# Patient Record
Sex: FEMALE | Race: WHITE | Employment: OTHER | URBAN - METROPOLITAN AREA
[De-identification: names, ages, dates, MRNs, and addresses within clinical notes are randomized per-mention and may not be internally consistent; named-entity substitution may affect disease eponyms.]

---

## 2017-01-06 ENCOUNTER — TELEPHONE (OUTPATIENT)
Dept: FAMILY MEDICINE CLINIC | Age: 52
End: 2017-01-06

## 2017-01-06 NOTE — TELEPHONE ENCOUNTER
Patient states her sugar levels have been high last night and this morning. It was 380 at 10am. Patient was advised by Dr Alcides Alfred to take an extra 6 units of Humalog (14 units all together). She verbalized understanding. She said she will call Monday to f/u with us.

## 2017-01-12 ENCOUNTER — TELEPHONE (OUTPATIENT)
Dept: FAMILY MEDICINE CLINIC | Age: 52
End: 2017-01-12

## 2017-01-12 RX ORDER — INSULIN LISPRO 100 [IU]/ML
INJECTION, SOLUTION INTRAVENOUS; SUBCUTANEOUS
Qty: 1 PACKAGE | Refills: 3 | Status: SHIPPED | OUTPATIENT
Start: 2017-01-12 | End: 2017-01-23 | Stop reason: SDUPTHER

## 2017-01-12 RX ORDER — INSULIN GLARGINE 100 [IU]/ML
INJECTION, SOLUTION SUBCUTANEOUS
Qty: 1 EACH | Refills: 3 | Status: SHIPPED | OUTPATIENT
Start: 2017-01-12 | End: 2017-01-23 | Stop reason: SDUPTHER

## 2017-01-12 RX ORDER — OXYBUTYNIN CHLORIDE 10 MG/1
10 TABLET, EXTENDED RELEASE ORAL DAILY
Qty: 30 TAB | Refills: 3 | Status: SHIPPED | OUTPATIENT
Start: 2017-01-12 | End: 2017-05-23 | Stop reason: SDUPTHER

## 2017-01-12 NOTE — TELEPHONE ENCOUNTER
Oxybutynin should be once daily. If she wants I can order long acting Ditropan once  Daily.  It should not be three times daily

## 2017-01-12 NOTE — TELEPHONE ENCOUNTER
Pt called in in ref to having a new script written for her insulin. Lantis 45 units and Humalog 16 units in order for her insurance to cover it. She will be picking up some today but for the next time it needs to be changed so that her insurance will pay for it. Oxybutynin is taken 3 times a day but on the bottle it says take one a day. Pt stats that Dr. Lary Teran changed it when she was here. So pt states that she needs a new script for this as well.  Please assist.

## 2017-01-12 NOTE — TELEPHONE ENCOUNTER
I spoke with patient and informed her that the oxybutinin is only supposed to be once a day. and she stated that she would rather have the long acting ditropan. I also told her that both insulin was refilled, and that ditropan was sent to her pharmacy also. She verbalized understanding.

## 2017-01-23 ENCOUNTER — HOSPITAL ENCOUNTER (OUTPATIENT)
Dept: LAB | Age: 52
Discharge: HOME | End: 2017-01-23
Payer: MEDICARE

## 2017-01-23 ENCOUNTER — OFFICE VISIT (OUTPATIENT)
Dept: FAMILY MEDICINE CLINIC | Age: 52
End: 2017-01-23

## 2017-01-23 VITALS
OXYGEN SATURATION: 94 % | WEIGHT: 204 LBS | BODY MASS INDEX: 34.83 KG/M2 | HEART RATE: 100 BPM | TEMPERATURE: 98.3 F | DIASTOLIC BLOOD PRESSURE: 76 MMHG | SYSTOLIC BLOOD PRESSURE: 114 MMHG | HEIGHT: 64 IN | RESPIRATION RATE: 18 BRPM

## 2017-01-23 DIAGNOSIS — M25.552 ARTHRALGIA OF LEFT HIP: ICD-10-CM

## 2017-01-23 DIAGNOSIS — E03.4 HYPOTHYROIDISM DUE TO ACQUIRED ATROPHY OF THYROID: ICD-10-CM

## 2017-01-23 DIAGNOSIS — E11.9 TYPE 2 DIABETES MELLITUS WITHOUT COMPLICATION, UNSPECIFIED LONG TERM INSULIN USE STATUS: Primary | ICD-10-CM

## 2017-01-23 DIAGNOSIS — E11.9 TYPE 2 DIABETES MELLITUS WITHOUT COMPLICATION, UNSPECIFIED LONG TERM INSULIN USE STATUS: ICD-10-CM

## 2017-01-23 DIAGNOSIS — E04.9 GOITER: ICD-10-CM

## 2017-01-23 DIAGNOSIS — K21.9 GASTROESOPHAGEAL REFLUX DISEASE WITHOUT ESOPHAGITIS: ICD-10-CM

## 2017-01-23 DIAGNOSIS — E78.00 HYPERCHOLESTEREMIA: ICD-10-CM

## 2017-01-23 LAB
ALBUMIN SERPL BCP-MCNC: 4.4 G/DL (ref 3.4–5)
ALBUMIN/GLOB SERPL: 1.2 {RATIO} (ref 0.8–1.7)
ALP SERPL-CCNC: 99 U/L (ref 45–117)
ALT SERPL-CCNC: 34 U/L (ref 13–56)
ANION GAP BLD CALC-SCNC: 13 MMOL/L (ref 3–18)
AST SERPL W P-5'-P-CCNC: 20 U/L (ref 15–37)
BASOPHILS # BLD AUTO: 0 K/UL (ref 0–0.06)
BASOPHILS # BLD: 0 % (ref 0–2)
BILIRUB SERPL-MCNC: 0.3 MG/DL (ref 0.2–1)
BUN SERPL-MCNC: 15 MG/DL (ref 7–18)
BUN/CREAT SERPL: 20 (ref 12–20)
CALCIUM SERPL-MCNC: 10.1 MG/DL (ref 8.5–10.1)
CHLORIDE SERPL-SCNC: 95 MMOL/L (ref 100–108)
CHOLEST SERPL-MCNC: 282 MG/DL
CO2 SERPL-SCNC: 26 MMOL/L (ref 21–32)
CREAT SERPL-MCNC: 0.74 MG/DL (ref 0.6–1.3)
CREAT UR-MCNC: 14.15 MG/DL (ref 30–125)
DIFFERENTIAL METHOD BLD: ABNORMAL
EOSINOPHIL # BLD: 0.1 K/UL (ref 0–0.4)
EOSINOPHIL NFR BLD: 1 % (ref 0–5)
ERYTHROCYTE [DISTWIDTH] IN BLOOD BY AUTOMATED COUNT: 13.7 % (ref 11.6–14.5)
EST. AVERAGE GLUCOSE BLD GHB EST-MCNC: 255 MG/DL
GLOBULIN SER CALC-MCNC: 3.6 G/DL (ref 2–4)
GLUCOSE SERPL-MCNC: 136 MG/DL (ref 74–99)
HBA1C MFR BLD: 10.5 % (ref 4.2–5.6)
HCT VFR BLD AUTO: 35.5 % (ref 35–45)
HDLC SERPL-MCNC: 40 MG/DL (ref 40–60)
HDLC SERPL: 7.1 {RATIO} (ref 0–5)
HGB BLD-MCNC: 11.5 G/DL (ref 12–16)
LDLC SERPL CALC-MCNC: ABNORMAL MG/DL (ref 0–100)
LIPID PROFILE,FLP: ABNORMAL
LYMPHOCYTES # BLD AUTO: 43 % (ref 21–52)
LYMPHOCYTES # BLD: 3.7 K/UL (ref 0.9–3.6)
MCH RBC QN AUTO: 26.7 PG (ref 24–34)
MCHC RBC AUTO-ENTMCNC: 32.4 G/DL (ref 31–37)
MCV RBC AUTO: 82.6 FL (ref 74–97)
MICROALBUMIN UR-MCNC: 0.5 MG/DL (ref 0–3)
MICROALBUMIN/CREAT UR-RTO: 35 MG/G (ref 0–30)
MONOCYTES # BLD: 0.4 K/UL (ref 0.05–1.2)
MONOCYTES NFR BLD AUTO: 5 % (ref 3–10)
NEUTS SEG # BLD: 4.5 K/UL (ref 1.8–8)
NEUTS SEG NFR BLD AUTO: 51 % (ref 40–73)
PLATELET # BLD AUTO: 381 K/UL (ref 135–420)
PMV BLD AUTO: 10.9 FL (ref 9.2–11.8)
POTASSIUM SERPL-SCNC: 4.3 MMOL/L (ref 3.5–5.5)
PROT SERPL-MCNC: 8 G/DL (ref 6.4–8.2)
RBC # BLD AUTO: 4.3 M/UL (ref 4.2–5.3)
SODIUM SERPL-SCNC: 134 MMOL/L (ref 136–145)
TRIGL SERPL-MCNC: 497 MG/DL (ref ?–150)
TSH SERPL DL<=0.05 MIU/L-ACNC: 0.46 UIU/ML (ref 0.36–3.74)
VLDLC SERPL CALC-MCNC: ABNORMAL MG/DL
WBC # BLD AUTO: 8.7 K/UL (ref 4.6–13.2)

## 2017-01-23 PROCEDURE — 80053 COMPREHEN METABOLIC PANEL: CPT | Performed by: INTERNAL MEDICINE

## 2017-01-23 PROCEDURE — 85025 COMPLETE CBC W/AUTO DIFF WBC: CPT | Performed by: INTERNAL MEDICINE

## 2017-01-23 PROCEDURE — 36415 COLL VENOUS BLD VENIPUNCTURE: CPT | Performed by: INTERNAL MEDICINE

## 2017-01-23 PROCEDURE — 83036 HEMOGLOBIN GLYCOSYLATED A1C: CPT | Performed by: INTERNAL MEDICINE

## 2017-01-23 PROCEDURE — 84443 ASSAY THYROID STIM HORMONE: CPT | Performed by: INTERNAL MEDICINE

## 2017-01-23 PROCEDURE — 82043 UR ALBUMIN QUANTITATIVE: CPT | Performed by: INTERNAL MEDICINE

## 2017-01-23 PROCEDURE — 80061 LIPID PANEL: CPT | Performed by: INTERNAL MEDICINE

## 2017-01-23 RX ORDER — INSULIN GLARGINE 100 [IU]/ML
INJECTION, SOLUTION SUBCUTANEOUS
Qty: 1 EACH | Refills: 3 | Status: SHIPPED | OUTPATIENT
Start: 2017-01-23 | End: 2017-06-13 | Stop reason: SDUPTHER

## 2017-01-23 RX ORDER — OMEPRAZOLE 20 MG/1
20 CAPSULE, DELAYED RELEASE ORAL 2 TIMES DAILY
Qty: 60 CAP | Refills: 0 | Status: SHIPPED | OUTPATIENT
Start: 2017-01-23 | End: 2017-02-06 | Stop reason: SDUPTHER

## 2017-01-23 RX ORDER — INSULIN LISPRO 100 [IU]/ML
INJECTION, SOLUTION INTRAVENOUS; SUBCUTANEOUS
Qty: 1 PACKAGE | Refills: 3 | Status: SHIPPED | OUTPATIENT
Start: 2017-01-23 | End: 2017-06-13 | Stop reason: SDUPTHER

## 2017-01-23 RX ORDER — GEMFIBROZIL 600 MG/1
600 TABLET, FILM COATED ORAL 2 TIMES DAILY
Qty: 60 TAB | Refills: 3 | Status: SHIPPED | OUTPATIENT
Start: 2017-01-23 | End: 2017-02-06 | Stop reason: SDUPTHER

## 2017-01-23 RX ORDER — IBUPROFEN 600 MG/1
600 TABLET ORAL
Qty: 40 TAB | Refills: 0 | Status: SHIPPED | OUTPATIENT
Start: 2017-01-23 | End: 2017-03-07 | Stop reason: SDUPTHER

## 2017-01-23 NOTE — PATIENT INSTRUCTIONS
Sliding scale for regular insulin( Humalog)    0 -100 - 0 units  101-150   - 3 units  151- 200   6 units  201 -250   9 units  251- 300  12 units  301 -350  15 units  351 - 400  18 units.

## 2017-01-23 NOTE — PROGRESS NOTES
1. Have you been to the ER, urgent care clinic since your last visit? Hospitalized since your last visit? No    2. Have you seen or consulted any other health care providers outside of the [facility] since your last visit? Include any pap smears or colon screening.  No

## 2017-01-23 NOTE — MR AVS SNAPSHOT
Visit Information Date & Time Provider Department Dept. Phone Encounter #  
 1/23/2017  3:15 PM Peter REYES Yesy Cervantes, [address] [phone] [id number] Follow-up Instructions Return in about 2 weeks (around 2/6/2017). Follow-up and Disposition History Your Appointments 2/13/2017 12:30 PM  
Follow Up with Peter Cervantes MD  
[facility] Appt Note: Return in about 2 months (around 2/12/2017). [phone]  
  
   
 [address] [place] [address] [address] Upcoming Health Maintenance Date Due FOBT Q 1 YEAR AGE 50-75 8/9/2015 MICROALBUMIN Q1 1/5/2017 HEMOGLOBIN A1C Q6M 2/15/2017 FOOT EXAM Q1 4/22/2017 MEDICARE YEARLY EXAM 4/23/2017 EYE EXAM RETINAL OR DILATED Q1 5/19/2017 LIPID PANEL Q1 8/15/2017 BREAST CANCER SCRN MAMMOGRAM 5/31/2018 DTaP/Tdap/Td series (2 - Td) 10/16/2018 PAP AKA CERVICAL CYTOLOGY 4/26/2019 Allergies as of 1/23/2017  Review Complete On: 1/23/2017 By: Peter Cervantes MD  
  
 Severity Noted Reaction Type Reactions Lipitor [Atorvastatin]    Other (comments)  
 myalgias Lisinopril  07/02/2014    Cough Aspirin Low 11/07/2012   Intolerance Other (comments) Nose bleeds Zocor [Simvastatin] Low 11/07/2012   Side Effect Other (comments) Causes pain in the left leg Current Immunizations  Reviewed on 10/21/2013 Name Date Influenza Vaccine 10/21/2013 11:54 AM  
 Influenza Vaccine (Quad) 9/8/2015  1:30 PM  
 Influenza Vaccine (Quad) PF 10/12/2016 Not reviewed this visit You Were Diagnosed With   
  
 Codes Comments Type 2 diabetes mellitus without complication, unspecified long term insulin use status (HCC)    -  Primary ICD-10-CM: E11.9 ICD-9-CM: 250.00 Gastroesophageal reflux disease without esophagitis     ICD-10-CM: K21.9 ICD-9-CM: 530.81   
 Arthralgia of left hip     ICD-10-CM: M25.552 ICD-9-CM: 719.45 Hypercholesteremia     ICD-10-CM: E78.00 ICD-9-CM: 272.0 DM type 2, uncontrolled, with neuropathy ([facility] [address]     ICD-10-CM: E11.40, E11.65 ICD-9-CM: 250.62, 357.2 Hypothyroidism due to acquired atrophy of thyroid     ICD-10-CM: E03.4 ICD-9-CM: 244.8, 246.8 Goiter     ICD-10-CM: E04.9 ICD-9-CM: 240.9 Vitals BP Pulse Temp Resp Height(growth percentile) Weight(growth percentile) 114/76 (BP 1 Location: Left arm, BP Patient Position: Sitting) 100 98.3 °F (36.8 °C) (Oral) 18 5' 4\" (1.626 m) 204 lb (92.5 kg) LMP SpO2 BMI OB Status Smoking Status 06/05/2014 94% 35.02 kg/m2 Postmenopausal Former Smoker Vitals History BMI and BSA Data Body Mass Index Body Surface Area 35.02 kg/m 2 2.04 m 2 Preferred Pharmacy Pharmacy Name Phone [id number] [address] [place] [address] [phone] Your Updated Medication List  
  
   
This list is accurate as of: 1/23/17  3:37 PM.  Always use your most recent med list.  
  
  
  
  
 albuterol 90 mcg/actuation inhaler Commonly known as:  PROVENTIL HFA, VENTOLIN HFA, PROAIR HFA Take 2 Puffs by inhalation every six (6) hours as needed for Wheezing. benztropine 0.5 mg tablet Commonly known as:  COGENTIN Take 0.5 mg by mouth two (2) times a day. ferrous sulfate 325 mg (65 mg iron) tablet Take 1 Tab by mouth two (2) times a day. fluticasone 50 mcg/actuation nasal spray Commonly known as:  Scotty Redo 2 Sprays by Both Nostrils route daily. gabapentin 100 mg capsule Commonly known as:  NEURONTIN Take 1 Cap by mouth nightly as needed. gemfibrozil 600 mg tablet Commonly known as:  LOPID Take 1 Tab by mouth two (2) times a day. glucose blood VI test strips strip Commonly known as:  [address] Monitor blood sugars twice daily hydrocortisone 1 % topical cream  
Commonly known as:  CORTAID Apply  to affected area two (2) times a day. use thin layer  
  
 ibuprofen 600 mg tablet Commonly known as:  MOTRIN Take 1 Tab by mouth every eight (8) hours as needed for Pain. insulin glargine 100 unit/mL (3 mL) pen Commonly known as:  LANTUS SOLOSTAR Administer  60  units subcut daily  
  
 insulin lispro 100 unit/mL kwikpen Commonly known as:  HUMALOG Administer 616 units 30 minutes before breakfast , lunch and dinner Insulin Needles (Disposable) 31 gauge x 5/16\" Ndle DM Lancets Misc Commonly known as:  ACCU-CHEK SOFTCLIX LANCETS  
(Accu-CHECK FAST CLIX LANCETS) monitor blood sugars three times daily  
  
 levothyroxine 175 mcg tablet Commonly known as:  SYNTHROID Take 1 Tab by mouth Daily (before breakfast). LORazepam 1 mg tablet Commonly known as:  ATIVAN Take 1 mg by mouth every four (4) hours as needed for Anxiety. losartan 25 mg tablet Commonly known as:  COZAAR Take 1 Tab by mouth daily. mupirocin 2 % ointment Commonly known as:  [employer] Apply  to affected area daily. omeprazole 20 mg capsule Commonly known as:  PRILOSEC Take 1 Cap by mouth two (2) times a day. oxybutynin chloride XL 10 mg CR tablet Commonly known as:  DITROPAN XL Take 1 Tab by mouth daily. pravastatin 80 mg tablet Commonly known as:  PRAVACHOL Take 1 Tab by mouth nightly. predniSONE 10 mg dose pack Commonly known as:  STERAPRED DS See administration instruction per 10mg dose pack  
  
 salicylic acid 17 % topical gel Apply  to affected area daily. Apply dime sized application to the callus on the left foot once daily  
  
 sertraline 100 mg tablet Commonly known as:  ZOLOFT Take 150 mg by mouth daily. SITagliptin-metFORMIN 50-1,000 mg per tablet Commonly known as:  Chyrel Breslow Take 1 Tab by mouth two (2) times daily (with meals). temazepam 15 mg capsule Commonly known as:  RESTORIL Take 15 mg by mouth nightly as needed for Sleep.  
  
 trifluoperazine 5 mg tablet Commonly known as:  STELAZINE Take 5 mg by mouth two (2) times a day. Prescriptions Sent to Pharmacy Refills  
 insulin lispro (HUMALOG) 100 unit/mL kwikpen 3 Sig: Administer 616 units 30 minutes before breakfast , lunch and dinner Class: Normal  
 [facility]acy: [facility] [place] Ph #: [phone]  
 omeprazole (PRILOSEC) 20 mg capsule 0 Sig: Take 1 Cap by mouth two (2) times a day. Class: Normal  
 [facility] Ph #: [phone] Route: Oral  
 ibuprofen (MOTRIN) 600 mg tablet 0 Sig: Take 1 Tab by mouth every eight (8) hours as needed for Pain. Class: Normal  
 [facility] Ph #: [phone] Route: Oral  
 SITagliptin-metFORMIN (JANUMET) 50-1,000 mg per tablet 1 Sig: Take 1 Tab by mouth two (2) times daily (with meals). Class: Normal  
 Pharmacy: [facility] [place] Ph #: [phone] Route: Oral  
 gemfibrozil (LOPID) 600 mg tablet 3 Sig: Take 1 Tab by mouth two (2) times a day. Class: Normal  
 Pharmacy: [facility] [place] Ph #: [phone] Route: Oral  
 insulin glargine (LANTUS SOLOSTAR) 100 unit/mL (3 mL) pen 3 Sig: Administer  60  units subcut daily Class: Normal  
 [facility]acy: [facility] [place] Ph #: [phone] Follow-up Instructions Return in about 2 weeks (around 2/6/2017). To-Do List   
 01/23/2017 Lab:  CBC WITH AUTOMATED DIFF   
  
 01/23/2017 Lab:  HEMOGLOBIN A1C WITH EAG   
  
 01/23/2017 Lab:  LIPID PANEL   
  
 01/23/2017 Lab: METABOLIC PANEL, COMPREHENSIVE   
  
 01/23/2017 Lab:  MICROALBUMIN, UR, RAND W/ MICROALBUMIN/CREA RATIO   
  
 01/23/2017 Lab:  TSH 3RD GENERATION   
  
 01/23/2017 Imaging:  US THYROID/PARATHYROID/SOFT TISS Patient Instructions Sliding scale for regular insulin( Humalog) 0 -100 - 0 units 101-150   - 3 units 151- 200   6 units 201 -250   9 units 251- 300  12 units 301 -350  15 units 351 - 400  18 units. Introducing [facility]! [facility] introduces [employer] patient portal. Now you can access parts of your medical record, email your doctor's office, and request medication refills online. 1. In your internet browser, go to [email or web address] 2. Click on the First Time User? Click Here link in the Sign In box. You will see the New Member Sign Up page. 3. Enter your [employer] Access Code exactly as it appears below. You will not need to use this code after youve completed the sign-up process. If you do not sign up before the expiration date, you must request a new code. · [employer] Access Code: [email or web address] Expires: 4/23/2017  3:36 PM 
 
4. Enter the last four digits of your Social Security Number (xxxx) and Date of Birth (mm/dd/yyyy) as indicated and click Submit. You will be taken to the next sign-up page. 5. Create a [employer] ID. This will be your [employer] login ID and cannot be changed, so think of one that is secure and easy to remember. 6. Create a [employer] password. You can change your password at any time. 7. Enter your Password Reset Question and Answer. This can be used at a later time if you forget your password. 8. Enter your e-mail address. You will receive e-mail notification when new information is available in [address] 9. Click Sign Up. You can now view and download portions of your medical record. 10. Click the Download Summary menu link to download a portable copy of your medical information. If you have questions, please visit the Frequently Asked Questions section of the [employer] website. Remember, [employer] is NOT to be used for urgent needs. For medical emergencies, dial 911. Now available from your iPhone and Android! Please provide this summary of care documentation to your next provider. Your primary care clinician is listed as Tawnya Pavon. If you have any questions after today's visit, please call [phone].

## 2017-01-23 NOTE — PROGRESS NOTES
Reyna Medina is a 46 y.o.  female and presents with     Chief Complaint   Patient presents with    Diabetes    Thyroid Problem    Insomnia    Cholesterol Problem       Pt says her blood sugars have been high. Pt forgets to take her insulin. Throat hurts. Pt takes omeprazoel for GERD. Pt is unable to sleep at night. Pt takes ativan prescribed ny her Haydee but that does not help her sleep. Pt used to take remeron but that made her gain wt . Pt used to take trazadone in the past.  Pt has nocturia times 2. Blood sugars have been between 200 -300 in the morning. Pt is not follow ing sldiing scale. Pt plans to qit drinking soda. Past Medical History   Diagnosis Date    Arthritis     Chronic pain     Depression     Diabetes ([address]     Hyperlipidemia     Hypothyroid     Medial meniscus tear      Left knee    Psychiatric disorder     Tobacco abuse, in remission      Past Surgical History   Procedure Laterality Date    Hx orthopaedic       Hand reconstructive surgery    Hx tubal ligation  2004     Current Outpatient Prescriptions   Medication Sig    insulin lispro (HUMALOG) 100 unit/mL kwikpen Administer 616 units 30 minutes before breakfast , lunch and dinner    omeprazole (PRILOSEC) 20 mg capsule Take 1 Cap by mouth two (2) times a day.  ibuprofen (MOTRIN) 600 mg tablet Take 1 Tab by mouth every eight (8) hours as needed for Pain.  SITagliptin-metFORMIN (JANUMET) 50-1,000 mg per tablet Take 1 Tab by mouth two (2) times daily (with meals).  gemfibrozil (LOPID) 600 mg tablet Take 1 Tab by mouth two (2) times a day.  insulin glargine (LANTUS SOLOSTAR) 100 unit/mL (3 mL) pen Administer  60  units subcut daily    oxybutynin chloride XL (DITROPAN XL) 10 mg CR tablet Take 1 Tab by mouth daily.  mupirocin (BACTROBAN) 2 % ointment Apply  to affected area daily.  gabapentin (NEURONTIN) 100 mg capsule Take 1 Cap by mouth nightly as needed.     Insulin Needles, Disposable, 31 gauge x 5/16\" ndle DM    temazepam (RESTORIL) 15 mg capsule Take 15 mg by mouth nightly as needed for Sleep.  pravastatin (PRAVACHOL) 80 mg tablet Take 1 Tab by mouth nightly.  glucose blood VI test strips (ACCU-CHEK SMARTVIEW TEST STRIP) strip Monitor blood sugars twice daily    losartan (COZAAR) 25 mg tablet Take 1 Tab by mouth daily.  levothyroxine (SYNTHROID) 175 mcg tablet Take 1 Tab by mouth Daily (before breakfast).  hydrocortisone (CORTAID) 1 % topical cream Apply  to affected area two (2) times a day. use thin layer    Lancets (ACCU-CHEK SOFTCLIX LANCETS) misc (Accu-CHECK FAST CLIX LANCETS) monitor blood sugars three times daily    salicylic acid 17 % topical gel Apply  to affected area daily. Apply dime sized application to the callus on the left foot once daily    fluticasone (FLONASE) 50 mcg/actuation nasal spray 2 Sprays by Both Nostrils route daily.  LORazepam (ATIVAN) 1 mg tablet Take 1 mg by mouth every four (4) hours as needed for Anxiety.  albuterol (PROVENTIL HFA, VENTOLIN HFA, PROAIR HFA) 90 mcg/actuation inhaler Take 2 Puffs by inhalation every six (6) hours as needed for Wheezing.  ferrous sulfate 325 mg (65 mg iron) tablet Take 1 Tab by mouth two (2) times a day.  benztropine (COGENTIN) 0.5 mg tablet Take 0.5 mg by mouth two (2) times a day.  trifluoperazine (STELAZINE) 5 mg tablet Take 5 mg by mouth two (2) times a day.  sertraline (ZOLOFT) 100 mg tablet Take 150 mg by mouth daily.  predniSONE (STERAPRED DS) 10 mg dose pack See administration instruction per 10mg dose pack     No current facility-administered medications for this visit.       Health Maintenance   Topic Date Due    FOBT Q 1 YEAR AGE 50-75  08/09/2015    MICROALBUMIN Q1  01/05/2017    HEMOGLOBIN A1C Q6M  02/15/2017    FOOT EXAM Q1  04/22/2017    MEDICARE YEARLY EXAM  04/23/2017    EYE EXAM RETINAL OR DILATED Q1  05/19/2017    LIPID PANEL Q1  08/15/2017    BREAST CANCER SCRN MAMMOGRAM  05/31/2018  DTaP/Tdap/Td series (2 - Td) 10/16/2018    PAP AKA CERVICAL CYTOLOGY  04/26/2019    Hepatitis C Screening  Completed    Pneumococcal 19-64 Medium Risk  Completed    INFLUENZA AGE 9 TO ADULT  Completed     Immunization History   Administered Date(s) Administered    Influenza Vaccine 10/21/2013    Influenza Vaccine (Quad) 09/08/2015    Influenza Vaccine (Quad) PF 10/12/2016     Patient's last menstrual period was 06/05/2014. Allergies and Intolerances: Allergies   Allergen Reactions    Lipitor [Atorvastatin] Other (comments)     myalgias    Lisinopril Cough    Aspirin Other (comments)     Nose bleeds    Zocor [Simvastatin] Other (comments)     Causes pain in the left leg       Family History:   Family History   Problem Relation Age of Onset    Alcohol abuse Mother    Neoma Linear Mother    Jeevan Balwinder Mother 61     breast cancer    Diabetes Mother     Hypertension Mother     Psychiatric Disorder Mother     Stroke Mother     Breast Cancer Mother     Alcohol abuse Father     Heart Disease Father     Diabetes Father     Hypertension Father     Lung Disease Father     Colon Cancer Father 72    Alcohol abuse Sister     Diabetes Sister     Psychiatric Disorder Sister     Alcohol abuse Brother     Alcohol abuse Paternal Grandmother     Psychiatric Disorder Son     Psychiatric Disorder       nephew    Ovarian Cancer Sister        Social History:   She  reports that she quit smoking about 5 years ago. Her smoking use included Cigarettes. She has a 35.00 pack-year smoking history. She has never used smokeless tobacco.  She  reports that she does not drink alcohol.             Review of Systems:   General: negative for - chills, fatigue, fever, weight change  Psych: negative for - anxiety, pos for depression, and insomnia  ENT: negative for - headaches, hearing change, nasal congestion, oral lesions, sneezing or sore throat  Heme/ Lymph: negative for - bleeding problems, bruising, pallor or swollen lymph nodes  Endo: negative for - hot flashes, polydipsia/polyuria or temperature intolerance  Resp: negative for - cough, shortness of breath or wheezing  CV: negative for - chest pain, edema or palpitations  GI: negative for - abdominal pain, change in bowel habits, constipation, diarrhea or nausea/vomiting  : negative for - dysuria, hematuria, incontinence, pelvic pain or vulvar/vaginal symptoms,pos for nocturia  MSK: negative for - joint pain, joint swelling or muscle pain  Neuro: negative for - confusion, headaches, seizures or weakness  Derm: negative for - dry skin, hair changes, rash or skin lesion changes          Physical:   Vitals:   Vitals:    01/23/17 1448   BP: 114/76   Pulse: 100   Resp: 18   Temp: 98.3 °F (36.8 °C)   TempSrc: Oral   SpO2: 94%   Weight: 204 lb (92.5 kg)   Height: 5' 4\" (1.626 m)           Exam:   HEENT- atraumatic,normocephalic, awake, oriented, well nourished  Neck - supple,no enlarged lymph nodes, no JVD, no thyromegaly  Chest- CTA, no rhonchi, no crackles  Heart- rrr, no murmurs / gallop/rub  Abdomen- soft,BS+,NT, no hepatosplenomegaly  Ext - no c/c/edema   Neuro- no focal deficits. Power 5/5 all extremities  Skin - warm,dry, no obvious rashes.           Review of Data:   LABS:   Lab Results   Component Value Date/Time    WBC 8.1 12/13/2015 01:28 PM    HGB 11.9 12/13/2015 01:28 PM    HCT 36.6 12/13/2015 01:28 PM    PLATELET 788 69/63/7022 01:28 PM     Lab Results   Component Value Date/Time    Sodium 136 08/15/2016 10:23 AM    Potassium 4.3 08/15/2016 10:23 AM    Chloride 100 08/15/2016 10:23 AM    CO2 24 08/15/2016 10:23 AM    Glucose 294 08/15/2016 10:23 AM    BUN 8 08/15/2016 10:23 AM    Creatinine 0.73 08/15/2016 10:23 AM     Lab Results   Component Value Date/Time    Cholesterol, total 212 08/15/2016 10:23 AM    HDL Cholesterol 38 08/15/2016 10:23 AM    LDL, calculated  08/15/2016 10:23 AM     LDL AND VLDL CHOLESTEROL NOT CALCULATED WHEN TRIGLYCERIDES >400 MG/DL OR HDL CHOLESTEROL <20 MG/DL    Triglyceride 602 08/15/2016 10:23 AM     No results found for: GPT        Impression / Plan:        ICD-10-CM ICD-9-CM    1. Type 2 diabetes mellitus without complication, unspecified long term insulin use status (HCC) E11.9 250.00 insulin lispro (HUMALOG) 100 unit/mL kwikpen      SITagliptin-metFORMIN (JANUMET) 50-1,000 mg per tablet      insulin glargine (LANTUS SOLOSTAR) 100 unit/mL (3 mL) pen      HEMOGLOBIN A1C WITH EAG      LIPID PANEL      METABOLIC PANEL, COMPREHENSIVE      CBC WITH AUTOMATED DIFF      MICROALBUMIN, UR, RAND W/ MICROALBUMIN/CREA RATIO   2. Gastroesophageal reflux disease without esophagitis K21.9 530.81 omeprazole (PRILOSEC) 20 mg capsule   3. Arthralgia of left hip M25.552 719.45 ibuprofen (MOTRIN) 600 mg tablet   4. Hypercholesteremia E78.00 272.0 gemfibrozil (LOPID) 600 mg tablet   5. DM type 2, uncontrolled, with neuropathy (HCC) E11.40 250.62 insulin glargine (LANTUS SOLOSTAR) 100 unit/mL (3 mL) pen    E11.65 357.2    6. Hypothyroidism due to acquired atrophy of thyroid E03.4 244.8 TSH 3RD GENERATION     246.8    7. Goiter E04.9 240.9 US THYROID/PARATHYROID/SOFT TISS     Needs to be compiant with diet and insulin. Pt denies headaches, nausea, vomiting or any symptoms s/o DKA. Will increase lantus to 45 units daily and strictly follow  Sliding scale. Explained to patient risk benefits of the medications. Advised patient to stop meds if having any side effects. Pt verbalized understanding of the instructions. I have discussed the diagnosis with the patient and the intended plan as seen in the above orders. The patient has received an after-visit summary and questions were answered concerning future plans. I have discussed medication side effects and warnings with the patient as well. I have reviewed the plan of care with the patient, accepted their input and they are in agreement with the treatment goals. Reviewed plan of care. Patient has provided input and agrees with goals.     Follow-up Disposition: Not on Susanne Mccormick MD

## 2017-02-03 ENCOUNTER — HOSPITAL ENCOUNTER (OUTPATIENT)
Dept: ULTRASOUND IMAGING | Age: 52
Discharge: HOME | End: 2017-02-03
Attending: INTERNAL MEDICINE
Payer: MEDICARE

## 2017-02-03 DIAGNOSIS — E04.9 GOITER: ICD-10-CM

## 2017-02-03 PROCEDURE — 76536 US EXAM OF HEAD AND NECK: CPT

## 2017-02-06 ENCOUNTER — OFFICE VISIT (OUTPATIENT)
Dept: FAMILY MEDICINE CLINIC | Age: 52
End: 2017-02-06

## 2017-02-06 VITALS
HEIGHT: 64 IN | HEART RATE: 92 BPM | TEMPERATURE: 97.9 F | OXYGEN SATURATION: 96 % | BODY MASS INDEX: 34.66 KG/M2 | WEIGHT: 203 LBS | RESPIRATION RATE: 19 BRPM | DIASTOLIC BLOOD PRESSURE: 83 MMHG | SYSTOLIC BLOOD PRESSURE: 116 MMHG

## 2017-02-06 DIAGNOSIS — K21.9 GASTROESOPHAGEAL REFLUX DISEASE WITHOUT ESOPHAGITIS: ICD-10-CM

## 2017-02-06 DIAGNOSIS — E03.4 HYPOTHYROIDISM DUE TO ACQUIRED ATROPHY OF THYROID: ICD-10-CM

## 2017-02-06 DIAGNOSIS — E78.00 HYPERCHOLESTEREMIA: ICD-10-CM

## 2017-02-06 RX ORDER — GEMFIBROZIL 600 MG/1
600 TABLET, FILM COATED ORAL 2 TIMES DAILY
Qty: 180 TAB | Refills: 3 | Status: SHIPPED | OUTPATIENT
Start: 2017-02-06 | End: 2018-05-01 | Stop reason: SDUPTHER

## 2017-02-06 RX ORDER — GABAPENTIN 100 MG/1
100 CAPSULE ORAL
Qty: 90 CAP | Refills: 1 | Status: SHIPPED | OUTPATIENT
Start: 2017-02-06 | End: 2017-06-13 | Stop reason: SDUPTHER

## 2017-02-06 RX ORDER — OMEPRAZOLE 20 MG/1
20 CAPSULE, DELAYED RELEASE ORAL 2 TIMES DAILY
Qty: 180 CAP | Refills: 1 | Status: SHIPPED | OUTPATIENT
Start: 2017-02-06 | End: 2017-06-13 | Stop reason: SDUPTHER

## 2017-02-06 RX ORDER — PRAVASTATIN SODIUM 80 MG/1
80 TABLET ORAL
Qty: 90 TAB | Refills: 1 | Status: SHIPPED | OUTPATIENT
Start: 2017-02-06 | End: 2017-06-13 | Stop reason: SDUPTHER

## 2017-02-06 RX ORDER — LEVOTHYROXINE SODIUM 175 UG/1
175 TABLET ORAL
Qty: 90 TAB | Refills: 1 | Status: SHIPPED | OUTPATIENT
Start: 2017-02-06 | End: 2017-06-13 | Stop reason: SDUPTHER

## 2017-02-06 NOTE — MR AVS SNAPSHOT
Visit Information Date & Time Provider Department Dept. Phone Encounter #  
 2/6/2017  3:00 PM Elvi Aponte, [address] [facility] [phone] [id number] Follow-up Instructions Return in about 10 weeks (around 4/17/2017). Upcoming Health Maintenance Date Due FOBT Q 1 YEAR AGE 50-75 8/9/2015 FOOT EXAM Q1 4/22/2017 MEDICARE YEARLY EXAM 4/23/2017 EYE EXAM RETINAL OR DILATED Q1 5/19/2017 HEMOGLOBIN A1C Q6M 7/23/2017 MICROALBUMIN Q1 1/23/2018 LIPID PANEL Q1 1/23/2018 BREAST CANCER SCRN MAMMOGRAM 5/31/2018 DTaP/Tdap/Td series (2 - Td) 10/16/2018 PAP AKA CERVICAL CYTOLOGY 4/26/2019 Allergies as of 2/6/2017  Review Complete On: 2/6/2017 By: Elvi Aponte MD  
  
 Severity Noted Reaction Type Reactions Lipitor [Atorvastatin]    Other (comments)  
 myalgias Lisinopril  07/02/2014    Cough Aspirin Low 11/07/2012   Intolerance Other (comments) Nose bleeds Zocor [Simvastatin] Low 11/07/2012   Side Effect Other (comments) Causes pain in the left leg Current Immunizations  Reviewed on 10/21/2013 Name Date Influenza Vaccine 10/21/2013 11:54 AM  
 Influenza Vaccine (Quad) 9/8/2015  1:30 PM  
 Influenza Vaccine (Quad) PF 10/12/2016 Not reviewed this visit You Were Diagnosed With   
  
 Codes Comments Gastroesophageal reflux disease without esophagitis     ICD-10-CM: K21.9 ICD-9-CM: 530.81 DM type 2, uncontrolled, with neuropathy ([facility] [address]     ICD-10-CM: E11.40, E11.65 ICD-9-CM: 250.62, 357.2 Hypercholesteremia     ICD-10-CM: E78.00 ICD-9-CM: 272.0 Hypothyroidism due to acquired atrophy of thyroid     ICD-10-CM: E03.4 ICD-9-CM: 244.8, 246.8 Vitals BP Pulse Temp Resp Height(growth percentile) Weight(growth percentile) 116/83 (BP 1 Location: Right arm, BP Patient Position: Sitting) 92 97.9 °F (36.6 °C) (Oral) 19 5' 4\" (1.626 m) 203 lb (92.1 kg) LMP SpO2 BMI OB Status Smoking Status 06/05/2014 96% 34.84 kg/m2 Postmenopausal Former Smoker BMI and BSA Data Body Mass Index Body Surface Area 34.84 kg/m 2 2.04 m 2 Preferred Pharmacy Pharmacy Name Phone [id number] [facility] [phone] Your Updated Medication List  
  
   
This list is accurate as of: 2/6/17  4:37 PM.  Always use your most recent med list.  
  
  
  
  
 albuterol 90 mcg/actuation inhaler Commonly known as:  PROVENTIL HFA, VENTOLIN HFA, PROAIR HFA Take 2 Puffs by inhalation every six (6) hours as needed for Wheezing. benztropine 0.5 mg tablet Commonly known as:  COGENTIN Take 0.5 mg by mouth two (2) times a day. ferrous sulfate 325 mg (65 mg iron) tablet Take 1 Tab by mouth two (2) times a day. fluticasone 50 mcg/actuation nasal spray Commonly known as:  Sonia Shames 2 Sprays by Both Nostrils route daily. gabapentin 100 mg capsule Commonly known as:  NEURONTIN Take 1 Cap by mouth nightly as needed. gemfibrozil 600 mg tablet Commonly known as:  LOPID Take 1 Tab by mouth two (2) times a day. glucose blood VI test strips strip Commonly known as:  [address] Monitor blood sugars twice daily  
  
 hydrocortisone 1 % topical cream  
Commonly known as:  CORTAID Apply  to affected area two (2) times a day. use thin layer  
  
 ibuprofen 600 mg tablet Commonly known as:  MOTRIN Take 1 Tab by mouth every eight (8) hours as needed for Pain. insulin glargine 100 unit/mL (3 mL) pen Commonly known as:  LANTUS SOLOSTAR Administer  60  units subcut daily  
  
 insulin lispro 100 unit/mL kwikpen Commonly known as:  HUMALOG Administer 616 units 30 minutes before breakfast , lunch and dinner Insulin Needles (Disposable) 31 gauge x 5/16\" Ndle DM Lancets Misc Commonly known as:  ACCU-CHEK SOFTCLIX LANCETS  
 (Accu-CHECK FAST CLIX LANCETS) monitor blood sugars three times daily  
  
 levothyroxine 175 mcg tablet Commonly known as:  SYNTHROID Take 1 Tab by mouth Daily (before breakfast). LORazepam 1 mg tablet Commonly known as:  ATIVAN Take 1 mg by mouth every four (4) hours as needed for Anxiety. losartan 25 mg tablet Commonly known as:  COZAAR Take 1 Tab by mouth daily. mupirocin 2 % ointment Commonly known as:  [facility] [employer] Apply  to affected area daily. omeprazole 20 mg capsule Commonly known as:  PRILOSEC Take 1 Cap by mouth two (2) times a day. oxybutynin chloride XL 10 mg CR tablet Commonly known as:  DITROPAN XL Take 1 Tab by mouth daily. pravastatin 80 mg tablet Commonly known as:  PRAVACHOL Take 1 Tab by mouth nightly. predniSONE 10 mg dose pack Commonly known as:  STERAPRED DS See administration instruction per 10mg dose pack  
  
 salicylic acid 17 % topical gel Apply  to affected area daily. Apply dime sized application to the callus on the left foot once daily  
  
 sertraline 100 mg tablet Commonly known as:  ZOLOFT Take 150 mg by mouth daily. SITagliptin-metFORMIN 50-1,000 mg per tablet Commonly known as:  Karolynn [place] Take 1 Tab by mouth two (2) times daily (with meals). temazepam 15 mg capsule Commonly known as:  RESTORIL Take 15 mg by mouth nightly as needed for Sleep.  
  
 trifluoperazine 5 mg tablet Commonly known as:  STELAZINE Take 5 mg by mouth two (2) times a day. Prescriptions Sent to Pharmacy Refills  
 omeprazole (PRILOSEC) 20 mg capsule 1 Sig: Take 1 Cap by mouth two (2) times a day. Class: Normal  
 Pharmacy: [facility] [address] [place] Ph #: [phone] Route: Oral  
 gabapentin (NEURONTIN) 100 mg capsule 1 Sig: Take 1 Cap by mouth nightly as needed.   
 Class: Normal  
 Pharmacy: [facility]  #: [phone] Route: Oral  
 pravastatin (PRAVACHOL) 80 mg tablet 1 Sig: Take 1 Tab by mouth nightly. Class: Normal  
 [facility]acy: [facility] [address] [place]  #: [phone] Route: Oral  
 gemfibrozil (LOPID) 600 mg tablet 3 Sig: Take 1 Tab by mouth two (2) times a day. Class: Normal  
 Pharmacy: [facility] [address] [place]  #: [phone] Route: Oral  
 levothyroxine (SYNTHROID) 175 mcg tablet 1 Sig: Take 1 Tab by mouth Daily (before breakfast). Class: Normal  
 Pharmacy: [facility] [address] [place]  #: [phone] Route: Oral  
  
Follow-up Instructions Return in about 10 weeks (around 4/17/2017). Introducing [facility] SERVICES! Lor Lam introduces [employer] patient portal. Now you can access parts of your medical record, email your doctor's office, and request medication refills online. 1. In your internet browser, go to [email or web address] 2. Click on the First Time User? Click Here link in the Sign In box. You will see the New Member Sign Up page. 3. Enter your [employer] Access Code exactly as it appears below. You will not need to use this code after youve completed the sign-up process. If you do not sign up before the expiration date, you must request a new code. · [employer] Access Code: [email or web address] Expires: 4/23/2017  3:36 PM 
 
4. Enter the last four digits of your Social Security Number (xxxx) and Date of Birth (mm/dd/yyyy) as indicated and click Submit. You will be taken to the next sign-up page. 5. Create a [employer] ID. This will be your [employer] login ID and cannot be changed, so think of one that is secure and easy to remember. 6. Create a [employer] password. You can change your password at any time. 7. Enter your Password Reset Question and Answer. This can be used at a later time if you forget your password. 8. Enter your e-mail address. You will receive e-mail notification when new information is available in [address] 9. Click Sign Up. You can now view and download portions of your medical record. 10. Click the Download Summary menu link to download a portable copy of your medical information. If you have questions, please visit the Frequently Asked Questions section of the [employer] website. Remember, [employer] is NOT to be used for urgent needs. For medical emergencies, dial 911. Now available from your iPhone and Android! Please provide this summary of care documentation to your next provider. Your primary care clinician is listed as Elvi Aponte. If you have any questions after today's visit, please call [phone].

## 2017-02-06 NOTE — PROGRESS NOTES
Armando Moncada is a 46 y.o.  female and presents with     Chief Complaint   Patient presents with    Diabetes    Hypertension    Cholesterol Problem    Hypothyroidism       Pt has bee nwatchign her diet and administering her insulin as she is supposed to . Pts blood sugars have improved. FBG - around 200  Before dinner 130-160. They used to be in the 300 range. Pt  Also had thyroid  ultrasound done  . She is takign her meds for thyroid problem and for high chol , no side effects reported. Past Medical History   Diagnosis Date    Arthritis     Chronic pain     Depression     Diabetes ([address]     Hyperlipidemia     Hypothyroid     Medial meniscus tear      Left knee    Psychiatric disorder     Tobacco abuse, in remission      Past Surgical History   Procedure Laterality Date    Hx orthopaedic       Hand reconstructive surgery    Hx tubal ligation  2004     Current Outpatient Prescriptions   Medication Sig    omeprazole (PRILOSEC) 20 mg capsule Take 1 Cap by mouth two (2) times a day.  gabapentin (NEURONTIN) 100 mg capsule Take 1 Cap by mouth nightly as needed.  pravastatin (PRAVACHOL) 80 mg tablet Take 1 Tab by mouth nightly.  gemfibrozil (LOPID) 600 mg tablet Take 1 Tab by mouth two (2) times a day.  levothyroxine (SYNTHROID) 175 mcg tablet Take 1 Tab by mouth Daily (before breakfast).  insulin lispro (HUMALOG) 100 unit/mL kwikpen Administer 616 units 30 minutes before breakfast , lunch and dinner    ibuprofen (MOTRIN) 600 mg tablet Take 1 Tab by mouth every eight (8) hours as needed for Pain.  SITagliptin-metFORMIN (JANUMET) 50-1,000 mg per tablet Take 1 Tab by mouth two (2) times daily (with meals).  insulin glargine (LANTUS SOLOSTAR) 100 unit/mL (3 mL) pen Administer  60  units subcut daily    oxybutynin chloride XL (DITROPAN XL) 10 mg CR tablet Take 1 Tab by mouth daily.     Insulin Needles, Disposable, 31 gauge x 5/16\" ndle DM    glucose blood VI test strips (ACCU-CHEK SMARTVIEW TEST STRIP) strip Monitor blood sugars twice daily    losartan (COZAAR) 25 mg tablet Take 1 Tab by mouth daily.  Lancets (ACCU-CHEK SOFTCLIX LANCETS) misc (Accu-CHECK FAST CLIX LANCETS) monitor blood sugars three times daily    LORazepam (ATIVAN) 1 mg tablet Take 1 mg by mouth every four (4) hours as needed for Anxiety.  ferrous sulfate 325 mg (65 mg iron) tablet Take 1 Tab by mouth two (2) times a day.  benztropine (COGENTIN) 0.5 mg tablet Take 0.5 mg by mouth two (2) times a day.  trifluoperazine (STELAZINE) 5 mg tablet Take 5 mg by mouth two (2) times a day.  sertraline (ZOLOFT) 100 mg tablet Take 150 mg by mouth daily.  predniSONE (STERAPRED DS) 10 mg dose pack See administration instruction per 10mg dose pack    mupirocin (BACTROBAN) 2 % ointment Apply  to affected area daily.  temazepam (RESTORIL) 15 mg capsule Take 15 mg by mouth nightly as needed for Sleep.  hydrocortisone (CORTAID) 1 % topical cream Apply  to affected area two (2) times a day. use thin layer    salicylic acid 17 % topical gel Apply  to affected area daily. Apply dime sized application to the callus on the left foot once daily    fluticasone (FLONASE) 50 mcg/actuation nasal spray 2 Sprays by Both Nostrils route daily.  albuterol (PROVENTIL HFA, VENTOLIN HFA, PROAIR HFA) 90 mcg/actuation inhaler Take 2 Puffs by inhalation every six (6) hours as needed for Wheezing. No current facility-administered medications for this visit.       Health Maintenance   Topic Date Due    FOBT Q 1 YEAR AGE 50-75  08/09/2015    FOOT EXAM Q1  04/22/2017    MEDICARE YEARLY EXAM  04/23/2017    EYE EXAM RETINAL OR DILATED Q1  05/19/2017    HEMOGLOBIN A1C Q6M  07/23/2017    MICROALBUMIN Q1  01/23/2018    LIPID PANEL Q1  01/23/2018    BREAST CANCER SCRN MAMMOGRAM  05/31/2018    DTaP/Tdap/Td series (2 - Td) 10/16/2018    PAP AKA CERVICAL CYTOLOGY  04/26/2019    Hepatitis C Screening  Completed    Pneumococcal 19-64 Medium Risk  Completed    INFLUENZA AGE 9 TO ADULT  Completed     Immunization History   Administered Date(s) Administered    Influenza Vaccine 10/21/2013    Influenza Vaccine (Quad) 09/08/2015    Influenza Vaccine (Quad) PF 10/12/2016     Patient's last menstrual period was 06/05/2014. Allergies and Intolerances: Allergies   Allergen Reactions    Lipitor [Atorvastatin] Other (comments)     myalgias    Lisinopril Cough    Aspirin Other (comments)     Nose bleeds    Zocor [Simvastatin] Other (comments)     Causes pain in the left leg       Family History:   Family History   Problem Relation Age of Onset    Alcohol abuse Mother    Yazidi Gandy Mother    Delmy Mark Mother 61     breast cancer    Diabetes Mother     Hypertension Mother     Psychiatric Disorder Mother     Stroke Mother     Breast Cancer Mother     Alcohol abuse Father     Heart Disease Father     Diabetes Father     Hypertension Father     Lung Disease Father     Colon Cancer Father 72    Alcohol abuse Sister     Diabetes Sister     Psychiatric Disorder Sister     Alcohol abuse Brother     Alcohol abuse Paternal Grandmother     Psychiatric Disorder Son     Psychiatric Disorder       nephew    Ovarian Cancer Sister        Social History:   She  reports that she quit smoking about 5 years ago. Her smoking use included Cigarettes. She has a 35.00 pack-year smoking history. She has never used smokeless tobacco.  She  reports that she does not drink alcohol.             Review of Systems:   General: negative for - chills, fatigue, fever, weight change  Psych: negative for - anxiety, depression, irritability or mood swings  ENT: negative for - headaches, hearing change, nasal congestion, oral lesions, sneezing or sore throat  Heme/ Lymph: negative for - bleeding problems, bruising, pallor or swollen lymph nodes  Endo: negative for - hot flashes, polydipsia/polyuria or temperature intolerance  Resp: negative for - cough, shortness of breath or wheezing  CV: negative for - chest pain, edema or palpitations  GI: negative for - abdominal pain, change in bowel habits, constipation, diarrhea or nausea/vomiting  : negative for - dysuria, hematuria, incontinence, pelvic pain or vulvar/vaginal symptoms  MSK: negative for - joint pain, joint swelling or muscle pain  Neuro: negative for - confusion, headaches, seizures or weakness  Derm: negative for - dry skin, hair changes, rash or skin lesion changes          Physical:   Vitals:   Vitals:    02/06/17 1527   BP: 116/83   Pulse: 92   Resp: 19   Temp: 97.9 °F (36.6 °C)   TempSrc: Oral   SpO2: 96%   Weight: 203 lb (92.1 kg)   Height: 5' 4\" (1.626 m)           Exam:   HEENT- atraumatic,normocephalic, awake, oriented, well nourished  Neck - supple,no enlarged lymph nodes, no JVD, no thyromegaly  Chest- CTA, no rhonchi, no crackles  Heart- rrr, no murmurs / gallop/rub  Abdomen- soft,BS+,NT, no hepatosplenomegaly  Ext - no c/c/edema   Neuro- no focal deficits. Power 5/5 all extremities  Skin - warm,dry, no obvious rashes.           Review of Data:   LABS:   Lab Results   Component Value Date/Time    WBC 8.7 01/23/2017 03:54 PM    HGB 11.5 01/23/2017 03:54 PM    HCT 35.5 01/23/2017 03:54 PM    PLATELET 383 75/12/8465 03:54 PM     Lab Results   Component Value Date/Time    Sodium 134 01/23/2017 03:54 PM    Potassium 4.3 01/23/2017 03:54 PM    Chloride 95 01/23/2017 03:54 PM    CO2 26 01/23/2017 03:54 PM    Glucose 136 01/23/2017 03:54 PM    BUN 15 01/23/2017 03:54 PM    Creatinine 0.74 01/23/2017 03:54 PM     Lab Results   Component Value Date/Time    Cholesterol, total 282 01/23/2017 03:54 PM    HDL Cholesterol 40 01/23/2017 03:54 PM    LDL, calculated  01/23/2017 03:54 PM     LDL AND VLDL CHOLESTEROL NOT CALCULATED WHEN TRIGLYCERIDES >400 MG/DL OR HDL CHOLESTEROL <20 MG/DL    Triglyceride 497 01/23/2017 03:54 PM     No results found for: GPT        Impression / Plan: ICD-10-CM ICD-9-CM    1. Gastroesophageal reflux disease without esophagitis K21.9 530.81 omeprazole (PRILOSEC) 20 mg capsule   2. DM type 2, uncontrolled, with neuropathy (HCC) E11.40 250.62 gabapentin (NEURONTIN) 100 mg capsule    E11.65 357.2    3. Hypercholesteremia E78.00 272.0 pravastatin (PRAVACHOL) 80 mg tablet      gemfibrozil (LOPID) 600 mg tablet   4. Hypothyroidism due to acquired atrophy of thyroid E03.4 244.8 levothyroxine (SYNTHROID) 175 mcg tablet     246.8      HTN/Hypothyroidism /Hytperchol -stable    Dm - sugars impriving    Mild hterogenous thyroid , no goiter, no nodules. Explained to patient risk benefits of the medications. Advised patient to stop meds if having any side effects. Pt verbalized understanding of the instructions. I have discussed the diagnosis with the patient and the intended plan as seen in the above orders. The patient has received an after-visit summary and questions were answered concerning future plans. I have discussed medication side effects and warnings with the patient as well. I have reviewed the plan of care with the patient, accepted their input and they are in agreement with the treatment goals. Reviewed plan of care. Patient has provided input and agrees with goals.     Follow-up Disposition: Not on Glennie Goldberg, MD

## 2017-03-07 DIAGNOSIS — M25.552 ARTHRALGIA OF LEFT HIP: ICD-10-CM

## 2017-03-08 RX ORDER — IBUPROFEN 600 MG/1
TABLET ORAL
Qty: 40 TAB | Refills: 0 | Status: SHIPPED | OUTPATIENT
Start: 2017-03-08 | End: 2017-04-10 | Stop reason: SDUPTHER

## 2017-03-27 ENCOUNTER — TELEPHONE (OUTPATIENT)
Dept: FAMILY MEDICINE CLINIC | Age: 52
End: 2017-03-27

## 2017-03-27 NOTE — TELEPHONE ENCOUNTER
[facility] called stating they were at patient home requesting A1c information. Spoke with pt, 3 identifiers used and patient gave permission to give A1c results. This encounter will be closed.

## 2017-04-10 DIAGNOSIS — M25.552 ARTHRALGIA OF LEFT HIP: ICD-10-CM

## 2017-04-11 RX ORDER — IBUPROFEN 600 MG/1
TABLET ORAL
Qty: 40 TAB | Refills: 0 | Status: SHIPPED | OUTPATIENT
Start: 2017-04-11 | End: 2017-05-10 | Stop reason: SDUPTHER

## 2017-04-18 ENCOUNTER — HOSPITAL ENCOUNTER (OUTPATIENT)
Dept: LAB | Age: 52
Discharge: HOME | End: 2017-04-18

## 2017-04-18 ENCOUNTER — OFFICE VISIT (OUTPATIENT)
Dept: FAMILY MEDICINE CLINIC | Age: 52
End: 2017-04-18

## 2017-04-18 VITALS
HEART RATE: 78 BPM | TEMPERATURE: 97.3 F | RESPIRATION RATE: 18 BRPM | SYSTOLIC BLOOD PRESSURE: 115 MMHG | WEIGHT: 204 LBS | HEIGHT: 64 IN | DIASTOLIC BLOOD PRESSURE: 75 MMHG | BODY MASS INDEX: 34.83 KG/M2 | OXYGEN SATURATION: 97 %

## 2017-04-18 DIAGNOSIS — Z00.00 MEDICARE ANNUAL WELLNESS VISIT, SUBSEQUENT: Primary | ICD-10-CM

## 2017-04-18 DIAGNOSIS — Z00.00 ROUTINE GENERAL MEDICAL EXAMINATION AT A HEALTH CARE FACILITY: ICD-10-CM

## 2017-04-18 DIAGNOSIS — Z12.11 COLON CANCER SCREENING: ICD-10-CM

## 2017-04-18 DIAGNOSIS — E03.4 HYPOTHYROIDISM DUE TO ACQUIRED ATROPHY OF THYROID: ICD-10-CM

## 2017-04-18 DIAGNOSIS — Z71.89 ADVANCE CARE PLANNING: ICD-10-CM

## 2017-04-18 DIAGNOSIS — E78.00 HYPERCHOLESTEREMIA: ICD-10-CM

## 2017-04-18 DIAGNOSIS — E11.9 DM TYPE 2, NOT AT GOAL (HCC): ICD-10-CM

## 2017-04-18 DIAGNOSIS — Z13.39 SCREENING FOR ALCOHOLISM: ICD-10-CM

## 2017-04-18 DIAGNOSIS — Z12.39 BREAST CANCER SCREENING: ICD-10-CM

## 2017-04-18 PROCEDURE — 99001 SPECIMEN HANDLING PT-LAB: CPT | Performed by: INTERNAL MEDICINE

## 2017-04-18 NOTE — PROGRESS NOTES
This is a Subsequent Medicare Annual Wellness Visit providing Personalized Prevention Plan Services (PPPS) (Performed 12 months after initial AWV and PPPS )    I have reviewed the patient's medical history in detail and updated the computerized patient record. History     Past Medical History:   Diagnosis Date    Arthritis     Chronic pain     Depression     Diabetes ([address]     Hyperlipidemia     Hypothyroid     Medial meniscus tear     Left knee    Psychiatric disorder     Tobacco abuse, in remission       Past Surgical History:   Procedure Laterality Date    HX ORTHOPAEDIC      Hand reconstructive surgery    HX TUBAL LIGATION  2004     Current Outpatient Prescriptions   Medication Sig Dispense Refill    ibuprofen (MOTRIN) 600 mg tablet take 1 tablet by mouth every 8 hours if needed with food if needed for pain 40 Tab 0    gabapentin (NEURONTIN) 100 mg capsule Take 1 Cap by mouth nightly as needed. 90 Cap 1    pravastatin (PRAVACHOL) 80 mg tablet Take 1 Tab by mouth nightly. 90 Tab 1    gemfibrozil (LOPID) 600 mg tablet Take 1 Tab by mouth two (2) times a day. 180 Tab 3    levothyroxine (SYNTHROID) 175 mcg tablet Take 1 Tab by mouth Daily (before breakfast). 90 Tab 1    insulin lispro (HUMALOG) 100 unit/mL kwikpen Administer 616 units 30 minutes before breakfast , lunch and dinner 1 Package 3    SITagliptin-metFORMIN (JANUMET) 50-1,000 mg per tablet Take 1 Tab by mouth two (2) times daily (with meals). 180 Tab 1    insulin glargine (LANTUS SOLOSTAR) 100 unit/mL (3 mL) pen Administer  60  units subcut daily 1 Each 3    oxybutynin chloride XL (DITROPAN XL) 10 mg CR tablet Take 1 Tab by mouth daily. 30 Tab 3    Insulin Needles, Disposable, 31 gauge x 5/16\" ndle DM 1 Package 11    glucose blood VI test strips (ACCU-CHEK SMARTVIEW TEST STRIP) strip Monitor blood sugars twice daily 100 Strip 6    losartan (COZAAR) 25 mg tablet Take 1 Tab by mouth daily.  90 Tab 1    hydrocortisone (CORTAID) 1 % topical cream Apply  to affected area two (2) times a day. use thin layer 30 g 0    Lancets (ACCU-CHEK SOFTCLIX LANCETS) misc (Accu-CHECK FAST CLIX LANCETS) monitor blood sugars three times daily 3 Each 3    LORazepam (ATIVAN) 1 mg tablet Take 1 mg by mouth every four (4) hours as needed for Anxiety.  albuterol (PROVENTIL HFA, VENTOLIN HFA, PROAIR HFA) 90 mcg/actuation inhaler Take 2 Puffs by inhalation every six (6) hours as needed for Wheezing. 1 Inhaler 3    ferrous sulfate 325 mg (65 mg iron) tablet Take 1 Tab by mouth two (2) times a day. 60 Tab 3    benztropine (COGENTIN) 0.5 mg tablet Take 0.5 mg by mouth two (2) times a day.  trifluoperazine (STELAZINE) 5 mg tablet Take 5 mg by mouth two (2) times a day.  sertraline (ZOLOFT) 100 mg tablet Take 150 mg by mouth daily.  omeprazole (PRILOSEC) 20 mg capsule Take 1 Cap by mouth two (2) times a day. 180 Cap 1    predniSONE (STERAPRED DS) 10 mg dose pack See administration instruction per 10mg dose pack 21 Tab 0    mupirocin (BACTROBAN) 2 % ointment Apply  to affected area daily. 22 g 0    temazepam (RESTORIL) 15 mg capsule Take 15 mg by mouth nightly as needed for Sleep.  salicylic acid 17 % topical gel Apply  to affected area daily. Apply dime sized application to the callus on the left foot once daily 14 g 3    fluticasone (FLONASE) 50 mcg/actuation nasal spray 2 Sprays by Both Nostrils route daily.  3 Bottle 4     Allergies   Allergen Reactions    Lipitor [Atorvastatin] Other (comments)     myalgias    Lisinopril Cough    Aspirin Other (comments)     Nose bleeds    Zocor [Simvastatin] Other (comments)     Causes pain in the left leg     Family History   Problem Relation Age of Onset    Alcohol abuse Mother     Arthritis-osteo Mother     Cancer Mother 61     breast cancer    Diabetes Mother     Hypertension Mother     Psychiatric Disorder Mother     Stroke Mother     Breast Cancer Mother     Alcohol abuse Father    Luis Daniel Moagustín Heart Disease Father     Diabetes Father     Hypertension Father     Lung Disease Father     Colon Cancer Father 72    Alcohol abuse Sister     Diabetes Sister     Psychiatric Disorder Sister     Alcohol abuse Brother     Alcohol abuse Paternal Grandmother     Psychiatric Disorder Son     Psychiatric Disorder       nephew    Ovarian Cancer Sister      Social History   Substance Use Topics    Smoking status: Former Smoker     Packs/day: 1.00     Years: 35.00     Types: Cigarettes     Quit date: 11/7/2011    Smokeless tobacco: Never Used    Alcohol use No      Comment: quit 10 years ago     Patient Active Problem List   Diagnosis Code    Hypothyroidism E03.9    Hypercholesteremia E78.00    Diabetes mellitus [address] [address] E11.9    Joint pain M25.50    Tear of meniscus of left knee S83.207A    Depression F32.9    Chest pain, unspecified R07.9    Preoperative risk stratification Z01.818    Back pain M54.9    Left low back pain M54.5    Encounter for long-term (current) use of medications Z79.899    History of bipolar disorder Z86.59    History of alcohol abuse Z87.898    Chronic pain syndrome G89.4    Lumbar degenerative disc disease M51.36    Facet arthritis of lumbar region ([facility] [address] M46.96    Hypothyroidism due to acquired atrophy of thyroid E03.4    DM type 2, not at goal [facility]) E11.9       Depression Risk Factor Screening:     PHQ 2 / 9, over the last two weeks 2/27/2014   Little interest or pleasure in doing things Not at all   Feeling down, depressed or hopeless Not at all   Total Score PHQ 2 0     Alcohol Risk Factor Screening: On any occasion during the past 3 months, have you had more than 3 drinks containing alcohol? No    Do you average more than 7 drinks per week? No      Functional Ability and Level of Safety:     Hearing Loss   none    Activities of Daily Living   Self-care.    Requires assistance with: no ADLs    Fall Risk     Fall Risk Assessment, last 12 mths 2/27/2014   Able to walk? Yes   Fall in past 12 months? No     Abuse Screen   Patient is not abused    Review of Systems   A comprehensive review of systems was negative except for: Musculoskeletal: positive for arthralgias    Physical Examination     Evaluation of Cognitive Function:  Mood/affect:  neutral  Appearance: age appropriate  Family member/caregiver input:  none    Visit Vitals    /75 (BP 1 Location: Left arm, BP Patient Position: Sitting)    Pulse 78    Temp 97.3 °F (36.3 °C) (Oral)    Resp 18    Ht 5' 4\" (1.626 m)    Wt 204 lb (92.5 kg)    LMP 06/05/2014    SpO2 97%    BMI 35.02 kg/m2     General:  Alert, cooperative, no distress, appears stated age. Head:  Normocephalic, without obvious abnormality, atraumatic. Eyes:  Conjunctivae/corneas clear. PERRL, EOMs intact. Fundi benign. Ears:  Normal TMs and external ear canals both ears. Nose: Nares normal. Septum midline. Mucosa normal. No drainage or sinus tenderness. Throat: Lips, mucosa, and tongue normal. Teeth and gums normal.   Neck: Supple, symmetrical, trachea midline, no adenopathy, thyroid: no enlargement/tenderness/nodules, no carotid bruit and no JVD. Back:   Symmetric, no curvature. ROM normal. No CVA tenderness. Lungs:   Clear to auscultation bilaterally. Chest wall:  No tenderness or deformity. Heart:  Regular rate and rhythm, S1, S2 normal, no murmur, click, rub or gallop. Abdomen:   Soft, non-tender. Bowel sounds normal. No masses,  No organomegaly. Extremities: Extremities normal, atraumatic, no cyanosis or edema. Pulses: 2+ and symmetric all extremities. Skin: Skin color, texture, turgor normal. No rashes or lesions. Lymph nodes: Cervical, supraclavicular, and axillary nodes normal.   Neurologic: CNII-XII intact. Normal strength, sensation and reflexes throughout.   Diabetic foot exam:     Left: Reflexes 2+     Vibratory sensation normal    Proprioception normal   Sharp/dull discrimination normal Filament test normal sensation with micro filament   Pulse DP: 2+ (normal)   Pulse PT: 2+ (normal)   Deformities: None  Right: Reflexes 2+   Vibratory sensation normal   Proprioception normal   Sharp/dull discrimination normal   Filament test normal sensation with micro filament   Pulse DP: 2+ (normal)   Pulse PT: 2+ (normal)   Deformities: None       Patient Care Team:  Kelley Manzano MD as PCP - General (Internal Medicine)  Brenda Pena MD (Cardiology)  Ibeth Henao as Nurse Navigator    Advice/Referrals/Counseling   Education and counseling provided:  Are appropriate based on today's review and evaluation      Assessment/Plan   current treatment plan is effective, no change in therapy.

## 2017-04-18 NOTE — MR AVS SNAPSHOT
Visit Information Date & Time Provider Department Dept. Phone Encounter #  
 4/18/2017  9:30 AM [id number] [phone] [id number] Follow-up Instructions Return in 3 months (on 7/18/2017). Upcoming Health Maintenance Date Due FOBT Q 1 YEAR AGE 50-75 8/9/2015 FOOT EXAM Q1 4/22/2017 EYE EXAM RETINAL OR DILATED Q1 5/19/2017 HEMOGLOBIN A1C Q6M 7/23/2017 MICROALBUMIN Q1 1/23/2018 LIPID PANEL Q1 1/23/2018 MEDICARE YEARLY EXAM 4/19/2018 BREAST CANCER SCRN MAMMOGRAM 5/31/2018 DTaP/Tdap/Td series (2 - Td) 10/16/2018 PAP AKA CERVICAL CYTOLOGY 4/26/2019 Allergies as of 4/18/2017  Review Complete On: 4/18/2017 By: Michelle Lozada LPN Severity Noted Reaction Type Reactions Lipitor [Atorvastatin]    Other (comments)  
 myalgias Lisinopril  07/02/2014    Cough Aspirin Low 11/07/2012   Intolerance Other (comments) Nose bleeds Zocor [Simvastatin] Low 11/07/2012   Side Effect Other (comments) Causes pain in the left leg Current Immunizations  Reviewed on 10/21/2013 Name Date Influenza Vaccine 10/21/2013 11:54 AM  
 Influenza Vaccine (Quad) 9/8/2015  1:30 PM  
 Influenza Vaccine (Quad) PF 10/12/2016 Not reviewed this visit You Were Diagnosed With   
  
 Codes Comments Medicare annual wellness visit, subsequent    -  Primary ICD-10-CM: Z00.00 ICD-9-CM: V70.0 DM type 2, not at goal [facility])     ICD-10-CM: E11.9 ICD-9-CM: 250.00 Hypothyroidism due to acquired atrophy of thyroid     ICD-10-CM: E03.4 ICD-9-CM: 244.8, 246.8 Hypercholesteremia     ICD-10-CM: E78.00 ICD-9-CM: 272.0 Breast cancer screening     ICD-10-CM: Z12.39 
ICD-9-CM: V76.10 Colon cancer screening     ICD-10-CM: Z12.11 ICD-9-CM: V76.51 Routine general medical examination at a health care facility     ICD-10-CM: Z00.00 ICD-9-CM: V70.0  Screening for alcoholism     ICD-10-CM: Z13.89 
 ICD-9-CM: V79.1 Advance care planning     ICD-10-CM: Z71.89 ICD-9-CM: V65.49 Vitals BP Pulse Temp Resp Height(growth percentile) Weight(growth percentile) 115/75 (BP 1 Location: Left arm, BP Patient Position: Sitting) 78 97.3 °F (36.3 °C) (Oral) 18 5' 4\" (1.626 m) 204 lb (92.5 kg) LMP SpO2 BMI OB Status Smoking Status 06/05/2014 97% 35.02 kg/m2 Postmenopausal Former Smoker Vitals History BMI and BSA Data Body Mass Index Body Surface Area 35.02 kg/m 2 2.04 m 2 Preferred Pharmacy Pharmacy Name Phone [phone] [facility] [phone] Your Updated Medication List  
  
   
This list is accurate as of: 4/18/17  9:58 AM.  Always use your most recent med list.  
  
  
  
  
 albuterol 90 mcg/actuation inhaler Commonly known as:  PROVENTIL HFA, VENTOLIN HFA, PROAIR HFA Take 2 Puffs by inhalation every six (6) hours as needed for Wheezing. benztropine 0.5 mg tablet Commonly known as:  COGENTIN Take 0.5 mg by mouth two (2) times a day. ferrous sulfate 325 mg (65 mg iron) tablet Take 1 Tab by mouth two (2) times a day. fluticasone 50 mcg/actuation nasal spray Commonly known as:  Radha Earnest 2 Sprays by Both Nostrils route daily. gabapentin 100 mg capsule Commonly known as:  NEURONTIN Take 1 Cap by mouth nightly as needed. gemfibrozil 600 mg tablet Commonly known as:  LOPID Take 1 Tab by mouth two (2) times a day. glucose blood VI test strips strip Commonly known as:  [address] Monitor blood sugars twice daily  
  
 hydrocortisone 1 % topical cream  
Commonly known as:  CORTAID Apply  to affected area two (2) times a day. use thin layer  
  
 ibuprofen 600 mg tablet Commonly known as:  MOTRIN  
take 1 tablet by mouth every 8 hours if needed with food if needed for pain  
  
 insulin glargine 100 unit/mL (3 mL) pen Commonly known as:  LANTUS SOLOSTAR Administer  60  units subcut daily  
  
 insulin lispro 100 unit/mL kwikpen Commonly known as:  HUMALOG Administer 616 units 30 minutes before breakfast , lunch and dinner Insulin Needles (Disposable) 31 gauge x 5/16\" Ndle DM Lancets Misc Commonly known as:  ACCU-CHEK SOFTCLIX LANCETS  
(Accu-CHECK FAST CLIX LANCETS) monitor blood sugars three times daily  
  
 levothyroxine 175 mcg tablet Commonly known as:  SYNTHROID Take 1 Tab by mouth Daily (before breakfast). LORazepam 1 mg tablet Commonly known as:  ATIVAN Take 1 mg by mouth every four (4) hours as needed for Anxiety. losartan 25 mg tablet Commonly known as:  COZAAR Take 1 Tab by mouth daily. mupirocin 2 % ointment Commonly known as:  [facility] Apply  to affected area daily. omeprazole 20 mg capsule Commonly known as:  PRILOSEC Take 1 Cap by mouth two (2) times a day. oxybutynin chloride XL 10 mg CR tablet Commonly known as:  DITROPAN XL Take 1 Tab by mouth daily. pravastatin 80 mg tablet Commonly known as:  PRAVACHOL Take 1 Tab by mouth nightly. predniSONE 10 mg dose pack Commonly known as:  STERAPRED DS See administration instruction per 10mg dose pack  
  
 salicylic acid 17 % topical gel Apply  to affected area daily. Apply dime sized application to the callus on the left foot once daily  
  
 sertraline 100 mg tablet Commonly known as:  ZOLOFT Take 150 mg by mouth daily. SITagliptin-metFORMIN 50-1,000 mg per tablet Commonly known as:  Chyrel Corns Take 1 Tab by mouth two (2) times daily (with meals). temazepam 15 mg capsule Commonly known as:  RESTORIL Take 15 mg by mouth nightly as needed for Sleep.  
  
 trifluoperazine 5 mg tablet Commonly known as:  STELAZINE Take 5 mg by mouth two (2) times a day. We Performed the Following ADVANCE CARE PLANNING FIRST 30 MINS [33128 CPT(R)] Follow-up Instructions Return in 3 months (on 7/18/2017). To-Do List   
 04/18/2017 Lab:  HEMOGLOBIN A1C WITH EAG   
  
 04/18/2017 Lab:  LIPID PANEL   
  
 04/18/2017 Imaging:  SUNG MAMMO BI SCREENING INCL CAD   
  
 04/18/2017 Lab:  METABOLIC PANEL, COMPREHENSIVE   
  
 04/18/2017 Lab:  OCCULT BLOOD, IMMUNOASSAY (FIT) Introducing [facility]! [facility] introduces "[employer]" patient portal. Now you can access parts of your medical record, email your doctor's office, and request medication refills online. 1. In your internet browser, go to [email or web address] 2. Click on the First Time User? Click Here link in the Sign In box. You will see the New Member Sign Up page. 3. Enter your "[employer]" Access Code exactly as it appears below. You will not need to use this code after youve completed the sign-up process. If you do not sign up before the expiration date, you must request a new code. · "[employer]" Access Code: [email or web address] Expires: 4/23/2017  4:36 PM 
 
4. Enter the last four digits of your Social Security Number (xxxx) and Date of Birth (mm/dd/yyyy) as indicated and click Submit. You will be taken to the next sign-up page. 5. Create a "[employer]" ID. This will be your "[employer]" login ID and cannot be changed, so think of one that is secure and easy to remember. 6. Create a "[employer]" password. You can change your password at any time. 7. Enter your Password Reset Question and Answer. This can be used at a later time if you forget your password. 8. Enter your e-mail address. You will receive e-mail notification when new information is available in [address] 9. Click Sign Up. You can now view and download portions of your medical record. 10. Click the Download Summary menu link to download a portable copy of your medical information.  
 
If you have questions, please visit the Frequently Asked Questions section of the [employer]. Remember, [employer] is NOT to be used for urgent needs. For medical emergencies, dial 911. Now available from your iPhone and Android! Please provide this summary of care documentation to your next provider. Your primary care clinician is listed as Parth Quick. If you have any questions after today's visit, please call [phone].

## 2017-04-18 NOTE — ACP (ADVANCE CARE PLANNING)
Advance Care Planning (ACP) Provider Note - Comprehensive     Date of ACP Conversation: 04/18/17  Persons included in Conversation:  patient  Length of ACP Conversation in minutes:  16 minutes    Authorized Decision Maker (if patient is incapable of making informed decisions): This person is:  daughter          General ACP for ALL Patients with Decision Making Capacity:   Importance of advance care planning, including choosing a healthcare agent to communicate patient's healthcare decisions if patient lost the ability to make decisions, such as after a sudden illness or accident    Review of Existing Advance Directive:  does not have one    For Serious or Chronic Illness:  Understanding of medical condition    Understanding of CPR, goals and expected outcomes, benefits and burdens discussed.     Interventions Provided:  Recommended completion of Advance Directive form after review of ACP materials and conversation with prospective healthcare agent

## 2017-04-19 ENCOUNTER — HOSPITAL ENCOUNTER (OUTPATIENT)
Dept: LAB | Age: 52
Discharge: HOME | End: 2017-04-19
Payer: MEDICARE

## 2017-04-19 DIAGNOSIS — Z12.11 COLON CANCER SCREENING: ICD-10-CM

## 2017-04-19 LAB
ALBUMIN SERPL-MCNC: 4.7 G/DL (ref 3.5–5.5)
ALBUMIN/GLOB SERPL: 1.5 {RATIO} (ref 1.2–2.2)
ALP SERPL-CCNC: 89 IU/L (ref 39–117)
ALT SERPL-CCNC: 18 IU/L (ref 0–32)
AST SERPL-CCNC: 15 IU/L (ref 0–40)
BILIRUB SERPL-MCNC: <0.2 MG/DL (ref 0–1.2)
BUN SERPL-MCNC: 16 MG/DL (ref 6–24)
BUN/CREAT SERPL: 28 (ref 9–23)
CALCIUM SERPL-MCNC: 10.5 MG/DL (ref 8.7–10.2)
CHLORIDE SERPL-SCNC: 96 MMOL/L (ref 96–106)
CHOLEST SERPL-MCNC: 285 MG/DL (ref 100–199)
CO2 SERPL-SCNC: 24 MMOL/L (ref 18–29)
CREAT SERPL-MCNC: 0.57 MG/DL (ref 0.57–1)
EST. AVERAGE GLUCOSE BLD GHB EST-MCNC: 214 MG/DL
GLOBULIN SER CALC-MCNC: 3.2 G/DL (ref 1.5–4.5)
GLUCOSE SERPL-MCNC: 199 MG/DL (ref 65–99)
HBA1C MFR BLD: 9.1 % (ref 4.8–5.6)
HDLC SERPL-MCNC: 37 MG/DL
INTERPRETATION, 910389: NORMAL
LDLC SERPL CALC-MCNC: ABNORMAL MG/DL (ref 0–99)
Lab: NORMAL
POTASSIUM SERPL-SCNC: 4.9 MMOL/L (ref 3.5–5.2)
PROT SERPL-MCNC: 7.9 G/DL (ref 6–8.5)
SODIUM SERPL-SCNC: 137 MMOL/L (ref 134–144)
TRIGL SERPL-MCNC: 518 MG/DL (ref 0–149)
VLDLC SERPL CALC-MCNC: ABNORMAL MG/DL (ref 5–40)

## 2017-04-19 PROCEDURE — 82274 ASSAY TEST FOR BLOOD FECAL: CPT | Performed by: INTERNAL MEDICINE

## 2017-04-26 LAB — HEMOCCULT STL QL IA: NEGATIVE

## 2017-05-10 DIAGNOSIS — M25.552 ARTHRALGIA OF LEFT HIP: ICD-10-CM

## 2017-05-10 RX ORDER — IBUPROFEN 600 MG/1
TABLET ORAL
Qty: 40 TAB | Refills: 0 | Status: SHIPPED | OUTPATIENT
Start: 2017-05-10 | End: 2017-06-11 | Stop reason: SDUPTHER

## 2017-05-23 RX ORDER — OXYBUTYNIN CHLORIDE 10 MG/1
TABLET, EXTENDED RELEASE ORAL
Qty: 30 TAB | Refills: 3 | Status: SHIPPED | OUTPATIENT
Start: 2017-05-23 | End: 2017-09-11 | Stop reason: SDUPTHER

## 2017-06-01 ENCOUNTER — HOSPITAL ENCOUNTER (OUTPATIENT)
Dept: MAMMOGRAPHY | Age: 52
Discharge: HOME | End: 2017-06-01
Attending: INTERNAL MEDICINE
Payer: MEDICARE

## 2017-06-01 DIAGNOSIS — Z12.39 BREAST CANCER SCREENING: ICD-10-CM

## 2017-06-01 PROCEDURE — 77067 SCR MAMMO BI INCL CAD: CPT

## 2017-06-11 DIAGNOSIS — M25.552 ARTHRALGIA OF LEFT HIP: ICD-10-CM

## 2017-06-11 DIAGNOSIS — E11.9 TYPE 2 DIABETES MELLITUS WITHOUT COMPLICATION (HCC): ICD-10-CM

## 2017-06-12 RX ORDER — BLOOD SUGAR DIAGNOSTIC
STRIP MISCELLANEOUS
Qty: 100 STRIP | Refills: 6 | Status: SHIPPED | OUTPATIENT
Start: 2017-06-12 | End: 2017-06-13 | Stop reason: SDUPTHER

## 2017-06-12 RX ORDER — IBUPROFEN 600 MG/1
TABLET ORAL
Qty: 40 TAB | Refills: 0 | Status: SHIPPED | OUTPATIENT
Start: 2017-06-12 | End: 2017-06-13 | Stop reason: SDUPTHER

## 2017-06-13 ENCOUNTER — OFFICE VISIT (OUTPATIENT)
Dept: FAMILY MEDICINE CLINIC | Age: 52
End: 2017-06-13

## 2017-06-13 VITALS
TEMPERATURE: 97.4 F | OXYGEN SATURATION: 97 % | WEIGHT: 204 LBS | RESPIRATION RATE: 17 BRPM | DIASTOLIC BLOOD PRESSURE: 84 MMHG | SYSTOLIC BLOOD PRESSURE: 136 MMHG | HEART RATE: 80 BPM | BODY MASS INDEX: 34.83 KG/M2 | HEIGHT: 64 IN

## 2017-06-13 DIAGNOSIS — E11.9 TYPE 2 DIABETES MELLITUS WITHOUT COMPLICATION, WITH LONG-TERM CURRENT USE OF INSULIN (HCC): ICD-10-CM

## 2017-06-13 DIAGNOSIS — E11.9 TYPE 2 DIABETES MELLITUS WITHOUT COMPLICATION, UNSPECIFIED LONG TERM INSULIN USE STATUS: Primary | ICD-10-CM

## 2017-06-13 DIAGNOSIS — Z79.4 TYPE 2 DIABETES MELLITUS WITHOUT COMPLICATION, WITH LONG-TERM CURRENT USE OF INSULIN (HCC): ICD-10-CM

## 2017-06-13 DIAGNOSIS — K21.9 GASTROESOPHAGEAL REFLUX DISEASE WITHOUT ESOPHAGITIS: ICD-10-CM

## 2017-06-13 DIAGNOSIS — E78.00 HYPERCHOLESTEREMIA: ICD-10-CM

## 2017-06-13 DIAGNOSIS — E03.4 HYPOTHYROIDISM DUE TO ACQUIRED ATROPHY OF THYROID: ICD-10-CM

## 2017-06-13 DIAGNOSIS — F51.01 PRIMARY INSOMNIA: ICD-10-CM

## 2017-06-13 DIAGNOSIS — M25.552 ARTHRALGIA OF LEFT HIP: ICD-10-CM

## 2017-06-13 RX ORDER — LOSARTAN POTASSIUM 25 MG/1
25 TABLET ORAL DAILY
Qty: 90 TAB | Refills: 1 | Status: SHIPPED | OUTPATIENT
Start: 2017-06-13 | End: 2017-09-18 | Stop reason: SDUPTHER

## 2017-06-13 RX ORDER — INSULIN GLARGINE 100 [IU]/ML
INJECTION, SOLUTION SUBCUTANEOUS
Qty: 5 PEN | Refills: 3 | Status: SHIPPED | OUTPATIENT
Start: 2017-06-13 | End: 2017-12-18 | Stop reason: SDUPTHER

## 2017-06-13 RX ORDER — IBUPROFEN 600 MG/1
600 TABLET ORAL
Qty: 40 TAB | Refills: 0 | Status: SHIPPED | OUTPATIENT
Start: 2017-06-13 | End: 2017-09-10 | Stop reason: SDUPTHER

## 2017-06-13 RX ORDER — LEVOTHYROXINE SODIUM 175 UG/1
175 TABLET ORAL
Qty: 90 TAB | Refills: 1 | Status: SHIPPED | OUTPATIENT
Start: 2017-06-13 | End: 2017-09-18 | Stop reason: SDUPTHER

## 2017-06-13 RX ORDER — GABAPENTIN 100 MG/1
100 CAPSULE ORAL
Qty: 90 CAP | Refills: 1 | Status: SHIPPED | OUTPATIENT
Start: 2017-06-13 | End: 2017-09-18 | Stop reason: SDUPTHER

## 2017-06-13 RX ORDER — ZOLPIDEM TARTRATE 5 MG/1
5 TABLET ORAL
Qty: 30 TAB | Refills: 0 | Status: SHIPPED | OUTPATIENT
Start: 2017-06-13 | End: 2018-05-11

## 2017-06-13 RX ORDER — PRAVASTATIN SODIUM 80 MG/1
80 TABLET ORAL
Qty: 90 TAB | Refills: 1 | Status: SHIPPED | OUTPATIENT
Start: 2017-06-13 | End: 2017-09-18

## 2017-06-13 RX ORDER — LANCETS
EACH MISCELLANEOUS
Qty: 3 EACH | Refills: 3 | Status: SHIPPED | OUTPATIENT
Start: 2017-06-13 | End: 2017-07-03 | Stop reason: SDUPTHER

## 2017-06-13 RX ORDER — INSULIN LISPRO 100 [IU]/ML
INJECTION, SOLUTION INTRAVENOUS; SUBCUTANEOUS
Qty: 1 PACKAGE | Refills: 3 | Status: SHIPPED | OUTPATIENT
Start: 2017-06-13 | End: 2018-05-12 | Stop reason: SDUPTHER

## 2017-06-13 RX ORDER — OMEPRAZOLE 20 MG/1
20 CAPSULE, DELAYED RELEASE ORAL 2 TIMES DAILY
Qty: 180 CAP | Refills: 1 | Status: SHIPPED | OUTPATIENT
Start: 2017-06-13 | End: 2017-07-18 | Stop reason: SDUPTHER

## 2017-06-13 NOTE — PROGRESS NOTES
Xuan Oneill is a 45 y.o.  female and presents with     Chief Complaint   Patient presents with    Diabetes    Hypothyroidism    Cholesterol Problem    Hypertension    Insomnia     Pt is going to the [employer] and is drinking diet coke. Pt was doing well with her sugars in the  Month of May but in June her sugars started to rise as she was not watching her diet. She is taking Lantus 60 units daily and HUmalog three times daily per sliding scale. Pt takes lunch at different times. Pt eats immediately after taking insulin. Pt goes to [employer] and sometimes sugars drop. Pt takes care of grand kids on the weekends and forgets to take her meds. Pt sometimes falls      Past Medical History:   Diagnosis Date    Arthritis     Chronic pain     Depression     Diabetes ([address]     Hyperlipidemia     Hypothyroid     Medial meniscus tear     Left knee    Psychiatric disorder     Tobacco abuse, in remission      Past Surgical History:   Procedure Laterality Date    HX ORTHOPAEDIC      Hand reconstructive surgery    HX TUBAL LIGATION  2004     Current Outpatient Prescriptions   Medication Sig    losartan (COZAAR) 25 mg tablet Take 1 Tab by mouth daily.  Lancets (ACCU-CHEK SOFTCLIX LANCETS) misc (Accu-CHECK FAST CLIX LANCETS) monitor blood sugars three times daily    ibuprofen (MOTRIN) 600 mg tablet Take 1 Tab by mouth every eight (8) hours as needed for Pain.  insulin glargine (LANTUS,BASAGLAR) 100 unit/mL (3 mL) inpn Administer  60  units subcut daily    insulin lispro (HUMALOG) 100 unit/mL kwikpen Administer 616 units 30 minutes before breakfast , lunch and dinner    glucose blood VI test strips (ACCU-CHEK SMARTVIEW TEST STRIP) strip dm    levothyroxine (SYNTHROID) 175 mcg tablet Take 1 Tab by mouth Daily (before breakfast).  gabapentin (NEURONTIN) 100 mg capsule Take 1 Cap by mouth nightly as needed.  pravastatin (PRAVACHOL) 80 mg tablet Take 1 Tab by mouth nightly.     omeprazole (PRILOSEC) 20 mg capsule Take 1 Cap by mouth two (2) times a day.  zolpidem (AMBIEN) 5 mg tablet Take 1 Tab by mouth nightly as needed for Sleep. Max Daily Amount: 5 mg.  oxybutynin chloride XL (DITROPAN XL) 10 mg CR tablet take 1 tablet by mouth once daily    gemfibrozil (LOPID) 600 mg tablet Take 1 Tab by mouth two (2) times a day.  SITagliptin-metFORMIN (JANUMET) 50-1,000 mg per tablet Take 1 Tab by mouth two (2) times daily (with meals).  Insulin Needles, Disposable, 31 gauge x 5/16\" ndle DM    hydrocortisone (CORTAID) 1 % topical cream Apply  to affected area two (2) times a day. use thin layer    fluticasone (FLONASE) 50 mcg/actuation nasal spray 2 Sprays by Both Nostrils route daily.  LORazepam (ATIVAN) 1 mg tablet Take 1 mg by mouth every four (4) hours as needed for Anxiety.  albuterol (PROVENTIL HFA, VENTOLIN HFA, PROAIR HFA) 90 mcg/actuation inhaler Take 2 Puffs by inhalation every six (6) hours as needed for Wheezing.  benztropine (COGENTIN) 0.5 mg tablet Take 0.5 mg by mouth two (2) times a day.  trifluoperazine (STELAZINE) 5 mg tablet Take 5 mg by mouth two (2) times a day.  sertraline (ZOLOFT) 100 mg tablet Take 150 mg by mouth daily.  predniSONE (STERAPRED DS) 10 mg dose pack See administration instruction per 10mg dose pack    mupirocin (BACTROBAN) 2 % ointment Apply  to affected area daily.  salicylic acid 17 % topical gel Apply  to affected area daily. Apply dime sized application to the callus on the left foot once daily    ferrous sulfate 325 mg (65 mg iron) tablet Take 1 Tab by mouth two (2) times a day. No current facility-administered medications for this visit.       Health Maintenance   Topic Date Due    FOOT EXAM Q1  04/22/2017    EYE EXAM RETINAL OR DILATED Q1  05/19/2017    INFLUENZA AGE 9 TO ADULT  08/01/2017    HEMOGLOBIN A1C Q6M  10/18/2017    MICROALBUMIN Q1  01/23/2018    LIPID PANEL Q1  04/18/2018    MEDICARE YEARLY EXAM  04/19/2018    FOBT Q 1 YEAR AGE 50-75  04/19/2018    DTaP/Tdap/Td series (2 - Td) 10/16/2018    PAP AKA CERVICAL CYTOLOGY  04/26/2019    BREAST CANCER SCRN MAMMOGRAM  06/01/2019    Hepatitis C Screening  Completed    Pneumococcal 19-64 Medium Risk  Completed     Immunization History   Administered Date(s) Administered    Influenza Vaccine 10/21/2013    Influenza Vaccine (Quad) 09/08/2015    Influenza Vaccine (Quad) PF 10/12/2016     Patient's last menstrual period was 06/05/2014. Allergies and Intolerances: Allergies   Allergen Reactions    Lipitor [Atorvastatin] Other (comments)     myalgias    Lisinopril Cough    Aspirin Other (comments)     Nose bleeds    Zocor [Simvastatin] Other (comments)     Causes pain in the left leg       Family History:   Family History   Problem Relation Age of Onset    Alcohol abuse Mother    Emily Prowers Mother    David Cole Mother 61     breast cancer    Diabetes Mother     Hypertension Mother     Psychiatric Disorder Mother     Stroke Mother     Breast Cancer Mother     Alcohol abuse Father     Heart Disease Father     Diabetes Father     Hypertension Father     Lung Disease Father     Colon Cancer Father 72    Alcohol abuse Sister     Diabetes Sister     Psychiatric Disorder Sister     Alcohol abuse Brother     Alcohol abuse Paternal Grandmother     Psychiatric Disorder Son     Psychiatric Disorder       nephew    Ovarian Cancer Sister        Social History:   She  reports that she quit smoking about 5 years ago. Her smoking use included Cigarettes. She has a 35.00 pack-year smoking history. She has never used smokeless tobacco.  She  reports that she does not drink alcohol.             Review of Systems:   General: negative for - chills, fatigue, fever, weight change  Psych: negative for - anxiety, depression, irritability or mood swings  ENT: negative for - headaches, hearing change, nasal congestion, oral lesions, sneezing or sore throat  Heme/ Lymph: negative for - bleeding problems, bruising, pallor or swollen lymph nodes  Endo: negative for - hot flashes, polydipsia/polyuria or temperature intolerance  Resp: negative for - cough, shortness of breath or wheezing  CV: negative for - chest pain, edema or palpitations  GI: negative for - abdominal pain, change in bowel habits, constipation, diarrhea or nausea/vomiting  : negative for - dysuria, hematuria, incontinence, pelvic pain or vulvar/vaginal symptoms  MSK: negative for - joint pain, joint swelling or muscle pain  Neuro: negative for - confusion, headaches, seizures or weakness  Derm: negative for - dry skin, hair changes, rash or skin lesion changes          Physical:   Vitals:   Vitals:    06/13/17 1132   BP: 136/84   Pulse: 80   Resp: 17   Temp: 97.4 °F (36.3 °C)   TempSrc: Oral   SpO2: 97%   Weight: 204 lb (92.5 kg)   Height: 5' 4\" (1.626 m)           Exam:   HEENT- atraumatic,normocephalic, awake, oriented, well nourished  Neck - supple,no enlarged lymph nodes, no JVD, no thyromegaly  Chest- CTA, no rhonchi, no crackles  Heart- rrr, no murmurs / gallop/rub  Abdomen- soft,BS+,NT, no hepatosplenomegaly  Ext - no c/c/edema   Neuro- no focal deficits. Power 5/5 all extremities  Skin - warm,dry, no obvious rashes.           Review of Data:   LABS:   Lab Results   Component Value Date/Time    WBC 8.7 01/23/2017 03:54 PM    HGB 11.5 01/23/2017 03:54 PM    HCT 35.5 01/23/2017 03:54 PM    PLATELET 956 37/09/5475 03:54 PM     Lab Results   Component Value Date/Time    Sodium 137 04/18/2017 10:03 AM    Potassium 4.9 04/18/2017 10:03 AM    Chloride 96 04/18/2017 10:03 AM    CO2 24 04/18/2017 10:03 AM    Glucose 199 04/18/2017 10:03 AM    BUN 16 04/18/2017 10:03 AM    Creatinine 0.57 04/18/2017 10:03 AM     Lab Results   Component Value Date/Time    Cholesterol, total 285 04/18/2017 10:03 AM    HDL Cholesterol 37 04/18/2017 10:03 AM    LDL, calculated Comment 04/18/2017 10:03 AM    Triglyceride 518 04/18/2017 10:03 AM     No results found for: GPT        Impression / Plan:        ICD-10-CM ICD-9-CM    1. Type 2 diabetes mellitus without complication, unspecified long term insulin use status E11.9 250.00 insulin glargine (LANTUS,BASAGLAR) 100 unit/mL (3 mL) inpn      insulin lispro (HUMALOG) 100 unit/mL kwikpen      HEMOGLOBIN A1C WITH EAG      LIPID PANEL      METABOLIC PANEL, COMPREHENSIVE      TSH 3RD GENERATION   2. Type 2 diabetes mellitus without complication, with long-term current use of insulin (HCC) E11.9 250.00 losartan (COZAAR) 25 mg tablet    Z79.4 V58.67 Lancets (ACCU-CHEK SOFTCLIX LANCETS) misc      glucose blood VI test strips (ACCU-CHEK SMARTVIEW TEST STRIP) strip   3. Arthralgia of left hip M25.552 719.45 ibuprofen (MOTRIN) 600 mg tablet   4. DM type 2, uncontrolled, with neuropathy (HCC) E11.40 250.62 insulin glargine (LANTUS,BASAGLAR) 100 unit/mL (3 mL) inpn    E11.65 357.2 gabapentin (NEURONTIN) 100 mg capsule   5. Hypothyroidism due to acquired atrophy of thyroid E03.4 244.8 levothyroxine (SYNTHROID) 175 mcg tablet     246.8    6. Hypercholesteremia E78.00 272.0 pravastatin (PRAVACHOL) 80 mg tablet   7. Gastroesophageal reflux disease without esophagitis K21.9 530.81 omeprazole (PRILOSEC) 20 mg capsule   8. Primary insomnia F51.01 307.42 zolpidem (AMBIEN) 5 mg tablet     Asked pt to check with Psych if it is okay to take ambien. Explained to patient risk benefits of the medications. Advised patient to stop meds if having any side effects. Pt verbalized understanding of the instructions. I have discussed the diagnosis with the patient and the intended plan as seen in the above orders. The patient has received an after-visit summary and questions were answered concerning future plans. I have discussed medication side effects and warnings with the patient as well. I have reviewed the plan of care with the patient, accepted their input and they are in agreement with the treatment goals. Reviewed plan of care. Patient has provided input and agrees with goals.     Follow-up Disposition: Not on Lorel Soulier, MD

## 2017-06-13 NOTE — MR AVS SNAPSHOT
Visit Information Date & Time Provider Department Dept. Phone Encounter #  
 6/13/2017 11:30 AM Farida Barcenas, [address] [facility] [phone] [id number] Follow-up Instructions Return for keep appt. Your Appointments 7/18/2017  9:30 AM  
Follow Up with Farida Barcenas MD  
[facility] [address] [facility]) Appt Note: Return in 3 months (on 7/18/2017). [address] [facility] [address]  
  
   
 [address] [facility] Upcoming Health Maintenance Date Due  
 FOOT EXAM Q1 4/22/2017 EYE EXAM RETINAL OR DILATED Q1 5/19/2017 INFLUENZA AGE 9 TO ADULT 8/1/2017 HEMOGLOBIN A1C Q6M 10/18/2017 MICROALBUMIN Q1 1/23/2018 LIPID PANEL Q1 4/18/2018 MEDICARE YEARLY EXAM 4/19/2018 FOBT Q 1 YEAR AGE 50-75 4/19/2018 DTaP/Tdap/Td series (2 - Td) 10/16/2018 PAP AKA CERVICAL CYTOLOGY 4/26/2019 BREAST CANCER SCRN MAMMOGRAM 6/1/2019 Allergies as of 6/13/2017  Review Complete On: 6/13/2017 By: Farida Barcenas MD  
  
 Severity Noted Reaction Type Reactions Lipitor [Atorvastatin]    Other (comments)  
 myalgias Lisinopril  07/02/2014    Cough Aspirin Low 11/07/2012   Intolerance Other (comments) Nose bleeds Zocor [Simvastatin] Low 11/07/2012   Side Effect Other (comments) Causes pain in the left leg Current Immunizations  Reviewed on 10/21/2013 Name Date Influenza Vaccine 10/21/2013 11:54 AM  
 Influenza Vaccine (Quad) 9/8/2015  1:30 PM  
 Influenza Vaccine (Quad) PF 10/12/2016 Not reviewed this visit You Were Diagnosed With   
  
 Codes Comments Type 2 diabetes mellitus without complication, unspecified long term insulin use status    -  Primary ICD-10-CM: E11.9 ICD-9-CM: 250.00 Type 2 diabetes mellitus without complication, with long-term current use of insulin (HCC)     ICD-10-CM: E11.9, Z79.4 ICD-9-CM: 250.00, V58.67   
 Arthralgia of left hip     ICD-10-CM: M25.552 ICD-9-CM: 719.45 DM type 2, uncontrolled, with neuropathy ([facility] [address]     ICD-10-CM: E11.40, E11.65 ICD-9-CM: 250.62, 357.2 Hypothyroidism due to acquired atrophy of thyroid     ICD-10-CM: E03.4 ICD-9-CM: 244.8, 246.8 Hypercholesteremia     ICD-10-CM: E78.00 ICD-9-CM: 272.0 Gastroesophageal reflux disease without esophagitis     ICD-10-CM: K21.9 ICD-9-CM: 530.81 Primary insomnia     ICD-10-CM: F51.01 
ICD-9-CM: 307.42 Vitals BP Pulse Temp Resp Height(growth percentile) Weight(growth percentile) 136/84 80 97.4 °F (36.3 °C) (Oral) 17 5' 4\" (1.626 m) 204 lb (92.5 kg) LMP SpO2 BMI OB Status Smoking Status 06/05/2014 97% 35.02 kg/m2 Postmenopausal Former Smoker Vitals History BMI and BSA Data Body Mass Index Body Surface Area 35.02 kg/m 2 2.04 m 2 Preferred Pharmacy Pharmacy Name Phone [phone] [facility] [phone] Your Updated Medication List  
  
   
This list is accurate as of: 6/13/17 12:08 PM.  Always use your most recent med list.  
  
  
  
  
 albuterol 90 mcg/actuation inhaler Commonly known as:  PROVENTIL HFA, VENTOLIN HFA, PROAIR HFA Take 2 Puffs by inhalation every six (6) hours as needed for Wheezing. benztropine 0.5 mg tablet Commonly known as:  COGENTIN Take 0.5 mg by mouth two (2) times a day. ferrous sulfate 325 mg (65 mg iron) tablet Take 1 Tab by mouth two (2) times a day. fluticasone 50 mcg/actuation nasal spray Commonly known as:  Wendell Caldwell 2 Sprays by Both Nostrils route daily. gabapentin 100 mg capsule Commonly known as:  NEURONTIN Take 1 Cap by mouth nightly as needed. gemfibrozil 600 mg tablet Commonly known as:  LOPID Take 1 Tab by mouth two (2) times a day. glucose blood VI test strips strip Commonly known as:  ACCU-CHEK SMARTVIEW TEST STRIP  
dm  
  
 hydrocortisone 1 % topical cream  
Commonly known as:  CORTAID Apply  to affected area two (2) times a day. use thin layer  
  
 ibuprofen 600 mg tablet Commonly known as:  MOTRIN Take 1 Tab by mouth every eight (8) hours as needed for Pain. insulin glargine 100 unit/mL (3 mL) Inpn Commonly known as:  Lenox Melena Administer  60  units subcut daily  
  
 insulin lispro 100 unit/mL kwikpen Commonly known as:  HUMALOG Administer 616 units 30 minutes before breakfast , lunch and dinner Insulin Needles (Disposable) 31 gauge x 5/16\" Ndle DM Lancets Misc Commonly known as:  ACCU-CHEK SOFTCLIX LANCETS  
(Accu-CHECK FAST CLIX LANCETS) monitor blood sugars three times daily  
  
 levothyroxine 175 mcg tablet Commonly known as:  SYNTHROID Take 1 Tab by mouth Daily (before breakfast). LORazepam 1 mg tablet Commonly known as:  ATIVAN Take 1 mg by mouth every four (4) hours as needed for Anxiety. losartan 25 mg tablet Commonly known as:  COZAAR Take 1 Tab by mouth daily. mupirocin 2 % ointment Commonly known as:  [employer] Apply  to affected area daily. omeprazole 20 mg capsule Commonly known as:  PRILOSEC Take 1 Cap by mouth two (2) times a day. oxybutynin chloride XL 10 mg CR tablet Commonly known as:  DITROPAN XL  
take 1 tablet by mouth once daily  
  
 pravastatin 80 mg tablet Commonly known as:  PRAVACHOL Take 1 Tab by mouth nightly. predniSONE 10 mg dose pack Commonly known as:  STERAPRED DS See administration instruction per 10mg dose pack  
  
 salicylic acid 17 % topical gel Apply  to affected area daily. Apply dime sized application to the callus on the left foot once daily  
  
 sertraline 100 mg tablet Commonly known as:  ZOLOFT Take 150 mg by mouth daily. SITagliptin-metFORMIN 50-1,000 mg per tablet Commonly known as:  Rachna Danay Take 1 Tab by mouth two (2) times daily (with meals). trifluoperazine 5 mg tablet Commonly known as:  STELAZINE Take 5 mg by mouth two (2) times a day. zolpidem 5 mg tablet Commonly known as:  AMBIEN Take 1 Tab by mouth nightly as needed for Sleep. Max Daily Amount: 5 mg. Prescriptions Printed Refills  
 zolpidem (AMBIEN) 5 mg tablet 0 Sig: Take 1 Tab by mouth nightly as needed for Sleep. Max Daily Amount: 5 mg. Class: Print Route: Oral  
  
Prescriptions Sent to Pharmacy Refills  
 losartan (COZAAR) 25 mg tablet 1 Sig: Take 1 Tab by mouth daily. Class: Normal  
 [facility]acy: [facility] [address] [place] Ph #: [phone] Route: Oral  
 Lancets (ACCU-CHEK SOFTCLIX LANCETS) misc 3 Sig: (Accu-CHECK FAST CLIX LANCETS) monitor blood sugars three times daily Class: Normal  
 [facility] [place] [address] [facility] Ph #: [phone]  
 ibuprofen (MOTRIN) 600 mg tablet 0 Sig: Take 1 Tab by mouth every eight (8) hours as needed for Pain. Class: Normal  
 Pharmacy: [facility] [address] [place] Ph #: [phone] Route: Oral  
 insulin glargine (LANTUS,BASAGLAR) 100 unit/mL (3 mL) inpn 3 Sig: Administer  60  units subcut daily Class: Normal  
 Pharm[facility] [place] [address] [facility] Ph #: [phone]  
 insulin lispro (HUMALOG) 100 unit/mL kwikpen 3 Sig: Administer 616 units 30 minutes before breakfast , lunch and dinner Class: Normal  
 Pharm[facility] [place] [address] [facility] Ph #: [phone]  
 glucose blood VI test strips (ACCU-CHEK SMARTVIEW TEST STRIP) strip 6  Sig: dm  
 Class: Normal  
 Pharmacy: [facility] [address] [place] Ph #: [phone]  
 levothyroxine (SYNTHROID) 175 mcg tablet 1  
 Sig: Take 1 Tab by mouth Daily (before breakfast). Class: Normal  
 Pharmacy: [facility] [address] [place] Ph #: [phone] Route: Oral  
 gabapentin (NEURONTIN) 100 mg capsule 1 Sig: Take 1 Cap by mouth nightly as needed. Class: Normal  
 Pharmacy: [facility] [address] [place] Ph #: [phone] Route: Oral  
 pravastatin (PRAVACHOL) 80 mg tablet 1 Sig: Take 1 Tab by mouth nightly. Class: Normal  
 Pharmacy: [facility] [address] [place] Ph #: [phone] Route: Oral  
 omeprazole (PRILOSEC) 20 mg capsule 1 Sig: Take 1 Cap by mouth two (2) times a day. Class: Normal  
 Pharmacy: [facility] [address] [place] Ph #: [phone] Route: Oral  
  
Follow-up Instructions Return for keep appt. To-Do List   
 06/13/2017 Lab:  HEMOGLOBIN A1C WITH EAG   
  
 06/13/2017 Lab:  LIPID PANEL   
  
 06/13/2017 Lab:  METABOLIC PANEL, COMPREHENSIVE   
  
 06/13/2017 Lab:  TSH 3RD GENERATION Introducing [facility]! Imtiaz Thayer introduces [employer] patient portal. Now you can access parts of your medical record, email your doctor's office, and request medication refills online. 1. In your internet browser, go to [email or web address] 2. Click on the First Time User? Click Here link in the Sign In box. You will see the New Member Sign Up page. 3. Enter your CoContest Access Code exactly as it appears below. You will not need to use this code after youve completed the sign-up process. If you do not sign up before the expiration date, you must request a new code. · [employer]Contest Access Code: [email or web address] Expires: 8/8/2017  2:18 PM 
 
4. Enter the last four digits of your Social Security Number (xxxx) and Date of Birth (mm/dd/yyyy) as indicated and click Submit.  You will be taken to the next sign-up page. 5. Create a MobileHelp ID. This will be your MobileHelp login ID and cannot be changed, so think of one that is secure and easy to remember. 6. Create a MobileHelp password. You can change your password at any time. 7. Enter your Password Reset Question and Answer. This can be used at a later time if you forget your password. 8. Enter your e-mail address. You will receive e-mail notification when new information is available in [address] 9. Click Sign Up. You can now view and download portions of your medical record. 10. Click the Download Summary menu link to download a portable copy of your medical information. If you have questions, please visit the Frequently Asked Questions section of the MobileHelp website. Remember, MobileHelp is NOT to be used for urgent needs. For medical emergencies, dial 911. Now available from your iPhone and Android! Please provide this summary of care documentation to your next provider. Your primary care clinician is listed as Lelo Velez. If you have any questions after today's visit, please call [phone].

## 2017-06-29 ENCOUNTER — TELEPHONE (OUTPATIENT)
Dept: FAMILY MEDICINE CLINIC | Age: 52
End: 2017-06-29

## 2017-06-29 NOTE — TELEPHONE ENCOUNTER
Pt called and stated that she feels that the Lantus dose is too high. Pt is currently taking BS three times a day before Breakfast, Lunch and [employer]. Reading between 117 to 154. Advised pt that this is within normal limits but she can see another provider if needed. Pt will call back if reading are too low. Pt verbalized understanding. This encounter will be closed.

## 2017-07-03 DIAGNOSIS — E11.9 TYPE 2 DIABETES MELLITUS WITHOUT COMPLICATION, WITH LONG-TERM CURRENT USE OF INSULIN (HCC): ICD-10-CM

## 2017-07-03 DIAGNOSIS — Z79.4 TYPE 2 DIABETES MELLITUS WITHOUT COMPLICATION, WITH LONG-TERM CURRENT USE OF INSULIN (HCC): ICD-10-CM

## 2017-07-03 RX ORDER — LANCETS
EACH MISCELLANEOUS
Qty: 200 EACH | Refills: 0 | Status: SHIPPED | OUTPATIENT
Start: 2017-07-03 | End: 2017-07-18 | Stop reason: SDUPTHER

## 2017-07-03 NOTE — TELEPHONE ENCOUNTER
Patient is calling stating that the lancet machine broke when she was testing her blood sugar. Does she need to have a how meter ordered or is there a way for her to have just a script for a new lancet machine? Please advise.

## 2017-07-03 NOTE — TELEPHONE ENCOUNTER
Requested Prescriptions     Pending Prescriptions Disp Refills    Lancets (ACCU-CHEK SOFTCLIX LANCETS) misc 3 Each 3     Sig: (Accu-CHECK FAST CLIX LANCETS) monitor blood sugars three times daily     Patient needs refill on lancets and also for the pen. It's called Accucehck soft clix lancets device (pen). Pt said her sugars are running low and needs for this to be done today.

## 2017-07-06 RX ORDER — PEN NEEDLE, DIABETIC 30 GX3/16"
NEEDLE, DISPOSABLE MISCELLANEOUS
Qty: 2 PACKAGE | Refills: 0 | Status: SHIPPED | OUTPATIENT
Start: 2017-07-06 | End: 2017-09-18 | Stop reason: SDUPTHER

## 2017-07-18 ENCOUNTER — OFFICE VISIT (OUTPATIENT)
Dept: FAMILY MEDICINE CLINIC | Age: 52
End: 2017-07-18

## 2017-07-18 ENCOUNTER — HOSPITAL ENCOUNTER (OUTPATIENT)
Dept: LAB | Age: 52
Discharge: HOME | End: 2017-07-18

## 2017-07-18 VITALS
OXYGEN SATURATION: 98 % | RESPIRATION RATE: 16 BRPM | BODY MASS INDEX: 34.01 KG/M2 | HEIGHT: 64 IN | SYSTOLIC BLOOD PRESSURE: 118 MMHG | WEIGHT: 199.2 LBS | TEMPERATURE: 96.7 F | HEART RATE: 83 BPM | DIASTOLIC BLOOD PRESSURE: 80 MMHG

## 2017-07-18 DIAGNOSIS — E11.9 DM TYPE 2, NOT AT GOAL (HCC): Primary | ICD-10-CM

## 2017-07-18 DIAGNOSIS — E11.9 TYPE 2 DIABETES MELLITUS WITHOUT COMPLICATION, UNSPECIFIED LONG TERM INSULIN USE STATUS: ICD-10-CM

## 2017-07-18 DIAGNOSIS — E78.00 HYPERCHOLESTEREMIA: ICD-10-CM

## 2017-07-18 DIAGNOSIS — K21.9 GASTROESOPHAGEAL REFLUX DISEASE WITHOUT ESOPHAGITIS: ICD-10-CM

## 2017-07-18 DIAGNOSIS — E11.9 TYPE 2 DIABETES MELLITUS WITHOUT COMPLICATION, WITH LONG-TERM CURRENT USE OF INSULIN (HCC): ICD-10-CM

## 2017-07-18 DIAGNOSIS — Z79.4 TYPE 2 DIABETES MELLITUS WITHOUT COMPLICATION, WITH LONG-TERM CURRENT USE OF INSULIN (HCC): ICD-10-CM

## 2017-07-18 DIAGNOSIS — E03.4 HYPOTHYROIDISM DUE TO ACQUIRED ATROPHY OF THYROID: ICD-10-CM

## 2017-07-18 PROCEDURE — 99001 SPECIMEN HANDLING PT-LAB: CPT | Performed by: INTERNAL MEDICINE

## 2017-07-18 RX ORDER — TRAZODONE HYDROCHLORIDE 50 MG/1
TABLET ORAL
COMMUNITY
End: 2018-05-11

## 2017-07-18 RX ORDER — OMEPRAZOLE 20 MG/1
20 CAPSULE, DELAYED RELEASE ORAL 2 TIMES DAILY
Qty: 180 CAP | Refills: 1 | Status: SHIPPED | OUTPATIENT
Start: 2017-07-18 | End: 2017-11-17 | Stop reason: SDUPTHER

## 2017-07-18 RX ORDER — LANCING DEVICE/LANCETS
KIT MISCELLANEOUS
Qty: 1 KIT | Refills: 0 | Status: SHIPPED | OUTPATIENT
Start: 2017-07-18 | End: 2019-02-24

## 2017-07-18 NOTE — MR AVS SNAPSHOT
Visit Information Date & Time Provider Department Dept. Phone Encounter #  
 7/18/2017  9:30 AM [id number], [address] [facility] [phone] Follow-up Instructions Return in about 2 months (around 9/18/2017). Upcoming Health Maintenance Date Due  
 FOOT EXAM Q1 4/22/2017 EYE EXAM RETINAL OR DILATED Q1 5/19/2017 INFLUENZA AGE 9 TO ADULT 8/1/2017 HEMOGLOBIN A1C Q6M 10/18/2017 MICROALBUMIN Q1 1/23/2018 LIPID PANEL Q1 4/18/2018 MEDICARE YEARLY EXAM 4/19/2018 FOBT Q 1 YEAR AGE 50-75 4/19/2018 DTaP/Tdap/Td series (2 - Td) 10/16/2018 PAP AKA CERVICAL CYTOLOGY 4/26/2019 BREAST CANCER SCRN MAMMOGRAM 6/1/2019 Allergies as of 7/18/2017  Review Complete On: 7/18/2017 By: [id number] Yesy Cervantes MD  
  
 Severity Noted Reaction Type Reactions Lipitor [Atorvastatin]    Other (comments)  
 myalgias Lisinopril  07/02/2014    Cough Aspirin Low 11/07/2012   Intolerance Other (comments) Nose bleeds Zocor [Simvastatin] Low 11/07/2012   Side Effect Other (comments) Causes pain in the left leg Current Immunizations  Reviewed on 10/21/2013 Name Date Influenza Vaccine 10/21/2013 11:54 AM  
 Influenza Vaccine (Quad) 9/8/2015  1:30 PM  
 Influenza Vaccine (Quad) PF 10/12/2016 Not reviewed this visit You Were Diagnosed With   
  
 Codes Comments DM type 2, not at goal [facility])    -  Primary ICD-10-CM: E11.9 ICD-9-CM: 250.00 Gastroesophageal reflux disease without esophagitis     ICD-10-CM: K21.9 ICD-9-CM: 530.81 Hypothyroidism due to acquired atrophy of thyroid     ICD-10-CM: E03.4 ICD-9-CM: 244.8, 246.8 Hypercholesteremia     ICD-10-CM: E78.00 ICD-9-CM: 272.0 Vitals BP Pulse Temp Resp Height(growth percentile) Weight(growth percentile) 118/80 (BP 1 Location: Left arm, BP Patient Position: Sitting) 83 96.7 °F (35.9 °C) (Oral) 16 5' 4\" (1.626 m) 199 lb 3.2 oz (90.4 kg) LMP SpO2 BMI OB Status Smoking Status 06/05/2014 98% 34.19 kg/m2 Postmenopausal Former Smoker Vitals History BMI and BSA Data Body Mass Index Body Surface Area  
 34.19 kg/m 2 2.02 m 2 Preferred Pharmacy Pharmacy Name Phone [phone] [phone] Your Updated Medication List  
  
   
This list is accurate as of: 7/18/17 10:13 AM.  Always use your most recent med list.  
  
  
  
  
 albuterol 90 mcg/actuation inhaler Commonly known as:  PROVENTIL HFA, VENTOLIN HFA, PROAIR HFA Take 2 Puffs by inhalation every six (6) hours as needed for Wheezing. benztropine 0.5 mg tablet Commonly known as:  COGENTIN Take 0.5 mg by mouth two (2) times a day. ferrous sulfate 325 mg (65 mg iron) tablet Take 1 Tab by mouth two (2) times a day. fluticasone 50 mcg/actuation nasal spray Commonly known as:  [place] 2 Sprays by Both Nostrils route daily. gabapentin 100 mg capsule Commonly known as:  NEURONTIN Take 1 Cap by mouth nightly as needed. gemfibrozil 600 mg tablet Commonly known as:  LOPID Take 1 Tab by mouth two (2) times a day. glucose blood VI test strips strip Commonly known as:  ACCU-CHEK SMARTVIEW TEST STRIP  
dm  
  
 hydrocortisone 1 % topical cream  
Commonly known as:  CORTAID Apply  to affected area two (2) times a day. use thin layer  
  
 ibuprofen 600 mg tablet Commonly known as:  MOTRIN Take 1 Tab by mouth every eight (8) hours as needed for Pain. insulin glargine 100 unit/mL (3 mL) Inpn Commonly known as:  [place] [employer] Administer  60  units subcut daily  
  
 insulin lispro 100 unit/mL kwikpen Commonly known as:  HUMALOG Administer 616 units 30 minutes before breakfast , lunch and dinner Insulin Needles (Disposable) 31 gauge x 5/16\" Ndle DM Lancets Misc Commonly known as:  ACCU-CHEK SOFTCLIX LANCETS  
 (Accu-CHECK FAST CLIX LANCETS) monitor blood sugars three times daily  
  
 levothyroxine 175 mcg tablet Commonly known as:  SYNTHROID Take 1 Tab by mouth Daily (before breakfast). LORazepam 1 mg tablet Commonly known as:  ATIVAN Take 1 mg by mouth every four (4) hours as needed for Anxiety. losartan 25 mg tablet Commonly known as:  COZAAR Take 1 Tab by mouth daily. mupirocin 2 % ointment Commonly known as:  [employer] Apply  to affected area daily. omeprazole 20 mg capsule Commonly known as:  PRILOSEC Take 1 Cap by mouth two (2) times a day. oxybutynin chloride XL 10 mg CR tablet Commonly known as:  DITROPAN XL  
take 1 tablet by mouth once daily  
  
 pravastatin 80 mg tablet Commonly known as:  PRAVACHOL Take 1 Tab by mouth nightly. predniSONE 10 mg dose pack Commonly known as:  STERAPRED DS See administration instruction per 10mg dose pack  
  
 salicylic acid 17 % topical gel Apply  to affected area daily. Apply dime sized application to the callus on the left foot once daily  
  
 sertraline 100 mg tablet Commonly known as:  ZOLOFT Take 150 mg by mouth daily. SITagliptin-metFORMIN 50-1,000 mg per tablet Commonly known as:  Shay Lime Take 1 Tab by mouth two (2) times daily (with meals). traZODone 50 mg tablet Commonly known as:  [place] Take  by mouth nightly. trifluoperazine 5 mg tablet Commonly known as:  STELAZINE Take 5 mg by mouth two (2) times a day. zolpidem 5 mg tablet Commonly known as:  AMBIEN Take 1 Tab by mouth nightly as needed for Sleep. Max Daily Amount: 5 mg. Prescriptions Sent to Pharmacy Refills  
 omeprazole (PRILOSEC) 20 mg capsule 1 Sig: Take 1 Cap by mouth two (2) times a day. Class: Normal  
 [facility]acy: [facility] [address], [place]  #: [phone] Route: Oral  
  
Follow-up Instructions Return in about 2 months (around 9/18/2017). Introducing [facility]! Luigi Kathleen introduces [employer] patient portal. Now you can access parts of your medical record, email your doctor's office, and request medication refills online. 1. In your internet browser, go to [email or web address] 2. Click on the First Time User? Click Here link in the Sign In box. You will see the New Member Sign Up page. 3. Enter your [employer]Hop Access Code exactly as it appears below. You will not need to use this code after youve completed the sign-up process. If you do not sign up before the expiration date, you must request a new code. · BroadHop Access Code: [email or web address] Expires: 8/8/2017  2:18 PM 
 
4. Enter the last four digits of your Social Security Number (xxxx) and Date of Birth (mm/dd/yyyy) as indicated and click Submit. You will be taken to the next sign-up page. 5. Create a [employer]Hop ID. This will be your [employer]Hop login ID and cannot be changed, so think of one that is secure and easy to remember. 6. Create a [employer]Hop password. You can change your password at any time. 7. Enter your Password Reset Question and Answer. This can be used at a later time if you forget your password. 8. Enter your e-mail address. You will receive e-mail notification when new information is available in [address] 9. Click Sign Up. You can now view and download portions of your medical record. 10. Click the Download Summary menu link to download a portable copy of your medical information. If you have questions, please visit the Frequently Asked Questions section of the BroadHop website. Remember, [employer]Hop is NOT to be used for urgent needs. For medical emergencies, dial 911. Now available from your iPhone and Android! Please provide this summary of care documentation to your next provider. Your primary care clinician is listed as Josi King.  If you have any questions after today's visit, please call [phone].

## 2017-07-18 NOTE — PROGRESS NOTES
1. Have you been to the ER, urgent care clinic since your last visit? Hospitalized since your last visit? No    2. Have you seen or consulted any other health care providers outside of the [address] [place] [address] since your last visit? Include any pap smears or colon screening.  Psychiatry Dr Cee Roth

## 2017-07-18 NOTE — PROGRESS NOTES
Sacha Ballesteros is a 45 y.o.  female and presents with     Chief Complaint   Patient presents with    Diabetes    Hypothyroidism    Hypertension    Cholesterol Problem     Pt has been taking lantus 60 units daily and Humalg 3-6 units three times daily  Most of her sugars have been lower than 200. Fasting sugars have been in the 150 range. Pt has been watching her diet. Pt stopped going to [facility] but is planning to restart. Pt feels better. Past Medical History:   Diagnosis Date    Arthritis     Chronic pain     Depression     Diabetes ([address]     Hyperlipidemia     Hypothyroid     Medial meniscus tear     Left knee    Psychiatric disorder     Tobacco abuse, in remission      Past Surgical History:   Procedure Laterality Date    HX ORTHOPAEDIC      Hand reconstructive surgery    HX TUBAL LIGATION  2004     Current Outpatient Prescriptions   Medication Sig    traZODone (DESYREL) 50 mg tablet Take  by mouth nightly.  omeprazole (PRILOSEC) 20 mg capsule Take 1 Cap by mouth two (2) times a day.  Insulin Needles, Disposable, 31 gauge x 5/16\" ndle DM    Lancets (ACCU-CHEK SOFTCLIX LANCETS) misc (Accu-CHECK FAST CLIX LANCETS) monitor blood sugars three times daily    losartan (COZAAR) 25 mg tablet Take 1 Tab by mouth daily.  ibuprofen (MOTRIN) 600 mg tablet Take 1 Tab by mouth every eight (8) hours as needed for Pain.  insulin glargine (LANTUS,BASAGLAR) 100 unit/mL (3 mL) inpn Administer  60  units subcut daily    insulin lispro (HUMALOG) 100 unit/mL kwikpen Administer 616 units 30 minutes before breakfast , lunch and dinner    glucose blood VI test strips (ACCU-CHEK SMARTVIEW TEST STRIP) strip dm    levothyroxine (SYNTHROID) 175 mcg tablet Take 1 Tab by mouth Daily (before breakfast).  gabapentin (NEURONTIN) 100 mg capsule Take 1 Cap by mouth nightly as needed.  pravastatin (PRAVACHOL) 80 mg tablet Take 1 Tab by mouth nightly.     oxybutynin chloride XL (DITROPAN XL) 10 mg CR tablet take 1 tablet by mouth once daily    gemfibrozil (LOPID) 600 mg tablet Take 1 Tab by mouth two (2) times a day.  hydrocortisone (CORTAID) 1 % topical cream Apply  to affected area two (2) times a day. use thin layer    fluticasone (FLONASE) 50 mcg/actuation nasal spray 2 Sprays by Both Nostrils route daily.  LORazepam (ATIVAN) 1 mg tablet Take 1 mg by mouth every four (4) hours as needed for Anxiety.  albuterol (PROVENTIL HFA, VENTOLIN HFA, PROAIR HFA) 90 mcg/actuation inhaler Take 2 Puffs by inhalation every six (6) hours as needed for Wheezing.  ferrous sulfate 325 mg (65 mg iron) tablet Take 1 Tab by mouth two (2) times a day.  benztropine (COGENTIN) 0.5 mg tablet Take 0.5 mg by mouth two (2) times a day.  trifluoperazine (STELAZINE) 5 mg tablet Take 5 mg by mouth two (2) times a day.  sertraline (ZOLOFT) 100 mg tablet Take 150 mg by mouth daily.  zolpidem (AMBIEN) 5 mg tablet Take 1 Tab by mouth nightly as needed for Sleep. Max Daily Amount: 5 mg.  SITagliptin-metFORMIN (JANUMET) 50-1,000 mg per tablet Take 1 Tab by mouth two (2) times daily (with meals).  predniSONE (STERAPRED DS) 10 mg dose pack See administration instruction per 10mg dose pack    mupirocin (BACTROBAN) 2 % ointment Apply  to affected area daily.  salicylic acid 17 % topical gel Apply  to affected area daily. Apply dime sized application to the callus on the left foot once daily     No current facility-administered medications for this visit.       Health Maintenance   Topic Date Due    FOOT EXAM Q1  04/22/2017    EYE EXAM RETINAL OR DILATED Q1  05/19/2017    INFLUENZA AGE 9 TO ADULT  08/01/2017    HEMOGLOBIN A1C Q6M  10/18/2017    MICROALBUMIN Q1  01/23/2018    LIPID PANEL Q1  04/18/2018    MEDICARE YEARLY EXAM  04/19/2018    FOBT Q 1 YEAR AGE 50-75  04/19/2018    DTaP/Tdap/Td series (2 - Td) 10/16/2018    PAP AKA CERVICAL CYTOLOGY  04/26/2019    BREAST CANCER SCRN MAMMOGRAM  06/01/2019    Hepatitis C Screening  Completed    Pneumococcal 19-64 Medium Risk  Completed     Immunization History   Administered Date(s) Administered    Influenza Vaccine 10/21/2013    Influenza Vaccine (Quad) 09/08/2015    Influenza Vaccine (Quad) PF 10/12/2016     Patient's last menstrual period was 06/05/2014. Allergies and Intolerances: Allergies   Allergen Reactions    Lipitor [Atorvastatin] Other (comments)     myalgias    Lisinopril Cough    Aspirin Other (comments)     Nose bleeds    Zocor [Simvastatin] Other (comments)     Causes pain in the left leg       Family History:   Family History   Problem Relation Age of Onset    Alcohol abuse Mother    Eudelia Sandi Mother    Evelyn Zeester Mother 61     breast cancer    Diabetes Mother     Hypertension Mother     Psychiatric Disorder Mother     Stroke Mother     Breast Cancer Mother     Alcohol abuse Father     Heart Disease Father     Diabetes Father     Hypertension Father     Lung Disease Father     Colon Cancer Father 72    Alcohol abuse Sister     Diabetes Sister     Psychiatric Disorder Sister     Alcohol abuse Brother     Alcohol abuse Paternal Grandmother     Psychiatric Disorder Son     Psychiatric Disorder       nephew    Ovarian Cancer Sister        Social History:   She  reports that she quit smoking about 5 years ago. Her smoking use included Cigarettes. She has a 35.00 pack-year smoking history. She has never used smokeless tobacco.  She  reports that she does not drink alcohol.             Review of Systems:   General: negative for - chills, fatigue, fever, weight change  Psych: negative for - anxiety, depression, irritability or mood swings  ENT: negative for - headaches, hearing change, nasal congestion, oral lesions, sneezing or sore throat  Heme/ Lymph: negative for - bleeding problems, bruising, pallor or swollen lymph nodes  Endo: negative for - hot flashes, polydipsia/polyuria or temperature intolerance  Resp: negative for - cough, shortness of breath or wheezing  CV: negative for - chest pain, edema or palpitations  GI: negative for - abdominal pain, change in bowel habits, constipation, diarrhea or nausea/vomiting  : negative for - dysuria, hematuria, incontinence, pelvic pain or vulvar/vaginal symptoms  MSK: negative for - joint pain, joint swelling or muscle pain  Neuro: negative for - confusion, headaches, seizures or weakness  Derm: negative for - dry skin, hair changes, rash or skin lesion changes          Physical:   Vitals:   Vitals:    07/18/17 0936   BP: 118/80   Pulse: 83   Resp: 16   Temp: 96.7 °F (35.9 °C)   TempSrc: Oral   SpO2: 98%   Weight: 199 lb 3.2 oz (90.4 kg)   Height: 5' 4\" (1.626 m)           Exam:   HEENT- atraumatic,normocephalic, awake, oriented, well nourished  Neck - supple,no enlarged lymph nodes, no JVD, no thyromegaly  Chest- CTA, no rhonchi, no crackles  Heart- rrr, no murmurs / gallop/rub  Abdomen- soft,BS+,NT, no hepatosplenomegaly  Ext - no c/c/edema   Neuro- no focal deficits. Power 5/5 all extremities  Skin - warm,dry, no obvious rashes. Review of Data:   LABS:   Lab Results   Component Value Date/Time    WBC 8.7 01/23/2017 03:54 PM    HGB 11.5 01/23/2017 03:54 PM    HCT 35.5 01/23/2017 03:54 PM    PLATELET 968 12/72/3432 03:54 PM     Lab Results   Component Value Date/Time    Sodium 137 04/18/2017 10:03 AM    Potassium 4.9 04/18/2017 10:03 AM    Chloride 96 04/18/2017 10:03 AM    CO2 24 04/18/2017 10:03 AM    Glucose 199 04/18/2017 10:03 AM    BUN 16 04/18/2017 10:03 AM    Creatinine 0.57 04/18/2017 10:03 AM     Lab Results   Component Value Date/Time    Cholesterol, total 285 04/18/2017 10:03 AM    HDL Cholesterol 37 04/18/2017 10:03 AM    LDL, calculated Comment 04/18/2017 10:03 AM    Triglyceride 518 04/18/2017 10:03 AM     No results found for: GPT        Impression / Plan:        ICD-10-CM ICD-9-CM    1. DM type 2, not at goal [facility] E11.9 250.00    2.  Gastroesophageal reflux disease without esophagitis K21.9 530.81 omeprazole (PRILOSEC) 20 mg capsule   3. Hypothyroidism due to acquired atrophy of thyroid E03.4 244.8      246.8    4. Hypercholesteremia E78.00 272.0      DM - sugars are trending down. Pt walks everyday. Explained to patient risk benefits of the medications. Advised patient to stop meds if having any side effects. Pt verbalized understanding of the instructions. I have discussed the diagnosis with the patient and the intended plan as seen in the above orders. The patient has received an after-visit summary and questions were answered concerning future plans. I have discussed medication side effects and warnings with the patient as well. I have reviewed the plan of care with the patient, accepted their input and they are in agreement with the treatment goals. Reviewed plan of care. Patient has provided input and agrees with goals.     Follow-up Disposition: Not on Vonda Patel MD

## 2017-07-19 ENCOUNTER — TELEPHONE (OUTPATIENT)
Dept: FAMILY MEDICINE CLINIC | Age: 52
End: 2017-07-19

## 2017-07-19 LAB
ALBUMIN SERPL-MCNC: 4.7 G/DL (ref 3.5–5.5)
ALBUMIN/GLOB SERPL: 2 {RATIO} (ref 1.2–2.2)
ALP SERPL-CCNC: 91 IU/L (ref 39–117)
ALT SERPL-CCNC: 22 IU/L (ref 0–32)
AST SERPL-CCNC: 18 IU/L (ref 0–40)
BILIRUB SERPL-MCNC: <0.2 MG/DL (ref 0–1.2)
BUN SERPL-MCNC: 12 MG/DL (ref 6–24)
BUN/CREAT SERPL: 19 (ref 9–23)
CALCIUM SERPL-MCNC: 10.2 MG/DL (ref 8.7–10.2)
CHLORIDE SERPL-SCNC: 97 MMOL/L (ref 96–106)
CHOLEST SERPL-MCNC: 208 MG/DL (ref 100–199)
CO2 SERPL-SCNC: 22 MMOL/L (ref 18–29)
CREAT SERPL-MCNC: 0.64 MG/DL (ref 0.57–1)
EST. AVERAGE GLUCOSE BLD GHB EST-MCNC: 189 MG/DL
GLOBULIN SER CALC-MCNC: 2.4 G/DL (ref 1.5–4.5)
GLUCOSE SERPL-MCNC: 132 MG/DL (ref 65–99)
HBA1C MFR BLD: 8.2 % (ref 4.8–5.6)
HDLC SERPL-MCNC: 40 MG/DL
LDLC SERPL CALC-MCNC: 130 MG/DL (ref 0–99)
POTASSIUM SERPL-SCNC: 4.9 MMOL/L (ref 3.5–5.2)
PROT SERPL-MCNC: 7.1 G/DL (ref 6–8.5)
SODIUM SERPL-SCNC: 139 MMOL/L (ref 134–144)
TRIGL SERPL-MCNC: 191 MG/DL (ref 0–149)
TSH SERPL DL<=0.005 MIU/L-ACNC: 2.82 UIU/ML (ref 0.45–4.5)
VLDLC SERPL CALC-MCNC: 38 MG/DL (ref 5–40)

## 2017-07-19 NOTE — TELEPHONE ENCOUNTER
Patient called stating that her sugar reading is 70. She had 3 units of humalog before breakfast, 3 units of humalog before lunch, and would like to knwo if Dr. Abimael Alexis wants her to continue to take 55 units of lantus before dinner. Per PCP, patient is to take half the dose (27 units of Lantus) right before dinner. Patient read back instructions & verbalized understanding.

## 2017-09-10 DIAGNOSIS — M25.552 ARTHRALGIA OF LEFT HIP: ICD-10-CM

## 2017-09-11 RX ORDER — OXYBUTYNIN CHLORIDE 10 MG/1
TABLET, EXTENDED RELEASE ORAL
Qty: 30 TAB | Refills: 3 | Status: SHIPPED | OUTPATIENT
Start: 2017-09-11 | End: 2017-12-18 | Stop reason: SDUPTHER

## 2017-09-11 RX ORDER — IBUPROFEN 600 MG/1
TABLET ORAL
Qty: 40 TAB | Refills: 0 | Status: SHIPPED | OUTPATIENT
Start: 2017-09-11 | End: 2019-02-24

## 2017-09-16 DIAGNOSIS — E11.9 TYPE 2 DIABETES MELLITUS WITHOUT COMPLICATION, UNSPECIFIED LONG TERM INSULIN USE STATUS: ICD-10-CM

## 2017-09-16 RX ORDER — SITAGLIPTIN AND METFORMIN HYDROCHLORIDE 50; 1000 MG/1; MG/1
TABLET, FILM COATED ORAL
Qty: 180 TAB | Refills: 1 | Status: SHIPPED | OUTPATIENT
Start: 2017-09-16 | End: 2017-09-19 | Stop reason: SDUPTHER

## 2017-09-18 ENCOUNTER — OFFICE VISIT (OUTPATIENT)
Dept: FAMILY MEDICINE CLINIC | Age: 52
End: 2017-09-18

## 2017-09-18 VITALS
TEMPERATURE: 98 F | BODY MASS INDEX: 33.57 KG/M2 | HEIGHT: 64 IN | WEIGHT: 196.6 LBS | OXYGEN SATURATION: 96 % | HEART RATE: 62 BPM | RESPIRATION RATE: 16 BRPM

## 2017-09-18 DIAGNOSIS — E03.4 HYPOTHYROIDISM DUE TO ACQUIRED ATROPHY OF THYROID: ICD-10-CM

## 2017-09-18 DIAGNOSIS — Z79.4 TYPE 2 DIABETES MELLITUS WITHOUT COMPLICATION, WITH LONG-TERM CURRENT USE OF INSULIN (HCC): ICD-10-CM

## 2017-09-18 DIAGNOSIS — Z23 ENCOUNTER FOR IMMUNIZATION: ICD-10-CM

## 2017-09-18 DIAGNOSIS — E11.9 TYPE 2 DIABETES MELLITUS WITHOUT COMPLICATION, WITH LONG-TERM CURRENT USE OF INSULIN (HCC): ICD-10-CM

## 2017-09-18 DIAGNOSIS — E78.00 HYPERCHOLESTEREMIA: ICD-10-CM

## 2017-09-18 RX ORDER — LOSARTAN POTASSIUM 25 MG/1
25 TABLET ORAL DAILY
Qty: 90 TAB | Refills: 1 | Status: SHIPPED | OUTPATIENT
Start: 2017-09-18 | End: 2018-02-19

## 2017-09-18 RX ORDER — LEVOTHYROXINE SODIUM 175 UG/1
175 TABLET ORAL
Qty: 90 TAB | Refills: 1 | Status: SHIPPED | OUTPATIENT
Start: 2017-09-18 | End: 2018-05-02 | Stop reason: SDUPTHER

## 2017-09-18 RX ORDER — GABAPENTIN 100 MG/1
100 CAPSULE ORAL
Qty: 90 CAP | Refills: 1 | Status: SHIPPED | OUTPATIENT
Start: 2017-09-18 | End: 2018-05-11

## 2017-09-18 RX ORDER — ROSUVASTATIN CALCIUM 40 MG/1
40 TABLET, COATED ORAL
Qty: 30 TAB | Refills: 3 | Status: SHIPPED | OUTPATIENT
Start: 2017-09-18 | End: 2017-12-18 | Stop reason: SDUPTHER

## 2017-09-18 RX ORDER — PEN NEEDLE, DIABETIC 30 GX3/16"
NEEDLE, DISPOSABLE MISCELLANEOUS
Qty: 2 PACKAGE | Refills: 0 | Status: SHIPPED | OUTPATIENT
Start: 2017-09-18 | End: 2017-09-19 | Stop reason: SDUPTHER

## 2017-09-18 NOTE — PROGRESS NOTES
Dary Cintron is a 45 y.o.  female and presents with     Chief Complaint   Patient presents with    Diabetes    Hypertension    Cholesterol Problem    Hypothyroidism     Pt says she had been exercising and she has lost weight. However she takes sleeping meds given by Psych . Pt sleeps at 6.00 pm and  Wakes up at 8.00 am.  Pt takes both ativan and trazadone. Pts morning sugars are high over 200 sometimes. Pt takes her evening dose of humalog at 5.00 pm. She has very early dinner. Pt is taking pravastatin but her chol is still high. Past Medical History:   Diagnosis Date    Arthritis     Chronic pain     Depression     Diabetes ([address]     Hyperlipidemia     Hypothyroid     Medial meniscus tear     Left knee    Psychiatric disorder     Tobacco abuse, in remission      Past Surgical History:   Procedure Laterality Date    HX ORTHOPAEDIC      Hand reconstructive surgery    HX TUBAL LIGATION  2004     Current Outpatient Prescriptions   Medication Sig    levothyroxine (SYNTHROID) 175 mcg tablet Take 1 Tab by mouth Daily (before breakfast).  gabapentin (NEURONTIN) 100 mg capsule Take 1 Cap by mouth nightly as needed.  losartan (COZAAR) 25 mg tablet Take 1 Tab by mouth daily.  Insulin Needles, Disposable, 31 gauge x 5/16\" ndle DM    rosuvastatin (CRESTOR) 40 mg tablet Take 1 Tab by mouth nightly.  JANUMET 50-1,000 mg per tablet  TAKE 1 TABLET BY MOUTH 2 TIMES DAILY WITH MEALS    ibuprofen (MOTRIN) 600 mg tablet  TAKE 1 TABLET BY MOUTH EVERY 8 HOURS AS NEEDED FOR PAIN    oxybutynin chloride XL (DITROPAN XL) 10 mg CR tablet take 1 tablet by mouth once daily    traZODone (DESYREL) 50 mg tablet Take  by mouth nightly.  omeprazole (PRILOSEC) 20 mg capsule Take 1 Cap by mouth two (2) times a day.     insulin glargine (LANTUS,BASAGLAR) 100 unit/mL (3 mL) inpn Administer  60  units subcut daily    insulin lispro (HUMALOG) 100 unit/mL kwikpen Administer 616 units 30 minutes before breakfast , lunch and dinner    glucose blood VI test strips (ACCU-CHEK SMARTVIEW TEST STRIP) strip dm    gemfibrozil (LOPID) 600 mg tablet Take 1 Tab by mouth two (2) times a day.  hydrocortisone (CORTAID) 1 % topical cream Apply  to affected area two (2) times a day. use thin layer    LORazepam (ATIVAN) 1 mg tablet Take 1 mg by mouth every four (4) hours as needed for Anxiety.  benztropine (COGENTIN) 0.5 mg tablet Take 0.5 mg by mouth two (2) times a day.  trifluoperazine (STELAZINE) 5 mg tablet Take 5 mg by mouth two (2) times a day.  sertraline (ZOLOFT) 100 mg tablet Take 150 mg by mouth daily.  ACCU-CHEK FASTCLIX kit use as directed to TEST three times a day    zolpidem (AMBIEN) 5 mg tablet Take 1 Tab by mouth nightly as needed for Sleep. Max Daily Amount: 5 mg.  predniSONE (STERAPRED DS) 10 mg dose pack See administration instruction per 10mg dose pack    mupirocin (BACTROBAN) 2 % ointment Apply  to affected area daily.  salicylic acid 17 % topical gel Apply  to affected area daily. Apply dime sized application to the callus on the left foot once daily    fluticasone (FLONASE) 50 mcg/actuation nasal spray 2 Sprays by Both Nostrils route daily.  albuterol (PROVENTIL HFA, VENTOLIN HFA, PROAIR HFA) 90 mcg/actuation inhaler Take 2 Puffs by inhalation every six (6) hours as needed for Wheezing.  ferrous sulfate 325 mg (65 mg iron) tablet Take 1 Tab by mouth two (2) times a day. No current facility-administered medications for this visit.       Health Maintenance   Topic Date Due    FOOT EXAM Q1  04/22/2017    EYE EXAM RETINAL OR DILATED Q1  05/19/2017    HEMOGLOBIN A1C Q6M  01/18/2018    MICROALBUMIN Q1  01/23/2018    MEDICARE YEARLY EXAM  04/19/2018    FOBT Q 1 YEAR AGE 50-75  04/19/2018    LIPID PANEL Q1  07/18/2018    DTaP/Tdap/Td series (2 - Td) 10/16/2018    PAP AKA CERVICAL CYTOLOGY  04/26/2019    BREAST CANCER SCRN MAMMOGRAM  06/01/2019    Hepatitis C Screening Completed    Pneumococcal 19-64 Medium Risk  Completed    INFLUENZA AGE 9 TO ADULT  Completed     Immunization History   Administered Date(s) Administered    Influenza Vaccine 10/21/2013    Influenza Vaccine (Quad) 09/08/2015    Influenza Vaccine (Quad) PF 10/12/2016, 09/18/2017     Patient's last menstrual period was 06/05/2014. Allergies and Intolerances: Allergies   Allergen Reactions    Lipitor [Atorvastatin] Other (comments)     myalgias    Lisinopril Cough    Aspirin Other (comments)     Nose bleeds    Zocor [Simvastatin] Other (comments)     Causes pain in the left leg       Family History:   Family History   Problem Relation Age of Onset    Alcohol abuse Mother    Blu Glasgow Mother    Kristan Goddard Mother 61     breast cancer    Diabetes Mother     Hypertension Mother     Psychiatric Disorder Mother     Stroke Mother     Breast Cancer Mother     Alcohol abuse Father     Heart Disease Father     Diabetes Father     Hypertension Father     Lung Disease Father     Colon Cancer Father 72    Alcohol abuse Sister     Diabetes Sister     Psychiatric Disorder Sister     Alcohol abuse Brother     Alcohol abuse Paternal Grandmother     Psychiatric Disorder Son     Psychiatric Disorder       nephew    Ovarian Cancer Sister        Social History:   She  reports that she quit smoking about 5 years ago. Her smoking use included Cigarettes. She has a 35.00 pack-year smoking history. She has never used smokeless tobacco.  She  reports that she does not drink alcohol.             Review of Systems:   General: negative for - chills, fatigue, fever, weight change  Psych: negative for - anxiety, depression, irritability or mood swings  ENT: negative for - headaches, hearing change, nasal congestion, oral lesions, sneezing or sore throat  Heme/ Lymph: negative for - bleeding problems, bruising, pallor or swollen lymph nodes  Endo: negative for - hot flashes, polydipsia/polyuria or temperature intolerance  Resp: negative for - cough, shortness of breath or wheezing  CV: negative for - chest pain, edema or palpitations  GI: negative for - abdominal pain, change in bowel habits, constipation, diarrhea or nausea/vomiting  : negative for - dysuria, hematuria, incontinence, pelvic pain or vulvar/vaginal symptoms  MSK: negative for - joint pain, joint swelling or muscle pain  Neuro: negative for - confusion, headaches, seizures or weakness  Derm: negative for - dry skin, hair changes, rash or skin lesion changes          Physical:   Vitals:   Vitals:    09/18/17 0920   Pulse: 62   Resp: 16   Temp: 98 °F (36.7 °C)   TempSrc: Oral   SpO2: 96%   Weight: 196 lb 9.6 oz (89.2 kg)   Height: 5' 4\" (1.626 m)           Exam:   HEENT- atraumatic,normocephalic, awake, oriented, well nourished  Neck - supple,no enlarged lymph nodes, no JVD, no thyromegaly  Chest- CTA, no rhonchi, no crackles  Heart- rrr, no murmurs / gallop/rub  Abdomen- soft,BS+,NT, no hepatosplenomegaly  Ext - no c/c/edema   Neuro- no focal deficits. Power 5/5 all extremities  Skin - warm,dry, no obvious rashes.           Review of Data:   LABS:   Lab Results   Component Value Date/Time    WBC 8.7 01/23/2017 03:54 PM    HGB 11.5 01/23/2017 03:54 PM    HCT 35.5 01/23/2017 03:54 PM    PLATELET 318 68/69/2804 03:54 PM     Lab Results   Component Value Date/Time    Sodium 139 07/18/2017 09:45 AM    Potassium 4.9 07/18/2017 09:45 AM    Chloride 97 07/18/2017 09:45 AM    CO2 22 07/18/2017 09:45 AM    Glucose 132 07/18/2017 09:45 AM    BUN 12 07/18/2017 09:45 AM    Creatinine 0.64 07/18/2017 09:45 AM     Lab Results   Component Value Date/Time    Cholesterol, total 208 07/18/2017 09:45 AM    HDL Cholesterol 40 07/18/2017 09:45 AM    LDL, calculated 130 07/18/2017 09:45 AM    Triglyceride 191 07/18/2017 09:45 AM     No results found for: GPT        Impression / Plan:        ICD-10-CM ICD-9-CM    1. DM type 2, uncontrolled, [facility] E11.40 250.62 gabapentin (NEURONTIN) 100 mg capsule    E11.65 357.2    2. Hypothyroidism due to acquired atrophy of thyroid E03.4 244.8 levothyroxine (SYNTHROID) 175 mcg tablet     246.8    3. Type 2 diabetes mellitus without complication, with long-term current use of insulin (HCC) E11.9 250.00 losartan (COZAAR) 25 mg tablet    Z79.4 V58.67 Insulin Needles, Disposable, 31 gauge x 5/16\" ndle   4. Encounter for immunization Z23 V03.89 INFLUENZA VIRUS VAC QUAD,SPLIT,PRESV FREE SYRINGE IM      ADMIN INFLUENZA VIRUS VAC   5. Hypercholesteremia E78.00 272.0 rosuvastatin (CRESTOR) 40 mg tablet       Hypothyroidism - stable    Hyperchol - will switch pt to crestor. Pt does complain of headaches - asked pt to monitor blood sugars when she gets headaches. R/o hypoglycemia  Asked pt to speak to SPych to reduce some meds so that she is not sleepy all the time and if she can move her dinner times to 7.30 pm and  [facility] to [facility] hypergl[facility]    Pt declined CT head at this time. Explained to patient risk benefits of the medications. Advised patient to stop meds if having any side effects. Pt verbalized understanding of the instructions. I have discussed the diagnosis with the patient and the intended plan as seen in the above orders. The patient has received an after-visit summary and questions were answered concerning future plans. I have discussed medication side effects and warnings with the patient as well. I have reviewed the plan of care with the patient, accepted their input and they are in agreement with the treatment goals. Reviewed plan of care. Patient has provided input and agrees with goals.     Follow-up Disposition: Not on Lukasz Rubio MD

## 2017-09-18 NOTE — MR AVS SNAPSHOT
Visit Information Date & Time Provider Department Dept. Phone Encounter #  
 9/18/2017  9:15 AM Llyod Mccall, [address] [facility] [phone] [id number] Follow-up Instructions Return in about 3 months (around 12/18/2017). Upcoming Health Maintenance Date Due  
 FOOT EXAM Q1 4/22/2017 EYE EXAM RETINAL OR DILATED Q1 5/19/2017 HEMOGLOBIN A1C Q6M 1/18/2018 MICROALBUMIN Q1 1/23/2018 MEDICARE YEARLY EXAM 4/19/2018 FOBT Q 1 YEAR AGE 50-75 4/19/2018 LIPID PANEL Q1 7/18/2018 DTaP/Tdap/Td series (2 - Td) 10/16/2018 PAP AKA CERVICAL CYTOLOGY 4/26/2019 BREAST CANCER SCRN MAMMOGRAM 6/1/2019 Allergies as of 9/18/2017  Review Complete On: 9/18/2017 By: Lloyd Mccall MD  
  
 Severity Noted Reaction Type Reactions Lipitor [Atorvastatin]    Other (comments)  
 myalgias Lisinopril  07/02/2014    Cough Aspirin Low 11/07/2012   Intolerance Other (comments) Nose bleeds Zocor [Simvastatin] Low 11/07/2012   Side Effect Other (comments) Causes pain in the left leg Current Immunizations  Reviewed on 10/21/2013 Name Date Influenza Vaccine 10/21/2013 11:54 AM  
 Influenza Vaccine (Quad) 9/8/2015  1:30 PM  
 Influenza Vaccine (Quad) PF 9/18/2017, 10/12/2016 Not reviewed this visit You Were Diagnosed With   
  
 Codes Comments DM type 2, uncontrolled, with neuropathy ([facility]    -  Primary ICD-10-CM: E11.40, E11.65 ICD-9-CM: 250.62, 357.2 Hypothyroidism due to acquired atrophy of thyroid     ICD-10-CM: E03.4 ICD-9-CM: 244.8, 246.8 Type 2 diabetes mellitus without complication, with long-term current use of insulin (HCC)     ICD-10-CM: E11.9, Z79.4 ICD-9-CM: 250.00, V58.67 Encounter for immunization     ICD-10-CM: I89 ICD-9-CM: V03.89 Hypercholesteremia     ICD-10-CM: E78.00 ICD-9-CM: 272.0 Vitals  Pulse Temp Resp Height(growth percentile) Weight(growth percentile) LMP  
 62 98 °F (36.7 °C) (Oral) 16 5' 4\" (1.626 m) 196 lb 9.6 oz (89.2 kg) 06/05/2014 SpO2 BMI OB Status Smoking Status 96% 33.75 kg/m2 Postmenopausal Former Smoker Vitals History BMI and BSA Data Body Mass Index Body Surface Area 33.75 kg/m 2 2.01 m 2 Preferred Pharmacy Pharmacy Name Phone [id number] [facility] [phone] Your Updated Medication List  
  
   
This list is accurate as of: 9/18/17  9:58 AM.  Always use your most recent med list.  
  
  
  
  
 Killian Harvey Generic drug:  Lancing Device with Lancets  
use as directed to TEST three times a day  
  
 albuterol 90 mcg/actuation inhaler Commonly known as:  PROVENTIL HFA, VENTOLIN HFA, PROAIR HFA Take 2 Puffs by inhalation every six (6) hours as needed for Wheezing. benztropine 0.5 mg tablet Commonly known as:  COGENTIN Take 0.5 mg by mouth two (2) times a day. ferrous sulfate 325 mg (65 mg iron) tablet Take 1 Tab by mouth two (2) times a day. fluticasone 50 mcg/actuation nasal spray Commonly known as:  Arlys Pock 2 Sprays by Both Nostrils route daily. gabapentin 100 mg capsule Commonly known as:  NEURONTIN Take 1 Cap by mouth nightly as needed. gemfibrozil 600 mg tablet Commonly known as:  LOPID Take 1 Tab by mouth two (2) times a day. glucose blood VI test strips strip Commonly known as:  ACCU-CHEK SMARTVIEW TEST STRIP  
dm  
  
 hydrocortisone 1 % topical cream  
Commonly known as:  CORTAID Apply  to affected area two (2) times a day. use thin layer  
  
 ibuprofen 600 mg tablet Commonly known as:  MOTRIN  
TAKE 1 TABLET BY MOUTH EVERY 8 HOURS AS NEEDED FOR PAIN  
  
 insulin glargine 100 unit/mL (3 mL) Inpn Commonly known as:  Lauren Rank Administer  60  units subcut daily  
  
 insulin lispro 100 unit/mL kwikpen Commonly known as:  HUMALOG Administer 616 units 30 minutes before breakfast , lunch and dinner Insulin Needles (Disposable) 31 gauge x 5/16\" Ndle DM JANUMET 50-1,000 mg per tablet Generic drug:  SITagliptin-metFORMIN  
TAKE 1 TABLET BY MOUTH 2 TIMES DAILY WITH MEALS  
  
 levothyroxine 175 mcg tablet Commonly known as:  SYNTHROID Take 1 Tab by mouth Daily (before breakfast). LORazepam 1 mg tablet Commonly known as:  ATIVAN Take 1 mg by mouth every four (4) hours as needed for Anxiety. losartan 25 mg tablet Commonly known as:  COZAAR Take 1 Tab by mouth daily. mupirocin 2 % ointment Commonly known as:  [employer] Apply  to affected area daily. omeprazole 20 mg capsule Commonly known as:  PRILOSEC Take 1 Cap by mouth two (2) times a day. oxybutynin chloride XL 10 mg CR tablet Commonly known as:  DITROPAN XL  
take 1 tablet by mouth once daily  
  
 predniSONE 10 mg dose pack Commonly known as:  STERAPRED DS See administration instruction per 10mg dose pack  
  
 rosuvastatin 40 mg tablet Commonly known as:  CRESTOR Take 1 Tab by mouth nightly. salicylic acid 17 % topical gel Apply  to affected area daily. Apply dime sized application to the callus on the left foot once daily  
  
 sertraline 100 mg tablet Commonly known as:  ZOLOFT Take 150 mg by mouth daily. traZODone 50 mg tablet Commonly known as:  [place] Take  by mouth nightly. trifluoperazine 5 mg tablet Commonly known as:  STELAZINE Take 5 mg by mouth two (2) times a day. zolpidem 5 mg tablet Commonly known as:  AMBIEN Take 1 Tab by mouth nightly as needed for Sleep. Max Daily Amount: 5 mg. Prescriptions Sent to Pharmacy Refills  
 levothyroxine (SYNTHROID) 175 mcg tablet 1 Sig: Take 1 Tab by mouth Daily (before breakfast). Class: Normal  
 [facility]acy: [facility] [place]  #: [phone] Route: Oral  
 gabapentin (NEURONTIN) 100 mg capsule 1 Sig: Take 1 Cap by mouth nightly as needed. Class: Normal  
 Pharmacy: [facility] [place] Ph #: [phone] Route: Oral  
 losartan (COZAAR) 25 mg tablet 1 Sig: Take 1 Tab by mouth daily. Class: Normal  
 Pharmacy: [facility] [place] Ph #: [phone] Route: Oral  
 Insulin Needles, Disposable, 31 gauge x 5/16\" ndle 0 Sig: DM Class: Normal  
 [id number] [facility] Ph #: [phone]  
 rosuvastatin (CRESTOR) 40 mg tablet 3 Sig: Take 1 Tab by mouth nightly. Class: Normal  
 Pharmacy: [facility] [place] Ph #: [phone] Route: Oral  
  
We Performed the Following ADMIN INFLUENZA VIRUS VAC [ HCPCS] INFLUENZA VIRUS VAC QUAD,SPLIT,PRESV FREE SYRINGE IM [id number] CPT(R)] Follow-up Instructions Return in about 3 months (around 12/18/2017). Introducing [facility] & HEALTH SERVICES! [facility] introduces [employer] patient portal. Now you can access parts of your medical record, email your doctor's office, and request medication refills online. 1. In your internet browser, go to [email or web address] 2. Click on the First Time User? Click Here link in the Sign In box. You will see the New Member Sign Up page. 3. Enter your [employer] Access Code exactly as it appears below. You will not need to use this code after youve completed the sign-up process. If you do not sign up before the expiration date, you must request a new code. · [employer] Access Code: [email or web address] Expires: 12/17/2017  9:27 AM 
 
4. Enter the last four digits of your Social Security Number (xxxx) and Date of Birth (mm/dd/yyyy) as indicated and click Submit. You will be taken to the next sign-up page. 5. Create a [employer] ID. This will be your [employer] login ID and cannot be changed, so think of one that is secure and easy to remember. 6. Create a [employer] password. You can change your password at any time. 7. Enter your Password Reset Question and Answer. This can be used at a later time if you forget your password. 8. Enter your e-mail address. You will receive e-mail notification when new information is available in [address] 9. Click Sign Up. You can now view and download portions of your medical record. 10. Click the Download Summary menu link to download a portable copy of your medical information. If you have questions, please visit the Frequently Asked Questions section of the [employer] website. Remember, [employer] is NOT to be used for urgent needs. For medical emergencies, dial 911. Now available from your iPhone and Android! Please provide this summary of care documentation to your next provider. Your primary care clinician is listed as Sohail Paz. If you have any questions after today's visit, please call [phone].

## 2017-09-18 NOTE — PROGRESS NOTES
1. Have you been to the ER, urgent care clinic since your last visit? Hospitalized since your last visit? No    2. Have you seen or consulted any other health care providers outside of the [address] [place] [address] since your last visit? Include any pap smears or colon screening.  Dr. Adriane Martinez

## 2017-09-19 DIAGNOSIS — Z79.4 TYPE 2 DIABETES MELLITUS WITHOUT COMPLICATION, WITH LONG-TERM CURRENT USE OF INSULIN (HCC): ICD-10-CM

## 2017-09-19 DIAGNOSIS — E11.9 TYPE 2 DIABETES MELLITUS WITHOUT COMPLICATION, UNSPECIFIED LONG TERM INSULIN USE STATUS: ICD-10-CM

## 2017-09-19 DIAGNOSIS — E11.9 TYPE 2 DIABETES MELLITUS WITHOUT COMPLICATION, WITH LONG-TERM CURRENT USE OF INSULIN (HCC): ICD-10-CM

## 2017-09-19 NOTE — TELEPHONE ENCOUNTER
Change in pharmacy need new scripts to be sent to the [facility].      Please send Janumet in a 90 day supply

## 2017-09-20 RX ORDER — PEN NEEDLE, DIABETIC 30 GX3/16"
NEEDLE, DISPOSABLE MISCELLANEOUS
Qty: 2 PACKAGE | Refills: 0 | Status: SHIPPED | OUTPATIENT
Start: 2017-09-20 | End: 2017-11-27 | Stop reason: SDUPTHER

## 2017-10-11 ENCOUNTER — HOSPITAL ENCOUNTER (OUTPATIENT)
Dept: LAB | Age: 52
Discharge: HOME | End: 2017-10-11
Payer: MEDICARE

## 2017-10-11 ENCOUNTER — OFFICE VISIT (OUTPATIENT)
Dept: FAMILY MEDICINE CLINIC | Age: 52
End: 2017-10-11

## 2017-10-11 VITALS
SYSTOLIC BLOOD PRESSURE: 138 MMHG | WEIGHT: 191.4 LBS | TEMPERATURE: 98.3 F | HEART RATE: 81 BPM | RESPIRATION RATE: 16 BRPM | OXYGEN SATURATION: 97 % | BODY MASS INDEX: 32.68 KG/M2 | DIASTOLIC BLOOD PRESSURE: 87 MMHG | HEIGHT: 64 IN

## 2017-10-11 DIAGNOSIS — R26.81 UNSTEADY GAIT: ICD-10-CM

## 2017-10-11 DIAGNOSIS — E78.00 HYPERCHOLESTEREMIA: ICD-10-CM
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
## 2017-10-11 DIAGNOSIS — M51.36 LUMBAR DEGENERATIVE DISC DISEASE: ICD-10-CM

## 2017-10-11 DIAGNOSIS — E03.4 HYPOTHYROIDISM DUE TO ACQUIRED ATROPHY OF THYROID: ICD-10-CM

## 2017-10-11 DIAGNOSIS — E11.9 DM TYPE 2, NOT AT GOAL (HCC): ICD-10-CM

## 2017-10-11 DIAGNOSIS — M51.36 LUMBAR DEGENERATIVE DISC DISEASE: Primary | ICD-10-CM

## 2017-10-11 LAB
ALBUMIN SERPL-MCNC: 4.3 G/DL (ref 3.4–5)
ALBUMIN/GLOB SERPL: 1.1 {RATIO} (ref 0.8–1.7)
ALP SERPL-CCNC: 121 U/L (ref 45–117)
ALT SERPL-CCNC: 30 U/L (ref 13–56)
ANION GAP SERPL CALC-SCNC: 9 MMOL/L (ref 3–18)
APPEARANCE UR: CLEAR
AST SERPL-CCNC: 14 U/L (ref 15–37)
BACTERIA URNS QL MICRO: NEGATIVE /HPF
BASOPHILS # BLD: 0 K/UL (ref 0–0.06)
BASOPHILS NFR BLD: 0 % (ref 0–2)
BILIRUB SERPL-MCNC: 0.2 MG/DL (ref 0.2–1)
BILIRUB UR QL: NEGATIVE
BUN SERPL-MCNC: 8 MG/DL (ref 7–18)
BUN/CREAT SERPL: 10 (ref 12–20)
CALCIUM SERPL-MCNC: 10.1 MG/DL (ref 8.5–10.1)
CHLORIDE SERPL-SCNC: 102 MMOL/L (ref 100–108)
CO2 SERPL-SCNC: 28 MMOL/L (ref 21–32)
COLOR UR: YELLOW
CREAT SERPL-MCNC: 0.78 MG/DL (ref 0.6–1.3)
DIFFERENTIAL METHOD BLD: ABNORMAL
EOSINOPHIL # BLD: 0.1 K/UL (ref 0–0.4)
EOSINOPHIL NFR BLD: 1 % (ref 0–5)
EPITH CASTS URNS QL MICRO: NORMAL /LPF (ref 0–5)
ERYTHROCYTE [DISTWIDTH] IN BLOOD BY AUTOMATED COUNT: 13.9 % (ref 11.6–14.5)
GLOBULIN SER CALC-MCNC: 3.8 G/DL (ref 2–4)
GLUCOSE SERPL-MCNC: 151 MG/DL (ref 74–99)
GLUCOSE UR STRIP.AUTO-MCNC: NEGATIVE MG/DL
HCT VFR BLD AUTO: 35.6 % (ref 35–45)
HGB BLD-MCNC: 11.3 G/DL (ref 12–16)
HGB UR QL STRIP: NEGATIVE
KETONES UR QL STRIP.AUTO: NEGATIVE MG/DL
LEUKOCYTE ESTERASE UR QL STRIP.AUTO: ABNORMAL
LYMPHOCYTES # BLD: 4.4 K/UL (ref 0.9–3.6)
LYMPHOCYTES NFR BLD: 43 % (ref 21–52)
MCH RBC QN AUTO: 27 PG (ref 24–34)
MCHC RBC AUTO-ENTMCNC: 31.7 G/DL (ref 31–37)
MCV RBC AUTO: 85.2 FL (ref 74–97)
MONOCYTES # BLD: 0.9 K/UL (ref 0.05–1.2)
MONOCYTES NFR BLD: 8 % (ref 3–10)
NEUTS SEG # BLD: 4.8 K/UL (ref 1.8–8)
NEUTS SEG NFR BLD: 48 % (ref 40–73)
NITRITE UR QL STRIP.AUTO: NEGATIVE
PH UR STRIP: 7.5 [PH] (ref 5–8)
PLATELET # BLD AUTO: 346 K/UL (ref 135–420)
PMV BLD AUTO: 11.7 FL (ref 9.2–11.8)
POTASSIUM SERPL-SCNC: 4.4 MMOL/L (ref 3.5–5.5)
PROT SERPL-MCNC: 8.1 G/DL (ref 6.4–8.2)
PROT UR STRIP-MCNC: NEGATIVE MG/DL
RBC # BLD AUTO: 4.18 M/UL (ref 4.2–5.3)
RBC #/AREA URNS HPF: 0 /HPF (ref 0–5)
SODIUM SERPL-SCNC: 139 MMOL/L (ref 136–145)
SP GR UR REFRACTOMETRY: 1 (ref 1–1.03)
TSH SERPL DL<=0.05 MIU/L-ACNC: 0.52 UIU/ML (ref 0.36–3.74)
UROBILINOGEN UR QL STRIP.AUTO: 0.2 EU/DL (ref 0.2–1)
WBC # BLD AUTO: 10.2 K/UL (ref 4.6–13.2)
WBC URNS QL MICRO: NORMAL /HPF (ref 0–4)

## 2017-10-11 PROCEDURE — 85025 COMPLETE CBC W/AUTO DIFF WBC: CPT | Performed by: INTERNAL MEDICINE

## 2017-10-11 PROCEDURE — 84443 ASSAY THYROID STIM HORMONE: CPT | Performed by: INTERNAL MEDICINE

## 2017-10-11 PROCEDURE — 36415 COLL VENOUS BLD VENIPUNCTURE: CPT | Performed by: INTERNAL MEDICINE

## 2017-10-11 PROCEDURE — 83036 HEMOGLOBIN GLYCOSYLATED A1C: CPT | Performed by: INTERNAL MEDICINE

## 2017-10-11 PROCEDURE — 80053 COMPREHEN METABOLIC PANEL: CPT | Performed by: INTERNAL MEDICINE

## 2017-10-11 PROCEDURE — 81001 URINALYSIS AUTO W/SCOPE: CPT | Performed by: INTERNAL MEDICINE

## 2017-10-11 RX ORDER — ACETAMINOPHEN AND CODEINE PHOSPHATE 300; 30 MG/1; MG/1
1 TABLET ORAL
Qty: 20 TAB | Refills: 0 | Status: SHIPPED | OUTPATIENT
Start: 2017-10-11 | End: 2019-02-24

## 2017-10-11 NOTE — MR AVS SNAPSHOT
Visit Information Date & Time Provider Department Dept. Phone Encounter #  
 10/11/2017  2:45 PM Mariely Anderson, [address] [phone] Follow-up Instructions Return in about 1 month (around 11/11/2017). Your Appointments 12/18/2017  9:15 AM  
Follow Up with Mariely Anderson MD  
[facility] Appt Note: Return in about 3 months (around 12/18/2017). [address] [facility] [address] [facility] [address]  
  
   
 [address] [facility] [address] [facility] Upcoming Health Maintenance Date Due  
 FOOT EXAM Q1 4/22/2017 EYE EXAM RETINAL OR DILATED Q1 5/19/2017 HEMOGLOBIN A1C Q6M 1/18/2018 MICROALBUMIN Q1 1/23/2018 MEDICARE YEARLY EXAM 4/19/2018 FOBT Q 1 YEAR AGE 50-75 4/19/2018 LIPID PANEL Q1 7/18/2018 DTaP/Tdap/Td series (2 - Td) 10/16/2018 PAP AKA CERVICAL CYTOLOGY 4/26/2019 BREAST CANCER SCRN MAMMOGRAM 6/1/2019 Allergies as of 10/11/2017  Review Complete On: 10/11/2017 By: Mariely Anderson MD  
  
 Severity Noted Reaction Type Reactions Lipitor [Atorvastatin]    Other (comments)  
 myalgias Lisinopril  07/02/2014    Cough Aspirin Low 11/07/2012   Intolerance Other (comments) Nose bleeds Zocor [Simvastatin] Low 11/07/2012   Side Effect Other (comments) Causes pain in the left leg Current Immunizations  Reviewed on 10/21/2013 Name Date Influenza Vaccine 10/21/2013 11:54 AM  
 Influenza Vaccine (Quad) 9/8/2015  1:30 PM  
 Influenza Vaccine (Quad) PF 9/18/2017, 10/12/2016 Not reviewed this visit You Were Diagnosed With   
  
 Codes Comments Lumbar degenerative disc disease    -  Primary ICD-10-CM: M51.36 
ICD-9-CM: 722.52 Fall, initial encounter     ICD-10-CM: W19. Harborton Hashimoto ICD-9-CM: E888.9 DM type 2, not at goal [facility])     ICD-10-CM: E11.9 ICD-9-CM: 250.00  Hypercholesteremia     ICD-10-CM: E78.00 
 ICD-9-CM: 272.0 Hypothyroidism due to acquired atrophy of thyroid     ICD-10-CM: E03.4 ICD-9-CM: 244.8, 246.8 Unsteady gait     ICD-10-CM: R26.81 
ICD-9-CM: 533. 2 Vitals BP Pulse Temp Resp Height(growth percentile) Weight(growth percentile) 138/87 81 98.3 °F (36.8 °C) (Oral) 16 5' 4\" (1.626 m) 191 lb 6.4 oz (86.8 kg) LMP SpO2 BMI OB Status Smoking Status 06/05/2014 97% 32.85 kg/m2 Postmenopausal Former Smoker Vitals History BMI and BSA Data Body Mass Index Body Surface Area  
 32.85 kg/m 2 1.98 m 2 Preferred Pharmacy Pharmacy Name Phone [address] [address] [phone] Your Updated Medication List  
  
   
This list is accurate as of: 10/11/17  3:58 PM.  Always use your most recent med list.  
  
  
  
  
 Vibha Hair Generic drug:  Lancing Device with Lancets  
use as directed to TEST three times a day  
  
 acetaminophen-codeine 300-30 mg per tablet Commonly known as:  TYLENOL-CODEINE #3 Take 1 Tab by mouth every six (6) hours as needed for Pain. Max Daily Amount: 4 Tabs. albuterol 90 mcg/actuation inhaler Commonly known as:  PROVENTIL HFA, VENTOLIN HFA, PROAIR HFA Take 2 Puffs by inhalation every six (6) hours as needed for Wheezing. benztropine 0.5 mg tablet Commonly known as:  COGENTIN Take 0.5 mg by mouth two (2) times a day. ferrous sulfate 325 mg (65 mg iron) tablet Take 1 Tab by mouth two (2) times a day. fluticasone 50 mcg/actuation nasal spray Commonly known as:  Alric Eaves 2 Sprays by Both Nostrils route daily. gabapentin 100 mg capsule Commonly known as:  NEURONTIN Take 1 Cap by mouth nightly as needed. gemfibrozil 600 mg tablet Commonly known as:  LOPID Take 1 Tab by mouth two (2) times a day. glucose blood VI test strips strip Commonly known as:  ACCU-CHEK SMARTVIEW TEST STRIP  
dm  
  
 hydrocortisone 1 % topical cream  
Commonly known as:  CORTAID Apply  to affected area two (2) times a day. use thin layer  
  
 ibuprofen 600 mg tablet Commonly known as:  MOTRIN  
TAKE 1 TABLET BY MOUTH EVERY 8 HOURS AS NEEDED FOR PAIN  
  
 insulin glargine 100 unit/mL (3 mL) Inpn Commonly known as:  Nevarez Nanas Administer  60  units subcut daily  
  
 insulin lispro 100 unit/mL kwikpen Commonly known as:  HUMALOG Administer 616 units 30 minutes before breakfast , lunch and dinner Insulin Needles (Disposable) 31 gauge x 5/16\" Ndle DM  
  
 levothyroxine 175 mcg tablet Commonly known as:  SYNTHROID Take 1 Tab by mouth Daily (before breakfast). LORazepam 1 mg tablet Commonly known as:  ATIVAN Take 1 mg by mouth every four (4) hours as needed for Anxiety. losartan 25 mg tablet Commonly known as:  COZAAR Take 1 Tab by mouth daily. mupirocin 2 % ointment Commonly known as:  [employer] Apply  to affected area daily. omeprazole 20 mg capsule Commonly known as:  PRILOSEC Take 1 Cap by mouth two (2) times a day. oxybutynin chloride XL 10 mg CR tablet Commonly known as:  DITROPAN XL  
take 1 tablet by mouth once daily  
  
 predniSONE 10 mg dose pack Commonly known as:  STERAPRED DS See administration instruction per 10mg dose pack  
  
 rosuvastatin 40 mg tablet Commonly known as:  CRESTOR Take 1 Tab by mouth nightly. salicylic acid 17 % topical gel Apply  to affected area daily. Apply dime sized application to the callus on the left foot once daily  
  
 sertraline 100 mg tablet Commonly known as:  ZOLOFT Take 150 mg by mouth daily. SITagliptin-metFORMIN 50-1,000 mg per tablet Commonly known as:  Oleg Kehr Take 1 Tab by mouth two (2) times daily (with meals). traZODone 50 mg tablet Commonly known as:  Emaline Sober Take  by mouth nightly. trifluoperazine 5 mg tablet Commonly known as:  STELAZINE  
 Take 5 mg by mouth two (2) times a day. zolpidem 5 mg tablet Commonly known as:  AMBIEN Take 1 Tab by mouth nightly as needed for Sleep. Max Daily Amount: 5 mg. Prescriptions Printed Refills  
 acetaminophen-codeine (TYLENOL-CODEINE #3) 300-30 mg per tablet 0 Sig: Take 1 Tab by mouth every six (6) hours as needed for Pain. Max Daily Amount: 4 Tabs. Class: Print Route: Oral  
  
We Performed the Following REFERRAL TO NEUROLOGY [NEW02 Custom] REFERRAL TO ORTHOPEDICS [AEJ358 Custom] Follow-up Instructions Return in about 1 month (around 11/11/2017). To-Do List   
 10/11/2017 Lab:  CBC WITH AUTOMATED DIFF   
  
 10/11/2017 Lab:  CBC WITH AUTOMATED DIFF   
  
 10/11/2017 Imaging:  CT HEAD W WO CONT   
  
 10/11/2017 Lab:  HEMOGLOBIN A1C WITH EAG   
  
 10/11/2017 Lab:  METABOLIC PANEL, COMPREHENSIVE   
  
 10/11/2017 Lab:  TSH 3RD GENERATION   
  
 10/11/2017 Lab:  URINALYSIS W/ RFLX MICROSCOPIC Referral Information Referral ID Referred By Referred To  
  
 [id number] [facility], [address] [place] [address] Not Available Visits Status Start Date End Date 1 New Request 10/11/17 10/11/18 If your referral has a status of pending review or denied, additional information will be sent to support the outcome of this decision. Referral ID Referred By Referred To  
 [id number] [facility], [address] [place] [address] Not Available Visits Status Start Date End Date 1 New Request 10/11/17 10/11/18 If your referral has a status of pending review or denied, additional information will be sent to support the outcome of this decision. Referral ID Referred By Referred To  
 [id number] Byron KIDD Not Available Visits Status Start Date End Date 1 New Request 10/11/17 10/11/18 If your referral has a status of pending review or denied, additional information will be sent to support the outcome of this decision. Introducing [facility]! [facility] introduces [employer] patient portal. Now you can access parts of your medical record, email your doctor's office, and request medication refills online. 1. In your internet browser, go to [email or web address] 2. Click on the First Time User? Click Here link in the Sign In box. You will see the New Member Sign Up page. 3. Enter your [employer] Access Code exactly as it appears below. You will not need to use this code after youve completed the sign-up process. If you do not sign up before the expiration date, you must request a new code. · [employer] Access Code: [email or web address] Expires: 12/17/2017  9:27 AM 
 
4. Enter the last four digits of your Social Security Number (xxxx) and Date of Birth (mm/dd/yyyy) as indicated and click Submit. You will be taken to the next sign-up page. 5. Create a G[employer] Party ID. This will be your [employer] login ID and cannot be changed, so think of one that is secure and easy to remember. 6. Create a [employer] password. You can change your password at any time. 7. Enter your Password Reset Question and Answer. This can be used at a later time if you forget your password. 8. Enter your e-mail address. You will receive e-mail notification when new information is available in [address] 9. Click Sign Up. You can now view and download portions of your medical record. 10. Click the Download Summary menu link to download a portable copy of your medical information. If you have questions, please visit the Frequently Asked Questions section of the [employer] website. Remember, [employer] is NOT to be used for urgent needs. For medical emergencies, dial 911. Now available from your iPhone and Android! Please provide this summary of care documentation to your next provider. Your primary care clinician is listed as Susan Yanes. If you have any questions after today's visit, please call [phone].

## 2017-10-11 NOTE — PROGRESS NOTES
Anastasiia Coyne is a 45 y.o.  female and presents with     Chief Complaint   Patient presents with    Hypertension    Diabetes    Back Pain    Fall       Pt has been having back pain on the rt side. Pt used to see pain management but stopped going there a year ago as she had to go to [place]. Pt has not taken pain meds in over a year. Pt also keeps falling and has had 6 falls over last year. Sometimes feels unsteady. N h/o tsroke in the past.  No ear problems. No bleeding. Past Medical History:   Diagnosis Date    Arthritis     Chronic pain     Depression     Diabetes ([address]     Hyperlipidemia     Hypothyroid     Medial meniscus tear     Left knee    Psychiatric disorder     Tobacco abuse, in remission      Past Surgical History:   Procedure Laterality Date    HX ORTHOPAEDIC      Hand reconstructive surgery    HX TUBAL LIGATION  2004     Current Outpatient Prescriptions   Medication Sig    acetaminophen-codeine (TYLENOL-CODEINE #3) 300-30 mg per tablet Take 1 Tab by mouth every six (6) hours as needed for Pain. Max Daily Amount: 4 Tabs.  Insulin Needles, Disposable, 31 gauge x 5/16\" ndle DM    SITagliptin-metFORMIN (JANUMET) 50-1,000 mg per tablet Take 1 Tab by mouth two (2) times daily (with meals).  levothyroxine (SYNTHROID) 175 mcg tablet Take 1 Tab by mouth Daily (before breakfast).  gabapentin (NEURONTIN) 100 mg capsule Take 1 Cap by mouth nightly as needed.  losartan (COZAAR) 25 mg tablet Take 1 Tab by mouth daily.  rosuvastatin (CRESTOR) 40 mg tablet Take 1 Tab by mouth nightly.  ibuprofen (MOTRIN) 600 mg tablet  TAKE 1 TABLET BY MOUTH EVERY 8 HOURS AS NEEDED FOR PAIN    oxybutynin chloride XL (DITROPAN XL) 10 mg CR tablet take 1 tablet by mouth once daily    traZODone (DESYREL) 50 mg tablet Take  by mouth nightly.  omeprazole (PRILOSEC) 20 mg capsule Take 1 Cap by mouth two (2) times a day.     ACCU-CHEK FASTCLIX kit use as directed to TEST three times a day    insulin glargine (LANTUS,BASAGLAR) 100 unit/mL (3 mL) inpn Administer  60  units subcut daily    insulin lispro (HUMALOG) 100 unit/mL kwikpen Administer 616 units 30 minutes before breakfast , lunch and dinner    glucose blood VI test strips (ACCU-CHEK SMARTVIEW TEST STRIP) strip dm    zolpidem (AMBIEN) 5 mg tablet Take 1 Tab by mouth nightly as needed for Sleep. Max Daily Amount: 5 mg.  gemfibrozil (LOPID) 600 mg tablet Take 1 Tab by mouth two (2) times a day.  predniSONE (STERAPRED DS) 10 mg dose pack See administration instruction per 10mg dose pack    mupirocin (BACTROBAN) 2 % ointment Apply  to affected area daily.  hydrocortisone (CORTAID) 1 % topical cream Apply  to affected area two (2) times a day. use thin layer    salicylic acid 17 % topical gel Apply  to affected area daily. Apply dime sized application to the callus on the left foot once daily    fluticasone (FLONASE) 50 mcg/actuation nasal spray 2 Sprays by Both Nostrils route daily.  LORazepam (ATIVAN) 1 mg tablet Take 1 mg by mouth every four (4) hours as needed for Anxiety.  albuterol (PROVENTIL HFA, VENTOLIN HFA, PROAIR HFA) 90 mcg/actuation inhaler Take 2 Puffs by inhalation every six (6) hours as needed for Wheezing.  ferrous sulfate 325 mg (65 mg iron) tablet Take 1 Tab by mouth two (2) times a day.  benztropine (COGENTIN) 0.5 mg tablet Take 0.5 mg by mouth two (2) times a day.  trifluoperazine (STELAZINE) 5 mg tablet Take 5 mg by mouth two (2) times a day.  sertraline (ZOLOFT) 100 mg tablet Take 150 mg by mouth daily. No current facility-administered medications for this visit.       Health Maintenance   Topic Date Due    FOOT EXAM Q1  04/22/2017    EYE EXAM RETINAL OR DILATED Q1  05/19/2017    HEMOGLOBIN A1C Q6M  01/18/2018    MICROALBUMIN Q1  01/23/2018    MEDICARE YEARLY EXAM  04/19/2018    FOBT Q 1 YEAR AGE 50-75  04/19/2018    LIPID PANEL Q1  07/18/2018    DTaP/Tdap/Td series (2 - Td) 10/16/2018    PAP AKA CERVICAL CYTOLOGY  04/26/2019    BREAST CANCER SCRN MAMMOGRAM  06/01/2019    Hepatitis C Screening  Completed    Pneumococcal 19-64 Medium Risk  Completed    INFLUENZA AGE 9 TO ADULT  Completed     Immunization History   Administered Date(s) Administered    Influenza Vaccine 10/21/2013    Influenza Vaccine (Quad) 09/08/2015    Influenza Vaccine (Quad) PF 10/12/2016, 09/18/2017     Patient's last menstrual period was 06/05/2014. Allergies and Intolerances: Allergies   Allergen Reactions    Lipitor [Atorvastatin] Other (comments)     myalgias    Lisinopril Cough    Aspirin Other (comments)     Nose bleeds    Zocor [Simvastatin] Other (comments)     Causes pain in the left leg       Family History:   Family History   Problem Relation Age of Onset    Alcohol abuse Mother    Brunner Degree Mother    Danni Hazy Mother 61     breast cancer    Diabetes Mother     Hypertension Mother     Psychiatric Disorder Mother     Stroke Mother     Breast Cancer Mother     Alcohol abuse Father     Heart Disease Father     Diabetes Father     Hypertension Father     Lung Disease Father     Colon Cancer Father 72    Alcohol abuse Sister     Diabetes Sister     Psychiatric Disorder Sister     Alcohol abuse Brother     Alcohol abuse Paternal Grandmother     Psychiatric Disorder Son     Psychiatric Disorder       nephew    Ovarian Cancer Sister        Social History:   She  reports that she quit smoking about 5 years ago. Her smoking use included Cigarettes. She has a 35.00 pack-year smoking history. She has never used smokeless tobacco.  She  reports that she does not drink alcohol.             Review of Systems:   General: negative for - chills, fatigue, fever, weight change  Psych: negative for - anxiety, depression, irritability or mood swings  ENT: negative for - headaches, hearing change, nasal congestion, oral lesions, sneezing or sore throat  Heme/ Lymph: negative for - bleeding problems, bruising, pallor or swollen lymph nodes  Endo: negative for - hot flashes, polydipsia/polyuria or temperature intolerance  Resp: negative for - cough, shortness of breath or wheezing  CV: negative for - chest pain, edema or palpitations  GI: negative for - abdominal pain, change in bowel habits, constipation, diarrhea or nausea/vomiting  : negative for - dysuria, hematuria, incontinence, pelvic pain or vulvar/vaginal symptoms  MSK: negative for - joint pain, joint swelling or muscle pain, pos for back pain  Neuro: negative for - confusion, headaches, seizures or weakness, pos for  unsteady gait. Derm: negative for - dry skin, hair changes, rash or skin lesion changes          Physical:   Vitals:   Vitals:    10/11/17 1455   BP: 138/87   Pulse: 81   Resp: 16   Temp: 98.3 °F (36.8 °C)   TempSrc: Oral   SpO2: 97%   Weight: 191 lb 6.4 oz (86.8 kg)   Height: 5' 4\" (1.626 m)           Exam:   HEENT- atraumatic,normocephalic, awake, oriented, well nourished  Neck - supple,no enlarged lymph nodes, no JVD, no thyromegaly  Chest- CTA, no rhonchi, no crackles  Heart- rrr, no murmurs / gallop/rub  Abdomen- soft,BS+,NT, no hepatosplenomegaly  Ext - no c/c/edema   Neuro- no focal deficits. Power 5/5 all extremities, Rhomberg pos , sensations in lower ext intact, no nystagmus, no orthosatisis  Skin - warm,dry, no obvious rashes.   Back - rt sided lower back tenderness ++          Review of Data:   LABS:   Lab Results   Component Value Date/Time    WBC 8.7 01/23/2017 03:54 PM    HGB 11.5 01/23/2017 03:54 PM    HCT 35.5 01/23/2017 03:54 PM    PLATELET 307 04/71/8966 03:54 PM     Lab Results   Component Value Date/Time    Sodium 139 07/18/2017 09:45 AM    Potassium 4.9 07/18/2017 09:45 AM    Chloride 97 07/18/2017 09:45 AM    CO2 22 07/18/2017 09:45 AM    Glucose 132 07/18/2017 09:45 AM    BUN 12 07/18/2017 09:45 AM    Creatinine 0.64 07/18/2017 09:45 AM     Lab Results   Component Value Date/Time    Cholesterol, total 208 07/18/2017 09:45 AM    HDL Cholesterol 40 07/18/2017 09:45 AM    LDL, calculated 130 07/18/2017 09:45 AM    Triglyceride 191 07/18/2017 09:45 AM     No results found for: GPT        Impression / Plan:        ICD-10-CM ICD-9-CM    1. Lumbar degenerative disc disease M51.36 722.52 REFERRAL TO ORTHOPEDICS      acetaminophen-codeine (TYLENOL-CODEINE #3) 300-30 mg per tablet   2. Fall, initial encounter [address] Kym [place] L547.0 REFERRAL TO NEUROLOGY      CT HEAD W WO CONT   3. DM type 2, not at goal ([address] E11.9 250.00 CBC WITH AUTOMATED DIFF      HEMOGLOBIN A1C WITH EAG      METABOLIC PANEL, COMPREHENSIVE      CBC WITH AUTOMATED DIFF   4. Hypercholesteremia E78.00 272.0    5. Hypothyroidism due to acquired atrophy of thyroid E03.4 244.8 TSH 3RD GENERATION     246.8    6. Unsteady gait R26.81 781.2 REFERRAL TO NEUROLOGY      CT HEAD W WO CONT      recurrent falls- R/o lacunar infarct , get Neurology opinion. Explained to patient risk benefits of the medications. Advised patient to stop meds if having any side effects. Pt verbalized understanding of the instructions. I have discussed the diagnosis with the patient and the intended plan as seen in the above orders. The patient has received an after-visit summary and questions were answered concerning future plans. I have discussed medication side effects and warnings with the patient as well. I have reviewed the plan of care with the patient, accepted their input and they are in agreement with the treatment goals. Reviewed plan of care. Patient has provided input and agrees with goals.     Follow-up Disposition: Not on Tung Holliday MD

## 2017-10-11 NOTE — PROGRESS NOTES
1. Have you been to the ER, urgent care clinic since your last visit? Hospitalized since your last visit? No    2. Have you seen or consulted any other health care providers outside of the [address] [place] [address] since your last visit? Include any pap smears or colon screening.  No

## 2017-10-12 LAB
EST. AVERAGE GLUCOSE BLD GHB EST-MCNC: 177 MG/DL
HBA1C MFR BLD: 7.8 % (ref 4.2–5.6)

## 2017-11-17 DIAGNOSIS — K21.9 GASTROESOPHAGEAL REFLUX DISEASE WITHOUT ESOPHAGITIS: ICD-10-CM

## 2017-11-19 RX ORDER — OMEPRAZOLE 20 MG/1
20 CAPSULE, DELAYED RELEASE ORAL 2 TIMES DAILY
Qty: 180 CAP | Refills: 1 | Status: SHIPPED | OUTPATIENT
Start: 2017-11-19 | End: 2017-12-18 | Stop reason: SDUPTHER

## 2017-11-27 DIAGNOSIS — E11.9 TYPE 2 DIABETES MELLITUS WITHOUT COMPLICATION, WITH LONG-TERM CURRENT USE OF INSULIN (HCC): ICD-10-CM

## 2017-11-27 DIAGNOSIS — Z79.4 TYPE 2 DIABETES MELLITUS WITHOUT COMPLICATION, WITH LONG-TERM CURRENT USE OF INSULIN (HCC): ICD-10-CM

## 2017-11-27 RX ORDER — PEN NEEDLE, DIABETIC 31 GX5/16"
NEEDLE, DISPOSABLE MISCELLANEOUS
Qty: 100 PEN NEEDLE | Refills: 3 | Status: SHIPPED | OUTPATIENT
Start: 2017-11-27 | End: 2018-04-23 | Stop reason: SDUPTHER

## 2017-12-18 ENCOUNTER — OFFICE VISIT (OUTPATIENT)
Dept: FAMILY MEDICINE CLINIC | Age: 52
End: 2017-12-18

## 2017-12-18 VITALS
BODY MASS INDEX: 32.17 KG/M2 | HEART RATE: 76 BPM | DIASTOLIC BLOOD PRESSURE: 70 MMHG | HEIGHT: 64 IN | OXYGEN SATURATION: 96 % | TEMPERATURE: 98 F | SYSTOLIC BLOOD PRESSURE: 107 MMHG | RESPIRATION RATE: 20 BRPM | WEIGHT: 188.4 LBS

## 2017-12-18 DIAGNOSIS — K21.9 GASTROESOPHAGEAL REFLUX DISEASE WITHOUT ESOPHAGITIS: ICD-10-CM

## 2017-12-18 DIAGNOSIS — E11.21 TYPE 2 DIABETES MELLITUS WITH NEPHROPATHY (HCC): Primary | ICD-10-CM

## 2017-12-18 DIAGNOSIS — E11.9 TYPE 2 DIABETES MELLITUS WITHOUT COMPLICATION, UNSPECIFIED LONG TERM INSULIN USE STATUS: ICD-10-CM

## 2017-12-18 DIAGNOSIS — N39.3 STRESS INCONTINENCE OF URINE: ICD-10-CM

## 2017-12-18 DIAGNOSIS — E78.00 HYPERCHOLESTEREMIA: ICD-10-CM

## 2017-12-18 RX ORDER — OXYBUTYNIN CHLORIDE 10 MG/1
10 TABLET, EXTENDED RELEASE ORAL DAILY
Qty: 90 TAB | Refills: 1 | Status: SHIPPED | OUTPATIENT
Start: 2017-12-18 | End: 2018-07-02 | Stop reason: SDUPTHER

## 2017-12-18 RX ORDER — INSULIN GLARGINE 100 [IU]/ML
INJECTION, SOLUTION SUBCUTANEOUS
Qty: 5 PEN | Refills: 3 | Status: SHIPPED | OUTPATIENT
Start: 2017-12-18 | End: 2018-09-07

## 2017-12-18 RX ORDER — OMEPRAZOLE 20 MG/1
20 CAPSULE, DELAYED RELEASE ORAL 2 TIMES DAILY
Qty: 180 CAP | Refills: 1 | Status: SHIPPED | OUTPATIENT
Start: 2017-12-18 | End: 2018-02-19 | Stop reason: SDUPTHER

## 2017-12-18 RX ORDER — ROSUVASTATIN CALCIUM 40 MG/1
40 TABLET, COATED ORAL
Qty: 90 TAB | Refills: 1 | Status: SHIPPED | OUTPATIENT
Start: 2017-12-18 | End: 2018-06-29 | Stop reason: SDUPTHER

## 2017-12-18 NOTE — PROGRESS NOTES
1. Have you been to the ER, urgent care clinic since your last visit? Hospitalized since your last visit? No    2. Have you seen or consulted any other health care providers outside of the [address] [place] [address] since your last visit? Include any pap smears or colon screening.  No

## 2017-12-18 NOTE — MR AVS SNAPSHOT
Visit Information Date & Time Provider Department Dept. Phone Encounter #  
 12/18/2017  9:45 AM [id number] [phone] [id number] Follow-up Instructions Return in about 2 months (around 2/18/2018). Upcoming Health Maintenance Date Due  
 FOOT EXAM Q1 4/22/2017 EYE EXAM RETINAL OR DILATED Q1 5/19/2017 MICROALBUMIN Q1 1/23/2018 HEMOGLOBIN A1C Q6M 4/11/2018 MEDICARE YEARLY EXAM 4/19/2018 FOBT Q 1 YEAR AGE 50-75 4/19/2018 LIPID PANEL Q1 7/18/2018 DTaP/Tdap/Td series (2 - Td) 10/16/2018 PAP AKA CERVICAL CYTOLOGY 4/26/2019 Allergies as of 12/18/2017  Review Complete On: 12/18/2017 By: Saray Hoskins LPN Severity Noted Reaction Type Reactions Lipitor [Atorvastatin]    Other (comments)  
 myalgias Lisinopril  07/02/2014    Cough Aspirin Low 11/07/2012   Intolerance Other (comments) Nose bleeds Zocor [Simvastatin] Low 11/07/2012   Side Effect Other (comments) Causes pain in the left leg Current Immunizations  Reviewed on 10/21/2013 Name Date Influenza Vaccine 10/21/2013 11:54 AM  
 Influenza Vaccine (Quad) 9/8/2015  1:30 PM  
 Influenza Vaccine (Quad) PF 9/18/2017, 10/12/2016 Not reviewed this visit You Were Diagnosed With   
  
 Codes Comments Type 2 diabetes mellitus with nephropathy ([facility]    -  Primary ICD-10-CM: E11.21 
ICD-9-CM: 250.40, 583.81 Hypercholesteremia     ICD-10-CM: E78.00 ICD-9-CM: 272.0 DM type 2, uncontrolled, with neuropathy ([facility]     ICD-10-CM: E11.40, E11.65 ICD-9-CM: 250.62, 357.2 Type 2 diabetes mellitus without complication, unspecified long term insulin use status (HCC)     ICD-10-CM: E11.9 ICD-9-CM: 250.00 Gastroesophageal reflux disease without esophagitis     ICD-10-CM: K21.9 ICD-9-CM: 530.81 Stress incontinence of urine     ICD-10-CM: N39.3 ICD-9-CM: [id number] Vitals BP Pulse Temp Resp Height(growth percentile) Weight(growth percentile) 107/70 76 98 °F (36.7 °C) (Oral) 20 5' 4\" (1.626 m) 188 lb 6.4 oz (85.5 kg) LMP SpO2 BMI OB Status Smoking Status 06/05/2014 96% 32.34 kg/m2 Postmenopausal Former Smoker BMI and BSA Data Body Mass Index Body Surface Area  
 32.34 kg/m 2 1.96 m 2 Preferred Pharmacy Pharmacy Name Phone [address] [facility], [address] [place] [phone] Your Updated Medication List  
  
   
This list is accurate as of: 12/18/17 10:58 AM.  Always use your most recent med list.  
  
  
  
  
 [employer] Generic drug:  Lancing Device with Lancets  
use as directed to TEST three times a day  
  
 acetaminophen-codeine 300-30 mg per tablet Commonly known as:  TYLENOL-CODEINE #3 Take 1 Tab by mouth every six (6) hours as needed for Pain. Max Daily Amount: 4 Tabs. albuterol 90 mcg/actuation inhaler Commonly known as:  PROVENTIL HFA, VENTOLIN HFA, PROAIR HFA Take 2 Puffs by inhalation every six (6) hours as needed for Wheezing. benztropine 0.5 mg tablet Commonly known as:  COGENTIN Take 0.5 mg by mouth two (2) times a day. ferrous sulfate 325 mg (65 mg iron) tablet Take 1 Tab by mouth two (2) times a day. fluticasone 50 mcg/actuation nasal spray Commonly known as:  [place] 2 Sprays by Both Nostrils route daily. gabapentin 100 mg capsule Commonly known as:  NEURONTIN Take 1 Cap by mouth nightly as needed. gemfibrozil 600 mg tablet Commonly known as:  LOPID Take 1 Tab by mouth two (2) times a day. glucose blood VI test strips strip Commonly known as:  ACCU-CHEK SMARTVIEW TEST STRIP  
dm  
  
 hydrocortisone 1 % topical cream  
Commonly known as:  CORTAID Apply  to affected area two (2) times a day. use thin layer  
  
 ibuprofen 600 mg tablet Commonly known as:  MOTRIN  
 TAKE 1 TABLET BY MOUTH EVERY 8 HOURS AS NEEDED FOR PAIN  
  
 insulin glargine 100 unit/mL (3 mL) Inpn Commonly known as:  Eather Fairton Administer  60  units subcut daily  
  
 insulin lispro 100 unit/mL kwikpen Commonly known as:  HUMALOG Administer 616 units 30 minutes before breakfast , lunch and dinner  
  
 levothyroxine 175 mcg tablet Commonly known as:  SYNTHROID Take 1 Tab by mouth Daily (before breakfast). LORazepam 1 mg tablet Commonly known as:  ATIVAN Take 1 mg by mouth every four (4) hours as needed for Anxiety. losartan 25 mg tablet Commonly known as:  COZAAR Take 1 Tab by mouth daily. mupirocin 2 % ointment Commonly known as:  [employer] Apply  to affected area daily. omeprazole 20 mg capsule Commonly known as:  PRILOSEC Take 1 Cap by mouth two (2) times a day. oxybutynin chloride XL 10 mg CR tablet Commonly known as:  DITROPAN XL Take 1 Tab by mouth daily. predniSONE 10 mg dose pack Commonly known as:  STERAPRED DS See administration instruction per 10mg dose pack  
  
 rosuvastatin 40 mg tablet Commonly known as:  CRESTOR Take 1 Tab by mouth nightly. salicylic acid 17 % topical gel Apply  to affected area daily. Apply dime sized application to the callus on the left foot once daily  
  
 sertraline 100 mg tablet Commonly known as:  ZOLOFT Take 150 mg by mouth daily. SITagliptin-metFORMIN 50-1,000 mg per tablet Commonly known as:  Debria Roses Take 1 Tab by mouth two (2) times daily (with meals). traZODone 50 mg tablet Commonly known as:  Arvind Cower Take  by mouth nightly. trifluoperazine 5 mg tablet Commonly known as:  STELAZINE Take 5 mg by mouth two (2) times a day. ULTRA-THIN II (SHORT) PEN NDL 31 gauge x 5/16\" Ndle Generic drug:  Insulin Needles (Disposable) USE FOR DIABETIC TESTING  
  
 zolpidem 5 mg tablet Commonly known as:  AMBIEN  
 Take 1 Tab by mouth nightly as needed for Sleep. Max Daily Amount: 5 mg. Prescriptions Sent to Pharmacy Refills  
 rosuvastatin (CRESTOR) 40 mg tablet 1 Sig: Take 1 Tab by mouth nightly. Class: Normal  
 Pharmacy: [address] [place] [address], [address] #: [phone] Route: Oral  
 insulin glargine (LANTUS,BASAGLAR) 100 unit/mL (3 mL) inpn 3 Sig: Administer  60  units subcut daily Class: Normal  
 Pharmacy: [address] [place] [address], [address] #: [phone]  
 oxybutynin chloride XL (DITROPAN XL) 10 mg CR tablet 1 Sig: Take 1 Tab by mouth daily. Class: Normal  
 Pharmacy: [address] [place] [address], [address] #: [phone] Route: Oral  
 omeprazole (PRILOSEC) 20 mg capsule 1 Sig: Take 1 Cap by mouth two (2) times a day. Class: Normal  
 Pharmacy: [address] [place] [address], [address] #: [phone] Route: Oral  
  
Follow-up Instructions Return in about 2 months (around 2/18/2018). Introducing [facility]! Tod Santos introduces [employer] patient portal. Now you can access parts of your medical record, email your doctor's office, and request medication refills online. 1. In your internet browser, go to [email or web address] 2. Click on the First Time User? Click Here link in the Sign In box. You will see the New Member Sign Up page. 3. Enter your [employer] Access Code exactly as it appears below. You will not need to use this code after youve completed the sign-up process. If you do not sign up before the expiration date, you must request a new code. · [employer] Access Code: [email or web address] Expires: 3/18/2018 10:58 AM 
 
4. Enter the last four digits of your Social Security Number (xxxx) and Date of Birth (mm/dd/yyyy) as indicated and click Submit.  You will be taken to the next sign-up page. 5. Create a KeyLemon ID. This will be your KeyLemon login ID and cannot be changed, so think of one that is secure and easy to remember. 6. Create a KeyLemon password. You can change your password at any time. 7. Enter your Password Reset Question and Answer. This can be used at a later time if you forget your password. 8. Enter your e-mail address. You will receive e-mail notification when new information is available in [address]. 9. Click Sign Up. You can now view and download portions of your medical record. 10. Click the Download Summary menu link to download a portable copy of your medical information. If you have questions, please visit the Frequently Asked Questions section of the KeyLemon website. Remember, [employer]Lemon is NOT to be used for urgent needs. For medical emergencies, dial 911. Now available from your iPhone and Android! Please provide this summary of care documentation to your next provider. Your primary care clinician is listed as Segun Rodriguez. If you have any questions after today's visit, please call [phone].

## 2017-12-18 NOTE — PROGRESS NOTES
Conchita Sparks is a 45 y.o.  female and presents with     Chief Complaint   Patient presents with    Medication Refill    Diabetes    Hypertension    Cholesterol Problem    Hypothyroidism       Pt says her gait problems has improved and has not had any falls. She did not do CT head as she feels claustrophobic and also did not see Neurology since she does not feel dizzy anymore. Pt reports her sugars are doing good- range from 90 150. No chest pain. Had little bit of cough , but dry. She is taking thyroid meds. Past Medical History:   Diagnosis Date    Arthritis     Chronic pain     Depression     Diabetes ([address]     Hyperlipidemia     Hypothyroid     Medial meniscus tear     Left knee    Psychiatric disorder     Tobacco abuse, in remission      Past Surgical History:   Procedure Laterality Date    HX ORTHOPAEDIC      Hand reconstructive surgery    HX TUBAL LIGATION  2004     Current Outpatient Prescriptions   Medication Sig    rosuvastatin (CRESTOR) 40 mg tablet Take 1 Tab by mouth nightly.  insulin glargine (LANTUS,BASAGLAR) 100 unit/mL (3 mL) inpn Administer  60  units subcut daily    oxybutynin chloride XL (DITROPAN XL) 10 mg CR tablet Take 1 Tab by mouth daily.  omeprazole (PRILOSEC) 20 mg capsule Take 1 Cap by mouth two (2) times a day.  ULTRA-THIN II, SHORT, PEN NDL 31 gauge x 5/16\" ndle USE FOR DIABETIC TESTING    acetaminophen-codeine (TYLENOL-CODEINE #3) 300-30 mg per tablet Take 1 Tab by mouth every six (6) hours as needed for Pain. Max Daily Amount: 4 Tabs.  SITagliptin-metFORMIN (JANUMET) 50-1,000 mg per tablet Take 1 Tab by mouth two (2) times daily (with meals).  levothyroxine (SYNTHROID) 175 mcg tablet Take 1 Tab by mouth Daily (before breakfast).  gabapentin (NEURONTIN) 100 mg capsule Take 1 Cap by mouth nightly as needed.  losartan (COZAAR) 25 mg tablet Take 1 Tab by mouth daily.  traZODone (DESYREL) 50 mg tablet Take  by mouth nightly.     ACCU-CHEK FASTCLIX kit use as directed to TEST three times a day    insulin lispro (HUMALOG) 100 unit/mL kwikpen Administer 616 units 30 minutes before breakfast , lunch and dinner    glucose blood VI test strips (ACCU-CHEK SMARTVIEW TEST STRIP) strip dm    gemfibrozil (LOPID) 600 mg tablet Take 1 Tab by mouth two (2) times a day.  benztropine (COGENTIN) 0.5 mg tablet Take 0.5 mg by mouth two (2) times a day.  trifluoperazine (STELAZINE) 5 mg tablet Take 5 mg by mouth two (2) times a day.  sertraline (ZOLOFT) 100 mg tablet Take 150 mg by mouth daily.  ibuprofen (MOTRIN) 600 mg tablet  TAKE 1 TABLET BY MOUTH EVERY 8 HOURS AS NEEDED FOR PAIN    zolpidem (AMBIEN) 5 mg tablet Take 1 Tab by mouth nightly as needed for Sleep. Max Daily Amount: 5 mg.  predniSONE (STERAPRED DS) 10 mg dose pack See administration instruction per 10mg dose pack    mupirocin (BACTROBAN) 2 % ointment Apply  to affected area daily.  hydrocortisone (CORTAID) 1 % topical cream Apply  to affected area two (2) times a day. use thin layer    salicylic acid 17 % topical gel Apply  to affected area daily. Apply dime sized application to the callus on the left foot once daily    fluticasone (FLONASE) 50 mcg/actuation nasal spray 2 Sprays by Both Nostrils route daily.  LORazepam (ATIVAN) 1 mg tablet Take 1 mg by mouth every four (4) hours as needed for Anxiety.  albuterol (PROVENTIL HFA, VENTOLIN HFA, PROAIR HFA) 90 mcg/actuation inhaler Take 2 Puffs by inhalation every six (6) hours as needed for Wheezing.  ferrous sulfate 325 mg (65 mg iron) tablet Take 1 Tab by mouth two (2) times a day. No current facility-administered medications for this visit.       Health Maintenance   Topic Date Due    FOOT EXAM Q1  04/22/2017    EYE EXAM RETINAL OR DILATED Q1  05/19/2017    MICROALBUMIN Q1  01/23/2018    HEMOGLOBIN A1C Q6M  04/11/2018    MEDICARE YEARLY EXAM  04/19/2018    FOBT Q 1 YEAR AGE 50-75  04/19/2018    LIPID PANEL Q1 07/18/2018    DTaP/Tdap/Td series (2 - Td) 10/16/2018    PAP AKA CERVICAL CYTOLOGY  04/26/2019    Hepatitis C Screening  Completed    Pneumococcal 19-64 Medium Risk  Completed    Influenza Age 5 to Adult  Completed     Immunization History   Administered Date(s) Administered    Influenza Vaccine 10/21/2013    Influenza Vaccine (Quad) 09/08/2015    Influenza Vaccine (Quad) PF 10/12/2016, 09/18/2017     Patient's last menstrual period was 06/05/2014. Allergies and Intolerances: Allergies   Allergen Reactions    Lipitor [Atorvastatin] Other (comments)     myalgias    Lisinopril Cough    Aspirin Other (comments)     Nose bleeds    Zocor [Simvastatin] Other (comments)     Causes pain in the left leg       Family History:   Family History   Problem Relation Age of Onset    Alcohol abuse Mother    Kandis Becker Mother    [facility] Mother 61     breast cancer    Diabetes Mother     Hypertension Mother     Psychiatric Disorder Mother     Stroke Mother     Breast Cancer Mother     Alcohol abuse Father     Heart Disease Father     Diabetes Father     Hypertension Father     Lung Disease Father     Colon Cancer Father 72    Alcohol abuse Sister     Diabetes Sister     Psychiatric Disorder Sister     Alcohol abuse Brother     Alcohol abuse Paternal Grandmother     Psychiatric Disorder Son     Psychiatric Disorder       nephew    Ovarian Cancer Sister        Social History:   She  reports that she quit smoking about 6 years ago. Her smoking use included Cigarettes. She has a 35.00 pack-year smoking history. She has never used smokeless tobacco.  She  reports that she does not drink alcohol.             Review of Systems:   General: negative for - chills, fatigue, fever, weight change  Psych: negative for - anxiety, depression, irritability or mood swings  ENT: negative for - headaches, hearing change, nasal congestion, oral lesions, sneezing or sore throat  Heme/ Lymph: negative for - bleeding problems, bruising, pallor or swollen lymph nodes  Endo: negative for - hot flashes, polydipsia/polyuria or temperature intolerance  Resp: negative for - cough, shortness of breath or wheezing  CV: negative for - chest pain, edema or palpitations  GI: negative for - abdominal pain, change in bowel habits, constipation, diarrhea or nausea/vomiting  : negative for - dysuria, hematuria, incontinence, pelvic pain or vulvar/vaginal symptoms  MSK: negative for - joint pain, joint swelling or muscle pain  Neuro: negative for - confusion, headaches, seizures or weakness  Derm: negative for - dry skin, hair changes, rash or skin lesion changes          Physical:   Vitals:   Vitals:    12/18/17 1006   BP: 107/70   Pulse: 76   Resp: 20   Temp: 98 °F (36.7 °C)   TempSrc: Oral   SpO2: 96%   Weight: 188 lb 6.4 oz (85.5 kg)   Height: 5' 4\" (1.626 m)           Exam:   HEENT- atraumatic,normocephalic, awake, oriented, well nourished  Neck - supple,no enlarged lymph nodes, no JVD, no thyromegaly  Chest- CTA, no rhonchi, no crackles  Heart- rrr, no murmurs / gallop/rub  Abdomen- soft,BS+,NT, no hepatosplenomegaly  Ext - no c/c/edema   Neuro- no focal deficits. Power 5/5 all extremities  Skin - warm,dry, no obvious rashes.           Review of Data:   LABS:   Lab Results   Component Value Date/Time    WBC 10.2 10/11/2017 03:58 PM    HGB 11.3 10/11/2017 03:58 PM    HCT 35.6 10/11/2017 03:58 PM    PLATELET 858 99/67/7044 03:58 PM     Lab Results   Component Value Date/Time    Sodium 139 10/11/2017 03:58 PM    Potassium 4.4 10/11/2017 03:58 PM    Chloride 102 10/11/2017 03:58 PM    CO2 28 10/11/2017 03:58 PM    Glucose 151 10/11/2017 03:58 PM    BUN 8 10/11/2017 03:58 PM    Creatinine 0.78 10/11/2017 03:58 PM     Lab Results   Component Value Date/Time    Cholesterol, total 208 07/18/2017 09:45 AM    HDL Cholesterol 40 07/18/2017 09:45 AM    LDL, calculated 130 07/18/2017 09:45 AM    Triglyceride 191 07/18/2017 09:45 AM     No results found for: GPT        Impression / Plan:        ICD-10-CM ICD-9-CM    1. Type 2 diabetes mellitus with nephropathy (HCC) E11.21 250.40      583.81    2. Hypercholesteremia E78.00 272.0 rosuvastatin (CRESTOR) 40 mg tablet   3. DM type 2, uncontrolled, with neuropathy (HCC) E11.40 250.62 insulin glargine (LANTUS,BASAGLAR) 100 unit/mL (3 mL) inpn    E11.65 357.2    4. Type 2 diabetes mellitus without complication, unspecified long term insulin use status (HCC) E11.9 250.00 insulin glargine (LANTUS,BASAGLAR) 100 unit/mL (3 mL) inpn   5. Gastroesophageal reflux disease without esophagitis K21.9 530.81 omeprazole (PRILOSEC) 20 mg capsule   6. Stress incontinence of urine N39.3 [id number] oxybutynin chloride XL (DITROPAN XL) 10 mg CR tablet     DM - sugars improving    Hyperchol - improved    Gait problems - resolved. Hypothyroidism - stable        Explained to patient risk benefits of the medications. Advised patient to stop meds if having any side effects. Pt verbalized understanding of the instructions. I have discussed the diagnosis with the patient and the intended plan as seen in the above orders. The patient has received an after-visit summary and questions were answered concerning future plans. I have discussed medication side effects and warnings with the patient as well. I have reviewed the plan of care with the patient, accepted their input and they are in agreement with the treatment goals. Reviewed plan of care. Patient has provided input and agrees with goals.     [facility]

## 2018-01-29 ENCOUNTER — TELEPHONE (OUTPATIENT)
Dept: FAMILY MEDICINE CLINIC | Age: 53
End: 2018-01-29

## 2018-01-29 NOTE — TELEPHONE ENCOUNTER
Pt requesting them to fill her diabetic supplies as well as Omega3. They have faxed the orders over twice and are checking on the status. Please advise.

## 2018-01-31 NOTE — TELEPHONE ENCOUNTER
Orders received and placed in folder for signature, once signed order will be faxed. This encounter will be closed.

## 2018-02-14 ENCOUNTER — TELEPHONE (OUTPATIENT)
Dept: FAMILY MEDICINE CLINIC | Age: 53
End: 2018-02-14

## 2018-02-14 ENCOUNTER — APPOINTMENT (OUTPATIENT)
Dept: CT IMAGING | Age: 53
End: 2018-02-14
Attending: NURSE PRACTITIONER
Payer: MEDICARE

## 2018-02-14 ENCOUNTER — HOSPITAL ENCOUNTER (EMERGENCY)
Age: 53
Discharge: HOME | End: 2018-02-14
Attending: EMERGENCY MEDICINE
Payer: MEDICARE

## 2018-02-14 VITALS
HEART RATE: 85 BPM | OXYGEN SATURATION: 97 % | TEMPERATURE: 98.4 F | RESPIRATION RATE: 16 BRPM | WEIGHT: 186 LBS | HEIGHT: 64 IN | SYSTOLIC BLOOD PRESSURE: 128 MMHG | BODY MASS INDEX: 31.76 KG/M2 | DIASTOLIC BLOOD PRESSURE: 81 MMHG

## 2018-02-14 DIAGNOSIS — J01.90 ACUTE SINUSITIS, RECURRENCE NOT SPECIFIED, UNSPECIFIED LOCATION: ICD-10-CM

## 2018-02-14 DIAGNOSIS — R42 VERTIGO: ICD-10-CM

## 2018-02-14 DIAGNOSIS — R42 DIZZINESS: Primary | ICD-10-CM

## 2018-02-14 LAB
ANION GAP SERPL CALC-SCNC: 8 MMOL/L (ref 3–18)
APPEARANCE UR: CLEAR
BASOPHILS # BLD: 0 K/UL (ref 0–0.06)
BASOPHILS NFR BLD: 0 % (ref 0–2)
BILIRUB UR QL: NEGATIVE
BUN SERPL-MCNC: 12 MG/DL (ref 7–18)
BUN/CREAT SERPL: 14 (ref 12–20)
CALCIUM SERPL-MCNC: 9.4 MG/DL (ref 8.5–10.1)
CHLORIDE SERPL-SCNC: 102 MMOL/L (ref 100–108)
CO2 SERPL-SCNC: 27 MMOL/L (ref 21–32)
COLOR UR: YELLOW
CREAT SERPL-MCNC: 0.84 MG/DL (ref 0.6–1.3)
DIFFERENTIAL METHOD BLD: ABNORMAL
EOSINOPHIL # BLD: 0.1 K/UL (ref 0–0.4)
EOSINOPHIL NFR BLD: 2 % (ref 0–5)
ERYTHROCYTE [DISTWIDTH] IN BLOOD BY AUTOMATED COUNT: 13.5 % (ref 11.6–14.5)
GLUCOSE SERPL-MCNC: 172 MG/DL (ref 74–99)
GLUCOSE UR STRIP.AUTO-MCNC: NEGATIVE MG/DL
HCT VFR BLD AUTO: 34.3 % (ref 35–45)
HGB BLD-MCNC: 11 G/DL (ref 12–16)
HGB UR QL STRIP: NEGATIVE
KETONES UR QL STRIP.AUTO: NEGATIVE MG/DL
LEUKOCYTE ESTERASE UR QL STRIP.AUTO: NEGATIVE
LYMPHOCYTES # BLD: 2.8 K/UL (ref 0.9–3.6)
LYMPHOCYTES NFR BLD: 46 % (ref 21–52)
MCH RBC QN AUTO: 26.6 PG (ref 24–34)
MCHC RBC AUTO-ENTMCNC: 32.1 G/DL (ref 31–37)
MCV RBC AUTO: 82.9 FL (ref 74–97)
MONOCYTES # BLD: 0.6 K/UL (ref 0.05–1.2)
MONOCYTES NFR BLD: 11 % (ref 3–10)
NEUTS SEG # BLD: 2.5 K/UL (ref 1.8–8)
NEUTS SEG NFR BLD: 41 % (ref 40–73)
NITRITE UR QL STRIP.AUTO: NEGATIVE
PH UR STRIP: 6.5 [PH] (ref 5–8)
PLATELET # BLD AUTO: 322 K/UL (ref 135–420)
PMV BLD AUTO: 10.4 FL (ref 9.2–11.8)
POTASSIUM SERPL-SCNC: 4.4 MMOL/L (ref 3.5–5.5)
PROT UR STRIP-MCNC: NEGATIVE MG/DL
RBC # BLD AUTO: 4.14 M/UL (ref 4.2–5.3)
SODIUM SERPL-SCNC: 137 MMOL/L (ref 136–145)
SP GR UR REFRACTOMETRY: 1 (ref 1–1.03)
UROBILINOGEN UR QL STRIP.AUTO: 0.2 EU/DL (ref 0.2–1)
WBC # BLD AUTO: 6.1 K/UL (ref 4.6–13.2)

## 2018-02-14 PROCEDURE — 93005 ELECTROCARDIOGRAM TRACING: CPT

## 2018-02-14 PROCEDURE — 85025 COMPLETE CBC W/AUTO DIFF WBC: CPT | Performed by: NURSE PRACTITIONER

## 2018-02-14 PROCEDURE — 99284 EMERGENCY DEPT VISIT MOD MDM: CPT

## 2018-02-14 PROCEDURE — 80048 BASIC METABOLIC PNL TOTAL CA: CPT | Performed by: NURSE PRACTITIONER

## 2018-02-14 PROCEDURE — 70450 CT HEAD/BRAIN W/O DYE: CPT

## 2018-02-14 PROCEDURE — 81003 URINALYSIS AUTO W/O SCOPE: CPT | Performed by: PHYSICIAN ASSISTANT

## 2018-02-14 PROCEDURE — 74011250636 HC RX REV CODE- 250/636: Performed by: NURSE PRACTITIONER

## 2018-02-14 RX ORDER — MECLIZINE HCL 12.5 MG 12.5 MG/1
25 TABLET ORAL
Status: COMPLETED | OUTPATIENT
Start: 2018-02-14 | End: 2018-02-14

## 2018-02-14 RX ORDER — AMOXICILLIN 875 MG/1
875 TABLET, FILM COATED ORAL 2 TIMES DAILY
Qty: 20 TAB | Refills: 0 | Status: SHIPPED | OUTPATIENT
Start: 2018-02-14 | End: 2018-02-24

## 2018-02-14 RX ORDER — MECLIZINE HYDROCHLORIDE 25 MG/1
25 TABLET ORAL
Qty: 12 TAB | Refills: 0 | Status: SHIPPED | OUTPATIENT
Start: 2018-02-14 | End: 2018-02-24

## 2018-02-14 RX ADMIN — MECLIZINE 25 MG: 12.5 TABLET ORAL at 13:43

## 2018-02-14 NOTE — TELEPHONE ENCOUNTER
Spoke with patient, patient stated she have an appointment scheduled for 02/19/2018. She is having dizzy spells. Advised patient physician is out of the office and she can go to the Er or local urgent care. Pt declined being put on another provider schedule and stated she will go to the Er. This encounter will be closed.

## 2018-02-14 NOTE — ED TRIAGE NOTES
Patient states she had two episodes of dizziness last week and three today, denies CP and SOB.  States her head is spinning

## 2018-02-14 NOTE — ED NOTES
I have reviewed discharge instruction and prescriptions with patient. Patient verbalized understanding and has no further questions at this time. Education taught and patient verbalized understanding of education. Teach back method used. IV removed, catheter tip intact on removal.  Patients pain decreased at time of discharge. Belongings given to patient. Patient discharged with family to home.

## 2018-02-14 NOTE — DISCHARGE INSTRUCTIONS
Dizziness: Care Instructions  Your Care Instructions  Dizziness is the feeling of unsteadiness or fuzziness in your head. It is different than having vertigo, which is a feeling that the room is spinning or that you are moving or falling. It is also different from lightheadedness, which is the feeling that you are about to faint. It can be hard to know what causes dizziness. Some people feel dizzy when they have migraine headaches. Sometimes bouts of flu can make you feel dizzy. Some medical conditions, such as heart problems or high blood pressure, can make you feel dizzy. Many medicines can cause dizziness, including medicines for high blood pressure, pain, or anxiety. If a medicine causes your symptoms, your doctor may recommend that you stop or change the medicine. If it is a problem with your heart, you may need medicine to help your heart work better. If there is no clear reason for your symptoms, your doctor may suggest watching and waiting for a while to see if the dizziness goes away on its own. Follow-up care is a key part of your treatment and safety. Be sure to make and go to all appointments, and call your doctor if you are having problems. It's also a good idea to know your test results and keep a list of the medicines you take. How can you care for yourself at home? · If your doctor recommends or prescribes medicine, take it exactly as directed. Call your doctor if you think you are having a problem with your medicine. · Do not drive while you feel dizzy. · Try to prevent falls. Steps you can take include:  ¨ Using nonskid mats, adding grab bars near the tub, and using night-lights. ¨ Clearing your home so that walkways are free of anything you might trip on. ¨ Letting family and friends know that you have been feeling dizzy. This will help them know how to help you. When should you call for help? Call 911 anytime you think you may need emergency care.  For example, call if:  ? · You passed out (lost consciousness). ? · You have dizziness along with symptoms of a heart attack. These may include:  ¨ Chest pain or pressure, or a strange feeling in the chest.  ¨ Sweating. ¨ Shortness of breath. ¨ Nausea or vomiting. ¨ Pain, pressure, or a strange feeling in the back, neck, jaw, or upper belly or in one or both shoulders or arms. ¨ Lightheadedness or sudden weakness. ¨ A fast or irregular heartbeat. ? · You have symptoms of a stroke. These may include:  ¨ Sudden numbness, tingling, weakness, or loss of movement in your face, arm, or leg, especially on only one side of your body. ¨ Sudden vision changes. ¨ Sudden trouble speaking. ¨ Sudden confusion or trouble understanding simple statements. ¨ Sudden problems with walking or balance. ¨ A sudden, severe headache that is different from past headaches. ?Call your doctor now or seek immediate medical care if:  ? · You feel dizzy and have a fever, headache, or ringing in your ears. ? · You have new or increased nausea and vomiting. ? · Your dizziness does not go away or comes back. ? Watch closely for changes in your health, and be sure to contact your doctor if:  ? · You do not get better as expected. Where can you learn more? Go to http://yevgeniy-em.info/. Enter V357 in the search box to learn more about \"Dizziness: Care Instructions. \"  Current as of: March 20, 2017  Content Version: 11.4  © 7734-8006 [employer]. Care instructions adapted under license by "[employer] (which disclaims liability or warranty for this information). If you have questions about a medical condition or this instruction, always ask your healthcare professional. [place] [address] any warranty or liability for your use of this information.          Saline Nasal Washes: Care Instructions  Your Care Instructions  Saline nasal washes help keep the nasal passages open by washing out thick or dried mucus. This simple remedy can help relieve symptoms of allergies, sinusitis, and colds. It also can make the nose feel more comfortable by keeping the mucous membranes moist. You may notice a little burning sensation in your nose the first few times you use the solution, but this usually gets better in a few days. Follow-up care is a key part of your treatment and safety. Be sure to make and go to all appointments, and call your doctor if you are having problems. It's also a good idea to know your test results and keep a list of the medicines you take. How can you care for yourself at home? · You can buy premixed saline solution in a squeeze bottle or other sinus rinse products at a drugstore. Read and follow the instructions on the label. · You also can make your own saline solution by adding 1 teaspoon of salt and 1 teaspoon of baking soda to 2 cups of distilled water. · If you use a homemade solution, pour a small amount into a clean bowl. Using a rubber bulb syringe, squeeze the syringe and place the tip in the salt water. Pull a small amount of the salt water into the syringe by relaxing your hand. · Sit down with your head tilted slightly back. Do not lie down. Put the tip of the bulb syringe or the squeeze bottle a little way into one of your nostrils. Gently drip or squirt a few drops into the nostril. Repeat with the other nostril. Some sneezing and gagging are normal at first.  · Gently blow your nose. · Wipe the syringe or bottle tip clean after each use. · Repeat this 2 or 3 times a day. · Use nasal washes gently if you have nosebleeds often. When should you call for help? Watch closely for changes in your health, and be sure to contact your doctor if:  ? · You often get nosebleeds. ? · You have problems doing the nasal washes. Where can you learn more? Go to http://yevgeniy-em.info/.   Enter [id number] [phone] in the search box to learn more about \"Saline Nasal Washes: Care Instructions. \"  Current as of: May 12, 2017  Content Version: 11.4  © 6859-3036 Healthwise, [facility]. Care instructions adapted under license by [employer] (which disclaims liability or warranty for this information). If you have questions about a medical condition or this instruction, always ask your healthcare professional. [place] [address] any warranty or liability for your use of this information.

## 2018-02-14 NOTE — ED PROVIDER NOTES
EMERGENCY DEPARTMENT HISTORY AND PHYSICAL EXAM    2:07 PM      Date: 2/14/2018  Patient Name: Giselle Allen    History of Presenting Illness     Chief Complaint   Patient presents with    Dizziness    Nausea         History Provided By: Patient    Chief Complaint: dizziness  Duration:  Weeks  Timing:  Intermittent  Location: head  Quality: spinning  Severity: Mild  Modifying Factors: sitting up makes worse  Associated Symptoms: mild nausea      Additional History (Context): Giselle Allen is a 45 y.o. female with diabetes, hyperlipidemia and hypothyroid who presents with dizziness. States had several episodes last week. Resolved within moments. States had three episodes this morning, lasted several moments when she went to stand up. States she is on multiple medications. States took her morning medication as directed. Denies fever or focal deficits. Denies vomiting. Denies previous history before last week. PCP: [id number], [place]    Current Outpatient Prescriptions   Medication Sig Dispense Refill    meclizine (ANTIVERT) 25 mg tablet Take 1 Tab by mouth three (3) times daily as needed for up to 10 days. 12 Tab 0    amoxicillin (AMOXIL) 875 mg tablet Take 1 Tab by mouth two (2) times a day for 10 days. 20 Tab 0    rosuvastatin (CRESTOR) 40 mg tablet Take 1 Tab by mouth nightly. 90 Tab 1    insulin glargine (LANTUS,BASAGLAR) 100 unit/mL (3 mL) inpn Administer  60  units subcut daily 5 Pen 3    oxybutynin chloride XL (DITROPAN XL) 10 mg CR tablet Take 1 Tab by mouth daily. 90 Tab 1    omeprazole (PRILOSEC) 20 mg capsule Take 1 Cap by mouth two (2) times a day. 180 Cap 1    ULTRA-THIN II, SHORT, PEN NDL 31 gauge x 5/16\" ndle USE FOR DIABETIC TESTING 100 Pen Needle 3    acetaminophen-codeine (TYLENOL-CODEINE #3) 300-30 mg per tablet Take 1 Tab by mouth every six (6) hours as needed for Pain. Max Daily Amount: 4 Tabs.  20 Tab 0    SITagliptin-metFORMIN (JANUMET) 50-1,000 mg per tablet Take 1 Tab by mouth two (2) times daily (with meals). 180 Tab 1    levothyroxine (SYNTHROID) 175 mcg tablet Take 1 Tab by mouth Daily (before breakfast). 90 Tab 1    gabapentin (NEURONTIN) 100 mg capsule Take 1 Cap by mouth nightly as needed. 90 Cap 1    losartan (COZAAR) 25 mg tablet Take 1 Tab by mouth daily. 90 Tab 1    ibuprofen (MOTRIN) 600 mg tablet  TAKE 1 TABLET BY MOUTH EVERY 8 HOURS AS NEEDED FOR PAIN 40 Tab 0    traZODone (DESYREL) 50 mg tablet Take  by mouth nightly.  ACCU-CHEK FASTCLIX kit use as directed to TEST three times a day 1 Kit 0    insulin lispro (HUMALOG) 100 unit/mL kwikpen Administer 616 units 30 minutes before breakfast , lunch and dinner 1 Package 3    glucose blood VI test strips (ACCU-CHEK SMARTVIEW TEST STRIP) strip dm 100 Strip 6    zolpidem (AMBIEN) 5 mg tablet Take 1 Tab by mouth nightly as needed for Sleep. Max Daily Amount: 5 mg. 30 Tab 0    gemfibrozil (LOPID) 600 mg tablet Take 1 Tab by mouth two (2) times a day. 180 Tab 3    predniSONE (STERAPRED DS) 10 mg dose pack See administration instruction per 10mg dose pack 21 Tab 0    mupirocin (BACTROBAN) 2 % ointment Apply  to affected area daily. 22 g 0    hydrocortisone (CORTAID) 1 % topical cream Apply  to affected area two (2) times a day. use thin layer 30 g 0    salicylic acid 17 % topical gel Apply  to affected area daily. Apply dime sized application to the callus on the left foot once daily 14 g 3    fluticasone (FLONASE) 50 mcg/actuation nasal spray 2 Sprays by Both Nostrils route daily. 3 Bottle 4    LORazepam (ATIVAN) 1 mg tablet Take 1 mg by mouth every four (4) hours as needed for Anxiety.  albuterol (PROVENTIL HFA, VENTOLIN HFA, PROAIR HFA) 90 mcg/actuation inhaler Take 2 Puffs by inhalation every six (6) hours as needed for Wheezing. 1 Inhaler 3    ferrous sulfate 325 mg (65 mg iron) tablet Take 1 Tab by mouth two (2) times a day.  60 Tab 3    benztropine (COGENTIN) 0.5 mg tablet Take 0.5 mg by mouth two (2) times a day.  trifluoperazine (STELAZINE) 5 mg tablet Take 5 mg by mouth two (2) times a day.  sertraline (ZOLOFT) 100 mg tablet Take 150 mg by mouth daily. Past History     Past Medical History:  Past Medical History:   Diagnosis Date    Arthritis     Chronic pain     Depression     Diabetes ([address]     Hyperlipidemia     Hypothyroid     Medial meniscus tear     Left knee    Psychiatric disorder     Tobacco abuse, in remission        Past Surgical History:  Past Surgical History:   Procedure Laterality Date    HX ORTHOPAEDIC      Hand reconstructive surgery    HX TUBAL LIGATION  2004       Family History:  Family History   Problem Relation Age of Onset    Alcohol abuse Mother    Megan Palmer Mother    Yajaira Love Mother 61     breast cancer    Diabetes Mother     Hypertension Mother    Sol Sears Psychiatric Disorder Mother    Sol Sears Stroke Mother     Breast Cancer Mother     Alcohol abuse Father     Heart Disease Father     Diabetes Father     Hypertension Father     Lung Disease Father     Colon Cancer Father 72    Alcohol abuse Sister     Diabetes Sister     Psychiatric Disorder Sister     Alcohol abuse Brother     Alcohol abuse Paternal Grandmother     Psychiatric Disorder Son     Psychiatric Disorder       nephew    Ovarian Cancer Sister        Social History:  Social History   Substance Use Topics    Smoking status: Former Smoker     Packs/day: 1.00     Years: 35.00     Types: Cigarettes     Quit date: 11/7/2011    Smokeless tobacco: Never Used    Alcohol use No      Comment: quit 10 years ago       Allergies: Allergies   Allergen Reactions    Lipitor [Atorvastatin] Other (comments)     myalgias    Lisinopril Cough    Aspirin Other (comments)     Nose bleeds    Zocor [Simvastatin] Other (comments)     Causes pain in the left leg         Review of Systems     Constitutional:  Denies malaise, fever, chills. Head:  Denies injury. Face:  Denies injury or pain.    ENMT: Denies sore throat. Neck:  Denies injury or pain. Chest:  Denies injury. Cardiac:  Denies chest pain or palpitations. Respiratory:  Denies cough, wheezing, difficulty breathing, shortness of breath. GI/ABD:  Denies injury, pain, distention, nausea, vomiting, diarrhea. :  Denies injury, pain, dysuria or urgency. Back:  Denies injury or pain. Pelvis:  Denies injury or pain. Extremity/MS:  Denies injury or pain. Neuro:  dizziness, Denies headache, LOC,  neurologic symptoms/deficits/paresthesias. Skin: Denies injury, rash, itching or skin changes. Physical Exam     Visit Vitals    /81 (BP 1 Location: Right arm, BP Patient Position: At rest;Sitting)    Pulse 85    Temp 98.4 °F (36.9 °C)    Resp 16    Ht 5' 4\" (1.626 m)    Wt 84.4 kg (186 lb)    LMP 06/05/2014    SpO2 97%    BMI 31.93 kg/m2       CONSTITUTIONAL: Alert, in no apparent distress; well-developed; well-nourished. HEAD:  Normocephalic, atraumatic. EYES: PERRL; EOM's intact. No nystagmus  ENTM: Nose: No rhinorrhea; Throat: mucous membranes moist. Posterior pharynx-normal.  Neck:  No JVD, supple without lymphadenopathy. RESP: Chest clear, equal breath sounds. CV: S1 and S2 WNL; No murmurs, gallops or rubs. GI: Abdomen soft and non-tender. No masses or organomegaly. UPPER EXT:  Normal inspection. LOWER EXT: Normal inspection. NEURO: CN 3-12 grossly intact, no pronator drift, strength 5/5 and sym, sensation intact. SKIN: No rashes; Normal for age and stage. PSYCH:  Alert and oriented, normal affect.         Diagnostic Study Results     Labs -  Recent Results (from the past 12 hour(s))   CBC WITH AUTOMATED DIFF    Collection Time: 02/14/18  1:30 PM   Result Value Ref Range    WBC 6.1 4.6 - 13.2 K/uL    RBC 4.14 (L) 4.20 - 5.30 M/uL    HGB 11.0 (L) 12.0 - 16.0 g/dL    HCT 34.3 (L) 35.0 - 45.0 %    MCV 82.9 74.0 - 97.0 FL    MCH 26.6 24.0 - 34.0 PG    MCHC 32.1 31.0 - 37.0 g/dL    RDW 13.5 11.6 - 14.5 % PLATELET 516 972 - 987 K/uL    MPV 10.4 9.2 - 11.8 FL    NEUTROPHILS 41 40 - 73 %    LYMPHOCYTES 46 21 - 52 %    MONOCYTES 11 (H) 3 - 10 %    EOSINOPHILS 2 0 - 5 %    BASOPHILS 0 0 - 2 %    ABS. NEUTROPHILS 2.5 1.8 - 8.0 K/UL    ABS. LYMPHOCYTES 2.8 0.9 - 3.6 K/UL    ABS. MONOCYTES 0.6 0.05 - 1.2 K/UL    ABS. EOSINOPHILS 0.1 0.0 - 0.4 K/UL    ABS.  BASOPHILS 0.0 0.0 - 0.06 K/UL    DF AUTOMATED     METABOLIC PANEL, BASIC    Collection Time: 02/14/18  1:30 PM   Result Value Ref Range    Sodium 137 136 - 145 mmol/L    Potassium 4.4 3.5 - 5.5 mmol/L    Chloride 102 100 - 108 mmol/L    CO2 27 21 - 32 mmol/L    Anion gap 8 3.0 - 18 mmol/L    Glucose 172 (H) 74 - 99 mg/dL    BUN 12 7.0 - 18 MG/DL    Creatinine 0.84 0.6 - 1.3 MG/DL    BUN/Creatinine ratio 14 12 - 20      GFR est AA >60 >60 ml/min/1.73m2    GFR est non-AA >60 >60 ml/min/1.73m2    Calcium 9.4 8.5 - 10.1 MG/DL   URINALYSIS W/ RFLX MICROSCOPIC    Collection Time: 02/14/18  1:30 PM   Result Value Ref Range    Color YELLOW      Appearance CLEAR      Specific gravity 1.005 1.005 - 1.030      pH (UA) 6.5 5.0 - 8.0      Protein NEGATIVE  NEG mg/dL    Glucose NEGATIVE  NEG mg/dL    Ketone NEGATIVE  NEG mg/dL    Bilirubin NEGATIVE  NEG      Blood NEGATIVE  NEG      Urobilinogen 0.2 0.2 - 1.0 EU/dL    Nitrites NEGATIVE  NEG      Leukocyte Esterase NEGATIVE  NEG     EKG, 12 LEAD, INITIAL    Collection Time: 02/14/18  1:47 PM   Result Value Ref Range    Ventricular Rate 79 BPM    Atrial Rate 79 BPM    P-R Interval 152 ms    QRS Duration 94 ms    Q-T Interval 390 ms    QTC Calculation (Bezet) 447 ms    Calculated P Axis 35 degrees    Calculated R Axis 9 degrees    Calculated T Axis 34 degrees    Diagnosis       Normal sinus rhythm  Cannot rule out Anterior infarct (cited on or before 13-DEC-2015)  Abnormal ECG  When compared with ECG of 13-DEC-2015 12:26,  No significant change was found         Radiologic Studies -   CT HEAD WO CONT   Final Result            Medical Decision Making   I am the first provider for this patient. I reviewed the vital signs, available nursing notes, past medical history, past surgical history, family history and social history. Vital Signs-Reviewed the patient's vital signs. Pulse Oximetry Analysis -  97 on room air (Interpretation)wnl      Records Reviewed: Nursing Notes and Old Medical Records (Time of Review: 2:07 PM)    ED Course: Progress Notes, Reevaluation, and Consults:      Provider Notes (Medical Decision Making):   DDx: HA, migraine, sinusitis, meningitis, SAH, head injury/concussion, contusion, TIA, stroke, aneurism, change in visual acuity, vertigo, eye emergency, dental pain, viral illness, temporal/Giant cell arteritis, scalp/skin etiology, malignancy  IMPRESSION AND MEDICAL DECISION MAKING:  Based upon the patient's presentation with noted HPI and PE, along with the work up done in the emergency department, I believe that the patient has vertigo as well as a sinusitis. Will treat and have her follow upw ith her PCP and Neurology. The patient will be discharged home. Warning signs of worsening condition were discussed and understood by the patient. Based on patient's age, coexisting illness, exam, and the results of this ED evaluation, the decision to treat as an outpatient was made. Based on the information available at time of discharge, acute pathology requiring immediate intervention was deemed relative unlikely. While it is impossible to completely exclude the possibility of underlying serious disease or worsening of condition, I feel the relative likelihood is extremely low. I discussed this uncertainty with the patient, who understood ED evaluation and treatment and felt comfortable with the outpatient treatment plan. All questions regarding care, test results, and follow up were answered. The patient is stable and appropriate to discharge.  They understand that they should return to the emergency department for any new or worsening symptoms. I stressed the importance of follow up for repeat assessment and possibly further evaluation/treatment. Diagnosis     Clinical Impression:   1. Dizziness    2. Acute sinusitis, recurrence not specified, unspecified location    3.  Vertigo      _______________________________

## 2018-02-14 NOTE — ED NOTES
Assume care of patient from [facility], patient with dizziness when getting up and moving x 1 week, has not seeked any treatment before us, and also has general fatigue.   Purposeful rounding completed:    Side rails up x 1:  YES  Bed in low position and wheels locked: YES  Call bell within reach: YES  Comfort addressed: YES    Toileting needs addressed: YES  Plan of care reviewed/updated with patient and or family members: YES  IV site assessed: N/A  Pain assessed and addressed: YES, 4

## 2018-02-15 ENCOUNTER — DOCUMENTATION ONLY (OUTPATIENT)
Dept: FAMILY MEDICINE CLINIC | Age: 53
End: 2018-02-15

## 2018-02-15 LAB
ATRIAL RATE: 79 BPM
CALCULATED P AXIS, ECG09: 35 DEGREES
CALCULATED R AXIS, ECG10: 9 DEGREES
CALCULATED T AXIS, ECG11: 34 DEGREES
DIAGNOSIS, 93000: NORMAL
P-R INTERVAL, ECG05: 152 MS
Q-T INTERVAL, ECG07: 390 MS
QRS DURATION, ECG06: 94 MS
QTC CALCULATION (BEZET), ECG08: 447 MS
VENTRICULAR RATE, ECG03: 79 BPM

## 2018-02-15 NOTE — PROGRESS NOTES
Prior auth for Gabapentin 100mg cap at night prn initiated and sent to [address] @ [phone]    Telephone: [phone]

## 2018-02-16 NOTE — PROGRESS NOTES
Per fax from [employer]  \"This medication is on your plan's list of covered drugs. Prior authorization is not required at this time. If your pharmacy has questions regarding the processing of your prescription, please have them call the [facility] pharmacy help desk at [phone].

## 2018-02-19 ENCOUNTER — OFFICE VISIT (OUTPATIENT)
Dept: FAMILY MEDICINE CLINIC | Age: 53
End: 2018-02-19

## 2018-02-19 VITALS
SYSTOLIC BLOOD PRESSURE: 96 MMHG | OXYGEN SATURATION: 97 % | HEIGHT: 64 IN | WEIGHT: 188 LBS | DIASTOLIC BLOOD PRESSURE: 69 MMHG | BODY MASS INDEX: 32.1 KG/M2 | HEART RATE: 78 BPM

## 2018-02-19 DIAGNOSIS — E11.9 TYPE 2 DIABETES MELLITUS WITHOUT COMPLICATION, UNSPECIFIED LONG TERM INSULIN USE STATUS: ICD-10-CM

## 2018-02-19 DIAGNOSIS — K21.9 GASTROESOPHAGEAL REFLUX DISEASE WITHOUT ESOPHAGITIS: ICD-10-CM

## 2018-02-19 DIAGNOSIS — R42 DIZZINESS: Primary | ICD-10-CM

## 2018-02-19 RX ORDER — OMEPRAZOLE 20 MG/1
20 CAPSULE, DELAYED RELEASE ORAL 2 TIMES DAILY
Qty: 180 CAP | Refills: 1 | Status: SHIPPED | OUTPATIENT
Start: 2018-02-19 | End: 2018-05-11

## 2018-02-19 NOTE — PROGRESS NOTES
Nikole Johns is a 45 y.o.  female and presents with     Chief Complaint   Patient presents with    Diabetes    Dizziness       Pt started feeling dizzy about 2 weeks back. Pt was seen in the ER and had CT scan that was umremarkable. Pt was given amox and meclizine prn and pt feels better. Pt denies nausea, vomiting, diarrhea, bleeding. Pt says she feel better now . BP is low normal.        Past Medical History:   Diagnosis Date    Arthritis     Chronic pain     Depression     Diabetes ([address]     Hyperlipidemia     Hypothyroid     Medial meniscus tear     Left knee    Psychiatric disorder     Tobacco abuse, in remission      Past Surgical History:   Procedure Laterality Date    HX ORTHOPAEDIC      Hand reconstructive surgery    HX TUBAL LIGATION  2004     Current Outpatient Prescriptions   Medication Sig    meclizine (ANTIVERT) 25 mg tablet Take 1 Tab by mouth three (3) times daily as needed for up to 10 days.  amoxicillin (AMOXIL) 875 mg tablet Take 1 Tab by mouth two (2) times a day for 10 days.  rosuvastatin (CRESTOR) 40 mg tablet Take 1 Tab by mouth nightly.  insulin glargine (LANTUS,BASAGLAR) 100 unit/mL (3 mL) inpn Administer  60  units subcut daily    oxybutynin chloride XL (DITROPAN XL) 10 mg CR tablet Take 1 Tab by mouth daily.  omeprazole (PRILOSEC) 20 mg capsule Take 1 Cap by mouth two (2) times a day.  ULTRA-THIN II, SHORT, PEN NDL 31 gauge x 5/16\" ndle USE FOR DIABETIC TESTING    SITagliptin-metFORMIN (JANUMET) 50-1,000 mg per tablet Take 1 Tab by mouth two (2) times daily (with meals).  levothyroxine (SYNTHROID) 175 mcg tablet Take 1 Tab by mouth Daily (before breakfast).  gabapentin (NEURONTIN) 100 mg capsule Take 1 Cap by mouth nightly as needed.  losartan (COZAAR) 25 mg tablet Take 1 Tab by mouth daily.  ibuprofen (MOTRIN) 600 mg tablet  TAKE 1 TABLET BY MOUTH EVERY 8 HOURS AS NEEDED FOR PAIN    traZODone (DESYREL) 50 mg tablet Take  by mouth nightly.     ACCU-CHEK FASTCLIX kit use as directed to TEST three times a day    insulin lispro (HUMALOG) 100 unit/mL kwikpen Administer 616 units 30 minutes before breakfast , lunch and dinner    glucose blood VI test strips (ACCU-CHEK SMARTVIEW TEST STRIP) strip dm    gemfibrozil (LOPID) 600 mg tablet Take 1 Tab by mouth two (2) times a day.  predniSONE (STERAPRED DS) 10 mg dose pack See administration instruction per 10mg dose pack    mupirocin (BACTROBAN) 2 % ointment Apply  to affected area daily.  hydrocortisone (CORTAID) 1 % topical cream Apply  to affected area two (2) times a day. use thin layer    salicylic acid 17 % topical gel Apply  to affected area daily. Apply dime sized application to the callus on the left foot once daily    fluticasone (FLONASE) 50 mcg/actuation nasal spray 2 Sprays by Both Nostrils route daily.  LORazepam (ATIVAN) 1 mg tablet Take 1 mg by mouth every four (4) hours as needed for Anxiety.  albuterol (PROVENTIL HFA, VENTOLIN HFA, PROAIR HFA) 90 mcg/actuation inhaler Take 2 Puffs by inhalation every six (6) hours as needed for Wheezing.  ferrous sulfate 325 mg (65 mg iron) tablet Take 1 Tab by mouth two (2) times a day.  benztropine (COGENTIN) 0.5 mg tablet Take 0.5 mg by mouth two (2) times a day.  trifluoperazine (STELAZINE) 5 mg tablet Take 5 mg by mouth two (2) times a day.  sertraline (ZOLOFT) 100 mg tablet Take 150 mg by mouth daily.  acetaminophen-codeine (TYLENOL-CODEINE #3) 300-30 mg per tablet Take 1 Tab by mouth every six (6) hours as needed for Pain. Max Daily Amount: 4 Tabs.  zolpidem (AMBIEN) 5 mg tablet Take 1 Tab by mouth nightly as needed for Sleep. Max Daily Amount: 5 mg. No current facility-administered medications for this visit.       Health Maintenance   Topic Date Due    FOOT EXAM Q1  04/22/2017    EYE EXAM RETINAL OR DILATED Q1  05/19/2017    MICROALBUMIN Q1  01/23/2018    FOBT Q 1 YEAR AGE 50-75  04/19/2018    HEMOGLOBIN A1C Q6M 04/11/2018    MEDICARE YEARLY EXAM  04/19/2018    LIPID PANEL Q1  07/18/2018    DTaP/Tdap/Td series (2 - Td) 10/16/2018    PAP AKA CERVICAL CYTOLOGY  04/26/2019    BREAST CANCER SCRN MAMMOGRAM  06/01/2019    Hepatitis C Screening  Completed    Pneumococcal 19-64 Medium Risk  Completed    Influenza Age 5 to Adult  Completed     Immunization History   Administered Date(s) Administered    Influenza Vaccine 10/21/2013    Influenza Vaccine (Quad) 09/08/2015    Influenza Vaccine (Quad) PF 10/12/2016, 09/18/2017     Patient's last menstrual period was 06/05/2014. Allergies and Intolerances: Allergies   Allergen Reactions    Lipitor [Atorvastatin] Other (comments)     myalgias    Lisinopril Cough    Aspirin Other (comments)     Nose bleeds    Zocor [Simvastatin] Other (comments)     Causes pain in the left leg       Family History:   Family History   Problem Relation Age of Onset    Alcohol abuse Mother    Anna Goring Mother    Hali England Mother 61     breast cancer    Diabetes Mother     Hypertension Mother     Psychiatric Disorder Mother     Stroke Mother     Breast Cancer Mother     Alcohol abuse Father     Heart Disease Father     Diabetes Father     Hypertension Father     Lung Disease Father     Colon Cancer Father 72    Alcohol abuse Sister     Diabetes Sister     Psychiatric Disorder Sister     Alcohol abuse Brother     Alcohol abuse Paternal Grandmother     Psychiatric Disorder Son     Psychiatric Disorder       nephew    Ovarian Cancer Sister        Social History:   She  reports that she quit smoking about 6 years ago. Her smoking use included Cigarettes. She has a 35.00 pack-year smoking history. She has never used smokeless tobacco.  She  reports that she does not drink alcohol.             Review of Systems: pos for dizziness  General: negative for - chills, fatigue, fever, weight change  Psych: negative for - anxiety, depression, irritability or mood swings  ENT: negative for - headaches, hearing change, nasal congestion, oral lesions, sneezing or sore throat  Heme/ Lymph: negative for - bleeding problems, bruising, pallor or swollen lymph nodes  Endo: negative for - hot flashes, polydipsia/polyuria or temperature intolerance  Resp: negative for - cough, shortness of breath or wheezing  CV: negative for - chest pain, edema or palpitations  GI: negative for - abdominal pain, change in bowel habits, constipation, diarrhea or nausea/vomiting  : negative for - dysuria, hematuria, incontinence, pelvic pain or vulvar/vaginal symptoms  MSK: negative for - joint pain, joint swelling or muscle pain  Neuro: negative for - confusion, headaches, seizures or weakness  Derm: negative for - dry skin, hair changes, rash or skin lesion changes          Physical:   Vitals:   Vitals:    02/19/18 1008   BP: 96/69   Pulse: 78   SpO2: 97%   Weight: 188 lb (85.3 kg)   Height: 5' 4\" (1.626 m)           Exam:   HEENT- atraumatic,normocephalic, awake, oriented, well nourished, mild tenderness over left frontal sinus  Neck - supple,no enlarged lymph nodes, no JVD, no thyromegaly  Chest- CTA, no rhonchi, no crackles  Heart- rrr, no murmurs / gallop/rub  Abdomen- soft,BS+,NT, no hepatosplenomegaly  Ext - no c/c/edema   Neuro- no focal deficits. Power 5/5 all extremities  Skin - warm,dry, no obvious rashes.           Review of Data:   LABS:   Lab Results   Component Value Date/Time    WBC 6.1 02/14/2018 01:30 PM    HGB 11.0 (L) 02/14/2018 01:30 PM    HCT 34.3 (L) 02/14/2018 01:30 PM    PLATELET 506 45/79/7783 01:30 PM     Lab Results   Component Value Date/Time    Sodium 137 02/14/2018 01:30 PM    Potassium 4.4 02/14/2018 01:30 PM    Chloride 102 02/14/2018 01:30 PM    CO2 27 02/14/2018 01:30 PM    Glucose 172 (H) 02/14/2018 01:30 PM    BUN 12 02/14/2018 01:30 PM    Creatinine 0.84 02/14/2018 01:30 PM     Lab Results   Component Value Date/Time    Cholesterol, total 208 (H) 07/18/2017 09:45 AM    HDL Cholesterol 40 07/18/2017 09:45 AM    LDL, calculated 130 (H) 07/18/2017 09:45 AM    Triglyceride 191 (H) 07/18/2017 09:45 AM     No results found for: GPT        Impression / Plan:        ICD-10-CM ICD-9-CM    1. Gastroesophageal reflux disease without esophagitis K21.9 530.81 omeprazole (PRILOSEC) 20 mg capsule   2. Type 2 diabetes mellitus without complication, unspecified long term insulin use status (HCC) E11.9 250.00 SITagliptin-metFORMIN (JANUMET) 50-1,000 mg per tablet     Blood pressure low normal., dizziness - asked pt to stop losartan. Explained to patient risk benefits of the medications. Advised patient to stop meds if having any side effects. Pt verbalized understanding of the instructions. I have discussed the diagnosis with the patient and the intended plan as seen in the above orders. The patient has received an after-visit summary and questions were answered concerning future plans. I have discussed medication side effects and warnings with the patient as well. I have reviewed the plan of care with the patient, accepted their input and they are in agreement with the treatment goals. Reviewed plan of care. Patient has provided input and agrees with goals.     Follow-up Disposition: Not on Rip Mckinnon MD

## 2018-02-19 NOTE — MR AVS SNAPSHOT
[facility] 
 
 
 [id number] [place] [address] [address] [place] [address] [phone] 
[phone] Patient: Carmelo Lucia MRN: [id number] OKT:[birth year] Visit Information Date & Time Provider Department Dept. Phone Encounter #  
 2/19/2018  9:45 AM [id number], [address] [facility] [phone] [id number] Follow-up Instructions Return in about 3 months (around 5/19/2018) for complete physical.  
  
Upcoming Health Maintenance Date Due  
 FOOT EXAM Q1 4/22/2017 EYE EXAM RETINAL OR DILATED Q1 5/19/2017 MICROALBUMIN Q1 1/23/2018 FOBT Q 1 YEAR AGE 50-75 4/19/2018 HEMOGLOBIN A1C Q6M 4/11/2018 MEDICARE YEARLY EXAM 4/19/2018 LIPID PANEL Q1 7/18/2018 DTaP/Tdap/Td series (2 - Td) 10/16/2018 PAP AKA CERVICAL CYTOLOGY 4/26/2019 BREAST CANCER SCRN MAMMOGRAM 6/1/2019 Allergies as of 2/19/2018  Review Complete On: 2/19/2018 By: Curvin Coffee, LPN Severity Noted Reaction Type Reactions Lipitor [Atorvastatin]    Other (comments)  
 myalgias Lisinopril  07/02/2014    Cough Aspirin Low 11/07/2012   Intolerance Other (comments) Nose bleeds Zocor [Simvastatin] Low 11/07/2012   Side Effect Other (comments) Causes pain in the left leg Current Immunizations  Reviewed on 10/21/2013 Name Date Influenza Vaccine 10/21/2013 11:54 AM  
 Influenza Vaccine (Quad) 9/8/2015  1:30 PM  
 Influenza Vaccine (Quad) PF 9/18/2017, 10/12/2016 Not reviewed this visit You Were Diagnosed With   
  
 Codes Comments Dizziness    -  Primary ICD-10-CM: S18 ICD-9-CM: 780.4 Gastroesophageal reflux disease without esophagitis     ICD-10-CM: K21.9 ICD-9-CM: 530.81 Type 2 diabetes mellitus without complication, unspecified long term insulin use status (HCC)     ICD-10-CM: E11.9 ICD-9-CM: 250.00 Vitals BP Pulse Height(growth percentile) Weight(growth percentile) LMP SpO2 96/69 78 5' 4\" (1.626 m) 188 lb (85.3 kg) 06/05/2014 97% BMI OB Status Smoking Status 32.27 kg/m2 Postmenopausal Former Smoker Vitals History BMI and BSA Data Body Mass Index Body Surface Area  
 32.27 kg/m 2 1.96 m 2 Preferred Pharmacy Pharmacy Name Phone [address] [address] [phone] Your Updated Medication List  
  
   
This list is accurate as of: 2/19/18 10:36 AM.  Always use your most recent med list.  
  
  
  
  
 David Roa Generic drug:  Lancing Device with Lancets  
use as directed to TEST three times a day  
  
 acetaminophen-codeine 300-30 mg per tablet Commonly known as:  TYLENOL-CODEINE #3 Take 1 Tab by mouth every six (6) hours as needed for Pain. Max Daily Amount: 4 Tabs. albuterol 90 mcg/actuation inhaler Commonly known as:  PROVENTIL HFA, VENTOLIN HFA, PROAIR HFA Take 2 Puffs by inhalation every six (6) hours as needed for Wheezing. amoxicillin 875 mg tablet Commonly known as:  AMOXIL Take 1 Tab by mouth two (2) times a day for 10 days. benztropine 0.5 mg tablet Commonly known as:  COGENTIN Take 0.5 mg by mouth two (2) times a day. ferrous sulfate 325 mg (65 mg iron) tablet Take 1 Tab by mouth two (2) times a day. fluticasone 50 mcg/actuation nasal spray Commonly known as:  Alvaro Rideau 2 Sprays by Both Nostrils route daily. gabapentin 100 mg capsule Commonly known as:  NEURONTIN Take 1 Cap by mouth nightly as needed. gemfibrozil 600 mg tablet Commonly known as:  LOPID Take 1 Tab by mouth two (2) times a day. glucose blood VI test strips strip Commonly known as:  ACCU-CHEK SMARTVIEW TEST STRIP  
dm  
  
 hydrocortisone 1 % topical cream  
Commonly known as:  CORTAID Apply  to affected area two (2) times a day. use thin layer  
  
 ibuprofen 600 mg tablet Commonly known as:  MOTRIN  
 TAKE 1 TABLET BY MOUTH EVERY 8 HOURS AS NEEDED FOR PAIN  
  
 insulin glargine 100 unit/mL (3 mL) Inpn Commonly known as:  Derral Quiver Administer  60  units subcut daily  
  
 insulin lispro 100 unit/mL kwikpen Commonly known as:  HUMALOG Administer 616 units 30 minutes before breakfast , lunch and dinner  
  
 levothyroxine 175 mcg tablet Commonly known as:  SYNTHROID Take 1 Tab by mouth Daily (before breakfast). LORazepam 1 mg tablet Commonly known as:  ATIVAN Take 1 mg by mouth every four (4) hours as needed for Anxiety. meclizine 25 mg tablet Commonly known as:  ANTIVERT Take 1 Tab by mouth three (3) times daily as needed for up to 10 days. mupirocin 2 % ointment Commonly known as:  [employer] Apply  to affected area daily. omeprazole 20 mg capsule Commonly known as:  PRILOSEC Take 1 Cap by mouth two (2) times a day. oxybutynin chloride XL 10 mg CR tablet Commonly known as:  DITROPAN XL Take 1 Tab by mouth daily. predniSONE 10 mg dose pack Commonly known as:  STERAPRED DS See administration instruction per 10mg dose pack  
  
 rosuvastatin 40 mg tablet Commonly known as:  CRESTOR Take 1 Tab by mouth nightly. salicylic acid 17 % topical gel Apply  to affected area daily. Apply dime sized application to the callus on the left foot once daily  
  
 sertraline 100 mg tablet Commonly known as:  ZOLOFT Take 150 mg by mouth daily. SITagliptin-metFORMIN 50-1,000 mg per tablet Commonly known as:  Milly [place] Take 1 Tab by mouth two (2) times daily (with meals). traZODone 50 mg tablet Commonly known as:  Teressa Del Take  by mouth nightly. trifluoperazine 5 mg tablet Commonly known as:  STELAZINE Take 5 mg by mouth two (2) times a day. ULTRA-THIN II (SHORT) PEN NDL 31 gauge x 5/16\" Ndle Generic drug:  Insulin Needles (Disposable) USE FOR DIABETIC TESTING  
  
 zolpidem 5 mg tablet Commonly known as:  AMBIEN Take 1 Tab by mouth nightly as needed for Sleep. Max Daily Amount: 5 mg. Prescriptions Sent to Pharmacy Refills  
 omeprazole (PRILOSEC) 20 mg capsule 1 Sig: Take 1 Cap by mouth two (2) times a day. Class: Normal  
 Pharmacy: [address] [place] [address] [address], [address] Ph #: [phone] Route: Oral  
 SITagliptin-metFORMIN (JANUMET) 50-1,000 mg per tablet 1 Sig: Take 1 Tab by mouth two (2) times daily (with meals). Class: Normal  
 Pharmacy: [address] [place] [address] [address], [address] Ph #: [phone] Route: Oral  
  
Follow-up Instructions Return in about 3 months (around 5/19/2018) for complete physical.  
  
To-Do List   
 02/19/2018 Lab:  HEMOGLOBIN A1C WITH EAG   
  
 02/19/2018 Lab:  LIPID PANEL   
  
 02/19/2018 Lab:  METABOLIC PANEL, COMPREHENSIVE   
  
 02/19/2018 Lab:  MICROALBUMIN, UR, RAND W/ MICROALBUMIN/CREA RATIO Introducing \[facility] [place] [facility]\"" & HEALTH SERVICES! Isidro Regan introduces [employer] patient portal. Now you can access parts of your medical record, email your doctor's office, and request medication refills online. 1. In your internet browser, go to [email or web address] 2. Click on the First Time User? Click Here link in the Sign In box. You will see the New Member Sign Up page. 3. Enter your [employer] Access Code exactly as it appears below. You will not need to use this code after youve completed the sign-up process. If you do not sign up before the expiration date, you must request a new code. · [employer] Access Code: [email or web address] Expires: 3/18/2018 10:58 AM 
 
4. Enter the last four digits of your Social Security Number (xxxx) and Date of Birth (mm/dd/yyyy) as indicated and click Submit. You will be taken to the next sign-up page. 5. Create a [employer] ID.  This will be your [employer] login ID and cannot be changed, so think of one that is secure and easy to remember. 6. Create a [employer] password. You can change your password at any time. 7. Enter your Password Reset Question and Answer. This can be used at a later time if you forget your password. 8. Enter your e-mail address. You will receive e-mail notification when new information is available in [address] 9. Click Sign Up. You can now view and download portions of your medical record. 10. Click the Download Summary menu link to download a portable copy of your medical information. If you have questions, please visit the Frequently Asked Questions section of the [employer] website. Remember, [employer] is NOT to be used for urgent needs. For medical emergencies, dial 911. Now available from your iPhone and Android! Please provide this summary of care documentation to your next provider. Your primary care clinician is listed as [id number]. If you have any questions after today's visit, please call [phone].

## 2018-02-20 LAB
ALBUMIN SERPL-MCNC: 4.6 G/DL (ref 3.5–5.5)
ALBUMIN/CREAT UR: 47.8 MG/G CREAT (ref 0–30)
ALBUMIN/GLOB SERPL: 1.6 {RATIO} (ref 1.2–2.2)
ALP SERPL-CCNC: 90 IU/L (ref 39–117)
ALT SERPL-CCNC: 19 IU/L (ref 0–32)
AST SERPL-CCNC: 14 IU/L (ref 0–40)
BILIRUB SERPL-MCNC: 0.2 MG/DL (ref 0–1.2)
BUN SERPL-MCNC: 14 MG/DL (ref 6–24)
BUN/CREAT SERPL: 20 (ref 9–23)
CALCIUM SERPL-MCNC: 10 MG/DL (ref 8.7–10.2)
CHLORIDE SERPL-SCNC: 95 MMOL/L (ref 96–106)
CHOLEST SERPL-MCNC: 169 MG/DL (ref 100–199)
CO2 SERPL-SCNC: 22 MMOL/L (ref 18–29)
CREAT SERPL-MCNC: 0.7 MG/DL (ref 0.57–1)
CREAT UR-MCNC: 27.4 MG/DL
EST. AVERAGE GLUCOSE BLD GHB EST-MCNC: 166 MG/DL
GFR SERPLBLD CREATININE-BSD FMLA CKD-EPI: 114 ML/MIN/{1.73_M2}
GFR SERPLBLD CREATININE-BSD FMLA CKD-EPI: 99 ML/MIN/{1.73_M2}
GLOBULIN SER CALC-MCNC: 2.8 G/L (ref 1.5–4.5)
GLUCOSE SERPL-MCNC: 118 MG/DL (ref 65–99)
HBA1C MFR BLD: 7.4 % (ref 4.8–5.6)
HDLC SERPL-MCNC: 31 MG/DL
INTERPRETATION, 910389: NORMAL
LDLC SERPL CALC-MCNC: 78 MG/DL (ref 0–99)
Lab: NORMAL
MICROALBUMIN UR-MCNC: 13.1 UG/ML
POTASSIUM SERPL-SCNC: 4.9 MMOL/L (ref 3.5–5.2)
PROT SERPL-MCNC: 7.4 G/DL (ref 6–8.5)
SODIUM SERPL-SCNC: 136 MMOL/L (ref 134–144)
TRIGL SERPL-MCNC: 299 MG/DL (ref 0–149)
VLDLC SERPL CALC-MCNC: 60 MG/DL (ref 5–40)

## 2018-03-12 ENCOUNTER — TELEPHONE (OUTPATIENT)
Dept: FAMILY MEDICINE CLINIC | Age: 53
End: 2018-03-12

## 2018-03-12 DIAGNOSIS — R42 VERTIGO: Primary | ICD-10-CM

## 2018-03-12 RX ORDER — MECLIZINE HYDROCHLORIDE 25 MG/1
25 TABLET ORAL
Qty: 30 TAB | Refills: 1 | Status: SHIPPED | OUTPATIENT
Start: 2018-03-12 | End: 2018-03-22

## 2018-03-12 NOTE — TELEPHONE ENCOUNTER
Patient is requesting meclizine 25 mg tablet. She last got it it in on 2/14/18 at [facility] ER and last saw dr Nena Robert at 2/19/18 but forgot to ask for a refill.

## 2018-03-12 NOTE — TELEPHONE ENCOUNTER
Informed patient that meclizine (ANTIVERT) 25 mg tablet. Sent to pharmacy. This encounter will be closed.

## 2018-03-12 NOTE — TELEPHONE ENCOUNTER
Patient called stating she just saw Zuly Bustos but did not request refill for dizziness. Meclizine 25 mg.

## 2018-04-23 DIAGNOSIS — E11.9 TYPE 2 DIABETES MELLITUS WITHOUT COMPLICATION, WITH LONG-TERM CURRENT USE OF INSULIN (HCC): ICD-10-CM

## 2018-04-23 DIAGNOSIS — Z79.4 TYPE 2 DIABETES MELLITUS WITHOUT COMPLICATION, WITH LONG-TERM CURRENT USE OF INSULIN (HCC): ICD-10-CM

## 2018-04-23 RX ORDER — PEN NEEDLE, DIABETIC 31 GX3/16"
NEEDLE, DISPOSABLE MISCELLANEOUS
Qty: 100 PEN NEEDLE | Refills: 3 | Status: SHIPPED | OUTPATIENT
Start: 2018-04-23 | End: 2019-02-24

## 2018-05-01 DIAGNOSIS — E78.00 HYPERCHOLESTEREMIA: ICD-10-CM

## 2018-05-02 DIAGNOSIS — E03.4 HYPOTHYROIDISM DUE TO ACQUIRED ATROPHY OF THYROID: ICD-10-CM

## 2018-05-02 RX ORDER — LEVOTHYROXINE SODIUM 175 UG/1
175 TABLET ORAL
Qty: 90 TAB | Refills: 1 | Status: SHIPPED | OUTPATIENT
Start: 2018-05-02 | End: 2018-07-04 | Stop reason: DRUGHIGH

## 2018-05-02 RX ORDER — GEMFIBROZIL 600 MG/1
TABLET, FILM COATED ORAL
Qty: 180 TAB | Refills: 1 | Status: SHIPPED | OUTPATIENT
Start: 2018-05-02 | End: 2018-09-07 | Stop reason: SDUPTHER

## 2018-05-02 NOTE — TELEPHONE ENCOUNTER
Pt. Stated that she lost her medication in the process of her moving. Pt. Would like it sent to [facility] on file.

## 2018-05-11 ENCOUNTER — OFFICE VISIT (OUTPATIENT)
Dept: FAMILY MEDICINE CLINIC | Age: 53
End: 2018-05-11

## 2018-05-11 ENCOUNTER — HOSPITAL ENCOUNTER (OUTPATIENT)
Dept: LAB | Age: 53
Discharge: HOME | End: 2018-05-11
Payer: MEDICARE

## 2018-05-11 VITALS
HEIGHT: 64 IN | BODY MASS INDEX: 31.07 KG/M2 | DIASTOLIC BLOOD PRESSURE: 64 MMHG | HEART RATE: 82 BPM | OXYGEN SATURATION: 98 % | WEIGHT: 182 LBS | RESPIRATION RATE: 17 BRPM | TEMPERATURE: 97.3 F | SYSTOLIC BLOOD PRESSURE: 109 MMHG

## 2018-05-11 DIAGNOSIS — Z12.11 COLON CANCER SCREENING: ICD-10-CM

## 2018-05-11 DIAGNOSIS — Z00.00 MEDICARE ANNUAL WELLNESS VISIT, SUBSEQUENT: ICD-10-CM

## 2018-05-11 DIAGNOSIS — Z12.39 BREAST CANCER SCREENING: ICD-10-CM

## 2018-05-11 DIAGNOSIS — Z78.0 POST-MENOPAUSAL: ICD-10-CM

## 2018-05-11 DIAGNOSIS — Z12.11 COLON CANCER SCREENING: Primary | ICD-10-CM

## 2018-05-11 PROCEDURE — 82274 ASSAY TEST FOR BLOOD FECAL: CPT | Performed by: INTERNAL MEDICINE

## 2018-05-11 NOTE — PROGRESS NOTES
This is the Subsequent Medicare Annual Wellness Exam, performed 12 months or more after the Initial AWV or the last Subsequent AWV    I have reviewed the patient's medical history in detail and updated the computerized patient record. History     Past Medical History:   Diagnosis Date    Arthritis     Chronic pain     Depression     Diabetes ([address]     Hyperlipidemia     Hypothyroid     Medial meniscus tear     Left knee    Psychiatric disorder     Tobacco abuse, in remission       Past Surgical History:   Procedure Laterality Date    HX ORTHOPAEDIC      Hand reconstructive surgery    HX TUBAL LIGATION  2004     Current Outpatient Prescriptions   Medication Sig Dispense Refill    gemfibrozil (LOPID) 600 mg tablet TAKE ONE TABLET BY MOUTH TWICE A  Tab 1    levothyroxine (SYNTHROID) 175 mcg tablet Take 1 Tab by mouth Daily (before breakfast). 90 Tab 1    SURE COMFORT PEN NEEDLE 31 gauge x 3/16\" ndle USE FOR DIABETIC TESTING 100 Pen Needle 3    SITagliptin-metFORMIN (JANUMET) 50-1,000 mg per tablet Take 1 Tab by mouth two (2) times daily (with meals). 180 Tab 1    rosuvastatin (CRESTOR) 40 mg tablet Take 1 Tab by mouth nightly. 90 Tab 1    insulin glargine (LANTUS,BASAGLAR) 100 unit/mL (3 mL) inpn Administer  60  units subcut daily 5 Pen 3    oxybutynin chloride XL (DITROPAN XL) 10 mg CR tablet Take 1 Tab by mouth daily. 90 Tab 1    acetaminophen-codeine (TYLENOL-CODEINE #3) 300-30 mg per tablet Take 1 Tab by mouth every six (6) hours as needed for Pain. Max Daily Amount: 4 Tabs.  20 Tab 0    ibuprofen (MOTRIN) 600 mg tablet  TAKE 1 TABLET BY MOUTH EVERY 8 HOURS AS NEEDED FOR PAIN 40 Tab 0    ACCU-CHEK FASTCLIX kit use as directed to TEST three times a day 1 Kit 0    insulin lispro (HUMALOG) 100 unit/mL kwikpen Administer 616 units 30 minutes before breakfast , lunch and dinner 1 Package 3    glucose blood VI test strips (ACCU-CHEK SMARTVIEW TEST STRIP) strip dm 100 Strip 6    mupirocin (BACTROBAN) 2 % ointment Apply  to affected area daily. 22 g 0    hydrocortisone (CORTAID) 1 % topical cream Apply  to affected area two (2) times a day. use thin layer 30 g 0    salicylic acid 17 % topical gel Apply  to affected area daily. Apply dime sized application to the callus on the left foot once daily 14 g 3    fluticasone (FLONASE) 50 mcg/actuation nasal spray 2 Sprays by Both Nostrils route daily. 3 Bottle 4    LORazepam (ATIVAN) 1 mg tablet Take 1 mg by mouth every four (4) hours as needed for Anxiety.  albuterol (PROVENTIL HFA, VENTOLIN HFA, PROAIR HFA) 90 mcg/actuation inhaler Take 2 Puffs by inhalation every six (6) hours as needed for Wheezing. 1 Inhaler 3    ferrous sulfate 325 mg (65 mg iron) tablet Take 1 Tab by mouth two (2) times a day. 60 Tab 3    benztropine (COGENTIN) 0.5 mg tablet Take 0.5 mg by mouth two (2) times a day.  trifluoperazine (STELAZINE) 5 mg tablet Take 5 mg by mouth two (2) times a day.  sertraline (ZOLOFT) 100 mg tablet Take 150 mg by mouth daily.        Allergies   Allergen Reactions    Lipitor [Atorvastatin] Other (comments)     myalgias    Lisinopril Cough    Aspirin Other (comments)     Nose bleeds    Zocor [Simvastatin] Other (comments)     Causes pain in the left leg     Family History   Problem Relation Age of Onset    Alcohol abuse Mother     Arthritis-osteo Mother    Pegge Devia Mother 61     breast cancer    Diabetes Mother     Hypertension Mother     Psychiatric Disorder Mother    [facility] Stroke Mother     Breast Cancer Mother     Alcohol abuse Father     Heart Disease Father     Diabetes Father     Hypertension Father     Lung Disease Father     Colon Cancer Father 72    Alcohol abuse Sister     Diabetes Sister     Psychiatric Disorder Sister     Alcohol abuse Brother     Alcohol abuse Paternal Grandmother     Psychiatric Disorder Son     Psychiatric Disorder       nephew    Ovarian Cancer Sister      Social History Substance Use Topics    Smoking status: Former Smoker     Packs/day: 1.00     Years: 35.00     Types: Cigarettes     Quit date: 11/7/2011    Smokeless tobacco: Never Used    Alcohol use No      Comment: quit 10 years ago     Patient Active Problem List   Diagnosis Code    Hypothyroidism E03.9    Hypercholesteremia E78.00    Diabetes mellitus ([facility] E11.9    Joint pain M25.50    Tear of meniscus of left knee S83.207A    Depression F32.9    Chest pain, unspecified R07.9    Preoperative risk stratification Z01.818    Back pain M54.9    Left low back pain M54.5    Encounter for long-term (current) use of medications Z79.899    History of bipolar disorder Z86.59    History of alcohol abuse Z87.898    Chronic pain syndrome G89.4    Lumbar degenerative disc disease M51.36    Facet arthritis of lumbar region ([facility] M46.96    Hypothyroidism due to acquired atrophy of thyroid E03.4    DM type 2, not at goal [facility]) E11.9    Type 2 diabetes mellitus with nephropathy ([facility] E11.21       Depression Risk Factor Screening:     PHQ over the last two weeks 2/27/2014   Little interest or pleasure in doing things Not at all   Feeling down, depressed or hopeless Not at all   Total Score PHQ 2 0     Alcohol Risk Factor Screening: You do not drink alcohol or very rarely. Functional Ability and Level of Safety:   Hearing Loss  Hearing is good. Activities of Daily Living  The home contains: no safety equipment. Patient does total self care    Fall Risk  Fall Risk Assessment, last 12 mths 2/27/2014   Able to walk? Yes   Fall in past 12 months?  No       Abuse Screen  Patient is not abused    Cognitive Screening   Evaluation of Cognitive Function:  Has your family/caregiver stated any concerns about your memory: no  Normal    Patient Care Team   Patient Care Team:  Noe Jones MD as PCP - General (Internal Medicine)  Donnell Colin MD (Cardiology)  Juliane Day as Nurse Navigator    Assessment/Plan Education and counseling provided:  Are appropriate based on today's review and evaluation    Diagnoses and all orders for this visit:    1. Colon cancer screening  -     OCCULT BLOOD, IMMUNOASSAY (FIT); Future    2. Medicare annual wellness visit, subsequent    3. Post-menopausal  -     DEXA BONE DENSITY STUDY AXIAL; Future    4. Breast cancer screening  -     SUNG MAMMO BI SCREENING INCL CAD;  Future        Health Maintenance Due   Topic Date Due    FOOT EXAM Q1  04/22/2017    EYE EXAM RETINAL OR DILATED Q1  05/19/2017    FOBT Q 1 YEAR AGE 50-75  04/19/2018

## 2018-05-11 NOTE — MR AVS SNAPSHOT
[address] [place] [id number] 
[phone] Patient: Sofy Man MRN: [id number] Visit Information Date & Time Provider Department Dept. Phone Encounter #  
 5/11/2018 11:00 AM Ivan Bolden [place] [id number] [phone] Follow-up Instructions Return in about 3 months (around 8/11/2018), or if symptoms worsen or fail to improve. Upcoming Health Maintenance Date Due  
 FOOT EXAM Q1 4/22/2017 EYE EXAM RETINAL OR DILATED Q1 5/19/2017 FOBT Q 1 YEAR AGE 50-75 4/19/2018 Influenza Age 5 to Adult 8/1/2018 HEMOGLOBIN A1C Q6M 8/19/2018 DTaP/Tdap/Td series (2 - Td) 10/16/2018 MICROALBUMIN Q1 2/19/2019 LIPID PANEL Q1 2/19/2019 PAP AKA CERVICAL CYTOLOGY 4/26/2019 MEDICARE YEARLY EXAM 5/12/2019 BREAST CANCER SCRN MAMMOGRAM 6/1/2019 Allergies as of 5/11/2018  Review Complete On: 5/11/2018 By: Lalit Nino LPN Severity Noted Reaction Type Reactions Lipitor [Atorvastatin]    Other (comments)  
 myalgias Lisinopril  07/02/2014    Cough Aspirin Low 11/07/2012   Intolerance Other (comments) Nose bleeds Zocor [Simvastatin] Low 11/07/2012   Side Effect Other (comments) Causes pain in the left leg Current Immunizations  Reviewed on 10/21/2013 Name Date Influenza Vaccine 10/21/2013 11:54 AM  
 Influenza Vaccine (Quad) 9/8/2015  1:30 PM  
 Influenza Vaccine (Quad) PF 9/18/2017, 10/12/2016 Not reviewed this visit You Were Diagnosed With   
  
 Codes Comments Colon cancer screening    -  Primary ICD-10-CM: Z12.11 ICD-9-CM: V76.51 Medicare annual wellness visit, subsequent     ICD-10-CM: Z00.00 ICD-9-CM: V70.0 Post-menopausal     ICD-10-CM: Z78.0 ICD-9-CM: V49.81 Breast cancer screening     ICD-10-CM: Z12.31 
ICD-9-CM: V76.10 Vitals BP Pulse Temp Resp Height(growth percentile) Weight(growth percentile) 109/64 82 97.3 °F (36.3 °C) (Oral) 17 5' 4\" (1.626 m) 182 lb (82.6 kg) LMP SpO2 BMI OB Status Smoking Status 06/05/2014 98% 31.24 kg/m2 Postmenopausal Former Smoker Vitals History BMI and BSA Data Body Mass Index Body Surface Area  
 31.24 kg/m 2 1.93 m 2 Preferred Pharmacy Pharmacy Name Phone [address] [place] [address] [phone] Your Updated Medication List  
  
   
This list is accurate as of 5/11/18 12:04 PM.  Always use your most recent med list.  
  
  
  
  
 David Prima Generic drug:  Lancing Device with Lancets  
use as directed to TEST three times a day  
  
 acetaminophen-codeine 300-30 mg per tablet Commonly known as:  TYLENOL-CODEINE #3 Take 1 Tab by mouth every six (6) hours as needed for Pain. Max Daily Amount: 4 Tabs. albuterol 90 mcg/actuation inhaler Commonly known as:  PROVENTIL HFA, VENTOLIN HFA, PROAIR HFA Take 2 Puffs by inhalation every six (6) hours as needed for Wheezing. benztropine 0.5 mg tablet Commonly known as:  COGENTIN Take 0.5 mg by mouth two (2) times a day. ferrous sulfate 325 mg (65 mg iron) tablet Take 1 Tab by mouth two (2) times a day. fluticasone 50 mcg/actuation nasal spray Commonly known as:  Leafy Few 2 Sprays by Both Nostrils route daily. gemfibrozil 600 mg tablet Commonly known as:  LOPID TAKE ONE TABLET BY MOUTH TWICE A DAY  
  
 glucose blood VI test strips strip Commonly known as:  ACCU-CHEK SMARTVIEW TEST STRIP  
dm  
  
 hydrocortisone 1 % topical cream  
Commonly known as:  CORTAID Apply  to affected area two (2) times a day. use thin layer  
  
 ibuprofen 600 mg tablet Commonly known as:  MOTRIN  
TAKE 1 TABLET BY MOUTH EVERY 8 HOURS AS NEEDED FOR PAIN  
  
 insulin glargine 100 unit/mL (3 mL) Inpn Commonly known as:  Monie Gails Administer  60  units subcut daily insulin lispro 100 unit/mL kwikpen Commonly known as:  HUMALOG Administer 616 units 30 minutes before breakfast , lunch and dinner  
  
 levothyroxine 175 mcg tablet Commonly known as:  SYNTHROID Take 1 Tab by mouth Daily (before breakfast). LORazepam 1 mg tablet Commonly known as:  ATIVAN Take 1 mg by mouth every four (4) hours as needed for Anxiety. mupirocin 2 % ointment Commonly known as:  [employer] Apply  to affected area daily. oxybutynin chloride XL 10 mg CR tablet Commonly known as:  DITROPAN XL Take 1 Tab by mouth daily. rosuvastatin 40 mg tablet Commonly known as:  CRESTOR Take 1 Tab by mouth nightly. salicylic acid 17 % topical gel Apply  to affected area daily. Apply dime sized application to the callus on the left foot once daily  
  
 sertraline 100 mg tablet Commonly known as:  ZOLOFT Take 150 mg by mouth daily. SITagliptin-metFORMIN 50-1,000 mg per tablet Commonly known as:  Jeni Riddles Take 1 Tab by mouth two (2) times daily (with meals). SURE COMFORT PEN NEEDLE 31 gauge x 3/16\" Ndle Generic drug:  Insulin Needles (Disposable) USE FOR DIABETIC TESTING  
  
 trifluoperazine 5 mg tablet Commonly known as:  STELAZINE Take 5 mg by mouth two (2) times a day. Follow-up Instructions Return in about 3 months (around 8/11/2018), or if symptoms worsen or fail to improve. To-Do List   
 05/11/2018 Imaging:  DEXA BONE DENSITY STUDY AXIAL   
  
 05/11/2018 Imaging:  SUNG MAMMO BI SCREENING INCL CAD   
  
 06/10/2018 Lab:  OCCULT BLOOD, IMMUNOASSAY (FIT) Patient Instructions Medicare Wellness Visit, Female The best way to live healthy is to have a healthy lifestyle by eating a well-balanced diet, exercising regularly, limiting alcohol and stopping smoking.  
 
Regular physical exams and screening tests are another way to keep healthy. Preventive exams provided by your health care provider can find health problems before they become diseases or illnesses. Preventive services including immunizations, screening tests, monitoring and exams can help you take care of your own health. All people over age 72 should have a pneumovax  and and a prevnar shot to prevent pneumonia. These are once in a lifetime unless you and your provider decide differently. All people over 65 should have a yearly flu shot and a tetanus vaccine every 10 years. A bone mass density to screen for osteoporosis or thinning of the bones should be done every 2 years after 65. Screening for diabetes mellitus with a blood sugar test should be done every year. Glaucoma is a disease of the eye due to increased ocular pressure that can lead to blindness and it should be done every year by an eye professional. 
 
Cardiovascular screening tests that check for elevated lipids (fatty part of blood) which can lead to heart disease and strokes should be done every 5 years. Colorectal screening that evaluates for blood or polyps in your colon should be done yearly as a stool test or every five years as a flexible sigmoidoscope or every 10 years as a colonoscopy up to age 76. Breast cancer screening with a mammogram is recommended biennially  for women age 54-69. Screening for cervical cancer with a pap smear and pelvic exam is recommended for women after age 72 years every 2 years up to age 79 or when the provider and patient decide to stop. If there is a history of cervical abnormalities or other increased risk for cancer then the test is recommended yearly. Hepatitis C screening is also recommended for anyone born between 80 through [address]. A shingles vaccine is also recommended once in a lifetime after age 61. Your Medicare Wellness Exam is recommended annually. Here is a list of your current Health Maintenance items with a due date: Health Maintenance Due Topic Date Due  
 Diabetic Foot Care  04/22/2017 [employer] Eye Exam  05/19/2017  Stool testing for trace blood  04/19/2018 Introducing [facility] HE[facility]! [employer] introduces [employer] patient portal. Now you can access parts of your medical record, email your doctor's office, and request medication refills online. 1. In your internet browser, go to [email or web address] 2. Click on the First Time User? Click Here link in the Sign In box. You will see the New Member Sign Up page. 3. Enter your [employer] Access Code exactly as it appears below. You will not need to use this code after youve completed the sign-up process. If you do not sign up before the expiration date, you must request a new code. · [employer] Access Code: [email or web address] Expires: 8/9/2018 10:26 AM 
 
4. Enter the last four digits of your Social Security Number (xxxx) and Date of Birth (mm/dd/yyyy) as indicated and click Submit. You will be taken to the next sign-up page. 5. Create a [employer] ID. This will be your [employer] login ID and cannot be changed, so think of one that is secure and easy to remember. 6. Create a [employer] password. You can change your password at any time. 7. Enter your Password Reset Question and Answer. This can be used at a later time if you forget your password. 8. Enter your e-mail address. You will receive e-mail notification when new information is available in [address] 9. Click Sign Up. You can now view and download portions of your medical record. 10. Click the Download Summary menu link to download a portable copy of your medical information. If you have questions, please visit the Frequently Asked Questions section of the [employer] website. Remember, [employer] is NOT to be used for urgent needs. For medical emergencies, dial 911. Now available from your iPhone and Android! Please provide this summary of care documentation to your next provider. Your primary care clinician is listed as [phone]. If you have any questions after today's visit, please call [phone].

## 2018-05-11 NOTE — PATIENT INSTRUCTIONS

## 2018-05-12 DIAGNOSIS — E11.9 TYPE 2 DIABETES MELLITUS WITHOUT COMPLICATION (HCC): ICD-10-CM

## 2018-05-13 RX ORDER — INSULIN LISPRO 100 [IU]/ML
INJECTION, SOLUTION INTRAVENOUS; SUBCUTANEOUS
Qty: 5 PEN | Refills: 3 | Status: SHIPPED | OUTPATIENT
Start: 2018-05-13 | End: 2018-08-10 | Stop reason: SDUPTHER

## 2018-05-13 NOTE — ACP (ADVANCE CARE PLANNING)
Advance Care Planning (ACP) Provider Note - Comprehensive     Date of ACP Conversation: 5/11/2018  Persons included in Conversation:  patient  Length of ACP Conversation in minutes:  <16 minutes (Non-Billable)    Authorized Decision Maker (if patient is incapable of making informed decisions): This person is:  daughter          General ACP for ALL Patients with Decision Making Capacity:   Importance of advance care planning, including choosing a healthcare agent to communicate patient's healthcare decisions if patient lost the ability to make decisions, such as after a sudden illness or accident    Review of Existing Advance Directive:  does not havee one    For Serious or Chronic Illness:  Understanding of medical condition    Understanding of CPR, goals and expected outcomes, benefits and burdens discussed.     Interventions Provided:  Recommended completion of Advance Directive form after review of ACP materials and conversation with prospective healthcare agent

## 2018-05-16 LAB — HEMOCCULT STL QL IA: NEGATIVE

## 2018-06-04 ENCOUNTER — HOSPITAL ENCOUNTER (OUTPATIENT)
Dept: GENERAL RADIOLOGY | Age: 53
Discharge: HOME | End: 2018-06-04
Attending: INTERNAL MEDICINE
Payer: MEDICARE

## 2018-06-04 ENCOUNTER — HOSPITAL ENCOUNTER (OUTPATIENT)
Dept: MAMMOGRAPHY | Age: 53
Discharge: HOME | End: 2018-06-04
Attending: INTERNAL MEDICINE
Payer: MEDICARE

## 2018-06-04 DIAGNOSIS — Z78.0 POST-MENOPAUSAL: ICD-10-CM

## 2018-06-04 DIAGNOSIS — Z12.39 BREAST CANCER SCREENING: ICD-10-CM

## 2018-06-04 PROCEDURE — 77067 SCR MAMMO BI INCL CAD: CPT

## 2018-06-04 PROCEDURE — 77080 DXA BONE DENSITY AXIAL: CPT

## 2018-06-06 ENCOUNTER — TELEPHONE (OUTPATIENT)
Dept: FAMILY MEDICINE CLINIC | Age: 53
End: 2018-06-06

## 2018-06-06 NOTE — TELEPHONE ENCOUNTER
Pt stated blood sugar is running 90 to 100, she would like to know can she stop taking the Lantus. Please advise.

## 2018-06-11 ENCOUNTER — TELEPHONE (OUTPATIENT)
Dept: FAMILY MEDICINE CLINIC | Age: 53
End: 2018-06-11

## 2018-06-11 NOTE — TELEPHONE ENCOUNTER
Pt should not stop lantus, she can reduce it from 60 units daily to 50 units daily     lvm to return call.

## 2018-06-12 NOTE — TELEPHONE ENCOUNTER
Pt should not stop lantus, she can reduce it from 60 units daily to 50 units daily. ( per PCP routing message. )    Called patient but there was no answer. Left a VM for patient to call back in regards to Lantus.

## 2018-06-18 ENCOUNTER — TELEPHONE (OUTPATIENT)
Dept: FAMILY MEDICINE CLINIC | Age: 53
End: 2018-06-18

## 2018-06-18 NOTE — TELEPHONE ENCOUNTER
Pt came in office to  sliding scale. Pt stated she stopped Lantus but is taking Humalog and Janument. Nurse advised that provider did not want her to stop the lantus. Pt states she is doing fine but anxious but she thinks it is due to her daughter coming in to town. Offered patient a same day appt, pt declined, and nurse advised she will let provider know.

## 2018-06-29 DIAGNOSIS — E78.00 HYPERCHOLESTEREMIA: ICD-10-CM

## 2018-07-01 RX ORDER — ROSUVASTATIN CALCIUM 40 MG/1
TABLET, COATED ORAL
Qty: 90 TAB | Refills: 1 | Status: SHIPPED | OUTPATIENT
Start: 2018-07-01 | End: 2018-10-26 | Stop reason: SDUPTHER

## 2018-07-02 ENCOUNTER — HOSPITAL ENCOUNTER (OUTPATIENT)
Dept: LAB | Age: 53
Discharge: HOME | End: 2018-07-02
Payer: MEDICARE

## 2018-07-02 ENCOUNTER — OFFICE VISIT (OUTPATIENT)
Dept: FAMILY MEDICINE CLINIC | Age: 53
End: 2018-07-02

## 2018-07-02 VITALS
TEMPERATURE: 97.7 F | BODY MASS INDEX: 28.34 KG/M2 | HEART RATE: 74 BPM | WEIGHT: 166 LBS | RESPIRATION RATE: 15 BRPM | HEIGHT: 64 IN | OXYGEN SATURATION: 98 % | SYSTOLIC BLOOD PRESSURE: 111 MMHG | DIASTOLIC BLOOD PRESSURE: 72 MMHG

## 2018-07-02 DIAGNOSIS — D64.9 ANEMIA, UNSPECIFIED TYPE: ICD-10-CM

## 2018-07-02 DIAGNOSIS — E53.8 VITAMIN B12 DEFICIENCY: ICD-10-CM

## 2018-07-02 DIAGNOSIS — E78.00 HYPERCHOLESTEREMIA: ICD-10-CM

## 2018-07-02 DIAGNOSIS — E11.21 TYPE 2 DIABETES MELLITUS WITH NEPHROPATHY (HCC): Primary | ICD-10-CM

## 2018-07-02 DIAGNOSIS — N39.3 STRESS INCONTINENCE OF URINE: ICD-10-CM

## 2018-07-02 DIAGNOSIS — E03.4 HYPOTHYROIDISM DUE TO ACQUIRED ATROPHY OF THYROID: ICD-10-CM

## 2018-07-02 LAB
FERRITIN SERPL-MCNC: 34 NG/ML (ref 8–388)
FOLATE SERPL-MCNC: 9.9 NG/ML (ref 3.1–17.5)
IRON SATN MFR SERPL: 20 % (ref 20–50)
IRON SERPL-MCNC: 92 UG/DL (ref 50–175)
TIBC SERPL-MCNC: 453 UG/DL (ref 250–450)
TSH SERPL DL<=0.05 MIU/L-ACNC: 0.01 UIU/ML (ref 0.36–3.74)
VIT B12 SERPL-MCNC: 199 PG/ML (ref 211–911)

## 2018-07-02 PROCEDURE — 82607 VITAMIN B-12: CPT | Performed by: INTERNAL MEDICINE

## 2018-07-02 PROCEDURE — 83021 HEMOGLOBIN CHROMOTOGRAPHY: CPT | Performed by: INTERNAL MEDICINE

## 2018-07-02 PROCEDURE — 82728 ASSAY OF FERRITIN: CPT | Performed by: INTERNAL MEDICINE

## 2018-07-02 PROCEDURE — 83540 ASSAY OF IRON: CPT | Performed by: INTERNAL MEDICINE

## 2018-07-02 PROCEDURE — 84443 ASSAY THYROID STIM HORMONE: CPT | Performed by: INTERNAL MEDICINE

## 2018-07-02 PROCEDURE — 36415 COLL VENOUS BLD VENIPUNCTURE: CPT | Performed by: INTERNAL MEDICINE

## 2018-07-02 RX ORDER — OXYBUTYNIN CHLORIDE 10 MG/1
10 TABLET, EXTENDED RELEASE ORAL DAILY
Qty: 90 TAB | Refills: 1 | Status: SHIPPED | OUTPATIENT
Start: 2018-07-02 | End: 2018-10-26 | Stop reason: SDUPTHER

## 2018-07-02 NOTE — PROGRESS NOTES
Roderick Pineda is a 52 y.o.  female and presents with     Chief Complaint   Patient presents with    Diabetes    Weight Loss    Anemia       Pt has been trying to loos wt and is happy about the wt loss. Pt has not been taking lantus as her sugars are below 150. She occasionally takes humalog. She has been going to the [facility]. Her goal is to get it down to 140 pounds. Wt loss has been intentional.  Pt does not want to do colonoscopy. Denies rectal bleeding melena, vaginal bleeding. Pt says she has been anemic since she was 15          Past Medical History:   Diagnosis Date    Arthritis     Chronic pain     Depression     Diabetes ([address]     Hyperlipidemia     Hypothyroid     Medial meniscus tear     Left knee    Psychiatric disorder     Tobacco abuse, in remission      Past Surgical History:   Procedure Laterality Date    HX ORTHOPAEDIC      Hand reconstructive surgery    HX TUBAL LIGATION  2004     Current Outpatient Prescriptions   Medication Sig    oxybutynin chloride XL (DITROPAN XL) 10 mg CR tablet Take 1 Tab by mouth daily.  rosuvastatin (CRESTOR) 40 mg tablet TAKE ONE TABLET BY MOUTH NIGHTLY    HUMALOG KWIKPEN INSULIN 100 unit/mL kwikpen INJECT 6 TO 8 UNITS SUBCUTANEOUSLY 30 MINS BEFORE BREAKFAST LUNCH AND DINNER    levothyroxine (SYNTHROID) 175 mcg tablet Take 1 Tab by mouth Daily (before breakfast).  SITagliptin-metFORMIN (JANUMET) 50-1,000 mg per tablet Take 1 Tab by mouth two (2) times daily (with meals).  ACCU-CHEK FASTCLIX kit use as directed to TEST three times a day    salicylic acid 17 % topical gel Apply  to affected area daily. Apply dime sized application to the callus on the left foot once daily    ferrous sulfate 325 mg (65 mg iron) tablet Take 1 Tab by mouth two (2) times a day.  benztropine (COGENTIN) 0.5 mg tablet Take 0.5 mg by mouth two (2) times a day.     gemfibrozil (LOPID) 600 mg tablet TAKE ONE TABLET BY MOUTH TWICE A DAY    SURE COMFORT PEN NEEDLE 31 gauge x 3/16\" ndle USE FOR DIABETIC TESTING    insulin glargine (LANTUS,BASAGLAR) 100 unit/mL (3 mL) inpn Administer  60  units subcut daily    acetaminophen-codeine (TYLENOL-CODEINE #3) 300-30 mg per tablet Take 1 Tab by mouth every six (6) hours as needed for Pain. Max Daily Amount: 4 Tabs.  ibuprofen (MOTRIN) 600 mg tablet  TAKE 1 TABLET BY MOUTH EVERY 8 HOURS AS NEEDED FOR PAIN    glucose blood VI test strips (ACCU-CHEK SMARTVIEW TEST STRIP) strip dm    mupirocin (BACTROBAN) 2 % ointment Apply  to affected area daily.  hydrocortisone (CORTAID) 1 % topical cream Apply  to affected area two (2) times a day. use thin layer    fluticasone (FLONASE) 50 mcg/actuation nasal spray 2 Sprays by Both Nostrils route daily.  LORazepam (ATIVAN) 1 mg tablet Take 1 mg by mouth every four (4) hours as needed for Anxiety.  albuterol (PROVENTIL HFA, VENTOLIN HFA, PROAIR HFA) 90 mcg/actuation inhaler Take 2 Puffs by inhalation every six (6) hours as needed for Wheezing.  trifluoperazine (STELAZINE) 5 mg tablet Take 5 mg by mouth two (2) times a day.  sertraline (ZOLOFT) 100 mg tablet Take 150 mg by mouth daily. No current facility-administered medications for this visit.       Health Maintenance   Topic Date Due    FOOT EXAM Q1  04/22/2017    EYE EXAM RETINAL OR DILATED Q1  05/19/2017    Influenza Age 9 to Adult  08/01/2018    HEMOGLOBIN A1C Q6M  08/19/2018    DTaP/Tdap/Td series (2 - Td) 10/16/2018    MICROALBUMIN Q1  02/19/2019    LIPID PANEL Q1  02/19/2019    PAP AKA CERVICAL CYTOLOGY  04/26/2019    FOBT Q 1 YEAR AGE 50-75  05/13/2019    BREAST CANCER SCRN MAMMOGRAM  06/04/2020    Hepatitis C Screening  Completed    Pneumococcal 19-64 Medium Risk  Completed     Immunization History   Administered Date(s) Administered    Influenza Vaccine 10/21/2013    Influenza Vaccine (Quad) 09/08/2015    Influenza Vaccine (Quad) PF 10/12/2016, 09/18/2017     Patient's last menstrual period was 06/05/2014. Allergies and Intolerances: Allergies   Allergen Reactions    Lipitor [Atorvastatin] Other (comments)     myalgias    Lisinopril Cough    Aspirin Other (comments)     Nose bleeds    Zocor [Simvastatin] Other (comments)     Causes pain in the left leg       Family History:   Family History   Problem Relation Age of Onset    Alcohol abuse Mother    Dong  Mother    Carol Ann Pear Mother 61     breast cancer    Diabetes Mother     Hypertension Mother     Psychiatric Disorder Mother     Stroke Mother     Breast Cancer Mother     Alcohol abuse Father     Heart Disease Father     Diabetes Father     Hypertension Father     Lung Disease Father     Colon Cancer Father 72    Alcohol abuse Sister     Diabetes Sister     Psychiatric Disorder Sister     Alcohol abuse Brother     Alcohol abuse Paternal Grandmother     Psychiatric Disorder Son     Psychiatric Disorder       nephew    Ovarian Cancer Sister        Social History:   She  reports that she quit smoking about 6 years ago. Her smoking use included Cigarettes. She has a 35.00 pack-year smoking history. She has never used smokeless tobacco.  She  reports that she does not drink alcohol.             Review of Systems:   General: negative for - chills, fatigue, fever,pos for  weight change  Psych: negative for - anxiety, depression, irritability or mood swings  ENT: negative for - headaches, hearing change, nasal congestion, oral lesions, sneezing or sore throat  Heme/ Lymph: negative for - bleeding problems, bruising, pallor or swollen lymph nodes  Endo: negative for - hot flashes, polydipsia/polyuria or temperature intolerance  Resp: negative for - cough, shortness of breath or wheezing  CV: negative for - chest pain, edema or palpitations  GI: negative for - abdominal pain, change in bowel habits, constipation, diarrhea or nausea/vomiting  : negative for - dysuria, hematuria, incontinence, pelvic pain or vulvar/vaginal symptoms  MSK: negative for - joint pain, joint swelling or muscle pain  Neuro: negative for - confusion, headaches, seizures or weakness  Derm: negative for - dry skin, hair changes, rash or skin lesion changes          Physical:   Vitals:   Vitals:    07/02/18 0858   BP: 111/72   Pulse: 74   Resp: 15   Temp: 97.7 °F (36.5 °C)   TempSrc: Oral   SpO2: 98%   Weight: 166 lb (75.3 kg)   Height: 5' 4\" (1.626 m)           Exam:   HEENT- atraumatic,normocephalic, awake, oriented, well nourished  Neck - supple,no enlarged lymph nodes, no JVD, no thyromegaly  Chest- CTA, no rhonchi, no crackles  Heart- rrr, no murmurs / gallop/rub  Abdomen- soft,BS+,NT, no hepatosplenomegaly  Ext - no c/c/edema   Neuro- no focal deficits. Power 5/5 all extremities  Skin - warm,dry, no obvious rashes. Review of Data:   LABS:   Lab Results   Component Value Date/Time    WBC 6.1 02/14/2018 01:30 PM    HGB 11.0 (L) 02/14/2018 01:30 PM    HCT 34.3 (L) 02/14/2018 01:30 PM    PLATELET 336 56/52/4749 01:30 PM     Lab Results   Component Value Date/Time    Sodium 136 02/19/2018 10:52 AM    Potassium 4.9 02/19/2018 10:52 AM    Chloride 95 (L) 02/19/2018 10:52 AM    CO2 22 02/19/2018 10:52 AM    Glucose 118 (H) 02/19/2018 10:52 AM    BUN 14 02/19/2018 10:52 AM    Creatinine 0.70 02/19/2018 10:52 AM     Lab Results   Component Value Date/Time    Cholesterol, total 169 02/19/2018 10:52 AM    HDL Cholesterol 31 (L) 02/19/2018 10:52 AM    LDL, calculated 78 02/19/2018 10:52 AM    Triglyceride 299 (H) 02/19/2018 10:52 AM     No results found for: GPT        Impression / Plan:        ICD-10-CM ICD-9-CM    1. Type 2 diabetes mellitus with nephropathy (HCC) E11.21 250.40 AMB POC HEMOGLOBIN A1C     583.81 REFERRAL TO OPHTHALMOLOGY   2. Stress incontinence of urine N39.3 [id number] oxybutynin chloride XL (DITROPAN XL) 10 mg CR tablet   3. Hypothyroidism due to acquired atrophy of thyroid E03.4 244.8      246.8    4.  Hypercholesteremia E78.00 272.0    5. Anemia, unspecified type D64.9 285.9 FERRITIN      IRON PROFILE      HEMOGLOBIN FRACTIONATION   6. Vitamin B12 deficiency E53.8 266.2 VITAMIN B12 & FOLATE     Wt loss - intentional per pt. Pt feels better    Pt has not been taking lantus. a1c - 6.7    Hold off  lantus  , follow sliding scale    Hyperchol / Hypothyroidism - stable        Explained to patient risk benefits of the medications. Advised patient to stop meds if having any side effects. Pt verbalized understanding of the instructions. I have discussed the diagnosis with the patient and the intended plan as seen in the above orders. The patient has received an after-visit summary and questions were answered concerning future plans. I have discussed medication side effects and warnings with the patient as well. I have reviewed the plan of care with the patient, accepted their input and they are in agreement with the treatment goals. Reviewed plan of care. Patient has provided input and agrees with goals.     Follow-up Disposition: Not on Tung Holliday MD

## 2018-07-02 NOTE — PROGRESS NOTES
1. Have you been to the ER, urgent care clinic since your last visit? Hospitalized since your last visit? No    2. Have you seen or consulted any other health care providers outside of the [address] [place] [address] since your last visit? Include any pap smears or colon screening.  No

## 2018-07-02 NOTE — MR AVS SNAPSHOT
Lila Hendricks 
 
 
 [address] [facility] [address] [place] [phone] 
[phone] Patient: Conchita Sparks MRN: [id number] Visit Information Date & Time Provider Department Dept. Phone Encounter #  
 7/2/2018  9:00 AM [phone] [id number] Follow-up Instructions Return for keep appt aug. Your Appointments 8/10/2018 11:00 AM  
Follow Up with Bonita Salmeron MD  
[facility]: Return in about 3 months (around 8/11/2018), or if symptoms worsen or fail to improve. [id number] [facility] [address] [facility] [address]  
  
   
 [address] [facility] [address] Upcoming Health Maintenance Date Due  
 FOOT EXAM Q1 4/22/2017 EYE EXAM RETINAL OR DILATED Q1 5/19/2017 Influenza Age 5 to Adult 8/1/2018 HEMOGLOBIN A1C Q6M 8/19/2018 DTaP/Tdap/Td series (2 - Td) 10/16/2018 MICROALBUMIN Q1 2/19/2019 LIPID PANEL Q1 2/19/2019 PAP AKA CERVICAL CYTOLOGY 4/26/2019 FOBT Q 1 YEAR AGE 50-75 5/13/2019 BREAST CANCER SCRN MAMMOGRAM 6/4/2020 Allergies as of 7/2/2018  Review Complete On: 7/2/2018 By: Bonita Salmeron MD  
  
 Severity Noted Reaction Type Reactions Lipitor [Atorvastatin]    Other (comments)  
 myalgias Lisinopril  07/02/2014    Cough Aspirin Low 11/07/2012   Intolerance Other (comments) Nose bleeds Zocor [Simvastatin] Low 11/07/2012   Side Effect Other (comments) Causes pain in the left leg Current Immunizations  Reviewed on 10/21/2013 Name Date Influenza Vaccine 10/21/2013 11:54 AM  
 Influenza Vaccine (Quad) 9/8/2015  1:30 PM  
 Influenza Vaccine (Quad) PF 9/18/2017, 10/12/2016 Not reviewed this visit You Were Diagnosed With   
  
 Codes Comments  Type 2 diabetes mellitus with nephropathy ([facility]    -  Primary ICD-10-CM: E11.21 
ICD-9-CM: 250.40, 583.81   
 Stress incontinence of urine     ICD-10-CM: N39.3 ICD-9-CM: [id number] Hypothyroidism due to acquired atrophy of thyroid     ICD-10-CM: E03.4 ICD-9-CM: 244.8, 246.8 Hypercholesteremia     ICD-10-CM: E78.00 ICD-9-CM: 272.0 Anemia, unspecified type     ICD-10-CM: D64.9 ICD-9-CM: 675. 9 Vitamin B12 deficiency     ICD-10-CM: E53.8 ICD-9-CM: 266.2 Vitals BP Pulse Temp Resp Height(growth percentile) Weight(growth percentile) 111/72 74 97.7 °F (36.5 °C) (Oral) 15 5' 4\" (1.626 m) 166 lb (75.3 kg) LMP SpO2 BMI OB Status Smoking Status 06/05/2014 98% 28.49 kg/m2 Postmenopausal Former Smoker Vitals History BMI and BSA Data Body Mass Index Body Surface Area  
 28.49 kg/m 2 1.84 m 2 Preferred Pharmacy Pharmacy Name Phone [address] [address] [phone] Your Updated Medication List  
  
   
This list is accurate as of 7/2/18  9:28 AM.  Always use your most recent med list.  
  
  
  
  
 Isabell Pott Generic drug:  Lancing Device with Lancets  
use as directed to TEST three times a day  
  
 acetaminophen-codeine 300-30 mg per tablet Commonly known as:  TYLENOL-CODEINE #3 Take 1 Tab by mouth every six (6) hours as needed for Pain. Max Daily Amount: 4 Tabs. albuterol 90 mcg/actuation inhaler Commonly known as:  PROVENTIL HFA, VENTOLIN HFA, PROAIR HFA Take 2 Puffs by inhalation every six (6) hours as needed for Wheezing. benztropine 0.5 mg tablet Commonly known as:  COGENTIN Take 0.5 mg by mouth two (2) times a day. ferrous sulfate 325 mg (65 mg iron) tablet Take 1 Tab by mouth two (2) times a day. fluticasone 50 mcg/actuation nasal spray Commonly known as:  Lindalee Howe 2 Sprays by Both Nostrils route daily. gemfibrozil 600 mg tablet Commonly known as:  LOPID TAKE ONE TABLET BY MOUTH TWICE A DAY  
  
 glucose blood VI test strips strip Commonly known as:  ACCU-CHEK SMARTVIEW TEST STRIP  
dm HumaLOG KwikPen Insulin 100 unit/mL kwikpen Generic drug:  insulin lispro INJECT 6 TO 8 UNITS SUBCUTANEOUSLY 30 MINS BEFORE BREAKFAST LUNCH AND DINNER  
  
 hydrocortisone 1 % topical cream  
Commonly known as:  CORTAID Apply  to affected area two (2) times a day. use thin layer  
  
 ibuprofen 600 mg tablet Commonly known as:  MOTRIN  
TAKE 1 TABLET BY MOUTH EVERY 8 HOURS AS NEEDED FOR PAIN  
  
 insulin glargine 100 unit/mL (3 mL) Inpn Commonly known as:  Reesa Conejos Administer  60  units subcut daily  
  
 levothyroxine 175 mcg tablet Commonly known as:  SYNTHROID Take 1 Tab by mouth Daily (before breakfast). LORazepam 1 mg tablet Commonly known as:  ATIVAN Take 1 mg by mouth every four (4) hours as needed for Anxiety. mupirocin 2 % ointment Commonly known as:  [employer] Apply  to affected area daily. oxybutynin chloride XL 10 mg CR tablet Commonly known as:  DITROPAN XL Take 1 Tab by mouth daily. rosuvastatin 40 mg tablet Commonly known as:  CRESTOR  
TAKE ONE TABLET BY MOUTH NIGHTLY  
  
 salicylic acid 17 % topical gel Apply  to affected area daily. Apply dime sized application to the callus on the left foot once daily  
  
 sertraline 100 mg tablet Commonly known as:  ZOLOFT Take 150 mg by mouth daily. SITagliptin-metFORMIN 50-1,000 mg per tablet Commonly known as:  Tennie Began Take 1 Tab by mouth two (2) times daily (with meals). SURE COMFORT PEN NEEDLE 31 gauge x 3/16\" Ndle Generic drug:  Insulin Needles (Disposable) USE FOR DIABETIC TESTING  
  
 trifluoperazine 5 mg tablet Commonly known as:  STELAZINE Take 5 mg by mouth two (2) times a day. Prescriptions Sent to Pharmacy Refills  
 oxybutynin chloride XL (DITROPAN XL) 10 mg CR tablet 1 Sig: Take 1 Tab by mouth daily.   
 Class: Normal  
 Pharmacy: [address]  #: [phone] Route: Oral  
  
We Performed the Following AMB POC HEMOGLOBIN A1C [50752 CPT(R)] REFERRAL TO OPHTHALMOLOGY [REF57 Custom] Follow-up Instructions Return for keep appt aug. To-Do List   
 07/02/2018 Lab:  FERRITIN   
  
 07/02/2018 Lab:  HEMOGLOBIN FRACTIONATION   
  
 07/02/2018 Lab:  IRON PROFILE   
  
 07/02/2018 Lab:  VITAMIN B12 & FOLATE Referral Information Referral ID Referred By Referred To  
  
 [id number] [facility], Jennifer Kan MD   
   [address] [place] [address] [address] [place] [address], [address] [place] [address] Road Phone: [phone] Fax: [phone] Visits Status Start Date End Date 1 New Request 7/2/18 7/2/19 If your referral has a status of pending review or denied, additional information will be sent to support the outcome of this decision. Introducing [facility]! [employer] introduces Sidecar patient portal. Now you can access parts of your medical record, email your doctor's office, and request medication refills online. 1. In your internet browser, go to [email or web address] 2. Click on the First Time User? Click Here link in the Sign In box. You will see the New Member Sign Up page. 3. Enter your Sidecar Access Code exactly as it appears below. You will not need to use this code after youve completed the sign-up process. If you do not sign up before the expiration date, you must request a new code. · Sidecar Access Code: [email or web address] Expires: 8/9/2018 10:26 AM 
 
4. Enter the last four digits of your Social Security Number (xxxx) and Date of Birth (mm/dd/yyyy) as indicated and click Submit. You will be taken to the next sign-up page. 5. Create a [employer] ID. This will be your [employer] login ID and cannot be changed, so think of one that is secure and easy to remember. 6. Create a Syncano password. You can change your password at any time. 7. Enter your Password Reset Question and Answer. This can be used at a later time if you forget your password. 8. Enter your e-mail address. You will receive e-mail notification when new information is available in [address] 9. Click Sign Up. You can now view and download portions of your medical record. 10. Click the Download Summary menu link to download a portable copy of your medical information. If you have questions, please visit the Frequently Asked Questions section of the [employer] website. Remember, Syncano is NOT to be used for urgent needs. For medical emergencies, dial 911. Now available from your iPhone and Android! Please provide this summary of care documentation to your next provider. Your primary care clinician is listed as [phone]. If you have any questions after today's visit, please call [phone].

## 2018-07-03 LAB
DEPRECATED HGB OTHER BLD-IMP: 0 %
HGB A MFR BLD: 98.2 % (ref 96.4–98.8)
HGB A2 MFR BLD COLUMN CHROM: 1.8 % (ref 1.8–3.2)
HGB C MFR BLD: 0 %
HGB F MFR BLD: 0 % (ref 0–2)
HGB FRACT BLD-IMP: NORMAL
HGB S BLD QL SOLY: NEGATIVE
HGB S MFR BLD: 0 %

## 2018-07-04 ENCOUNTER — TELEPHONE (OUTPATIENT)
Dept: FAMILY MEDICINE CLINIC | Age: 53
End: 2018-07-04

## 2018-07-04 DIAGNOSIS — E03.4 HYPOTHYROIDISM DUE TO ACQUIRED ATROPHY OF THYROID: ICD-10-CM

## 2018-07-04 DIAGNOSIS — E53.8 VITAMIN B12 DEFICIENCY: Primary | ICD-10-CM

## 2018-07-04 RX ORDER — LEVOTHYROXINE SODIUM 137 UG/1
137 TABLET ORAL
Qty: 30 TAB | Refills: 3 | Status: SHIPPED | OUTPATIENT
Start: 2018-07-04 | End: 2018-08-13 | Stop reason: DRUGHIGH

## 2018-07-06 NOTE — TELEPHONE ENCOUNTER
TSH is low. I have sent lower dose of levothyroxine to pharmacy. Vitamin b12 level is low. I have ordered labs to find cause of low b12. Spoke with patient, patient will  medication and will return 07/09/2018 for labs. This encounter will be closed.

## 2018-07-10 ENCOUNTER — HOSPITAL ENCOUNTER (OUTPATIENT)
Dept: LAB | Age: 53
Discharge: HOME | End: 2018-07-10
Payer: MEDICARE

## 2018-07-10 DIAGNOSIS — E53.8 VITAMIN B12 DEFICIENCY: ICD-10-CM

## 2018-07-10 PROCEDURE — 86340 INTRINSIC FACTOR ANTIBODY: CPT | Performed by: INTERNAL MEDICINE

## 2018-07-10 PROCEDURE — 83516 IMMUNOASSAY NONANTIBODY: CPT | Performed by: INTERNAL MEDICINE

## 2018-07-10 PROCEDURE — 36415 COLL VENOUS BLD VENIPUNCTURE: CPT | Performed by: INTERNAL MEDICINE

## 2018-07-11 LAB
IF BLOCK AB SER-ACNC: 1 AU/ML (ref 0–1.1)
PCA AB SER-ACNC: 2.9 UNITS (ref 0–20)

## 2018-07-19 ENCOUNTER — TELEPHONE (OUTPATIENT)
Dept: FAMILY MEDICINE CLINIC | Age: 53
End: 2018-07-19

## 2018-07-19 NOTE — TELEPHONE ENCOUNTER
Parietal antibody negative, intrinsic factor antibody borderline high. Would recommend to start oral OTC vitamin B12 1,000mcg per day. Repeat test in 3 months. If still low, she might benefit from B12 injections.

## 2018-07-19 NOTE — TELEPHONE ENCOUNTER
Dr. Lupe Salas asked patient to come back for additional labs due to b12 results, please result labs. Pt states she is experiencing dizziness, tingling in hands and headaches. She stated she looked up this symptoms online and she read that this could be from Vitamin B12. Please advise.

## 2018-07-20 NOTE — TELEPHONE ENCOUNTER
Nurse spoke with patient, patient advised to purchase Vit b12 otc and recheck in 3 months at that time, if it's still low patient will benefit from Vit b12 injection. This encounter will be closed.

## 2018-08-10 ENCOUNTER — HOSPITAL ENCOUNTER (OUTPATIENT)
Dept: LAB | Age: 53
Discharge: HOME | End: 2018-08-10
Payer: MEDICARE

## 2018-08-10 ENCOUNTER — OFFICE VISIT (OUTPATIENT)
Dept: FAMILY MEDICINE CLINIC | Age: 53
End: 2018-08-10

## 2018-08-10 VITALS
DIASTOLIC BLOOD PRESSURE: 77 MMHG | WEIGHT: 159 LBS | TEMPERATURE: 97.1 F | HEIGHT: 64 IN | OXYGEN SATURATION: 99 % | BODY MASS INDEX: 27.14 KG/M2 | RESPIRATION RATE: 18 BRPM | SYSTOLIC BLOOD PRESSURE: 105 MMHG | HEART RATE: 73 BPM

## 2018-08-10 DIAGNOSIS — E03.9 HYPOTHYROIDISM, UNSPECIFIED TYPE: ICD-10-CM

## 2018-08-10 DIAGNOSIS — E11.9 TYPE 2 DIABETES MELLITUS WITHOUT COMPLICATION, UNSPECIFIED WHETHER LONG TERM INSULIN USE (HCC): ICD-10-CM

## 2018-08-10 DIAGNOSIS — E03.9 HYPOTHYROIDISM, UNSPECIFIED TYPE: Primary | ICD-10-CM

## 2018-08-10 LAB
GLUCOSE POC: 179 MG/DL
HBA1C MFR BLD HPLC: 6.7 %
TSH SERPL DL<=0.05 MIU/L-ACNC: 0.08 UIU/ML (ref 0.36–3.74)

## 2018-08-10 PROCEDURE — 84443 ASSAY THYROID STIM HORMONE: CPT | Performed by: INTERNAL MEDICINE

## 2018-08-10 PROCEDURE — 36415 COLL VENOUS BLD VENIPUNCTURE: CPT | Performed by: INTERNAL MEDICINE

## 2018-08-10 RX ORDER — INSULIN LISPRO 100 [IU]/ML
INJECTION, SOLUTION INTRAVENOUS; SUBCUTANEOUS
Qty: 5 PEN | Refills: 3 | Status: SHIPPED | OUTPATIENT
Start: 2018-08-10 | End: 2019-01-26

## 2018-08-10 NOTE — PROGRESS NOTES
1. Have you been to the ER, urgent care clinic since your last visit? Hospitalized since your last visit? No    2. Have you seen or consulted any other health care providers outside of the [address] [place] [address] since your last visit? Include any pap smears or colon screening.  No

## 2018-08-10 NOTE — MR AVS SNAPSHOT
[address] [place] [id number] 
[phone] Patient: Anirudh Lin MRN: [id number] FYY:[id number] Visit Information Date & Time Provider Department Dept. Phone Encounter #  
 8/10/2018 11:00 AM [id number], [facility] [phone] [id number] Follow-up Instructions Return in about 1 month (around 9/10/2018). Upcoming Health Maintenance Date Due  
 FOOT EXAM Q1 4/22/2017 EYE EXAM RETINAL OR DILATED Q1 5/19/2017 Influenza Age 5 to Adult 8/1/2018 HEMOGLOBIN A1C Q6M 8/19/2018 DTaP/Tdap/Td series (2 - Td) 10/16/2018 MICROALBUMIN Q1 2/19/2019 LIPID PANEL Q1 2/19/2019 PAP AKA CERVICAL CYTOLOGY 4/26/2019 FOBT Q 1 YEAR AGE 50-75 5/13/2019 BREAST CANCER SCRN MAMMOGRAM 6/4/2020 Allergies as of 8/10/2018  Review Complete On: 8/10/2018 By: Andie Valenzuela LPN Severity Noted Reaction Type Reactions Lipitor [Atorvastatin]    Other (comments)  
 myalgias Lisinopril  07/02/2014    Cough Aspirin Low 11/07/2012   Intolerance Other (comments) Nose bleeds Zocor [Simvastatin] Low 11/07/2012   Side Effect Other (comments) Causes pain in the left leg Current Immunizations  Reviewed on 10/21/2013 Name Date Influenza Vaccine 10/21/2013 11:54 AM  
 Influenza Vaccine (Quad) 9/8/2015  1:30 PM  
 Influenza Vaccine (Quad) PF 9/18/2017, 10/12/2016 Not reviewed this visit You Were Diagnosed With   
  
 Codes Comments Hypothyroidism, unspecified type    -  Primary ICD-10-CM: E03.9 ICD-9-CM: 228. 9 Type 2 diabetes mellitus without complication, unspecified whether long term insulin use (HCC)     ICD-10-CM: E11.9 ICD-9-CM: 250.00 Vitals BP Pulse Temp Resp Height(growth percentile) Weight(growth percentile) 105/77 73 97.1 °F (36.2 °C) (Oral) 18 5' 4\" (1.626 m) 159 lb (72.1 kg) LMP SpO2 BMI OB Status Smoking Status 06/05/2014 99% 27.29 kg/m2 Postmenopausal Former Smoker Vitals History BMI and BSA Data Body Mass Index Body Surface Area  
 27.29 kg/m 2 1.8 m 2 Preferred Pharmacy Pharmacy Name Phone [address] , [address] [place] [address] 256 [phone] Your Updated Medication List  
  
   
This list is accurate as of 8/10/18 12:01 PM.  Always use your most recent med list.  
  
  
  
  
 ACCU-CHEK FASTCLIX LANCING DEV Kit Generic drug:  Lancing Device with Lancets  
use as directed to TEST three times a day  
  
 acetaminophen-codeine 300-30 mg per tablet Commonly known as:  TYLENOL-CODEINE #3 Take 1 Tab by mouth every six (6) hours as needed for Pain. Max Daily Amount: 4 Tabs. albuterol 90 mcg/actuation inhaler Commonly known as:  PROVENTIL HFA, VENTOLIN HFA, PROAIR HFA Take 2 Puffs by inhalation every six (6) hours as needed for Wheezing. benztropine 0.5 mg tablet Commonly known as:  COGENTIN Take 0.5 mg by mouth two (2) times a day. ferrous sulfate 325 mg (65 mg iron) tablet Take 1 Tab by mouth two (2) times a day. fluticasone 50 mcg/actuation nasal spray Commonly known as:  Lucila Jumper 2 Sprays by Both Nostrils route daily. gemfibrozil 600 mg tablet Commonly known as:  LOPID TAKE ONE TABLET BY MOUTH TWICE A DAY  
  
 glucose blood VI test strips strip Commonly known as:  ACCU-CHEK SMARTVIEW TEST STRIP  
dm  
  
 hydrocortisone 1 % topical cream  
Commonly known as:  CORTAID Apply  to affected area two (2) times a day. use thin layer  
  
 ibuprofen 600 mg tablet Commonly known as:  MOTRIN  
TAKE 1 TABLET BY MOUTH EVERY 8 HOURS AS NEEDED FOR PAIN  
  
 insulin glargine 100 unit/mL (3 mL) Inpn Commonly known as:  Brenda Shane Administer  60  units subcut daily  
  
 insulin lispro 100 unit/mL kwikpen Commonly known as:  HumaLOG KwikPen Insulin Administer  6- 8 units three times daily before each meal per sliding scale  
  
 levothyroxine 137 mcg tablet Commonly known as:  SYNTHROID Take 137 mcg by mouth Daily (before breakfast). LORazepam 1 mg tablet Commonly known as:  ATIVAN Take 1 mg by mouth every four (4) hours as needed for Anxiety. mupirocin 2 % ointment Commonly known as:  [employer] Apply  to affected area daily. oxybutynin chloride XL 10 mg CR tablet Commonly known as:  DITROPAN XL Take 1 Tab by mouth daily. rosuvastatin 40 mg tablet Commonly known as:  CRESTOR  
TAKE ONE TABLET BY MOUTH NIGHTLY  
  
 salicylic acid 17 % topical gel Apply  to affected area daily. Apply dime sized application to the callus on the left foot once daily  
  
 sertraline 100 mg tablet Commonly known as:  ZOLOFT Take 150 mg by mouth daily. SITagliptin-metFORMIN 50-1,000 mg per tablet Commonly known as:  Oleg Kehr Take 1 Tab by mouth two (2) times daily (with meals). SURE COMFORT PEN NEEDLE 31 gauge x 3/16\" Ndle Generic drug:  Insulin Needles (Disposable) USE FOR DIABETIC TESTING  
  
 trifluoperazine 5 mg tablet Commonly known as:  STELAZINE Take 5 mg by mouth two (2) times a day. Prescriptions Sent to Pharmacy Refills  
 insulin lispro (HUMALOG KWIKPEN INSULIN) 100 unit/mL kwikpen 3 Sig: Administer  6- 8 units three times daily before each meal per sliding scale Class: Normal  
 Pharmacy: [address] [place] [address] Rd, [address] #: [phone] Follow-up Instructions Return in about 1 month (around 9/10/2018). To-Do List   
 08/10/2018 Lab:  TSH 3RD GENERATION Patient Instructions Sliding scale for Humalog Blood sugar                                  Units 0-100                                              0 
101- 150                                         2 
 151-200                                          4 
201- 250                                         6 
251- 300                                         8 
301-350                                          10 
351 400                                         12 
 
 
  
Introducing [employer]! Diana Benavides introduces NoteWagon patient portal. Now you can access parts of your medical record, email your doctor's office, and request medication refills online. 1. In your internet browser, go to [email or web address] 2. Click on the First Time User? Click Here link in the Sign In box. You will see the New Member Sign Up page. 3. Enter your NoteWagon Access Code exactly as it appears below. You will not need to use this code after youve completed the sign-up process. If you do not sign up before the expiration date, you must request a new code. · NoteWagon Access Code: [email or web address] Expires: 11/8/2018 10:22 AM 
 
4. Enter the last four digits of your Social Security Number (xxxx) and Date of Birth (mm/dd/yyyy) as indicated and click Submit. You will be taken to the next sign-up page. 5. Create a NoteWagon ID. This will be your NoteWagon login ID and cannot be changed, so think of one that is secure and easy to remember. 6. Create a NoteWagon password. You can change your password at any time. 7. Enter your Password Reset Question and Answer. This can be used at a later time if you forget your password. 8. Enter your e-mail address. You will receive e-mail notification when new information is available in [address] 9. Click Sign Up. You can now view and download portions of your medical record. 10. Click the Download Summary menu link to download a portable copy of your medical information. If you have questions, please visit the Frequently Asked Questions section of the NoteWagon website. Remember, NoteWagon is NOT to be used for urgent needs. For medical emergencies, dial 911. Now available from your iPhone and Android! Please provide this summary of care documentation to your next provider. Your primary care clinician is listed as Dmitry Gee. If you have any questions after today's visit, please call [phone].

## 2018-08-10 NOTE — PATIENT INSTRUCTIONS
Sliding scale for Humalog    Blood sugar                                  Units    0-100                                              0  101- 150                                         2  151-200                                          4  201- 250                                         6  251- 300                                         8  301-350                                          10  351- 400                                         12

## 2018-08-11 NOTE — PROGRESS NOTES
Sofia Russell is a 52 y.o.  female and presents with     Chief Complaint   Patient presents with    Diabetes       Pt  Has not been watching her diet recently and her blood sugars have started to rise. She is taking Janumet and  humalog around 6 units twice daily  She does not have sliding scale      Past Medical History:   Diagnosis Date    Arthritis     Chronic pain     Depression     Diabetes ([address]     Hyperlipidemia     Hypothyroid     Medial meniscus tear     Left knee    Psychiatric disorder     Tobacco abuse, in remission      Past Surgical History:   Procedure Laterality Date    HX ORTHOPAEDIC      Hand reconstructive surgery    HX TUBAL LIGATION  2004     Current Outpatient Prescriptions   Medication Sig    insulin lispro (HUMALOG KWIKPEN INSULIN) 100 unit/mL kwikpen Administer  6- 8 units three times daily before each meal per sliding scale    levothyroxine (SYNTHROID) 137 mcg tablet Take 137 mcg by mouth Daily (before breakfast).  oxybutynin chloride XL (DITROPAN XL) 10 mg CR tablet Take 1 Tab by mouth daily.  rosuvastatin (CRESTOR) 40 mg tablet TAKE ONE TABLET BY MOUTH NIGHTLY    gemfibrozil (LOPID) 600 mg tablet TAKE ONE TABLET BY MOUTH TWICE A DAY    SURE COMFORT PEN NEEDLE 31 gauge x 3/16\" ndle USE FOR DIABETIC TESTING    SITagliptin-metFORMIN (JANUMET) 50-1,000 mg per tablet Take 1 Tab by mouth two (2) times daily (with meals).  insulin glargine (LANTUS,BASAGLAR) 100 unit/mL (3 mL) inpn Administer  60  units subcut daily    ibuprofen (MOTRIN) 600 mg tablet  TAKE 1 TABLET BY MOUTH EVERY 8 HOURS AS NEEDED FOR PAIN    ACCU-CHEK FASTCLIX kit use as directed to TEST three times a day    glucose blood VI test strips (ACCU-CHEK SMARTVIEW TEST STRIP) strip dm    hydrocortisone (CORTAID) 1 % topical cream Apply  to affected area two (2) times a day.  use thin layer    albuterol (PROVENTIL HFA, VENTOLIN HFA, PROAIR HFA) 90 mcg/actuation inhaler Take 2 Puffs by inhalation every six (6) hours as needed for Wheezing.  ferrous sulfate 325 mg (65 mg iron) tablet Take 1 Tab by mouth two (2) times a day.  benztropine (COGENTIN) 0.5 mg tablet Take 0.5 mg by mouth two (2) times a day.  trifluoperazine (STELAZINE) 5 mg tablet Take 5 mg by mouth two (2) times a day.  sertraline (ZOLOFT) 100 mg tablet Take 150 mg by mouth daily.  acetaminophen-codeine (TYLENOL-CODEINE #3) 300-30 mg per tablet Take 1 Tab by mouth every six (6) hours as needed for Pain. Max Daily Amount: 4 Tabs.  mupirocin (BACTROBAN) 2 % ointment Apply  to affected area daily.  salicylic acid 17 % topical gel Apply  to affected area daily. Apply dime sized application to the callus on the left foot once daily    fluticasone (FLONASE) 50 mcg/actuation nasal spray 2 Sprays by Both Nostrils route daily.  LORazepam (ATIVAN) 1 mg tablet Take 1 mg by mouth every four (4) hours as needed for Anxiety. No current facility-administered medications for this visit. Health Maintenance   Topic Date Due    FOOT EXAM Q1  04/22/2017    EYE EXAM RETINAL OR DILATED Q1  05/19/2017    Influenza Age 5 to Adult  08/01/2018    DTaP/Tdap/Td series (2 - Td) 10/16/2018    HEMOGLOBIN A1C Q6M  01/02/2019    MICROALBUMIN Q1  02/19/2019    LIPID PANEL Q1  02/19/2019    PAP AKA CERVICAL CYTOLOGY  04/26/2019    MEDICARE YEARLY EXAM  05/12/2019    FOBT Q 1 YEAR AGE 50-75  05/13/2019    BREAST CANCER SCRN MAMMOGRAM  06/04/2020    Hepatitis C Screening  Completed    Pneumococcal 19-64 Medium Risk  Completed     Immunization History   Administered Date(s) Administered    Influenza Vaccine 10/21/2013    Influenza Vaccine (Quad) 09/08/2015    Influenza Vaccine (Quad) PF 10/12/2016, 09/18/2017     Patient's last menstrual period was 06/05/2014. Allergies and Intolerances:    Allergies   Allergen Reactions    Lipitor [Atorvastatin] Other (comments)     myalgias    Lisinopril Cough    Aspirin Other (comments)     Nose bleeds    Zocor [Simvastatin] Other (comments)     Causes pain in the left leg       Family History:   Family History   Problem Relation Age of Onset    Alcohol abuse Mother    Zeeshan Sea Mother    Rodriguez Jewels Mother 61     breast cancer    Diabetes Mother     Hypertension Mother     Psychiatric Disorder Mother     Stroke Mother     Breast Cancer Mother     Alcohol abuse Father     Heart Disease Father     Diabetes Father     Hypertension Father     Lung Disease Father     Colon Cancer Father 72    Alcohol abuse Sister     Diabetes Sister     Psychiatric Disorder Sister     Alcohol abuse Brother     Alcohol abuse Paternal Grandmother     Psychiatric Disorder Son     Psychiatric Disorder       nephew    Ovarian Cancer Sister        Social History:   She  reports that she quit smoking about 6 years ago. Her smoking use included Cigarettes. She has a 35.00 pack-year smoking history. She has never used smokeless tobacco.  She  reports that she does not drink alcohol.             Review of Systems:   General: negative for - chills, fatigue, fever, weight change  Psych: negative for - anxiety, depression, irritability or mood swings  ENT: negative for - headaches, hearing change, nasal congestion, oral lesions, sneezing or sore throat  Heme/ Lymph: negative for - bleeding problems, bruising, pallor or swollen lymph nodes  Endo: negative for - hot flashes, polydipsia/polyuria or temperature intolerance  Resp: negative for - cough, shortness of breath or wheezing  CV: negative for - chest pain, edema or palpitations  GI: negative for - abdominal pain, change in bowel habits, constipation, diarrhea or nausea/vomiting  : negative for - dysuria, hematuria, incontinence, pelvic pain or vulvar/vaginal symptoms  MSK: negative for - joint pain, joint swelling or muscle pain  Neuro: negative for - confusion, headaches, seizures or weakness  Derm: negative for - dry skin, hair changes, rash or skin lesion changes          Physical:   Vitals:   Vitals:    08/10/18 1120   BP: 105/77   Pulse: 73   Resp: 18   Temp: 97.1 °F (36.2 °C)   TempSrc: Oral   SpO2: 99%   Weight: 159 lb (72.1 kg)   Height: 5' 4\" (1.626 m)           Exam:   HEENT- atraumatic,normocephalic, awake, oriented, well nourished  Neck - supple,no enlarged lymph nodes, no JVD, no thyromegaly  Chest- CTA, no rhonchi, no crackles  Heart- rrr, no murmurs / gallop/rub  Abdomen- soft,BS+,NT, no hepatosplenomegaly  Ext - no c/c/edema   Neuro- no focal deficits. Power 5/5 all extremities  Skin - warm,dry, no obvious rashes. Review of Data:   LABS:   Lab Results   Component Value Date/Time    WBC 6.1 02/14/2018 01:30 PM    HGB 11.0 (L) 02/14/2018 01:30 PM    HCT 34.3 (L) 02/14/2018 01:30 PM    PLATELET 874 53/20/3952 01:30 PM     Lab Results   Component Value Date/Time    Sodium 136 02/19/2018 10:52 AM    Potassium 4.9 02/19/2018 10:52 AM    Chloride 95 (L) 02/19/2018 10:52 AM    CO2 22 02/19/2018 10:52 AM    Glucose 118 (H) 02/19/2018 10:52 AM    BUN 14 02/19/2018 10:52 AM    Creatinine 0.70 02/19/2018 10:52 AM     Lab Results   Component Value Date/Time    Cholesterol, total 169 02/19/2018 10:52 AM    HDL Cholesterol 31 (L) 02/19/2018 10:52 AM    LDL, calculated 78 02/19/2018 10:52 AM    Triglyceride 299 (H) 02/19/2018 10:52 AM     No results found for: GPT        Impression / Plan:        ICD-10-CM ICD-9-CM    1. Hypothyroidism, unspecified type E03.9 244.9 TSH 3RD GENERATION      AMB POC GLUCOSE BLOOD, BY GLUCOSE MONITORING DEVICE   2. Type 2 diabetes mellitus without complication, unspecified whether long term insulin use (HCC) E11.9 250.00 insulin lispro (HUMALOG KWIKPEN INSULIN) 100 unit/mL kwikpen      AMB POC GLUCOSE BLOOD, BY GLUCOSE MONITORING DEVICE     Asked pt to administer humalog tree times daily before each meal and gave sliding scale to pt to follow. Explained to patient risk benefits of the medications. Advised patient to stop meds if having any side effects. Pt verbalized understanding of the instructions. I have discussed the diagnosis with the patient and the intended plan as seen in the above orders. The patient has received an after-visit summary and questions were answered concerning future plans. I have discussed medication side effects and warnings with the patient as well. I have reviewed the plan of care with the patient, accepted their input and they are in agreement with the treatment goals. Reviewed plan of care. Patient has provided input and agrees with goals. Follow-up Disposition:  Return in about 1 month (around 9/10/2018).     Tanna Castellanos MD

## 2018-08-13 ENCOUNTER — TELEPHONE (OUTPATIENT)
Dept: FAMILY MEDICINE CLINIC | Age: 53
End: 2018-08-13

## 2018-08-13 DIAGNOSIS — E03.4 HYPOTHYROIDISM DUE TO ACQUIRED ATROPHY OF THYROID: Primary | ICD-10-CM

## 2018-08-13 RX ORDER — LEVOTHYROXINE SODIUM 100 UG/1
100 TABLET ORAL
Qty: 30 TAB | Refills: 1 | Status: SHIPPED | OUTPATIENT
Start: 2018-08-13 | End: 2018-10-08 | Stop reason: SDUPTHER

## 2018-08-14 NOTE — TELEPHONE ENCOUNTER
TSH is low. Will reduce dose of levoxyl to 100 mcg po daily, sent to pharmacy. Spoke with patient, advised patient of medication change. Pt verbalized understanding. This encounter will be closed.

## 2018-09-07 ENCOUNTER — OFFICE VISIT (OUTPATIENT)
Dept: FAMILY MEDICINE CLINIC | Age: 53
End: 2018-09-07

## 2018-09-07 VITALS
OXYGEN SATURATION: 97 % | DIASTOLIC BLOOD PRESSURE: 70 MMHG | WEIGHT: 165 LBS | HEART RATE: 70 BPM | TEMPERATURE: 97.6 F | BODY MASS INDEX: 28.17 KG/M2 | RESPIRATION RATE: 15 BRPM | SYSTOLIC BLOOD PRESSURE: 107 MMHG | HEIGHT: 64 IN

## 2018-09-07 DIAGNOSIS — E53.8 VITAMIN B 12 DEFICIENCY: ICD-10-CM

## 2018-09-07 DIAGNOSIS — E03.4 HYPOTHYROIDISM DUE TO ACQUIRED ATROPHY OF THYROID: ICD-10-CM

## 2018-09-07 DIAGNOSIS — E78.00 HYPERCHOLESTEREMIA: ICD-10-CM

## 2018-09-07 DIAGNOSIS — Z23 ENCOUNTER FOR IMMUNIZATION: ICD-10-CM

## 2018-09-07 DIAGNOSIS — E11.9 DM TYPE 2, GOAL HBA1C < 7% (HCC): Primary | ICD-10-CM

## 2018-09-07 RX ORDER — GEMFIBROZIL 600 MG/1
600 TABLET, FILM COATED ORAL 2 TIMES DAILY
Qty: 180 TAB | Refills: 1 | Status: SHIPPED | OUTPATIENT
Start: 2018-09-07 | End: 2018-10-26 | Stop reason: SDUPTHER

## 2018-09-07 RX ORDER — CYANOCOBALAMIN 1000 UG/ML
1000 INJECTION, SOLUTION INTRAMUSCULAR; SUBCUTANEOUS ONCE
Qty: 1 ML | Refills: 0
Start: 2018-09-07 | End: 2018-09-07

## 2018-09-07 NOTE — PROGRESS NOTES
Tacho Lemus is a 52 y.o.  female and presents with     Chief Complaint   Patient presents with    Diabetes    Hypothyroidism    Cholesterol Problem    Vitamin B12 Deficiency       Pt is here for follow up on labs and DM. She has been monitoring her sugars and most readings are below 200. FBG vary between 130 -180. Pt is not taking lantus as she feels she gains wt. She has taken about 4- 6 units of short acting insulin per scale only 10 times in the past month. She is taking her thyroid and chol meds. She does not want to gain wt. She silvestre have mild fatigue. Past Medical History:   Diagnosis Date    Arthritis     Chronic pain     Depression     Diabetes ([address]     Hyperlipidemia     Hypothyroid     Medial meniscus tear     Left knee    Psychiatric disorder     Tobacco abuse, in remission      Past Surgical History:   Procedure Laterality Date    HX ORTHOPAEDIC      Hand reconstructive surgery    HX TUBAL LIGATION  2004     Current Outpatient Prescriptions   Medication Sig    cyanocobalamin (VITAMIN B-12) 1,000 mcg/mL injection 1 mL by IntraMUSCular route once for 1 dose.  gemfibrozil (LOPID) 600 mg tablet Take 1 Tab by mouth two (2) times a day.  levothyroxine (SYNTHROID) 100 mcg tablet Take 1 Tab by mouth Daily (before breakfast).  insulin lispro (HUMALOG KWIKPEN INSULIN) 100 unit/mL kwikpen Administer  6- 8 units three times daily before each meal per sliding scale    oxybutynin chloride XL (DITROPAN XL) 10 mg CR tablet Take 1 Tab by mouth daily.  rosuvastatin (CRESTOR) 40 mg tablet TAKE ONE TABLET BY MOUTH NIGHTLY    SURE COMFORT PEN NEEDLE 31 gauge x 3/16\" ndle USE FOR DIABETIC TESTING    SITagliptin-metFORMIN (JANUMET) 50-1,000 mg per tablet Take 1 Tab by mouth two (2) times daily (with meals).  acetaminophen-codeine (TYLENOL-CODEINE #3) 300-30 mg per tablet Take 1 Tab by mouth every six (6) hours as needed for Pain. Max Daily Amount: 4 Tabs.     ibuprofen (MOTRIN) 600 mg tablet  TAKE 1 TABLET BY MOUTH EVERY 8 HOURS AS NEEDED FOR PAIN    ACCU-CHEK FASTCLIX kit use as directed to TEST three times a day    glucose blood VI test strips (ACCU-CHEK SMARTVIEW TEST STRIP) strip dm    mupirocin (BACTROBAN) 2 % ointment Apply  to affected area daily.  hydrocortisone (CORTAID) 1 % topical cream Apply  to affected area two (2) times a day. use thin layer    salicylic acid 17 % topical gel Apply  to affected area daily. Apply dime sized application to the callus on the left foot once daily    fluticasone (FLONASE) 50 mcg/actuation nasal spray 2 Sprays by Both Nostrils route daily.  LORazepam (ATIVAN) 1 mg tablet Take 1 mg by mouth every four (4) hours as needed for Anxiety.  albuterol (PROVENTIL HFA, VENTOLIN HFA, PROAIR HFA) 90 mcg/actuation inhaler Take 2 Puffs by inhalation every six (6) hours as needed for Wheezing.  ferrous sulfate 325 mg (65 mg iron) tablet Take 1 Tab by mouth two (2) times a day.  benztropine (COGENTIN) 0.5 mg tablet Take 0.5 mg by mouth two (2) times a day.  trifluoperazine (STELAZINE) 5 mg tablet Take 5 mg by mouth two (2) times a day.  sertraline (ZOLOFT) 100 mg tablet Take 150 mg by mouth daily. No current facility-administered medications for this visit.       Health Maintenance   Topic Date Due    FOOT EXAM Q1  04/22/2017    EYE EXAM RETINAL OR DILATED Q1  05/19/2017    Influenza Age 5 to Adult  08/01/2018    DTaP/Tdap/Td series (2 - Td) 10/16/2018    HEMOGLOBIN A1C Q6M  01/02/2019    MICROALBUMIN Q1  02/19/2019    LIPID PANEL Q1  02/19/2019    PAP AKA CERVICAL CYTOLOGY  04/26/2019    MEDICARE YEARLY EXAM  05/12/2019    FOBT Q 1 YEAR AGE 50-75  05/13/2019    BREAST CANCER SCRN MAMMOGRAM  06/04/2020    Hepatitis C Screening  Completed    Pneumococcal 19-64 Medium Risk  Completed     Immunization History   Administered Date(s) Administered    Influenza Vaccine 10/21/2013    Influenza Vaccine (Quad) 09/08/2015    Influenza Vaccine (Quad) PF 10/12/2016, 09/18/2017, 09/07/2018     Patient's last menstrual period was 06/05/2014. Allergies and Intolerances: Allergies   Allergen Reactions    Lipitor [Atorvastatin] Other (comments)     myalgias    Lisinopril Cough    Aspirin Other (comments)     Nose bleeds    Zocor [Simvastatin] Other (comments)     Causes pain in the left leg       Family History:   Family History   Problem Relation Age of Onset    Alcohol abuse Mother    Delta Gin Mother    Megan Gaucher Mother 61     breast cancer    Diabetes Mother     Hypertension Mother     Psychiatric Disorder Mother     Stroke Mother     Breast Cancer Mother     Alcohol abuse Father     Heart Disease Father     Diabetes Father     Hypertension Father     Lung Disease Father     Colon Cancer Father 72    Alcohol abuse Sister     Diabetes Sister     Psychiatric Disorder Sister     Alcohol abuse Brother     Alcohol abuse Paternal Grandmother     Psychiatric Disorder Son     Psychiatric Disorder       nephew    Ovarian Cancer Sister        Social History:   She  reports that she quit smoking about 6 years ago. Her smoking use included Cigarettes. She has a 35.00 pack-year smoking history. She has never used smokeless tobacco.  She  reports that she does not drink alcohol.             Review of Systems: pos for mild fatigue  General: negative for - chills, fatigue, fever, weight change  Psych: negative for - anxiety, depression, irritability or mood swings  ENT: negative for - headaches, hearing change, nasal congestion, oral lesions, sneezing or sore throat  Heme/ Lymph: negative for - bleeding problems, bruising, pallor or swollen lymph nodes  Endo: negative for - hot flashes, polydipsia/polyuria or temperature intolerance  Resp: negative for - cough, shortness of breath or wheezing  CV: negative for - chest pain, edema or palpitations  GI: negative for - abdominal pain, change in bowel habits, constipation, diarrhea or nausea/vomiting  : negative for - dysuria, hematuria, incontinence, pelvic pain or vulvar/vaginal symptoms  MSK: negative for - joint pain, joint swelling or muscle pain  Neuro: negative for - confusion, headaches, seizures or weakness  Derm: negative for - dry skin, hair changes, rash or skin lesion changes          Physical:   Vitals:   Vitals:    09/07/18 1105   BP: 107/70   Pulse: 70   Resp: 15   Temp: 97.6 °F (36.4 °C)   TempSrc: Oral   SpO2: 97%   Weight: 165 lb (74.8 kg)   Height: 5' 4\" (1.626 m)           Exam:   HEENT- atraumatic,normocephalic, awake, oriented, well nourished  Neck - supple,no enlarged lymph nodes, no JVD, no thyromegaly  Chest- CTA, no rhonchi, no crackles  Heart- rrr, no murmurs / gallop/rub  Abdomen- soft,BS+,NT, no hepatosplenomegaly  Ext - no c/c/edema   Neuro- no focal deficits. Power 5/5 all extremities  Skin - warm,dry, no obvious rashes.           Review of Data:   LABS:   Lab Results   Component Value Date/Time    WBC 6.1 02/14/2018 01:30 PM    HGB 11.0 (L) 02/14/2018 01:30 PM    HCT 34.3 (L) 02/14/2018 01:30 PM    PLATELET 219 96/85/7383 01:30 PM     Lab Results   Component Value Date/Time    Sodium 136 02/19/2018 10:52 AM    Potassium 4.9 02/19/2018 10:52 AM    Chloride 95 (L) 02/19/2018 10:52 AM    CO2 22 02/19/2018 10:52 AM    Glucose 118 (H) 02/19/2018 10:52 AM    BUN 14 02/19/2018 10:52 AM    Creatinine 0.70 02/19/2018 10:52 AM     Lab Results   Component Value Date/Time    Cholesterol, total 169 02/19/2018 10:52 AM    HDL Cholesterol 31 (L) 02/19/2018 10:52 AM    LDL, calculated 78 02/19/2018 10:52 AM    Triglyceride 299 (H) 02/19/2018 10:52 AM     No results found for: GPT        Impression / Plan:        ICD-10-CM ICD-9-CM    1. DM type 2, goal HbA1c < 7% ([facility]) E11.9 250.00 CBC WITH AUTOMATED DIFF      METABOLIC PANEL, COMPREHENSIVE      LIPID PANEL   2. Vitamin B 12 deficiency E53.8 266.2 VITAMIN B12 INJECTION      THER/PROPH/DIAG INJECTION, SUBCUT/IM      cyanocobalamin (VITAMIN B-12) 1,000 mcg/mL injection   3. Hypothyroidism due to acquired atrophy of thyroid E03.4 244.8 TSH 3RD GENERATION     246.8 T3, FREE      T4, FREE   4. Hypercholesteremia E78.00 272.0 gemfibrozil (LOPID) 600 mg tablet   5. Encounter for immunization Z23 V03.89 INFLUENZA VIRUS VAC QUAD,SPLIT,PRESV FREE SYRINGE IM      ADMIN INFLUENZA VIRUS VAC         Take lantus 10 units daily      Hypothyroidism/ hyperchol - stable          Explained to patient risk benefits of the medications. Advised patient to stop meds if having any side effects. Pt verbalized understanding of the instructions. I have discussed the diagnosis with the patient and the intended plan as seen in the above orders. The patient has received an after-visit summary and questions were answered concerning future plans. I have discussed medication side effects and warnings with the patient as well. I have reviewed the plan of care with the patient, accepted their input and they are in agreement with the treatment goals. Reviewed plan of care. Patient has provided input and agrees with goals. Follow-up Disposition:  Return in about 7 weeks (around 10/26/2018).     Monie Nelson MD

## 2018-10-03 ENCOUNTER — TELEPHONE (OUTPATIENT)
Dept: FAMILY MEDICINE CLINIC | Age: 53
End: 2018-10-03

## 2018-10-03 NOTE — TELEPHONE ENCOUNTER
Cherelle Phillips from [facility] is checking to see if Medical Record of Clarification Request was received it was faxed on 09/19/18.  Please assist.

## 2018-10-04 DIAGNOSIS — E11.9 TYPE 2 DIABETES MELLITUS WITHOUT COMPLICATION (HCC): ICD-10-CM

## 2018-10-05 RX ORDER — SITAGLIPTIN AND METFORMIN HYDROCHLORIDE 50; 1000 MG/1; MG/1
TABLET, FILM COATED ORAL
Qty: 180 TAB | Refills: 0 | Status: SHIPPED | OUTPATIENT
Start: 2018-10-05 | End: 2018-10-26 | Stop reason: SDUPTHER

## 2018-10-08 DIAGNOSIS — E03.4 HYPOTHYROIDISM DUE TO ACQUIRED ATROPHY OF THYROID: ICD-10-CM

## 2018-10-08 RX ORDER — LEVOTHYROXINE SODIUM 100 UG/1
TABLET ORAL
Qty: 30 TAB | Refills: 3 | Status: SHIPPED | OUTPATIENT
Start: 2018-10-08 | End: 2018-10-22 | Stop reason: DRUGHIGH

## 2018-10-22 ENCOUNTER — TELEPHONE (OUTPATIENT)
Dept: FAMILY MEDICINE CLINIC | Age: 53
End: 2018-10-22

## 2018-10-22 ENCOUNTER — HOSPITAL ENCOUNTER (OUTPATIENT)
Dept: LAB | Age: 53
Discharge: HOME | End: 2018-10-22
Payer: MEDICARE

## 2018-10-22 DIAGNOSIS — E11.9 DM TYPE 2, GOAL HBA1C < 7% (HCC): ICD-10-CM

## 2018-10-22 DIAGNOSIS — E03.4 HYPOTHYROIDISM DUE TO ACQUIRED ATROPHY OF THYROID: ICD-10-CM

## 2018-10-22 LAB
ALBUMIN SERPL-MCNC: 4.5 G/DL (ref 3.4–5)
ALBUMIN/GLOB SERPL: 1.3 {RATIO} (ref 0.8–1.7)
ALP SERPL-CCNC: 90 U/L (ref 45–117)
ALT SERPL-CCNC: 18 U/L (ref 13–56)
ANION GAP SERPL CALC-SCNC: 6 MMOL/L (ref 3–18)
AST SERPL-CCNC: 16 U/L (ref 15–37)
BASOPHILS # BLD: 0 K/UL (ref 0–0.1)
BASOPHILS NFR BLD: 0 % (ref 0–2)
BILIRUB SERPL-MCNC: 0.4 MG/DL (ref 0.2–1)
BUN SERPL-MCNC: 13 MG/DL (ref 7–18)
BUN/CREAT SERPL: 13 (ref 12–20)
CALCIUM SERPL-MCNC: 9.5 MG/DL (ref 8.5–10.1)
CHLORIDE SERPL-SCNC: 104 MMOL/L (ref 100–108)
CHOLEST SERPL-MCNC: 155 MG/DL
CO2 SERPL-SCNC: 26 MMOL/L (ref 21–32)
CREAT SERPL-MCNC: 0.97 MG/DL (ref 0.6–1.3)
DIFFERENTIAL METHOD BLD: ABNORMAL
EOSINOPHIL # BLD: 0.1 K/UL (ref 0–0.4)
EOSINOPHIL NFR BLD: 1 % (ref 0–5)
ERYTHROCYTE [DISTWIDTH] IN BLOOD BY AUTOMATED COUNT: 15 % (ref 11.6–14.5)
GLOBULIN SER CALC-MCNC: 3.6 G/DL (ref 2–4)
GLUCOSE SERPL-MCNC: 131 MG/DL (ref 74–99)
HCT VFR BLD AUTO: 35.7 % (ref 35–45)
HDLC SERPL-MCNC: 45 MG/DL (ref 40–60)
HDLC SERPL: 3.4 {RATIO} (ref 0–5)
HGB BLD-MCNC: 11.4 G/DL (ref 12–16)
LDLC SERPL CALC-MCNC: 83.4 MG/DL (ref 0–100)
LIPID PROFILE,FLP: NORMAL
LYMPHOCYTES # BLD: 3.2 K/UL (ref 0.9–3.6)
LYMPHOCYTES NFR BLD: 47 % (ref 21–52)
MCH RBC QN AUTO: 26.6 PG (ref 24–34)
MCHC RBC AUTO-ENTMCNC: 31.9 G/DL (ref 31–37)
MCV RBC AUTO: 83.2 FL (ref 74–97)
MONOCYTES # BLD: 0.6 K/UL (ref 0.05–1.2)
MONOCYTES NFR BLD: 8 % (ref 3–10)
NEUTS SEG # BLD: 3 K/UL (ref 1.8–8)
NEUTS SEG NFR BLD: 44 % (ref 40–73)
PLATELET # BLD AUTO: 278 K/UL (ref 135–420)
PMV BLD AUTO: 10.6 FL (ref 9.2–11.8)
POTASSIUM SERPL-SCNC: 5 MMOL/L (ref 3.5–5.5)
PROT SERPL-MCNC: 8.1 G/DL (ref 6.4–8.2)
RBC # BLD AUTO: 4.29 M/UL (ref 4.2–5.3)
SODIUM SERPL-SCNC: 136 MMOL/L (ref 136–145)
T3FREE SERPL-MCNC: 1.3 PG/ML (ref 2.18–3.98)
T4 FREE SERPL-MCNC: 0.6 NG/DL (ref 0.7–1.5)
TRIGL SERPL-MCNC: 133 MG/DL (ref ?–150)
TSH SERPL DL<=0.05 MIU/L-ACNC: 30 UIU/ML (ref 0.36–3.74)
VLDLC SERPL CALC-MCNC: 26.6 MG/DL
WBC # BLD AUTO: 6.8 K/UL (ref 4.6–13.2)

## 2018-10-22 PROCEDURE — 80061 LIPID PANEL: CPT | Performed by: INTERNAL MEDICINE

## 2018-10-22 PROCEDURE — 84443 ASSAY THYROID STIM HORMONE: CPT | Performed by: INTERNAL MEDICINE

## 2018-10-22 PROCEDURE — 84481 FREE ASSAY (FT-3): CPT | Performed by: INTERNAL MEDICINE

## 2018-10-22 PROCEDURE — 84439 ASSAY OF FREE THYROXINE: CPT | Performed by: INTERNAL MEDICINE

## 2018-10-22 PROCEDURE — 80053 COMPREHEN METABOLIC PANEL: CPT | Performed by: INTERNAL MEDICINE

## 2018-10-22 PROCEDURE — 85025 COMPLETE CBC W/AUTO DIFF WBC: CPT | Performed by: INTERNAL MEDICINE

## 2018-10-22 PROCEDURE — 36415 COLL VENOUS BLD VENIPUNCTURE: CPT | Performed by: INTERNAL MEDICINE

## 2018-10-22 RX ORDER — LEVOTHYROXINE SODIUM 137 UG/1
137 TABLET ORAL
Qty: 30 TAB | Refills: 3 | Status: SHIPPED | OUTPATIENT
Start: 2018-10-22 | End: 2018-12-06 | Stop reason: SDUPTHER

## 2018-10-26 ENCOUNTER — OFFICE VISIT (OUTPATIENT)
Dept: FAMILY MEDICINE CLINIC | Age: 53
End: 2018-10-26

## 2018-10-26 VITALS
BODY MASS INDEX: 28.85 KG/M2 | RESPIRATION RATE: 16 BRPM | SYSTOLIC BLOOD PRESSURE: 100 MMHG | TEMPERATURE: 97.7 F | DIASTOLIC BLOOD PRESSURE: 67 MMHG | HEIGHT: 64 IN | WEIGHT: 169 LBS | OXYGEN SATURATION: 100 % | HEART RATE: 65 BPM

## 2018-10-26 DIAGNOSIS — E03.9 HYPOTHYROIDISM, UNSPECIFIED TYPE: Primary | ICD-10-CM

## 2018-10-26 DIAGNOSIS — N39.3 STRESS INCONTINENCE OF URINE: ICD-10-CM

## 2018-10-26 DIAGNOSIS — E53.8 B12 DEFICIENCY: ICD-10-CM

## 2018-10-26 DIAGNOSIS — E78.00 HYPERCHOLESTEREMIA: ICD-10-CM

## 2018-10-26 DIAGNOSIS — E11.9 TYPE 2 DIABETES MELLITUS WITHOUT COMPLICATION (HCC): ICD-10-CM

## 2018-10-26 RX ORDER — CYANOCOBALAMIN 1000 UG/ML
1000 INJECTION, SOLUTION INTRAMUSCULAR; SUBCUTANEOUS ONCE
Qty: 1 ML | Refills: 0
Start: 2018-10-26 | End: 2018-10-26

## 2018-10-26 RX ORDER — ROSUVASTATIN CALCIUM 40 MG/1
40 TABLET, COATED ORAL
Qty: 90 TAB | Refills: 1 | Status: SHIPPED | OUTPATIENT
Start: 2018-10-26 | End: 2019-07-01 | Stop reason: SDUPTHER

## 2018-10-26 RX ORDER — OXYBUTYNIN CHLORIDE 10 MG/1
10 TABLET, EXTENDED RELEASE ORAL DAILY
Qty: 90 TAB | Refills: 1 | Status: SHIPPED | OUTPATIENT
Start: 2018-10-26 | End: 2019-06-11 | Stop reason: SDUPTHER

## 2018-10-26 RX ORDER — GEMFIBROZIL 600 MG/1
600 TABLET, FILM COATED ORAL 2 TIMES DAILY
Qty: 180 TAB | Refills: 1 | Status: SHIPPED | OUTPATIENT
Start: 2018-10-26 | End: 2018-12-26 | Stop reason: SDUPTHER

## 2018-10-26 NOTE — PROGRESS NOTES
Denver Tarango is a 52 y.o.  female and presents with     Chief Complaint   Patient presents with    Diabetes    Hypothyroidism    Cholesterol Problem    Vitamin B12 Deficiency       Pt says she feels fine today,  Pt has vitamin b12 def and needs B12 injection. She has been monitoring her blood sugars and they have been mostly below 200. She is taking meds for hyperchol and hypothyroidsim. She has gained some wt. She had lost some when she was watching her diet. Past Medical History:   Diagnosis Date    Arthritis     Chronic pain     Depression     Diabetes ([address]     Hyperlipidemia     Hypothyroid     Medial meniscus tear     Left knee    Psychiatric disorder     Tobacco abuse, in remission      Past Surgical History:   Procedure Laterality Date    HX ORTHOPAEDIC      Hand reconstructive surgery    HX TUBAL LIGATION  2004     Current Outpatient Medications   Medication Sig    SITagliptin-metFORMIN (JANUMET) 50-1,000 mg per tablet Take 1 Tab by mouth daily (with breakfast).  gemfibrozil (LOPID) 600 mg tablet Take 1 Tab by mouth two (2) times a day.  oxybutynin chloride XL (DITROPAN XL) 10 mg CR tablet Take 1 Tab by mouth daily.  rosuvastatin (CRESTOR) 40 mg tablet Take 1 Tab by mouth nightly.  cyanocobalamin (VITAMIN B-12) 1,000 mcg/mL injection 1 mL by IntraMUSCular route once for 1 dose.  levothyroxine (SYNTHROID) 137 mcg tablet Take 137 mcg by mouth Daily (before breakfast).  acetaminophen-codeine (TYLENOL-CODEINE #3) 300-30 mg per tablet Take 1 Tab by mouth every six (6) hours as needed for Pain. Max Daily Amount: 4 Tabs.  ibuprofen (MOTRIN) 600 mg tablet  TAKE 1 TABLET BY MOUTH EVERY 8 HOURS AS NEEDED FOR PAIN    ACCU-CHEK FASTCLIX kit use as directed to TEST three times a day    glucose blood VI test strips (ACCU-CHEK SMARTVIEW TEST STRIP) strip dm    mupirocin (BACTROBAN) 2 % ointment Apply  to affected area daily.     hydrocortisone (CORTAID) 1 % topical cream Apply to affected area two (2) times a day. use thin layer    fluticasone (FLONASE) 50 mcg/actuation nasal spray 2 Sprays by Both Nostrils route daily.  ferrous sulfate 325 mg (65 mg iron) tablet Take 1 Tab by mouth two (2) times a day.  benztropine (COGENTIN) 0.5 mg tablet Take 0.5 mg by mouth two (2) times a day.  trifluoperazine (STELAZINE) 5 mg tablet Take 20 mg by mouth two (2) times a day.  sertraline (ZOLOFT) 100 mg tablet Take 200 mg by mouth daily.  insulin lispro (HUMALOG KWIKPEN INSULIN) 100 unit/mL kwikpen Administer  6- 8 units three times daily before each meal per sliding scale    SURE COMFORT PEN NEEDLE 31 gauge x 3/16\" ndle USE FOR DIABETIC TESTING    salicylic acid 17 % topical gel Apply  to affected area daily. Apply dime sized application to the callus on the left foot once daily    LORazepam (ATIVAN) 1 mg tablet Take 1 mg by mouth every four (4) hours as needed for Anxiety.  albuterol (PROVENTIL HFA, VENTOLIN HFA, PROAIR HFA) 90 mcg/actuation inhaler Take 2 Puffs by inhalation every six (6) hours as needed for Wheezing. No current facility-administered medications for this visit.       Health Maintenance   Topic Date Due    Shingrix Vaccine Age 49> (1 of 2) 04/01/2013    EYE EXAM RETINAL OR DILATED Q1  05/19/2017    FOOT EXAM Q1  04/18/2018    DTaP/Tdap/Td series (2 - Td) 10/16/2018    HEMOGLOBIN A1C Q6M  01/02/2019    MICROALBUMIN Q1  02/19/2019    PAP AKA CERVICAL CYTOLOGY  04/26/2019    MEDICARE YEARLY EXAM  05/12/2019    FOBT Q 1 YEAR AGE 50-75  05/13/2019    LIPID PANEL Q1  10/22/2019    BREAST CANCER SCRN MAMMOGRAM  06/04/2020    Hepatitis C Screening  Completed    Pneumococcal 19-64 Medium Risk  Completed    Influenza Age 5 to Adult  Completed     Immunization History   Administered Date(s) Administered    Influenza Vaccine 10/21/2013    Influenza Vaccine (Quad) 09/08/2015    Influenza Vaccine (Quad) PF 10/12/2016, 09/18/2017, 09/07/2018     Patient's last menstrual period was 06/05/2014. Allergies and Intolerances: Allergies   Allergen Reactions    Lipitor [Atorvastatin] Other (comments)     myalgias    Lisinopril Cough    Aspirin Other (comments)     Nose bleeds    Zocor [Simvastatin] Other (comments)     Causes pain in the left leg       Family History:   Family History   Problem Relation Age of Onset    Alcohol abuse Mother    Jez Gan Mother    Matilde Tejada Mother 61        breast cancer    Diabetes Mother     Hypertension Mother     Psychiatric Disorder Mother     Stroke Mother     Breast Cancer Mother     Alcohol abuse Father     Heart Disease Father     Diabetes Father     Hypertension Father     Lung Disease Father     Colon Cancer Father 72    Alcohol abuse Paternal Grandmother     Ovarian Cancer Sister     Alcohol abuse Sister     Diabetes Sister     Psychiatric Disorder Sister     Alcohol abuse Brother     Psychiatric Disorder Son     Psychiatric Disorder Other         nephew       Social History:   She  reports that she quit smoking about 6 years ago. Her smoking use included cigarettes. She has a 35.00 pack-year smoking history. she has never used smokeless tobacco.  She  reports that she does not drink alcohol.             Review of Systems:   General: negative for - chills, fatigue, fever, weight change  Psych: negative for - anxiety, depression, irritability or mood swings  ENT: negative for - headaches, hearing change, nasal congestion, oral lesions, sneezing or sore throat  Heme/ Lymph: negative for - bleeding problems, bruising, pallor or swollen lymph nodes  Endo: negative for - hot flashes, polydipsia/polyuria or temperature intolerance  Resp: negative for - cough, shortness of breath or wheezing  CV: negative for - chest pain, edema or palpitations  GI: negative for - abdominal pain, change in bowel habits, constipation, diarrhea or nausea/vomiting  : negative for - dysuria, hematuria, incontinence, pelvic pain or vulvar/vaginal symptoms  MSK: negative for - joint pain, joint swelling or muscle pain  Neuro: negative for - confusion, headaches, seizures or weakness  Derm: negative for - dry skin, hair changes, rash or skin lesion changes          Physical:   Vitals:   Vitals:    10/26/18 0845   BP: 100/67   Pulse: 65   Resp: 16   Temp: 97.7 °F (36.5 °C)   SpO2: 100%   Weight: 169 lb (76.7 kg)   Height: 5' 4\" (1.626 m)           Exam:   HEENT- atraumatic,normocephalic, awake, oriented, well nourished  Neck - supple,no enlarged lymph nodes, no JVD, no thyromegaly  Chest- CTA, no rhonchi, no crackles  Heart- rrr, no murmurs / gallop/rub  Abdomen- soft,BS+,NT, no hepatosplenomegaly  Ext - no c/c/edema   Neuro- no focal deficits. Power 5/5 all extremities  Skin - warm,dry, no obvious rashes. Review of Data:   LABS:   Lab Results   Component Value Date/Time    WBC 6.8 10/22/2018 11:00 AM    HGB 11.4 (L) 10/22/2018 11:00 AM    HCT 35.7 10/22/2018 11:00 AM    PLATELET 530 48/87/9946 11:00 AM     Lab Results   Component Value Date/Time    Sodium 136 10/22/2018 11:00 AM    Potassium 5.0 10/22/2018 11:00 AM    Chloride 104 10/22/2018 11:00 AM    CO2 26 10/22/2018 11:00 AM    Glucose 131 (H) 10/22/2018 11:00 AM    BUN 13 10/22/2018 11:00 AM    Creatinine 0.97 10/22/2018 11:00 AM     Lab Results   Component Value Date/Time    Cholesterol, total 155 10/22/2018 11:00 AM    HDL Cholesterol 45 10/22/2018 11:00 AM    LDL, calculated 83.4 10/22/2018 11:00 AM    Triglyceride 133 10/22/2018 11:00 AM     No results found for: GPT        Impression / Plan:        ICD-10-CM ICD-9-CM    1. Hypothyroidism, unspecified type E03.9 244.9 TSH 3RD GENERATION   2. Type 2 diabetes mellitus without complication (HCC) Q93.5 250.00 SITagliptin-metFORMIN (JANUMET) 50-1,000 mg per tablet   3. Hypercholesteremia E78.00 272.0 gemfibrozil (LOPID) 600 mg tablet      rosuvastatin (CRESTOR) 40 mg tablet   4.  Stress incontinence of urine N39.3 [id number] oxybutynin chloride XL (DITROPAN XL) 10 mg CR tablet   5. B12 deficiency E53.8 266.2 VITAMIN B12 INJECTION      THER/PROPH/DIAG INJECTION, SUBCUT/IM      cyanocobalamin (VITAMIN B-12) 1,000 mcg/mL injection     DM/hypothyroidsim/ hyperchol - stable      Explained to patient risk benefits of the medications. Advised patient to stop meds if having any side effects. Pt verbalized understanding of the instructions. I have discussed the diagnosis with the patient and the intended plan as seen in the above orders. The patient has received an after-visit summary and questions were answered concerning future plans. I have discussed medication side effects and warnings with the patient as well. I have reviewed the plan of care with the patient, accepted their input and they are in agreement with the treatment goals. Reviewed plan of care. Patient has provided input and agrees with goals.     Follow-up Disposition: Not on Raf Sharp MD

## 2018-10-26 NOTE — PROGRESS NOTES
1. Have you been to the ER, urgent care clinic since your last visit? Hospitalized since your last visit? No    2. Have you seen or consulted any other health care providers outside of the [address] [place] [address] since your last visit? Include any pap smears or colon screening.  No

## 2018-11-26 ENCOUNTER — CLINICAL SUPPORT (OUTPATIENT)
Dept: FAMILY MEDICINE CLINIC | Age: 53
End: 2018-11-26

## 2018-11-26 VITALS
BODY MASS INDEX: 28.68 KG/M2 | HEART RATE: 68 BPM | WEIGHT: 168 LBS | SYSTOLIC BLOOD PRESSURE: 100 MMHG | OXYGEN SATURATION: 97 % | DIASTOLIC BLOOD PRESSURE: 69 MMHG | HEIGHT: 64 IN | TEMPERATURE: 98.2 F | RESPIRATION RATE: 16 BRPM

## 2018-11-26 DIAGNOSIS — E53.8 B12 DEFICIENCY: Primary | ICD-10-CM

## 2018-11-26 RX ORDER — CYANOCOBALAMIN 1000 UG/ML
1000 INJECTION, SOLUTION INTRAMUSCULAR; SUBCUTANEOUS ONCE
Qty: 1 ML | Refills: 0
Start: 2018-11-26 | End: 2018-11-26

## 2018-11-26 NOTE — PROGRESS NOTES
1. Have you been to the ER, urgent care clinic since your last visit? Hospitalized since your last visit? No    2. Have you seen or consulted any other health care providers outside of the [address] [place] [address] since your last visit? Include any pap smears or colon screening. No      Per verbal order from Dr. Maykel Santiago- injection of b12 administered. Patient instructed to remain in clinic for 15 minutes and to report any adverse reactions immediately.

## 2018-12-06 NOTE — TELEPHONE ENCOUNTER
Requested Prescriptions     Pending Prescriptions Disp Refills    levothyroxine (SYNTHROID) 137 mcg tablet 30 Tab 3     Sig: Take 137 mcg by mouth Daily (before breakfast). Please advise, thank you.

## 2018-12-07 RX ORDER — LEVOTHYROXINE SODIUM 137 UG/1
137 TABLET ORAL
Qty: 30 TAB | Refills: 3 | Status: SHIPPED | OUTPATIENT
Start: 2018-12-07 | End: 2019-03-01

## 2018-12-21 ENCOUNTER — CLINICAL SUPPORT (OUTPATIENT)
Dept: FAMILY MEDICINE CLINIC | Age: 53
End: 2018-12-21

## 2018-12-21 ENCOUNTER — HOSPITAL ENCOUNTER (OUTPATIENT)
Dept: LAB | Age: 53
Discharge: HOME | End: 2018-12-21
Payer: MEDICARE

## 2018-12-21 VITALS
SYSTOLIC BLOOD PRESSURE: 117 MMHG | DIASTOLIC BLOOD PRESSURE: 75 MMHG | BODY MASS INDEX: 28.68 KG/M2 | TEMPERATURE: 98 F | OXYGEN SATURATION: 97 % | HEIGHT: 64 IN | WEIGHT: 168 LBS | HEART RATE: 100 BPM | RESPIRATION RATE: 20 BRPM

## 2018-12-21 DIAGNOSIS — E03.9 HYPOTHYROIDISM, UNSPECIFIED TYPE: ICD-10-CM

## 2018-12-21 DIAGNOSIS — E53.8 VITAMIN B12 DEFICIENCY: Primary | ICD-10-CM

## 2018-12-21 LAB — TSH SERPL DL<=0.05 MIU/L-ACNC: 4.3 UIU/ML (ref 0.36–3.74)

## 2018-12-21 PROCEDURE — 84443 ASSAY THYROID STIM HORMONE: CPT

## 2018-12-21 PROCEDURE — 36415 COLL VENOUS BLD VENIPUNCTURE: CPT

## 2018-12-21 RX ORDER — CYANOCOBALAMIN 1000 UG/ML
1000 INJECTION, SOLUTION INTRAMUSCULAR; SUBCUTANEOUS ONCE
Qty: 1 ML | Refills: 0
Start: 2018-12-21 | End: 2018-12-21

## 2018-12-21 NOTE — PROGRESS NOTES
Room #      SUBJECTIVE:    Melissa Rice is a 52 y.o. female who presents today for B-12 shot    1. Have you been to the ER, urgent care clinic since your last visit? Hospitalized since your last visit? NO    2. Have you seen or consulted any other health care providers outside of the [address] [place] [address] since your last visit? Include any pap smears or colon screening. NO  When :  Reason:    Health Maintenance reviewed Yes    Health Maintenance Due   Topic Date Due    Shingrix Vaccine Age 50> (1 of 2) 04/01/2013    FOOT EXAM Q1  04/18/2018    DTaP/Tdap/Td series (2 - Td) 10/16/2018    HEMOGLOBIN A1C Q6M  01/02/2019

## 2018-12-26 DIAGNOSIS — E11.9 TYPE 2 DIABETES MELLITUS WITHOUT COMPLICATION, WITH LONG-TERM CURRENT USE OF INSULIN (HCC): ICD-10-CM

## 2018-12-26 DIAGNOSIS — Z79.4 TYPE 2 DIABETES MELLITUS WITHOUT COMPLICATION, WITH LONG-TERM CURRENT USE OF INSULIN (HCC): ICD-10-CM

## 2018-12-26 DIAGNOSIS — E78.00 HYPERCHOLESTEREMIA: ICD-10-CM

## 2018-12-28 NOTE — TELEPHONE ENCOUNTER
Requested Prescriptions     Pending Prescriptions Disp Refills    gemfibrozil (LOPID) 600 mg tablet 180 Tab 1     Sig: Take 1 Tab by mouth two (2) times a day.     glucose blood VI test strips (ACCU-CHEK SMARTVIEW TEST STRIP) strip 100 Strip 6     Sig: dm

## 2018-12-30 RX ORDER — GEMFIBROZIL 600 MG/1
600 TABLET, FILM COATED ORAL 2 TIMES DAILY
Qty: 180 TAB | Refills: 1 | Status: SHIPPED | OUTPATIENT
Start: 2018-12-30 | End: 2019-03-01 | Stop reason: SDUPTHER

## 2018-12-31 ENCOUNTER — TELEPHONE (OUTPATIENT)
Dept: FAMILY MEDICINE CLINIC | Age: 53
End: 2018-12-31

## 2018-12-31 NOTE — TELEPHONE ENCOUNTER
Pharmacy called, they need clarification on medication: glucose blood vi test strips. Please advise, thank you.

## 2019-01-03 NOTE — TELEPHONE ENCOUNTER
Patient called in to advise the wrong test strips were forwarded to her rx.   Please forward order for One Touch Ultra instead

## 2019-01-04 NOTE — TELEPHONE ENCOUNTER
Requested Prescriptions     Pending Prescriptions Disp Refills    glucose blood VI test strips (ONETOUCH ULTRA BLUE TEST STRIP) strip       Sig: by Does Not Apply route See Admin Instructions. Patient checks 3 x daily.

## 2019-01-15 DIAGNOSIS — E11.9 TYPE 2 DIABETES MELLITUS WITHOUT COMPLICATION (HCC): ICD-10-CM

## 2019-01-15 RX ORDER — SITAGLIPTIN AND METFORMIN HYDROCHLORIDE 50; 1000 MG/1; MG/1
TABLET, FILM COATED ORAL
Qty: 180 TAB | Refills: 0 | Status: SHIPPED | OUTPATIENT
Start: 2019-01-15 | End: 2019-05-01 | Stop reason: SDUPTHER

## 2019-01-26 ENCOUNTER — HOSPITAL ENCOUNTER (EMERGENCY)
Age: 54
Discharge: HOME | End: 2019-01-26
Attending: EMERGENCY MEDICINE
Payer: MEDICARE

## 2019-01-26 VITALS
OXYGEN SATURATION: 95 % | RESPIRATION RATE: 16 BRPM | HEIGHT: 64 IN | TEMPERATURE: 97.6 F | SYSTOLIC BLOOD PRESSURE: 106 MMHG | WEIGHT: 169 LBS | HEART RATE: 76 BPM | DIASTOLIC BLOOD PRESSURE: 77 MMHG | BODY MASS INDEX: 28.85 KG/M2

## 2019-01-26 DIAGNOSIS — E11.9 TYPE 2 DIABETES MELLITUS WITHOUT COMPLICATION, UNSPECIFIED WHETHER LONG TERM INSULIN USE (HCC): ICD-10-CM

## 2019-01-26 DIAGNOSIS — R73.9 HYPERGLYCEMIA: Primary | ICD-10-CM

## 2019-01-26 LAB
ALBUMIN SERPL-MCNC: 4.4 G/DL (ref 3.4–5)
ALBUMIN/GLOB SERPL: 1.1 {RATIO} (ref 0.8–1.7)
ALP SERPL-CCNC: 152 U/L (ref 45–117)
ALT SERPL-CCNC: 19 U/L (ref 13–56)
ANION GAP SERPL CALC-SCNC: 9 MMOL/L (ref 3–18)
APPEARANCE UR: CLEAR
AST SERPL-CCNC: 7 U/L (ref 15–37)
BACTERIA URNS QL MICRO: ABNORMAL /HPF
BASOPHILS # BLD: 0 K/UL (ref 0–0.1)
BASOPHILS NFR BLD: 1 % (ref 0–2)
BILIRUB SERPL-MCNC: 0.2 MG/DL (ref 0.2–1)
BILIRUB UR QL: NEGATIVE
BUN SERPL-MCNC: 10 MG/DL (ref 7–18)
BUN/CREAT SERPL: 10 (ref 12–20)
CALCIUM SERPL-MCNC: 9.4 MG/DL (ref 8.5–10.1)
CHLORIDE SERPL-SCNC: 100 MMOL/L (ref 100–108)
CO2 SERPL-SCNC: 26 MMOL/L (ref 21–32)
COLOR UR: YELLOW
CREAT SERPL-MCNC: 0.98 MG/DL (ref 0.6–1.3)
DIFFERENTIAL METHOD BLD: ABNORMAL
EOSINOPHIL # BLD: 0.2 K/UL (ref 0–0.4)
EOSINOPHIL NFR BLD: 3 % (ref 0–5)
EPITH CASTS URNS QL MICRO: ABNORMAL /LPF (ref 0–5)
ERYTHROCYTE [DISTWIDTH] IN BLOOD BY AUTOMATED COUNT: 14.3 % (ref 11.6–14.5)
GLOBULIN SER CALC-MCNC: 3.9 G/DL (ref 2–4)
GLUCOSE BLD STRIP.AUTO-MCNC: 367 MG/DL (ref 70–110)
GLUCOSE SERPL-MCNC: 391 MG/DL (ref 74–99)
GLUCOSE UR STRIP.AUTO-MCNC: >1000 MG/DL
HCT VFR BLD AUTO: 36.3 % (ref 35–45)
HGB BLD-MCNC: 11.6 G/DL (ref 12–16)
HGB UR QL STRIP: NEGATIVE
KETONES UR QL STRIP.AUTO: NEGATIVE MG/DL
LEUKOCYTE ESTERASE UR QL STRIP.AUTO: ABNORMAL
LYMPHOCYTES # BLD: 3 K/UL (ref 0.9–3.6)
LYMPHOCYTES NFR BLD: 45 % (ref 21–52)
MCH RBC QN AUTO: 26.7 PG (ref 24–34)
MCHC RBC AUTO-ENTMCNC: 32 G/DL (ref 31–37)
MCV RBC AUTO: 83.6 FL (ref 74–97)
MONOCYTES # BLD: 0.6 K/UL (ref 0.05–1.2)
MONOCYTES NFR BLD: 9 % (ref 3–10)
NEUTS SEG # BLD: 2.7 K/UL (ref 1.8–8)
NEUTS SEG NFR BLD: 42 % (ref 40–73)
NITRITE UR QL STRIP.AUTO: NEGATIVE
PH UR STRIP: 6.5 [PH] (ref 5–8)
PLATELET # BLD AUTO: 335 K/UL (ref 135–420)
PMV BLD AUTO: 9.7 FL (ref 9.2–11.8)
POTASSIUM SERPL-SCNC: 3.7 MMOL/L (ref 3.5–5.5)
PROT SERPL-MCNC: 8.3 G/DL (ref 6.4–8.2)
PROT UR STRIP-MCNC: NEGATIVE MG/DL
RBC # BLD AUTO: 4.34 M/UL (ref 4.2–5.3)
RBC #/AREA URNS HPF: ABNORMAL /HPF (ref 0–5)
SODIUM SERPL-SCNC: 135 MMOL/L (ref 136–145)
SP GR UR REFRACTOMETRY: 1.01 (ref 1–1.03)
UROBILINOGEN UR QL STRIP.AUTO: 0.2 EU/DL (ref 0.2–1)
WBC # BLD AUTO: 6.5 K/UL (ref 4.6–13.2)
WBC URNS QL MICRO: ABNORMAL /HPF (ref 0–4)

## 2019-01-26 PROCEDURE — 80053 COMPREHEN METABOLIC PANEL: CPT

## 2019-01-26 PROCEDURE — 82962 GLUCOSE BLOOD TEST: CPT

## 2019-01-26 PROCEDURE — 85025 COMPLETE CBC W/AUTO DIFF WBC: CPT

## 2019-01-26 PROCEDURE — 96360 HYDRATION IV INFUSION INIT: CPT

## 2019-01-26 PROCEDURE — 74011250636 HC RX REV CODE- 250/636: Performed by: EMERGENCY MEDICINE

## 2019-01-26 PROCEDURE — 81001 URINALYSIS AUTO W/SCOPE: CPT

## 2019-01-26 PROCEDURE — 99284 EMERGENCY DEPT VISIT MOD MDM: CPT

## 2019-01-26 PROCEDURE — 74011636637 HC RX REV CODE- 636/637: Performed by: EMERGENCY MEDICINE

## 2019-01-26 RX ORDER — INSULIN GLARGINE 100 [IU]/ML
INJECTION, SOLUTION SUBCUTANEOUS
Qty: 5 PEN | Refills: 0 | Status: SHIPPED | OUTPATIENT
Start: 2019-01-26 | End: 2019-06-05 | Stop reason: SDUPTHER

## 2019-01-26 RX ORDER — FAMOTIDINE 10 MG/ML
20 INJECTION INTRAVENOUS
Status: DISCONTINUED | OUTPATIENT
Start: 2019-01-26 | End: 2019-01-26

## 2019-01-26 RX ORDER — INSULIN LISPRO 100 [IU]/ML
INJECTION, SOLUTION INTRAVENOUS; SUBCUTANEOUS
Qty: 5 PEN | Refills: 0 | Status: SHIPPED | OUTPATIENT
Start: 2019-01-26 | End: 2019-06-11 | Stop reason: SDUPTHER

## 2019-01-26 RX ORDER — SUCRALFATE 1 G/1
1 TABLET ORAL
Status: DISCONTINUED | OUTPATIENT
Start: 2019-01-26 | End: 2019-01-26

## 2019-01-26 RX ADMIN — SODIUM CHLORIDE 1000 ML: 900 INJECTION, SOLUTION INTRAVENOUS at 04:22

## 2019-01-26 RX ADMIN — INSULIN HUMAN 5 UNITS: 100 INJECTION, SOLUTION PARENTERAL at 06:10

## 2019-01-26 NOTE — ED PROVIDER NOTES
EMERGENCY DEPARTMENT HISTORY AND PHYSICAL EXAM    4:03 AM      Date: 1/26/2019  Patient Name: Sofy Man    History of Presenting Illness     Chief Complaint   Patient presents with    High Blood Sugar         History Provided By: Patient       Additional History (Context): Sofy Man is a 52 y.o. female presenting to the ED for the evaluation of an elevated blood glucose of 445 at home just PTA. Patient explains that it just keeps rising. She notes not being compliant with her diabetic medications, Lantus and Humalog. States that she has not taken it in the last 6 months as she had lost a lot of weight. Patient currently does not have any insulin at home. Reports associated symptoms of polydipsia and urinary frequency. Does report other history of high cholesterol and thyroid complications. No fevers, chills. No other complaints or concerns at this time. PCP: Kailey Ortega MD    Current Facility-Administered Medications   Medication Dose Route Frequency Provider Last Rate Last Dose    sodium chloride 0.9 % bolus infusion 1,000 mL  1,000 mL IntraVENous Jama Perez MD         Current Outpatient Medications   Medication Sig Dispense Refill    insulin lispro (HUMALOG KWIKPEN INSULIN) 100 unit/mL kwikpen Administer  6- 8 units three times daily before each meal per sliding scale 5 Pen 0    insulin glargine (LANTUS,BASAGLAR) 100 unit/mL (3 mL) inpn Take 10u at night 5 Pen 0    JANUMET 50-1,000 mg per tablet TAKE ONE TABLET BY MOUTH TWICE A  Tab 0    glucose blood VI test strips (ONETOUCH ULTRA BLUE TEST STRIP) strip by Does Not Apply route See Admin Instructions. 100 Strip 6    gemfibrozil (LOPID) 600 mg tablet Take 1 Tab by mouth two (2) times a day. 180 Tab 1    glucose blood VI test strips (ACCU-CHEK SMARTVIEW TEST STRIP) strip dm 100 Strip 6    levothyroxine (SYNTHROID) 137 mcg tablet Take 137 mcg by mouth Daily (before breakfast).  [address] [facility] 50-1,000 mg per tablet Take 1 Tab by mouth daily (with breakfast). 180 Tab 1    oxybutynin chloride XL (DITROPAN XL) 10 mg CR tablet Take 1 Tab by mouth daily. 90 Tab 1    rosuvastatin (CRESTOR) 40 mg tablet Take 1 Tab by mouth nightly. 90 Tab 1    SURE COMFORT PEN NEEDLE 31 gauge x 3/16\" ndle USE FOR DIABETIC TESTING 100 Pen Needle 3    acetaminophen-codeine (TYLENOL-CODEINE #3) 300-30 mg per tablet Take 1 Tab by mouth every six (6) hours as needed for Pain. Max Daily Amount: 4 Tabs. 20 Tab 0    ibuprofen (MOTRIN) 600 mg tablet  TAKE 1 TABLET BY MOUTH EVERY 8 HOURS AS NEEDED FOR PAIN 40 Tab 0    ACCU-CHEK FASTCLIX kit use as directed to TEST three times a day 1 Kit 0    mupirocin (BACTROBAN) 2 % ointment Apply  to affected area daily. 22 g 0    hydrocortisone (CORTAID) 1 % topical cream Apply  to affected area two (2) times a day. use thin layer 30 g 0    salicylic acid 17 % topical gel Apply  to affected area daily. Apply dime sized application to the callus on the left foot once daily 14 g 3    fluticasone (FLONASE) 50 mcg/actuation nasal spray 2 Sprays by Both Nostrils route daily. 3 Bottle 4    LORazepam (ATIVAN) 1 mg tablet Take 1 mg by mouth every four (4) hours as needed for Anxiety.  albuterol (PROVENTIL HFA, VENTOLIN HFA, PROAIR HFA) 90 mcg/actuation inhaler Take 2 Puffs by inhalation every six (6) hours as needed for Wheezing. 1 Inhaler 3    ferrous sulfate 325 mg (65 mg iron) tablet Take 1 Tab by mouth two (2) times a day. 60 Tab 3    benztropine (COGENTIN) 0.5 mg tablet Take 0.5 mg by mouth two (2) times a day.  trifluoperazine (STELAZINE) 5 mg tablet Take 20 mg by mouth two (2) times a day.  sertraline (ZOLOFT) 100 mg tablet Take 200 mg by mouth daily.          Past History     Past Medical History:  Past Medical History:   Diagnosis Date    Arthritis     Chronic pain     Depression     Diabetes ([facility] [address]     Hyperlipidemia     Hypothyroid     Medial meniscus tear     Left knee    Psychiatric disorder     Tobacco abuse, in remission        Past Surgical History:  Past Surgical History:   Procedure Laterality Date    HX ORTHOPAEDIC      Hand reconstructive surgery    HX TUBAL LIGATION         Family History:  Family History   Problem Relation Age of Onset    Alcohol abuse Mother    Jory Kevin Mother    Florian Mean Mother 61        breast cancer    Diabetes Mother     Hypertension Mother    [address] [facility] Nilesh Psychiatric Disorder Mother    [address] [facility] Stroke Mother     Breast Cancer Mother     Alcohol abuse Father     Heart Disease Father     Diabetes Father     Hypertension Father     Lung Disease Father     Colon Cancer Father 72    Alcohol abuse Paternal Grandmother     Ovarian Cancer Sister     Alcohol abuse Sister     Diabetes Sister     Psychiatric Disorder Sister     Alcohol abuse Brother     Psychiatric Disorder Son     Psychiatric Disorder Other         nephew       Social History:  Social History     Tobacco Use    Smoking status: Former Smoker     Packs/day: 1.00     Years: 35.00     Pack years: 35.00     Types: Cigarettes     Last attempt to quit: 2011     Years since quittin.2    Smokeless tobacco: Never Used   Substance Use Topics    Alcohol use: No     Alcohol/week: 0.0 oz     Comment: quit 10 years ago    Drug use: No       Allergies: Allergies   Allergen Reactions    Lipitor [Atorvastatin] Other (comments)     myalgias    Lisinopril Cough    Aspirin Other (comments)     Nose bleeds    Zocor [Simvastatin] Other (comments)     Causes pain in the left leg         Review of Systems       Review of Systems   Constitutional: Negative. Negative for activity change, chills and fever. HENT: Negative. Negative for ear pain, hearing loss and sore throat. Eyes: Negative. Negative for pain and visual disturbance. Respiratory: Negative. Negative for cough, chest tightness and shortness of breath. Cardiovascular: Negative.   Negative for chest pain and leg swelling. Gastrointestinal: Negative. Negative for abdominal distention and abdominal pain. Endocrine: Positive for polydipsia. (+) elevated BG   Genitourinary: Positive for frequency. Negative for dysuria and hematuria. Musculoskeletal: Negative. Skin: Negative. Negative for rash. Neurological: Negative. Negative for dizziness and headaches. Psychiatric/Behavioral: Negative. Negative for agitation and behavioral problems. All other systems reviewed and are negative. Physical Exam     Visit Vitals  /77   Pulse 76   Temp 97.6 °F (36.4 °C)   Resp 16   Ht 5' 4\" (1.626 m)   Wt 76.7 kg (169 lb)   LMP 06/05/2014   SpO2 95%   BMI 29.01 kg/m²         Physical Exam   Constitutional: She is oriented to person, place, and time. She appears well-developed and well-nourished. No distress. HENT:   Head: Normocephalic and atraumatic. Mouth/Throat: Mucous membranes are dry. Eyes: Pupils are equal, round, and reactive to light. Neck: Normal range of motion. Neck supple. Cardiovascular: Normal rate, regular rhythm, normal heart sounds and intact distal pulses. Exam reveals no gallop and no friction rub. No murmur heard. 2+ radial pulses bilaterally. No BLE edema. Pulmonary/Chest: Effort normal and breath sounds normal. No respiratory distress. She has no wheezes. She has no rales. Lungs CTA. No crackles or wheezing. Abdominal: Soft. Bowel sounds are normal. She exhibits no distension. There is no tenderness. Active bowel sounds. No pain in all 4 quadrants. Musculoskeletal: Normal range of motion. No calf tenderness. Lymphadenopathy:   No lymphadenopathy. Neurological: She is alert and oriented to person, place, and time. No cranial nerve deficit. Skin: Skin is warm and dry. Nursing note and vitals reviewed.         Diagnostic Study Results     Labs -  Recent Results (from the past 12 hour(s))   GLUCOSE, POC    Collection Time: 01/26/19  3:57 AM Result Value Ref Range    Glucose (POC) 367 (H) 70 - 110 mg/dL   URINALYSIS W/ RFLX MICROSCOPIC    Collection Time: 01/26/19  4:15 AM   Result Value Ref Range    Color YELLOW      Appearance CLEAR      Specific gravity 1.007 1.005 - 1.030      pH (UA) 6.5 5.0 - 8.0      Protein NEGATIVE  NEG mg/dL    Glucose >1,000 (A) NEG mg/dL    Ketone NEGATIVE  NEG mg/dL    Bilirubin NEGATIVE  NEG      Blood NEGATIVE  NEG      Urobilinogen 0.2 0.2 - 1.0 EU/dL    Nitrites NEGATIVE  NEG      Leukocyte Esterase TRACE (A) NEG     URINE MICROSCOPIC ONLY    Collection Time: 01/26/19  4:15 AM   Result Value Ref Range    WBC 4 to 10 0 - 4 /hpf    RBC 0 to 3 0 - 5 /hpf    Epithelial cells FEW 0 - 5 /lpf    Bacteria 3+ (A) NEG /hpf   CBC WITH AUTOMATED DIFF    Collection Time: 01/26/19  4:20 AM   Result Value Ref Range    WBC 6.5 4.6 - 13.2 K/uL    RBC 4.34 4.20 - 5.30 M/uL    HGB 11.6 (L) 12.0 - 16.0 g/dL    HCT 36.3 35.0 - 45.0 %    MCV 83.6 74.0 - 97.0 FL    MCH 26.7 24.0 - 34.0 PG    MCHC 32.0 31.0 - 37.0 g/dL    RDW 14.3 11.6 - 14.5 %    PLATELET 719 039 - 633 K/uL    MPV 9.7 9.2 - 11.8 FL    NEUTROPHILS 42 40 - 73 %    LYMPHOCYTES 45 21 - 52 %    MONOCYTES 9 3 - 10 %    EOSINOPHILS 3 0 - 5 %    BASOPHILS 1 0 - 2 %    ABS. NEUTROPHILS 2.7 1.8 - 8.0 K/UL    ABS. LYMPHOCYTES 3.0 0.9 - 3.6 K/UL    ABS. MONOCYTES 0.6 0.05 - 1.2 K/UL    ABS. EOSINOPHILS 0.2 0.0 - 0.4 K/UL    ABS. BASOPHILS 0.0 0.0 - 0.1 K/UL    DF AUTOMATED         Radiologic Studies -   No orders to display         Medical Decision Making   I am the first provider for this patient. I reviewed the vital signs, available nursing notes, past medical history, past surgical history, family history and social history. Vital Signs-Reviewed the patient's vital signs.     Records Reviewed: Nursing Notes        ED Course: Progress Notes, Reevaluation, and Consults:    Provider Notes (Medical Decision Making):     MDM  Number of Diagnoses or Management Options  Hyperglycemia:   Diagnosis management comments: Diff dx: hyperglycemia, med noncompliance, dka, HHS    Pt reporting bg 415 at home, she has been out of her insulin for awhile. No other complaints, will get basic labs including cbc and bmp, r/o dka and hhs. Will get UA for lactate and eval uti    Reeval: no sign of DKA or HHS, BG downtrending, pt asking for her lantus and lispro pens, have refilled these and she will go see her PCP shortly. Safe for d/c, careful return precautions given. Pt/family comfortable with naomi Hsu MD      Diagnosis     Clinical Impression:   1. Hyperglycemia    2. Type 2 diabetes mellitus without complication, unspecified whether long term insulin use (HCC)        Disposition: home    Follow-up Information     Follow up With Specialties Details Why [address] [facility] EMERGENCY DEPT Emergency Medicine  As needed, If symptoms worsen [address] [facility]    Srikanth Conner MD Internal Medicine In 2 days  [address] [address]  [phone]                Medication List      START taking these medications    insulin glargine 100 unit/mL (3 mL) Inpn  Commonly known as:  LANTUS,BASAGLAR  Take 10u at night        CONTINUE taking these medications    ACCU-CHEK FASTCLIX LANCING DEV Kit  Generic drug:  Lancing Device with Lancets  use as directed to TEST three times a day     acetaminophen-codeine 300-30 mg per tablet  Commonly known as:  TYLENOL-CODEINE #3  Take 1 Tab by mouth every six (6) hours as needed for Pain. Max Daily Amount: 4 Tabs. albuterol 90 mcg/actuation inhaler  Commonly known as:  PROVENTIL HFA, VENTOLIN HFA, PROAIR HFA  Take 2 Puffs by inhalation every six (6) hours as needed for Wheezing. benztropine 0.5 mg tablet  Commonly known as:  COGENTIN     ferrous sulfate 325 mg (65 mg iron) tablet  Take 1 Tab by mouth two (2) times a day.      fluticasone 50 mcg/actuation nasal spray  Commonly known as:  FLONASE  2 Sprays by Both Nostrils route daily. gemfibrozil 600 mg tablet  Commonly known as:  LOPID  Take 1 Tab by mouth two (2) times a day. * glucose blood VI test strips strip  Commonly known as:  ACCU-CHEK SMARTVIEW TEST STRIP  dm     * glucose blood VI test strips strip  Commonly known as:  ONETOUCH ULTRA BLUE TEST STRIP  by Does Not Apply route See Admin Instructions. hydrocortisone 1 % topical cream  Commonly known as:  CORTAID  Apply  to affected area two (2) times a day. use thin layer     ibuprofen 600 mg tablet  Commonly known as:  MOTRIN  TAKE 1 TABLET BY MOUTH EVERY 8 HOURS AS NEEDED FOR PAIN     insulin lispro 100 unit/mL kwikpen  Commonly known as:  HumaLOG KwikPen Insulin  Administer  6- 8 units three times daily before each meal per sliding scale     levothyroxine 137 mcg tablet  Commonly known as:  SYNTHROID  Take 137 mcg by mouth Daily (before breakfast). LORazepam 1 mg tablet  Commonly known as:  ATIVAN     mupirocin 2 % ointment  Commonly known as:  BACTROBAN  Apply  to affected area daily. oxybutynin chloride XL 10 mg CR tablet  Commonly known as:  DITROPAN XL  Take 1 Tab by mouth daily. rosuvastatin 40 mg tablet  Commonly known as:  CRESTOR  Take 1 Tab by mouth nightly. salicylic acid 17 % topical gel  Apply  to affected area daily. Apply dime sized application to the callus on the left foot once daily     sertraline 100 mg tablet  Commonly known as:  ZOLOFT     * SITagliptin-metFORMIN 50-1,000 mg per tablet  Commonly known as:  JANUMET  Take 1 Tab by mouth daily (with breakfast).      * JANUMET 50-1,000 mg per tablet  Generic drug:  SITagliptin-metFORMIN  TAKE ONE TABLET BY MOUTH TWICE A DAY     SURE COMFORT PEN NEEDLE 31 gauge x 3/16\" Ndle  Generic drug:  Insulin Needles (Disposable)  USE FOR DIABETIC TESTING     trifluoperazine 5 mg tablet  Commonly known as:  STELAZINE         * This list has 4 medication(s) that are the same as other medications prescribed for you. Read the directions carefully, and ask your doctor or other care provider to review them with you. Where to Get Your Medications      Information about where to get these medications is not yet available    Ask your nurse or doctor about these medications  · insulin glargine 100 unit/mL (3 mL) Inpn  · insulin lispro 100 unit/mL kwikpen       _______________________________    Attestations:  Scribe Attestation     Minna Varma acting as a scribe for and in the presence of Debo Bojorquez MD      January 26, 2019 at 4:03 AM       Provider Attestation:      I personally performed the services described in the documentation, reviewed the documentation, as recorded by the scribe in my presence, and it accurately and completely records my words and actions.  January 26, 2019 at 4:03 AM - Debo Bojorquez MD   _______________________________

## 2019-01-26 NOTE — ED TRIAGE NOTES
Pt stating that her blood sugars are running high, states her lantus and humalog were changed 6 months ago and this week her blood sugar has been running in the 500s.  States she has had blurry vision, urinary frequency, and excessive thirst. Patient blood sugar in triage 367 mg/dL

## 2019-02-24 ENCOUNTER — HOSPITAL ENCOUNTER (EMERGENCY)
Age: 54
Discharge: HOME | End: 2019-02-24
Attending: EMERGENCY MEDICINE
Payer: MEDICARE

## 2019-02-24 ENCOUNTER — APPOINTMENT (OUTPATIENT)
Dept: GENERAL RADIOLOGY | Age: 54
End: 2019-02-24
Attending: EMERGENCY MEDICINE
Payer: MEDICARE

## 2019-02-24 VITALS
WEIGHT: 170 LBS | OXYGEN SATURATION: 98 % | RESPIRATION RATE: 18 BRPM | TEMPERATURE: 98.3 F | BODY MASS INDEX: 29.02 KG/M2 | DIASTOLIC BLOOD PRESSURE: 72 MMHG | HEIGHT: 64 IN | HEART RATE: 58 BPM | SYSTOLIC BLOOD PRESSURE: 105 MMHG

## 2019-02-24 DIAGNOSIS — M79.671 RIGHT FOOT PAIN: Primary | ICD-10-CM

## 2019-02-24 PROCEDURE — 99282 EMERGENCY DEPT VISIT SF MDM: CPT

## 2019-02-24 PROCEDURE — 73630 X-RAY EXAM OF FOOT: CPT

## 2019-02-24 RX ORDER — LOXAPINE SUCCINATE 25 MG/1
25 TABLET ORAL
COMMUNITY
End: 2020-01-31

## 2019-02-24 NOTE — DISCHARGE INSTRUCTIONS
SPECIFIC PATIENT INSTRUCTIONS FROM THE PHYSICIAN WHO TREATED YOU IN THE ER TODAY:  1. Return if any concerns or worsening of condition(s)  2. FOLLOW UP APPOINTMENT:  Your podiatrist or the one listed in these discharge instructions as soon as possible for further outpatient work up and possible restarting of your gabapentin. Patient Education        Foot Pain: Care Instructions  Your Care Instructions  Foot injuries that cause pain and swelling are fairly common. Almost all sports or home repair projects can cause a misstep that ends up as foot pain. Normal wear and tear, especially as you get older, also can cause foot pain. Most minor foot injuries will heal on their own, and home treatment is usually all you need to do. If you have a severe injury, you may need tests and treatment. Follow-up care is a key part of your treatment and safety. Be sure to make and go to all appointments, and call your doctor if you are having problems. It's also a good idea to know your test results and keep a list of the medicines you take. How can you care for yourself at home? · Take pain medicines exactly as directed. ? If the doctor gave you a prescription medicine for pain, take it as prescribed. ? If you are not taking a prescription pain medicine, ask your doctor if you can take an over-the-counter medicine. · Rest and protect your foot. Take a break from any activity that may cause pain. · Put ice or a cold pack on your foot for 10 to 20 minutes at a time. Put a thin cloth between the ice and your skin. · Prop up the sore foot on a pillow when you ice it or anytime you sit or lie down during the next 3 days. Try to keep it above the level of your heart. This will help reduce swelling. · Your doctor may recommend that you wrap your foot with an elastic bandage. Keep your foot wrapped for as long as your doctor advises. · If your doctor recommends crutches, use them as directed. · Wear roomy footwear.   · As soon as pain and swelling end, begin gentle exercises of your foot. Your doctor can tell you which exercises will help. When should you call for help? Call 911 anytime you think you may need emergency care. For example, call if:    · Your foot turns pale, white, blue, or cold.    Call your doctor now or seek immediate medical care if:    · You cannot move or stand on your foot.     · Your foot looks twisted or out of its normal position.     · Your foot is not stable when you step down.     · You have signs of infection, such as:  ? Increased pain, swelling, warmth, or redness. ? Red streaks leading from the sore area. ? Pus draining from a place on your foot. ? A fever.     · Your foot is numb or tingly.    Watch closely for changes in your health, and be sure to contact your doctor if:    · You do not get better as expected.     · You have bruises from an injury that last longer than 2 weeks. Where can you learn more? Go to http://yevgeniy-em.info/. Enter C971 in the search box to learn more about \"Foot Pain: Care Instructions. \"  Current as of: September 20, 2018  Content Version: 11.9  © 0830-1682 [employer]. Care instructions adapted under license by [employer] (which disclaims liability or warranty for this information). If you have questions about a medical condition or this instruction, always ask your healthcare professional. [address] any warranty or liability for your use of this information. [employer] Activation    Thank you for requesting access to [employer]. Please follow the instructions below to securely access and download your online medical record. [employer] allows you to send messages to your doctor, view your test results, renew your prescriptions, schedule appointments, and more. How Do I Sign Up? 1. In your internet browser, go to [email or web address]. 2. Click on the First Time User? Click Here link in the Sign In box. You will see the New Member Sign Up page. 3. Enter your [employer] Access Code exactly as it appears below. You will not need to use this code after youve completed the sign-up process. If you do not sign up before the expiration date, you must request a new code. [employer] Access Code: [email or web address]  Expires: 4/10/2019  9:09 AM (This is the date your [employer] access code will )    4. Enter the last four digits of your Social Security Number (xxxx) and Date of Birth (mm/dd/yyyy) as indicated and click Submit. You will be taken to the next sign-up page. 5. Create a [employer] ID. This will be your [employer] login ID and cannot be changed, so think of one that is secure and easy to remember. 6. Create a [employer] password. You can change your password at any time. 7. Enter your Password Reset Question and Answer. This can be used at a later time if you forget your password. 8. Enter your e-mail address. You will receive e-mail notification when new information is available in [address] 9. Click Sign Up. You can now view and download portions of your medical record. 10. Click the Download Summary menu link to download a portable copy of your medical information. Additional Information    If you have questions, please visit the Frequently Asked Questions section of the [employer] website at [email or web address] Remember, [employer] is NOT to be used for urgent needs. For medical emergencies, dial 911.

## 2019-02-24 NOTE — ED PROVIDER NOTES
[facility] EMERGENCY DEPT      9:34 AM    Date: 2/24/2019  Patient Name: Radha Acevedo    History of Presenting Illness     Chief Complaint   Patient presents with    Foot Pain       History Provided By: Patient    Chief Complaint: Foot Pain  Duration: Three Weeks  Timing:  Constant  Location: Right Foot  Severity: Moderate  Modifying Factors: None  Associated Symptoms: denies any other associated signs or symptoms    52 y.o. female with noted past medical history of DM, HLD, and arthritis who presents to the emergency department for right foot pain. The patient has experienced the pain for the last three weeks. She denies any trauma to the foot or falling or twisting her foot. The patient notes a hx of neuropathy due to her DM. She was on Gabapentin for one year, but her provider took her off it. She has not taken Gabapentin for a year now. The patient denies smoking tobacco or drinking alcohol. No other concerns or symptoms at this time. No other complaints. Nursing notes regarding the HPI and triage nursing notes were reviewed. Prior medical records were reviewed. Current Outpatient Medications   Medication Sig Dispense Refill    loxapine (LOXITANE) 25 mg capsule Take 25 mg by mouth nightly.  insulin lispro (HUMALOG KWIKPEN INSULIN) 100 unit/mL kwikpen Administer  6- 8 units three times daily before each meal per sliding scale 5 Pen 0    insulin glargine (LANTUS,BASAGLAR) 100 unit/mL (3 mL) inpn Take 10u at night 5 Pen 0    JANUMET 50-1,000 mg per tablet TAKE ONE TABLET BY MOUTH TWICE A  Tab 0    gemfibrozil (LOPID) 600 mg tablet Take 1 Tab by mouth two (2) times a day. 180 Tab 1    levothyroxine (SYNTHROID) 137 mcg tablet Take 137 mcg by mouth Daily (before breakfast). 30 Tab 3    oxybutynin chloride XL (DITROPAN XL) 10 mg CR tablet Take 1 Tab by mouth daily. 90 Tab 1    rosuvastatin (CRESTOR) 40 mg tablet Take 1 Tab by mouth nightly.  90 Tab 1    ferrous sulfate 325 mg (65 mg iron) tablet Take 1 Tab by mouth two (2) times a day. 60 Tab 3    benztropine (COGENTIN) 0.5 mg tablet Take 0.5 mg by mouth two (2) times a day.  sertraline (ZOLOFT) 100 mg tablet Take 200 mg by mouth daily. Past History     Past Medical History:  Past Medical History:   Diagnosis Date    Arthritis     Chronic pain     Depression     Diabetes ([address]     Hyperlipidemia     Hypothyroid     Medial meniscus tear     Left knee    Psychiatric disorder     Tobacco abuse, in remission        Past Surgical History:  Past Surgical History:   Procedure Laterality Date    HX ORTHOPAEDIC      Hand reconstructive surgery    HX TUBAL LIGATION         Family History:  Family History   Problem Relation Age of Onset    Alcohol abuse Mother    Xander Gold Mother    Monica Dears Mother 61        breast cancer    Diabetes Mother     Hypertension Mother    [de-identified] Psychiatric Disorder Mother    [de-identified] Stroke Mother     Breast Cancer Mother     Alcohol abuse Father     Heart Disease Father     Diabetes Father     Hypertension Father     Lung Disease Father     Colon Cancer Father 72    Alcohol abuse Paternal Grandmother     Ovarian Cancer Sister     Alcohol abuse Sister     Diabetes Sister     Psychiatric Disorder Sister     Alcohol abuse Brother     Psychiatric Disorder Son     Psychiatric Disorder Other         nephew       Social History:  Social History     Tobacco Use    Smoking status: Former Smoker     Packs/day: 1.00     Years: 35.00     Pack years: 35.00     Types: Cigarettes     Last attempt to quit: 2011     Years since quittin.3    Smokeless tobacco: Never Used   Substance Use Topics    Alcohol use: No     Alcohol/week: 0.0 oz     Comment: quit 10 years ago    Drug use: No       Allergies:   Allergies   Allergen Reactions    Lipitor [Atorvastatin] Other (comments)     myalgias    Lisinopril Cough    Aspirin Other (comments)     Nose bleeds    Zocor [Simvastatin] Other (comments)     Causes pain in the left leg       Patient's primary care provider (as noted in EPIC):  Josue Monroy MD    Review of Systems   Constitutional: Negative for diaphoresis. HENT: Negative for congestion. Eyes: Negative for discharge. Respiratory: Negative for stridor. Cardiovascular: Negative for palpitations. Gastrointestinal: Negative for diarrhea. Endocrine: Negative for heat intolerance. Genitourinary: Negative for flank pain. Musculoskeletal: Positive for myalgias (Right Foot). Negative for back pain. Neurological: Negative for weakness. Psychiatric/Behavioral: Negative for hallucinations. All other systems reviewed and are negative. Visit Vitals  /72 (BP 1 Location: Right arm, BP Patient Position: Sitting)   Pulse (!) 58   Temp 98.3 °F (36.8 °C)   Resp 18   Ht 5' 4\" (1.626 m)   Wt 77.1 kg (170 lb)   SpO2 98%   Breastfeeding? No   BMI 29.18 kg/m²       PHYSICAL EXAM:    CONSTITUTIONAL:  Alert, in no apparent distress;  well developed;  well nourished. HEAD:  Normocephalic, atraumatic. EYES:  EOMI. Non-icteric sclera. Normal conjunctiva. ENTM:  Nose:  no rhinorrhea. Throat:  no erythema or exudate, mucous membranes moist.  NECK:  No JVD. Supple  RESPIRATORY:  Chest clear, equal breath sounds, good air movement. CARDIOVASCULAR:  Regular rate and rhythm. No murmurs, rubs, or gallops. GI:  Normal bowel sounds, abdomen soft and non-tender. No rebound or guarding. BACK:  Non-tender. UPPER EXT:  Normal inspection. LOWER EXT:  No edema, no calf tenderness. Distal pulses intact. SEE BELOW. NEURO:  Moves all four extremities, and grossly normal motor exam.  SKIN:  No rashes;  Normal for age. PSYCH:  Alert and normal affect. Right foot exam:  No rash, lesions, bruising. No obvious deformity. There is no open wound. There is no purulent discharge. No foul odor. Minimal lateral foot reproducible TTP. No rash, lesions, bruising.      Diagnostic Study Results     Abnormal lab results from this emergency department encounter:  Labs Reviewed - No data to display    Lab values for this patient within approximately the last 12 hours:  No results found for this or any previous visit (from the past 12 hour(s)). Radiologist and cardiologist interpretations if available at time of this note: Foot X Ray Impression:  Read by Mark London MD. No fracture, dislocation, or bony erosion. Medication(s) ordered for patient during this emergency visit encounter:  Medications - No data to display    Medical Decision Making     I am the first provider for this patient. I reviewed the vital signs, available nursing notes, past medical history, past surgical history, family history and social history. Vital Signs:  Reviewed the patient's vital signs. Right foot x-ray:  Read by Mark London MD. No fracture, dislocation, or bony erosion. ED COURSE:  Foot xray was obtained to assess for signs of osteomyelitis, including periosteal lifting, bone erosion or other abnormalities. IMPRESSION AND MEDICAL DECISION MAKING:  Based upon the patient's presentation with noted HPI and PE, along with the work   up done in the emergency department, I believe that the patient is having plantar fasciitis. DIAGNOSIS:  1. Right foot pain. SPECIFIC PATIENT INSTRUCTIONS FROM THE PHYSICIAN WHO TREATED YOU IN THE ER TODAY:  1. Return if any concerns or worsening of condition(s)  2. FOLLOW UP APPOINTMENT:  Your podiatrist or the one listed in these discharge instructions as soon as possible for further outpatient work up and possible restarting of your gabapentin. Patient is improved, resting quietly and comfortably. The patient will be discharged home. The patient was reassured that these symptoms do not appear to represent a serious or life threatening condition at this time.  Warning signs of worsening condition were discussed and understood by the patient. Based on patient's age, coexisting illness, exam, and the results of this ED evaluation, the decision to treat as an outpatient was made. Based on the information available at time of discharge, acute pathology requiring immediate intervention was deemed relative unlikely. While it is impossible to completely exclude the possibility of underlying serious disease or worsening of condition, I feel the relative likelihood is extremely low. I discussed this uncertainty with the patient, who understood ED evaluation and treatment and felt comfortable with the outpatient treatment plan. All questions regarding care, test results, and follow up were answered. The patient is stable and appropriate to discharge. They understand that they should return to the emergency department for any new or worsening symptoms. I stressed the importance of follow up for repeat assessment and possibly further evaluation/treatment. Coding Diagnoses     Clinical Impression:   1. Right foot pain        Disposition     Disposition:  Home. RAMANDEEP Ortega Board Certified Emergency Physician    Provider Attestation:  If a scribe was utilized in generation of this patient record, I personally performed the services described in the documentation, reviewed the documentation, as recorded by the scribe in my presence, and it accurately records the patient's history of presenting illness, review of systems, patient physical examination, and procedures performed by me as the attending physician. RAMANDEEP Ortega Board Certified Emergency Physician  2/24/2019.  9:36 AM    Scribe Attestation     Perla Cardona acting as a scribe for and in the presence of Donavan Chiang MD      February 24, 2019 at 9:38 AM       Provider Attestation:      I personally performed the services described in the documentation, reviewed the documentation, as recorded by the scribe in my presence, and it accurately and completely records my words and actions.  February 24, 2019 at 9:38 AM - Haily Paula MD

## 2019-02-24 NOTE — ED NOTES
Pt offered wheelchair, declined. Pt ambulatory to bed 6. Reports not taking a DM for one year and this might be the cause.

## 2019-02-25 ENCOUNTER — TELEPHONE (OUTPATIENT)
Dept: FAMILY MEDICINE CLINIC | Age: 54
End: 2019-02-25

## 2019-03-01 ENCOUNTER — OFFICE VISIT (OUTPATIENT)
Dept: FAMILY MEDICINE CLINIC | Age: 54
End: 2019-03-01

## 2019-03-01 VITALS
OXYGEN SATURATION: 98 % | BODY MASS INDEX: 30.29 KG/M2 | SYSTOLIC BLOOD PRESSURE: 125 MMHG | RESPIRATION RATE: 14 BRPM | HEART RATE: 78 BPM | HEIGHT: 64 IN | TEMPERATURE: 96.8 F | WEIGHT: 177.4 LBS | DIASTOLIC BLOOD PRESSURE: 81 MMHG

## 2019-03-01 DIAGNOSIS — E78.00 HYPERCHOLESTEREMIA: ICD-10-CM

## 2019-03-01 DIAGNOSIS — E53.8 B12 DEFICIENCY: Primary | ICD-10-CM

## 2019-03-01 RX ORDER — CHOLECALCIFEROL (VITAMIN D3) 125 MCG
10 CAPSULE ORAL
COMMUNITY
End: 2021-11-03

## 2019-03-01 RX ORDER — BENZTROPINE MESYLATE 1 MG/1
0.5 TABLET ORAL
COMMUNITY
End: 2019-03-01 | Stop reason: ALTCHOICE

## 2019-03-01 RX ORDER — CYANOCOBALAMIN 1000 UG/ML
1000 INJECTION, SOLUTION INTRAMUSCULAR; SUBCUTANEOUS ONCE
Qty: 1 ML | Refills: 0
Start: 2019-03-01 | End: 2019-03-01

## 2019-03-01 RX ORDER — LEVOTHYROXINE SODIUM 150 UG/1
137 TABLET ORAL
Qty: 30 TAB | Refills: 2 | Status: SHIPPED | OUTPATIENT
Start: 2019-03-01 | End: 2019-04-25

## 2019-03-01 RX ORDER — LANOLIN ALCOHOL/MO/W.PET/CERES
500 CREAM (GRAM) TOPICAL DAILY
COMMUNITY

## 2019-03-01 RX ORDER — HYDROXYZINE 50 MG/1
TABLET, FILM COATED ORAL
COMMUNITY
Start: 2019-02-04 | End: 2019-06-20 | Stop reason: SDUPTHER

## 2019-03-01 RX ORDER — GEMFIBROZIL 600 MG/1
600 TABLET, FILM COATED ORAL 2 TIMES DAILY
Qty: 180 TAB | Refills: 1 | Status: SHIPPED | OUTPATIENT
Start: 2019-03-01 | End: 2019-10-14 | Stop reason: CLARIF

## 2019-03-01 RX ORDER — OMEGA-3-ACID ETHYL ESTERS 1 G/1
2 CAPSULE, LIQUID FILLED ORAL 2 TIMES DAILY
COMMUNITY
End: 2019-04-25 | Stop reason: SDUPTHER

## 2019-03-01 NOTE — PROGRESS NOTES
Subjective:     HPI:  Peggy Jerome is a 52 y.o. female who presents to the office today for routine care. Previously a patient of Dr. Clarence Carrasco. Diabetes Mellitus:  female has diabetes mellitus, and  diabetes and hyperlipidemia. Pt reports she craves sugar and has been stress eating. Diabetic ROS - medication compliance: compliant all of the time, home glucose monitoring: is performed regularly. She reports that her BS readings are usually around 165. When her BS is high, her BS can get up to 445. When her BS is low, her BS can get down to 85. Further diabetic ROS: no polyuria or polydipsia, no chest pain, dyspnea or TIA's, no numbness, tingling or pain in extremities. Lab review: . Lab Results   Component Value Date/Time    Hemoglobin A1c 7.4 (H) 02/19/2018 10:52 AM    Hemoglobin A1c (POC) 6.7 07/02/2018 09:00 AM     Psych: Pt has a diagnosis of bipolar disorder, depression, and insomnia. She follows up with a psychiatrist. Her next appointment with psychiatry is on Tuesday, 03/05/19. B12 Deficiency: patient reports she usually receives a B12 shot once a month. She did not receive an injection in February 2019 she is requesting injection today. Hyperlipidemia: patient is stable on current therapy. She is requesting medication refills today on her Lopid.      Lab Results   Component Value Date/Time    Cholesterol, total 155 10/22/2018 11:00 AM    HDL Cholesterol 45 10/22/2018 11:00 AM    LDL, calculated 83.4 10/22/2018 11:00 AM    VLDL, calculated 26.6 10/22/2018 11:00 AM    Triglyceride 133 10/22/2018 11:00 AM    CHOL/HDL Ratio 3.4 10/22/2018 11:00 AM         Lab Results   Component Value Date/Time    Vitamin B12 199 (L) 07/02/2018 09:36 AM    Folate 9.9 07/02/2018 09:36 AM     Lab Results   Component Value Date/Time    WBC 6.5 01/26/2019 04:20 AM    HGB 11.6 (L) 01/26/2019 04:20 AM    HCT 36.3 01/26/2019 04:20 AM    PLATELET 030 47/32/5216 04:20 AM    MCV 83.6 01/26/2019 04:20 AM Current Outpatient Medications   Medication Sig Dispense Refill    hydrOXYzine HCl (ATARAX) 50 mg tablet       melatonin tab tablet Take  by mouth nightly.  cyanocobalamin (VITAMIN B-12) 500 mcg tablet Take 500 mcg by mouth daily.  omega-3 acid ethyl esters (LOVAZA) 1 gram capsule Take 2 g by mouth two (2) times a day.  cyanocobalamin (VITAMIN B-12) 1,000 mcg/mL injection 1 mL by IntraMUSCular route once for 1 dose. 1 mL 0    gemfibrozil (LOPID) 600 mg tablet Take 1 Tab by mouth two (2) times a day. 180 Tab 1    levothyroxine (SYNTHROID) 150 mcg tablet Take 1 Tab by mouth Daily (before breakfast). 30 Tab 2    loxapine (LOXITANE) 25 mg capsule Take 25 mg by mouth nightly.  insulin lispro (HUMALOG KWIKPEN INSULIN) 100 unit/mL kwikpen Administer  6- 8 units three times daily before each meal per sliding scale 5 Pen 0    insulin glargine (LANTUS,BASAGLAR) 100 unit/mL (3 mL) inpn Take 10u at night 5 Pen 0    JANUMET 50-1,000 mg per tablet TAKE ONE TABLET BY MOUTH TWICE A  Tab 0    oxybutynin chloride XL (DITROPAN XL) 10 mg CR tablet Take 1 Tab by mouth daily. 90 Tab 1    rosuvastatin (CRESTOR) 40 mg tablet Take 1 Tab by mouth nightly. 90 Tab 1    ferrous sulfate 325 mg (65 mg iron) tablet Take 1 Tab by mouth two (2) times a day. 60 Tab 3    benztropine (COGENTIN) 0.5 mg tablet Take 0.5 mg by mouth two (2) times a day.  sertraline (ZOLOFT) 100 mg tablet Take 200 mg by mouth daily.           Allergies   Allergen Reactions    Lipitor [Atorvastatin] Other (comments)     myalgias    Lisinopril Cough    Aspirin Other (comments)     Nose bleeds    Zocor [Simvastatin] Other (comments)     Causes pain in the left leg       Past Medical History:   Diagnosis Date    Arthritis     Chronic pain     Depression     Diabetes ([facility] [address]     Hyperlipidemia     Hypothyroid     Medial meniscus tear     Left knee    Psychiatric disorder     Tobacco abuse, in remission         Past Surgical History:   Procedure Laterality Date    HX ORTHOPAEDIC      Hand reconstructive surgery    HX TUBAL LIGATION         Family History   Problem Relation Age of Onset    Alcohol abuse Mother    Carlota Pardo Mother    Kinnie Mode Mother 61        breast cancer    Diabetes Mother     Hypertension Mother    [facility] Psychiatric Disorder Mother    [facility] Stroke Mother     Breast Cancer Mother     Alcohol abuse Father     Heart Disease Father     Diabetes Father     Hypertension Father     Lung Disease Father     Colon Cancer Father 72    Alcohol abuse Paternal Grandmother     Ovarian Cancer Sister     Alcohol abuse Sister     Diabetes Sister     Psychiatric Disorder Sister     Alcohol abuse Brother     Psychiatric Disorder Son     Psychiatric Disorder Other         nephew       Social History     Socioeconomic History    Marital status:      Spouse name: Not on file    Number of children: Not on file    Years of education: Not on file    Highest education level: Not on file   Social Needs    Financial resource strain: Not on file    Food insecurity - worry: Not on file    Food insecurity - inability: Not on file   [employer] needs - medical: Not on file   [employer] needs - non-medical: Not on file   Occupational History    Not on file   Tobacco Use    Smoking status: Former Smoker     Packs/day: 1.00     Years: 35.00     Pack years: 35.00     Types: Cigarettes     Last attempt to quit: 2011     Years since quittin.3    Smokeless tobacco: Never Used    Tobacco comment: quit    Substance and Sexual Activity    Alcohol use: No     Alcohol/week: 0.0 oz     Comment: quit 10 years ago    Drug use: No    Sexual activity: Yes     Partners: Male   Other Topics Concern     Service No    Blood Transfusions No    Caffeine Concern No    Occupational Exposure No    Hobby Hazards Not Asked    Sleep Concern Yes     Comment: takes sleeping pills    Stress Concern Not Asked    Weight Concern Yes    Special Diet Not Asked    Back Care Not Asked    Exercise Yes    Bike Helmet Not Asked    Seat Belt Yes    Self-Exams Yes   Social History Narrative    Not on file       REVIEW OF SYSTEM:  Review of Systems   Constitutional: Negative for chills and fever. Eyes: Negative for blurred vision. Respiratory: Negative for shortness of breath. Cardiovascular: Negative for chest pain, palpitations and leg swelling. Gastrointestinal: Negative for constipation, diarrhea, nausea and vomiting. Musculoskeletal: Negative for joint pain. Neurological: Negative for headaches. Objective:     Visit Vitals  /81 (BP 1 Location: Right arm, BP Patient Position: Sitting)   Pulse 78   Temp 96.8 °F (36 °C) (Oral)   Resp 14   Ht 5' 4\" (1.626 m)   Wt 177 lb 6.4 oz (80.5 kg)   LMP 06/05/2014   SpO2 98%   BMI 30.45 kg/m²       PHYSICAL EXAM:  Physical Exam   Constitutional: She is oriented to person, place, and time and well-developed, well-nourished, and in no distress. HENT:   Right Ear: Tympanic membrane, external ear and ear canal normal.   Left Ear: Tympanic membrane, external ear and ear canal normal.   Nose: Nose normal.   Mouth/Throat: Oropharynx is clear and moist.   Eyes: Pupils are equal, round, and reactive to light. Neck: Normal range of motion. Neck supple. No thyromegaly present. Cardiovascular: Normal rate, regular rhythm, normal heart sounds and intact distal pulses. No murmur heard. Pulmonary/Chest: Effort normal and breath sounds normal. She has no wheezes. Neurological: She is alert and oriented to person, place, and time. Skin: Skin is warm and dry. Vitals reviewed. Assessment/Plan:       ICD-10-CM ICD-9-CM    1. B12 deficiency E53.8 266.2 VITAMIN B12 INJECTION      THER/PROPH/DIAG INJECTION, SUBCUT/IM      cyanocobalamin (VITAMIN B-12) 1,000 mcg/mL injection   2.  Hypercholesteremia E78.00 272.0 gemfibrozil (LOPID) 600 mg tablet Patient given opportunity to ask questions. Questions answered. Patient understands plan of care. Follow-up Disposition:  Return in about 1 month (around 4/1/2019). Written by Derl Boast, as dictated by Nayely Randhawa DO.    I, Dr. Nayely Randhwaa, confirm that all documentation is accurate.

## 2019-03-01 NOTE — PROGRESS NOTES
Bertha Olmstead is a 52 y.o. female presented to clinic for a routine f/u for medication refill, DM, depression and Thyroid. Pt denies any pain at this time but states that she went to the er last weekend for right foot pain.     Learning Assessment 8/10/2018   PRIMARY LEARNER Patient   HIGHEST LEVEL OF EDUCATION - PRIMARY LEARNER  SOME COLLEGE   BARRIERS PRIMARY LEARNER NONE   CO-LEARNER CAREGIVER No   CO-LEARNER NAME -   PRIMARY LANGUAGE ENGLISH   LEARNER PREFERENCE PRIMARY LISTENING     -   ANSWERED BY patient   RELATIONSHIP SELF     Health Maintenance Due   Topic Date Due    Shingrix Vaccine Age 50> (1 of 2) 04/01/2013    FOOT EXAM Q1  04/18/2018    DTaP/Tdap/Td series (2 - Td) 10/16/2018    HEMOGLOBIN A1C Q6M  01/02/2019    MICROALBUMIN Q1  02/19/2019    PAP AKA CERVICAL CYTOLOGY  04/26/2019

## 2019-03-29 ENCOUNTER — TELEPHONE (OUTPATIENT)
Dept: FAMILY MEDICINE CLINIC | Age: 54
End: 2019-03-29

## 2019-03-29 NOTE — TELEPHONE ENCOUNTER
Pt. Would like to switch providers from [employer] [facility] to [facility]. Pt. Was already scheduled to establish care with Dr. Lauri Henry, but I didn't see any notes where it was approved. If Dr. Lauri Henry decides to accept her, she is requesting an appt. For B12 shot on 04/01/19. Advised pt. She would have to see Dr. Patricia Chery once she switch providers. Please advise.

## 2019-04-25 ENCOUNTER — HOSPITAL ENCOUNTER (OUTPATIENT)
Dept: LAB | Age: 54
Discharge: HOME | End: 2019-04-25
Payer: MEDICARE

## 2019-04-25 ENCOUNTER — TELEPHONE (OUTPATIENT)
Dept: FAMILY MEDICINE CLINIC | Age: 54
End: 2019-04-25

## 2019-04-25 ENCOUNTER — OFFICE VISIT (OUTPATIENT)
Dept: FAMILY MEDICINE CLINIC | Age: 54
End: 2019-04-25

## 2019-04-25 VITALS
DIASTOLIC BLOOD PRESSURE: 70 MMHG | TEMPERATURE: 97.2 F | BODY MASS INDEX: 30.05 KG/M2 | RESPIRATION RATE: 18 BRPM | OXYGEN SATURATION: 98 % | HEART RATE: 76 BPM | WEIGHT: 176 LBS | SYSTOLIC BLOOD PRESSURE: 103 MMHG | HEIGHT: 64 IN

## 2019-04-25 DIAGNOSIS — Z13.31 SCREENING FOR DEPRESSION: ICD-10-CM

## 2019-04-25 DIAGNOSIS — M25.562 CHRONIC PAIN OF LEFT KNEE: ICD-10-CM

## 2019-04-25 DIAGNOSIS — Z79.4 TYPE 2 DIABETES MELLITUS WITH MICROALBUMINURIA, WITH LONG-TERM CURRENT USE OF INSULIN (HCC): Primary | ICD-10-CM

## 2019-04-25 DIAGNOSIS — E03.9 HYPOTHYROIDISM, UNSPECIFIED TYPE: ICD-10-CM

## 2019-04-25 DIAGNOSIS — E11.29 TYPE 2 DIABETES MELLITUS WITH MICROALBUMINURIA, WITH LONG-TERM CURRENT USE OF INSULIN (HCC): ICD-10-CM

## 2019-04-25 DIAGNOSIS — E78.00 HYPERCHOLESTEREMIA: Primary | ICD-10-CM

## 2019-04-25 DIAGNOSIS — Z79.4 TYPE 2 DIABETES MELLITUS WITH MICROALBUMINURIA, WITH LONG-TERM CURRENT USE OF INSULIN (HCC): ICD-10-CM

## 2019-04-25 DIAGNOSIS — R80.9 TYPE 2 DIABETES MELLITUS WITH MICROALBUMINURIA, WITH LONG-TERM CURRENT USE OF INSULIN (HCC): ICD-10-CM

## 2019-04-25 DIAGNOSIS — E11.29 TYPE 2 DIABETES MELLITUS WITH MICROALBUMINURIA, WITH LONG-TERM CURRENT USE OF INSULIN (HCC): Primary | ICD-10-CM

## 2019-04-25 DIAGNOSIS — Z00.00 MEDICARE ANNUAL WELLNESS VISIT, SUBSEQUENT: ICD-10-CM

## 2019-04-25 DIAGNOSIS — Z13.39 SCREENING FOR ALCOHOLISM: ICD-10-CM

## 2019-04-25 DIAGNOSIS — F43.10 PTSD (POST-TRAUMATIC STRESS DISORDER): ICD-10-CM

## 2019-04-25 DIAGNOSIS — R80.9 TYPE 2 DIABETES MELLITUS WITH MICROALBUMINURIA, WITH LONG-TERM CURRENT USE OF INSULIN (HCC): Primary | ICD-10-CM

## 2019-04-25 DIAGNOSIS — F31.74 BIPOLAR 1 DISORDER, MANIC, FULL REMISSION (HCC): ICD-10-CM

## 2019-04-25 DIAGNOSIS — E78.00 HYPERCHOLESTEREMIA: ICD-10-CM

## 2019-04-25 DIAGNOSIS — E53.8 VITAMIN B12 DEFICIENCY: ICD-10-CM

## 2019-04-25 DIAGNOSIS — G89.29 CHRONIC PAIN OF LEFT KNEE: ICD-10-CM

## 2019-04-25 DIAGNOSIS — F33.9 RECURRENT DEPRESSION (HCC): ICD-10-CM

## 2019-04-25 LAB
CREAT UR-MCNC: <13 MG/DL (ref 30–125)
EST. AVERAGE GLUCOSE BLD GHB EST-MCNC: 186 MG/DL
HBA1C MFR BLD: 8.1 % (ref 4.2–5.6)
MICROALBUMIN UR-MCNC: 1.65 MG/DL (ref 0–3)
MICROALBUMIN/CREAT UR-RTO: ABNORMAL MG/G (ref 0–30)
T4 FREE SERPL-MCNC: 0.9 NG/DL (ref 0.7–1.5)
TSH SERPL DL<=0.05 MIU/L-ACNC: 1.83 UIU/ML (ref 0.36–3.74)
VIT B12 SERPL-MCNC: >2000 PG/ML (ref 211–911)

## 2019-04-25 PROCEDURE — 82607 VITAMIN B-12: CPT

## 2019-04-25 PROCEDURE — 84439 ASSAY OF FREE THYROXINE: CPT

## 2019-04-25 PROCEDURE — 82043 UR ALBUMIN QUANTITATIVE: CPT

## 2019-04-25 PROCEDURE — 83036 HEMOGLOBIN GLYCOSYLATED A1C: CPT

## 2019-04-25 PROCEDURE — 36415 COLL VENOUS BLD VENIPUNCTURE: CPT

## 2019-04-25 RX ORDER — CYANOCOBALAMIN 1000 UG/ML
1000 INJECTION, SOLUTION INTRAMUSCULAR; SUBCUTANEOUS ONCE
Qty: 1 ML | Refills: 0
Start: 2019-04-25 | End: 2019-04-25

## 2019-04-25 RX ORDER — OMEGA-3-ACID ETHYL ESTERS 1 G/1
2 CAPSULE, LIQUID FILLED ORAL 2 TIMES DAILY WITH MEALS
Qty: 360 CAP | Refills: 3 | Status: SHIPPED | OUTPATIENT
Start: 2019-04-25 | End: 2019-04-26 | Stop reason: SDUPTHER

## 2019-04-25 RX ORDER — PEN NEEDLE, DIABETIC 31 GX3/16"
NEEDLE, DISPOSABLE MISCELLANEOUS
Qty: 200 PEN NEEDLE | Refills: 11 | Status: SHIPPED | OUTPATIENT
Start: 2019-04-25 | End: 2019-07-22 | Stop reason: SDUPTHER

## 2019-04-25 RX ORDER — HALOPERIDOL 5 MG/1
5 TABLET ORAL 2 TIMES DAILY
COMMUNITY
End: 2020-12-07

## 2019-04-25 RX ORDER — IBUPROFEN 800 MG/1
800 TABLET ORAL
Qty: 60 TAB | Refills: 0 | Status: SHIPPED | OUTPATIENT
Start: 2019-04-25 | End: 2020-11-10 | Stop reason: SDUPTHER

## 2019-04-25 RX ORDER — LEVOTHYROXINE SODIUM 137 UG/1
137 TABLET ORAL
COMMUNITY
End: 2019-04-26 | Stop reason: SDUPTHER

## 2019-04-25 RX ORDER — OLANZAPINE 5 MG/1
TABLET ORAL
COMMUNITY
Start: 2019-04-17 | End: 2019-10-14

## 2019-04-25 NOTE — PATIENT INSTRUCTIONS
Medicare Wellness Visit, Female     The best way to live healthy is to have a lifestyle where you eat a well-balanced diet, exercise regularly, limit alcohol use, and quit all forms of tobacco/nicotine, if applicable. Regular preventive services are another way to keep healthy. Preventive services (vaccines, screening tests, monitoring & exams) can help personalize your care plan, which helps you manage your own care. Screening tests can find health problems at the earliest stages, when they are easiest to treat. [facility] follows the current, evidence-based guidelines published by the [facility] ([facility]) when recommending preventive services for our patients. Because we follow these guidelines, sometimes recommendations change over time as research supports it. (For example, mammograms used to be recommended annually. Even though Medicare will still pay for an annual mammogram, the newer guidelines recommend a mammogram every two years for women of average risk.)  Of course, you and your doctor may decide to screen more often for some diseases, based on your risk and your health status. Preventive services for you include:  - Medicare offers their members a free annual wellness visit, which is time for you and your primary care provider to discuss and plan for your preventive service needs. Take advantage of this benefit every year!  -All adults over the age of 72 should receive the recommended pneumonia vaccines. Current USPSTF guidelines recommend a series of two vaccines for the best pneumonia protection.   -All adults should have a flu vaccine yearly and a tetanus vaccine every 10 years. All adults age 61 and older should receive a shingles vaccine once in their lifetime.    -A bone mass density test is recommended when a woman turns 65 to screen for osteoporosis. This test is only recommended one time, as a screening.  Some providers will use this same test as a disease monitoring tool if you already have osteoporosis. -All adults age 38-68 who are overweight should have a diabetes screening test once every three years.   -Other screening tests and preventive services for persons with diabetes include: an eye exam to screen for diabetic retinopathy, a kidney function test, a foot exam, and stricter control over your cholesterol.   -Cardiovascular screening for adults with routine risk involves an electrocardiogram (ECG) at intervals determined by your doctor.   -Colorectal cancer screenings should be done for adults age 54-65 with no increased risk factors for colorectal cancer. There are a number of acceptable methods of screening for this type of cancer. Each test has its own benefits and drawbacks. Discuss with your doctor what is most appropriate for you during your annual wellness visit. The different tests include: colonoscopy (considered the best screening method), a fecal occult blood test, a fecal DNA test, and sigmoidoscopy. -Breast cancer screenings are recommended every other year for women of normal risk, age 54-69.  -Cervical cancer screenings for women over age 72 are only recommended with certain risk factors.   -All adults born between [facility] should be screened once for Hepatitis C.      Here is a list of your current Health Maintenance items (your personalized list of preventive services) with a due date:  Health Maintenance Due   Topic Date Due    Shingles Vaccine (1 of 2) 04/01/2013    Diabetic Foot Care  04/18/2018    Eye Exam  05/19/2018    DTaP/Tdap/Td  (2 - Td) 10/16/2018    Hemoglobin A1C    01/02/2019    Albumin Urine Test  02/19/2019    Pap Test  04/26/2019    Annual Well Visit  05/12/2019    Stool testing for trace blood  05/13/2019

## 2019-04-25 NOTE — PROGRESS NOTES
History of Present Illness  Elmira Fink is a 62 y.o. female who presents today for management of    Chief Complaint   Patient presents with    Establish Care    Knee Pain     left knee- pain 7/10 requesting ibuprofen    Foot Pain     right foot       Patient is here to establish care. Previous PCP: Dr. Guillermo Ravi    Knee Pain  Patient complains of left knee pain. Onset of the symptoms was problem is longstanding. Inciting event: none known. Current symptoms include pain location: left: anterior and severity of pain = moderate. Associated symptoms: none. Aggravating symptoms: standing, walking. Patient's overall course: waxing and waning but worse overall Patient has had prior knee problems. Patient denies none. Evaluation to date: MRI: abnormal: posterior horn meniscus tear. Treatment to date: prescription NSAIDs per med orders: effective. Diabetes Mellitus:  She has diabetes mellitus, and  hyperlipidemia. Diabetic ROS - medication compliance: compliant all of the time, diabetic diet compliance: compliant most of the time, home glucose monitoring: fasting values range 120s, nonfasting values range 150-200. Lab review: orders written for new lab studies as appropriate; see orders. Thyroid Review:  Patient is seen for followup of hypothyroidism. Thyroid ROS: denies fatigue, weight changes, heat/cold intolerance, bowel/skin changes or CVS symptoms. Depression Review:  Patient has history of depression, PTSD, bipolar disorder. Treatment includes SSRI, neuroleptics and no other therapies. Ongoing symptoms include none. She denies depressed mood, anhedonia, weight loss, insomnia and suicidal attempt. She experiences the following side effects from the treatment: none.     Past Medical History  Past Medical History:   Diagnosis Date    Arthritis     Chronic pain     Depression     Diabetes ([address]     Hyperlipidemia     Hypothyroid     Medial meniscus tear     Left knee    Psychiatric disorder  Tobacco abuse, in remission         Surgical History  Past Surgical History:   Procedure Laterality Date    HX ORTHOPAEDIC      Hand reconstructive surgery    HX TUBAL LIGATION  2004        Current Medications  Current Outpatient Medications   Medication Sig    haloperidol (HALDOL) 5 mg tablet Take 5 mg by mouth two (2) times a day.  OLANZapine (ZYPREXA) 5 mg tablet     cyanocobalamin (VITAMIN B12) 1,000 mcg/mL injection 1 mL by IntraMUSCular route once for 1 dose.  levothyroxine (SYNTHROID) 137 mcg tablet Take 137 mcg by mouth Daily (before breakfast).  Insulin Needles, Disposable, (BD ULTRA-FINE MINI PEN NEEDLE) 31 gauge x 3/16\" ndle Use 4 times daily    ibuprofen (MOTRIN) 800 mg tablet Take 1 Tab by mouth every eight (8) hours as needed for Pain.  varicella-zoster recombinant, PF, (SHINGRIX, PF,) 50 mcg/0.5 mL susr injection 0.5mL by IntraMUSCular route once now and then repeat in 2-6 months    diph,Pertuss,AC,,Tet Vac-PF (BOOSTRIX) 2.5-8-5 Lf-mcg suspension 0.5 mL by IntraMUSCular route once for 1 dose.  hydrOXYzine HCl (ATARAX) 50 mg tablet     melatonin tab tablet Take 5 mg by mouth nightly.  cyanocobalamin (VITAMIN B-12) 500 mcg tablet Take 500 mcg by mouth daily.  omega-3 acid ethyl esters (LOVAZA) 1 gram capsule Take 2 g by mouth two (2) times a day.  gemfibrozil (LOPID) 600 mg tablet Take 1 Tab by mouth two (2) times a day.  insulin lispro (HUMALOG KWIKPEN INSULIN) 100 unit/mL kwikpen Administer  6- 8 units three times daily before each meal per sliding scale (Patient taking differently: Inject TID TID using a sliding scale)    insulin glargine (LANTUS,BASAGLAR) 100 unit/mL (3 mL) inpn Take 10u at night    JANUMET 50-1,000 mg per tablet TAKE ONE TABLET BY MOUTH TWICE A DAY    oxybutynin chloride XL (DITROPAN XL) 10 mg CR tablet Take 1 Tab by mouth daily.  rosuvastatin (CRESTOR) 40 mg tablet Take 1 Tab by mouth nightly.     ferrous sulfate 325 mg (65 mg iron) tablet Take 1 Tab by mouth two (2) times a day.  benztropine (COGENTIN) 0.5 mg tablet Take 0.5 mg by mouth two (2) times a day.  sertraline (ZOLOFT) 100 mg tablet Take 200 mg by mouth daily.  loxapine (LOXITANE) 25 mg capsule Take 25 mg by mouth nightly. No current facility-administered medications for this visit.         Allergies/Drug Reactions  Allergies   Allergen Reactions    Lipitor [Atorvastatin] Other (comments)     myalgias    Lisinopril Cough    Aspirin Other (comments)     Nose bleeds    Zocor [Simvastatin] Other (comments)     Causes pain in the left leg        Family History  Family History   Problem Relation Age of Onset    Alcohol abuse Mother     Arthritis-osteo Mother    Verlena Byron Mother 61        breast cancer    Diabetes Mother     Hypertension Mother     Psychiatric Disorder Mother    Elvira Amis Stroke Mother     Breast Cancer Mother     Alcohol abuse Father     Heart Disease Father     Diabetes Father     Hypertension Father     Lung Disease Father     Colon Cancer Father 72    Alcohol abuse Paternal Grandmother     Ovarian Cancer Sister     Alcohol abuse Sister     Diabetes Sister     Psychiatric Disorder Sister     Alcohol abuse Brother     Psychiatric Disorder Son     Psychiatric Disorder Other         nephew        Social History  Social History     Socioeconomic History    Marital status:      Spouse name: Not on file    Number of children: Not on file    Years of education: Not on file    Highest education level: Not on file   Occupational History    Not on file   Social Needs    Financial resource strain: Not on file    Food insecurity:     Worry: Not on file     Inability: Not on file    Transportation needs:     Medical: Not on file     Non-medical: Not on file   Tobacco Use    Smoking status: Former Smoker     Packs/day: 1.00     Years: 35.00     Pack years: 35.00     Types: Cigarettes     Last attempt to quit: 11/7/2011     Years since quittin.4    Smokeless tobacco: Never Used    Tobacco comment: quit    Substance and Sexual Activity    Alcohol use: No     Alcohol/week: 0.0 oz     Comment: quit 10 years ago    Drug use: No    Sexual activity: Yes     Partners: Male   Lifestyle    Physical activity:     Days per week: Not on file     Minutes per session: Not on file    Stress: Not on file   Relationships    Social connections:     Talks on phone: Not on file     Gets together: Not on file     Attends [employer] service: Not on file     Active member of club or organization: Not on file     Attends meetings of clubs or organizations: Not on file     Relationship status: Not on file    Intimate partner violence:     Fear of current or ex partner: Not on file     Emotionally abused: Not on file     Physically abused: Not on file     Forced sexual activity: Not on file   Other Topics Concern     Service No    Blood Transfusions No    Caffeine Concern No    Occupational Exposure No    Hobby Hazards Not Asked    Sleep Concern Yes     Comment: takes sleeping pills    Stress Concern Not Asked    Weight Concern Yes    Special Diet Not Asked    Back Care Not Asked    Exercise Yes    Bike Helmet Not Asked    Seat Belt Yes    Self-Exams Yes   Social History Narrative    Not on file       Health Maintenance   Topic Date Due    Shingrix Vaccine Age 50> (1 of 2) 2013    FOOT EXAM Q1  2018    EYE EXAM RETINAL OR DILATED  2018    DTaP/Tdap/Td series (2 - Td) 10/16/2018    HEMOGLOBIN A1C Q6M  2019    MICROALBUMIN Q1  2019    PAP AKA CERVICAL CYTOLOGY  2019    MEDICARE YEARLY EXAM  2019    FOBT Q 1 YEAR AGE 50-75  2019    Pneumococcal 0-64 years (1 of 1 - PPSV23) 2020 (Originally 1971)    Influenza Age 5 to Adult  2019    LIPID PANEL Q1  10/22/2019    BREAST CANCER SCRN MAMMOGRAM  2020    Bone Densitometry (Dexa) Screening  2028    Hepatitis C Screening  Completed     Immunization History   Administered Date(s) Administered    Influenza Vaccine 10/21/2013    Influenza Vaccine (Quad) 09/08/2015    Influenza Vaccine (Quad) PF 10/12/2016, 09/18/2017, 09/07/2018       Review of Systems  Negative except as mentioned in HPI     Hearing Screening    125Hz 250Hz 500Hz 1000Hz 2000Hz 3000Hz 4000Hz 6000Hz 8000Hz   Right ear:   Pass Pass Pass  Pass     Left ear:   Pass Pass Pass  Pass        Visual Acuity Screening    Right eye Left eye Both eyes   Without correction:      With correction: 20/30 20/15 20/15       Physical Exam  Vital signs:   Vitals:    04/25/19 1006   BP: 103/70   Pulse: 76   Resp: 18   Temp: 97.2 °F (36.2 °C)   TempSrc: Oral   SpO2: 98%   Weight: 176 lb (79.8 kg)   Height: 5' 4\" (1.626 m)       General: alert, oriented, not in distress  Head: scalp normal, atraumatic  Eyes: pupils are equal and reactive, full and intact EOM's  Neck: supple, no JVD, no lymphadenopathy, non-palpable thyroid  Chest/Lungs: clear breath sounds, no wheezing or crackles  Heart: normal rate, regular rhythm, no murmur  Extremities: no focal deformities, no edema  Skin: no active skin lesions      Laboratory/Tests:  Component      Latest Ref Rng & Units 1/26/2019 1/26/2019 1/26/2019 1/26/2019           4:20 AM  4:20 AM  4:15 AM  4:15 AM   WBC      4.6 - 13.2 K/uL  6.5     RBC      4.20 - 5.30 M/uL  4.34     HGB      12.0 - 16.0 g/dL  11.6 (L)     HCT      35.0 - 45.0 %  36.3     MCV      74.0 - 97.0 FL  83.6     MCH      24.0 - 34.0 PG  26.7     MCHC      31.0 - 37.0 g/dL  32.0     RDW      11.6 - 14.5 %  14.3     PLATELET      394 - 236 K/uL  335     MPV      9.2 - 11.8 FL  9.7     NEUTROPHILS      40 - 73 %  42     LYMPHOCYTES      21 - 52 %  45     MONOCYTES      3 - 10 %  9     EOSINOPHILS      0 - 5 %  3     BASOPHILS      0 - 2 %  1     ABS. NEUTROPHILS      1.8 - 8.0 K/UL  2.7     ABS. LYMPHOCYTES      0.9 - 3.6 K/UL  3.0     ABS.  MONOCYTES      0.05 - 1.2 K/UL  0.6     ABS. EOSINOPHILS      0.0 - 0.4 K/UL  0.2     ABS. BASOPHILS      0.0 - 0.1 K/UL  0.0     DF        AUTOMATED     Sodium      136 - 145 mmol/L 135 (L)      Potassium      3.5 - 5.5 mmol/L 3.7      Chloride      100 - 108 mmol/L 100      CO2      21 - 32 mmol/L 26      Anion gap      3.0 - 18 mmol/L 9      Glucose      74 - 99 mg/dL 391 (H)      BUN      7.0 - 18 MG/DL 10      Creatinine      0.6 - 1.3 MG/DL 0.98      BUN/Creatinine ratio      12 - 20   10 (L)      GFR est AA      >60 ml/min/1.73m2 >60      GFR est non-AA      >60 ml/min/1.73m2 59 (L)      Calcium      8.5 - 10.1 MG/DL 9.4      Bilirubin, total      0.2 - 1.0 MG/DL 0.2      ALT (SGPT)      13 - 56 U/L 19      AST      15 - 37 U/L 7 (L)      Alk.  phosphatase      45 - 117 U/L 152 (H)      Protein, total      6.4 - 8.2 g/dL 8.3 (H)      Albumin      3.4 - 5.0 g/dL 4.4      Globulin      2.0 - 4.0 g/dL 3.9      A-G Ratio      0.8 - 1.7   1.1      Color          YELLOW   Appearance          CLEAR   Specific gravity      1.005 - 1.030      1.007   pH (UA)      5.0 - 8.0      6.5   Protein      NEG mg/dL    NEGATIVE   Glucose      NEG mg/dL    >1,000 (A)   Ketone      NEG mg/dL    NEGATIVE   Bilirubin      NEG      NEGATIVE   Blood      NEG      NEGATIVE   Urobilinogen      0.2 - 1.0 EU/dL    0.2   Nitrites      NEG      NEGATIVE   Leukocyte Esterase      NEG      TRACE (A)   WBC      0 - 4 /hpf   4 to 10    RBC      0 - 5 /hpf   0 to 3    Epithelial cells      0 - 5 /lpf   FEW    Bacteria      NEG /hpf   3+ (A)      Component      Latest Ref Rng & Units 12/21/2018 7/10/2018 7/10/2018 7/2/2018           9:12 AM 11:45 AM 11:45 AM  9:36 AM   Vitamin B12      211 - 911 pg/mL    199 (L)   Folate      3.10 - 17.50 ng/mL    9.9   Intrinsic factor Abs      0.0 - 1.1 AU/mL   1.0    Antiparietal Cell Ab      0.0 - 20.0 Units  2.9     TSH      0.36 - 3.74 uIU/mL 4.30 (H)        Component      Latest Ref Rng & Units 7/2/2018 7/2/2018 7/2/2018 2/19/2018           9:36 AM  9:36 AM  9:36 AM 10:52 AM   Hgb Solubility      NEGATIVE   NEGATIVE      Hemoglobin A      96.4 - 98.8 % 98.2      Hemoglobin S      0.0 % 0.0      Hemoglobin C      0.0 % 0.0      Hemoglobin A2      1.8 - 3.2 % 1.8      Hemoglobin F      0.0 - 2.0 % 0.0      Hemoglobin, Other      0.0 % 0.0      Interpretation       Comment      Creatinine, urine      Not Estab. mg/dL    27.4   Microalbumin, urine      Not Estab. ug/mL    13.1   Microalbumin/Creat. Ratio      0.0 - 30.0 mg/g creat    47.8 (H)   Iron      50 - 175 ug/dL  92     TIBC      250 - 450 ug/dL  453 (H)     Iron % saturation      20 - 50 %  20     Ferritin      8 - 388 NG/ML   34        Assessment/Plan:    1. Type 2 diabetes mellitus with microalbuminuria, with long-term current use of insulin (HCC)  - control uncertain  - continue Lantus and Humalog SS, Janumet  - HEMOGLOBIN A1C WITH EAG; Future  - MICROALBUMIN, UR, RAND W/ MICROALB/CREAT RATIO; Future  - Insulin Needles, Disposable, (BD ULTRA-FINE MINI PEN NEEDLE) 31 gauge x 3/16\" ndle; Use 4 times daily  Dispense: 200 Pen Needle; Refill: 11    2. Hypercholesteremia  - controlled    3. Hypothyroidism, unspecified type  - on levothyroxine 137mcg per day  - TSH AND FREE T4; Future    4. Vitamin B12 deficiency  - VITAMIN B12; Future  - VITAMIN B12 INJECTION  - [place] THER/PROPH/DIAG INJECTION, SUBCUT/IM  - cyanocobalamin (VITAMIN B12) 1,000 mcg/mL injection; 1 mL by IntraMUSCular route once for 1 dose. Dispense: 1 mL; Refill: 0    5. Recurrent depression (HCC)  - stable    6. PTSD (post-traumatic stress disorder)  - stable    7. Bipolar 1 disorder, manic, full remission (HCC)  - stable    8. Chronic pain of left knee  - torn posterior menicus  - ibuprofen (MOTRIN) 800 mg tablet; Take 1 Tab by mouth every eight (8) hours as needed for Pain. Dispense: 60 Tab; Refill: 0      Follow-up and Dispositions    · Return in about 3 months (around 7/25/2019) for ROV.            I have discussed the diagnosis with the patient and the intended plan as seen in the above orders. The patient has received an after-visit summary and questions were answered concerning future plans. I have discussed medication side effects and warnings with the patient as well. I have reviewed the plan of care with the patient, accepted their input and they are in agreement with the treatment goals. Lajuan Habermann, MD  April 25, 2019       This is the Subsequent Medicare Annual Wellness Exam, performed 12 months or more after the Initial AWV or the last Subsequent AWV    I have reviewed the patient's medical history in detail and updated the computerized patient record. History     Past Medical History:   Diagnosis Date    Arthritis     Chronic pain     Depression     Diabetes ([facility] [address]     Hyperlipidemia     Hypothyroid     Medial meniscus tear     Left knee    Psychiatric disorder     Tobacco abuse, in remission       Past Surgical History:   Procedure Laterality Date    HX ORTHOPAEDIC      Hand reconstructive surgery    HX TUBAL LIGATION  2004     Current Outpatient Medications   Medication Sig Dispense Refill    haloperidol (HALDOL) 5 mg tablet Take 5 mg by mouth two (2) times a day.  OLANZapine (ZYPREXA) 5 mg tablet       cyanocobalamin (VITAMIN B12) 1,000 mcg/mL injection 1 mL by IntraMUSCular route once for 1 dose. 1 mL 0    levothyroxine (SYNTHROID) 137 mcg tablet Take 137 mcg by mouth Daily (before breakfast).  Insulin Needles, Disposable, (BD ULTRA-FINE MINI PEN NEEDLE) 31 gauge x 3/16\" ndle Use 4 times daily 200 Pen Needle 11    ibuprofen (MOTRIN) 800 mg tablet Take 1 Tab by mouth every eight (8) hours as needed for Pain.  60 Tab 0    varicella-zoster recombinant, PF, (SHINGRIX, PF,) 50 mcg/0.5 mL susr injection 0.5mL by IntraMUSCular route once now and then repeat in 2-6 months 0.5 mL 1    diph,Jose Cruz,AC,,Tet Vac-PF (BOOSTRIX) 2.5-8-5 Lf-mcg suspension 0.5 mL by IntraMUSCular route once for 1 dose. 0.5 mL 0    hydrOXYzine HCl (ATARAX) 50 mg tablet       melatonin tab tablet Take 5 mg by mouth nightly.  cyanocobalamin (VITAMIN B-12) 500 mcg tablet Take 500 mcg by mouth daily.  omega-3 acid ethyl esters (LOVAZA) 1 gram capsule Take 2 g by mouth two (2) times a day.  gemfibrozil (LOPID) 600 mg tablet Take 1 Tab by mouth two (2) times a day. 180 Tab 1    insulin lispro (HUMALOG KWIKPEN INSULIN) 100 unit/mL kwikpen Administer  6- 8 units three times daily before each meal per sliding scale (Patient taking differently: Inject TID TID using a sliding scale) 5 Pen 0    insulin glargine (LANTUS,BASAGLAR) 100 unit/mL (3 mL) inpn Take 10u at night 5 Pen 0    JANUMET 50-1,000 mg per tablet TAKE ONE TABLET BY MOUTH TWICE A  Tab 0    oxybutynin chloride XL (DITROPAN XL) 10 mg CR tablet Take 1 Tab by mouth daily. 90 Tab 1    rosuvastatin (CRESTOR) 40 mg tablet Take 1 Tab by mouth nightly. 90 Tab 1    ferrous sulfate 325 mg (65 mg iron) tablet Take 1 Tab by mouth two (2) times a day. 60 Tab 3    benztropine (COGENTIN) 0.5 mg tablet Take 0.5 mg by mouth two (2) times a day.  sertraline (ZOLOFT) 100 mg tablet Take 200 mg by mouth daily.  loxapine (LOXITANE) 25 mg capsule Take 25 mg by mouth nightly.        Allergies   Allergen Reactions    Lipitor [Atorvastatin] Other (comments)     myalgias    Lisinopril Cough    Aspirin Other (comments)     Nose bleeds    Zocor [Simvastatin] Other (comments)     Causes pain in the left leg     Family History   Problem Relation Age of Onset    Alcohol abuse Mother     Arthritis-osteo Mother     Cancer Mother 61        breast cancer    Diabetes Mother     Hypertension Mother     Psychiatric Disorder Mother    [facility] Stroke Mother     Breast Cancer Mother     Alcohol abuse Father     Heart Disease Father     Diabetes Father     Hypertension Father     Lung Disease Father     Colon Cancer Father 72    Alcohol abuse Paternal Grandmother     Ovarian Cancer Sister     Alcohol abuse Sister     Diabetes Sister     Psychiatric Disorder Sister     Alcohol abuse Brother     Psychiatric Disorder Son     Psychiatric Disorder Other         nephew     Social History     Tobacco Use    Smoking status: Former Smoker     Packs/day: 1.00     Years: 35.00     Pack years: 35.00     Types: Cigarettes     Last attempt to quit: 2011     Years since quittin.4    Smokeless tobacco: Never Used    Tobacco comment: quit    Substance Use Topics    Alcohol use: No     Alcohol/week: 0.0 oz     Comment: quit 10 years ago     Patient Active Problem List   Diagnosis Code    Hypothyroidism E03.9    Hypercholesteremia E78.00    Type 2 diabetes mellitus with microalbuminuria, with long-term current use of insulin (HCC) E11.29, R80.9, Z79.4    Tear of meniscus of left knee S83.207A    Recurrent depression ([facility]) F33.9    Left low back pain M54.5    History of bipolar disorder Z86.59    History of alcohol abuse Z87.898    Lumbar degenerative disc disease M51.36    Hypothyroidism due to acquired atrophy of thyroid E03.4    Vitamin B12 deficiency E53.8    Bipolar 1 disorder, manic, full remission ([facility] [address] F31.74    PTSD (post-traumatic stress disorder) F43.10       Depression Risk Factor Screening:     3 most recent PHQ Screens 2019   Little interest or pleasure in doing things Not at all   Feeling down, depressed, irritable, or hopeless Several days   Total Score PHQ 2 1     Alcohol Risk Factor Screening: You do not drink alcohol or very rarely. Functional Ability and Level of Safety:   Hearing Loss  Hearing is good. Activities of Daily Living  The home contains: no safety equipment. Patient does total self care    Fall Risk  Fall Risk Assessment, last 12 mths 2019   Able to walk? Yes   Fall in past 12 months?  No       Abuse Screen  Patient is not abused    Cognitive Screening   Evaluation of Cognitive Function:  Has your family/caregiver stated any concerns about your memory: no  Normal    Patient Care Team   Patient Care Team:  Dari Lewis MD as PCP - General (Internal Medicine)  Octaviano Singh MD (Cardiology)  Byron Zuñiga as Nurse Navigator  Tiffany Rivas MD (Ophthalmology)    Assessment/Plan   Education and counseling provided:  Are appropriate based on today's review and evaluation  Medical nutrition therapy for individuals with diabetes or renal disease    Diagnoses and all orders for this visit:    1. Type 2 diabetes mellitus with microalbuminuria, with long-term current use of insulin (HCC)  -     HEMOGLOBIN A1C WITH EAG; Future  -     MICROALBUMIN, UR, RAND W/ MICROALB/CREAT RATIO; Future  -     Insulin Needles, Disposable, (BD ULTRA-FINE MINI PEN NEEDLE) 31 gauge x 3/16\" ndle; Use 4 times daily    2. Hypercholesteremia    3. Hypothyroidism, unspecified type  -     TSH AND FREE T4; Future    4. Vitamin B12 deficiency  -     VITAMIN B12; Future  -     VITAMIN B12 INJECTION  -     [place] THER/PROPH/DIAG INJECTION, SUBCUT/IM  -     cyanocobalamin (VITAMIN B12) 1,000 mcg/mL injection; 1 mL by IntraMUSCular route once for 1 dose. 5. Recurrent depression ([facility] [address]    6. PTSD (post-traumatic stress disorder)    7. Bipolar 1 disorder, manic, full remission ([address]    8. Chronic pain of left knee  -     ibuprofen (MOTRIN) 800 mg tablet; Take 1 Tab by mouth every eight (8) hours as needed for Pain. 9. Medicare annual wellness visit, subsequent    10. Screening for alcoholism  -     [place] ANNUAL ALCOHOL SCREEN 15 MIN    11. Screening for depression  -     Wendy Drummond    Other orders  -     varicella-zoster recombinant, PF, (SHINGRIX, PF,) 50 mcg/0.5 mL susr injection; 0.5mL by IntraMUSCular route once now and then repeat in 2-6 months  -     diph,Pertuss,AC,,Tet Vac-PF (BOOSTRIX) 2.5-8-5 Lf-mcg suspension; 0.5 mL by IntraMUSCular route once for 1 dose.         Health Maintenance Due   Topic Date Due    Shingrix Vaccine Age 50> (1 of 2) 04/01/2013    FOOT EXAM Q1  04/18/2018    EYE EXAM RETINAL OR DILATED  05/19/2018    DTaP/Tdap/Td series (2 - Td) 10/16/2018    HEMOGLOBIN A1C Q6M  01/02/2019    MICROALBUMIN Q1  02/19/2019    PAP AKA CERVICAL CYTOLOGY  04/26/2019    MEDICARE YEARLY EXAM  05/12/2019    FOBT Q 1 YEAR AGE 50-75  05/13/2019

## 2019-04-25 NOTE — TELEPHONE ENCOUNTER
[facility] pharmacy called, requesting option for patient to stop Lopid since she was on a high dose of Crestor. Spoke to Dr. Diana Navas, per provider, patient can stop Lopid and bloodwork will be completed on followup. Message given to Jonathan Gutierrez, Clarice Gruber. This encounter will be closed.

## 2019-04-25 NOTE — PROGRESS NOTES
1. Have you been to the ER, urgent care clinic since your last visit? Hospitalized since your last visit? No    2. Have you seen or consulted any other health care providers outside of the [address] [place] [address] since your last visit? Include any pap smears or colon screening. No      Per verbal order from Dr. Jose Leonardo- injection of b12 administered. Verified by this nurse and Mert Carlson LPN that this is the correct immunization/injection. Patient instructed to remain in clinic for 15 minutes and to report any adverse reactions immediately. Most recent VIS given. No adverse reactions noted after 15 minutes of observation.

## 2019-04-26 DIAGNOSIS — E78.00 HYPERCHOLESTEREMIA: ICD-10-CM

## 2019-04-26 DIAGNOSIS — E03.4 HYPOTHYROIDISM DUE TO ACQUIRED ATROPHY OF THYROID: Primary | ICD-10-CM

## 2019-04-26 RX ORDER — OMEGA-3-ACID ETHYL ESTERS 1 G/1
2 CAPSULE, LIQUID FILLED ORAL 2 TIMES DAILY WITH MEALS
Qty: 360 CAP | Refills: 3 | Status: SHIPPED | OUTPATIENT
Start: 2019-04-26 | End: 2019-06-11 | Stop reason: SDUPTHER

## 2019-04-26 RX ORDER — LEVOTHYROXINE SODIUM 137 UG/1
137 TABLET ORAL
Qty: 90 TAB | Refills: 1 | Status: SHIPPED | OUTPATIENT
Start: 2019-04-26 | End: 2019-06-11 | Stop reason: SDUPTHER

## 2019-04-26 NOTE — PROGRESS NOTES
B12 level elevated. Will decrease B12 injection from [id number] to [id number]. Continue PO vitamin B12 tabs.

## 2019-05-01 DIAGNOSIS — E11.9 TYPE 2 DIABETES MELLITUS WITHOUT COMPLICATION (HCC): ICD-10-CM

## 2019-06-05 DIAGNOSIS — R80.9 TYPE 2 DIABETES MELLITUS WITH MICROALBUMINURIA, WITH LONG-TERM CURRENT USE OF INSULIN (HCC): Primary | ICD-10-CM

## 2019-06-05 DIAGNOSIS — E11.29 TYPE 2 DIABETES MELLITUS WITH MICROALBUMINURIA, WITH LONG-TERM CURRENT USE OF INSULIN (HCC): Primary | ICD-10-CM

## 2019-06-05 DIAGNOSIS — Z79.4 TYPE 2 DIABETES MELLITUS WITH MICROALBUMINURIA, WITH LONG-TERM CURRENT USE OF INSULIN (HCC): Primary | ICD-10-CM

## 2019-06-06 RX ORDER — INSULIN GLARGINE 100 [IU]/ML
10 INJECTION, SOLUTION SUBCUTANEOUS
Qty: 5 PEN | Refills: 5 | Status: SHIPPED | OUTPATIENT
Start: 2019-06-06 | End: 2019-06-11 | Stop reason: SDUPTHER

## 2019-06-11 DIAGNOSIS — E03.4 HYPOTHYROIDISM DUE TO ACQUIRED ATROPHY OF THYROID: ICD-10-CM

## 2019-06-11 DIAGNOSIS — E78.00 HYPERCHOLESTEREMIA: ICD-10-CM

## 2019-06-11 DIAGNOSIS — Z79.4 TYPE 2 DIABETES MELLITUS WITH MICROALBUMINURIA, WITH LONG-TERM CURRENT USE OF INSULIN (HCC): ICD-10-CM

## 2019-06-11 DIAGNOSIS — N39.3 STRESS INCONTINENCE OF URINE: ICD-10-CM

## 2019-06-11 DIAGNOSIS — E11.29 TYPE 2 DIABETES MELLITUS WITH MICROALBUMINURIA, WITH LONG-TERM CURRENT USE OF INSULIN (HCC): ICD-10-CM

## 2019-06-11 DIAGNOSIS — R80.9 TYPE 2 DIABETES MELLITUS WITH MICROALBUMINURIA, WITH LONG-TERM CURRENT USE OF INSULIN (HCC): ICD-10-CM

## 2019-06-11 DIAGNOSIS — E11.9 TYPE 2 DIABETES MELLITUS WITHOUT COMPLICATION, UNSPECIFIED WHETHER LONG TERM INSULIN USE (HCC): ICD-10-CM

## 2019-06-11 RX ORDER — OMEGA-3-ACID ETHYL ESTERS 1 G/1
2 CAPSULE, LIQUID FILLED ORAL 2 TIMES DAILY WITH MEALS
Qty: 360 CAP | Refills: 3 | Status: SHIPPED | OUTPATIENT
Start: 2019-06-11 | End: 2020-05-27

## 2019-06-11 RX ORDER — OXYBUTYNIN CHLORIDE 10 MG/1
10 TABLET, EXTENDED RELEASE ORAL DAILY
Qty: 90 TAB | Refills: 2 | Status: SHIPPED | OUTPATIENT
Start: 2019-06-11 | End: 2020-02-11

## 2019-06-11 RX ORDER — INSULIN LISPRO 100 [IU]/ML
INJECTION, SOLUTION INTRAVENOUS; SUBCUTANEOUS
Qty: 5 PEN | Refills: 5 | Status: SHIPPED | OUTPATIENT
Start: 2019-06-11 | End: 2020-01-31

## 2019-06-11 RX ORDER — INSULIN GLARGINE 100 [IU]/ML
10 INJECTION, SOLUTION SUBCUTANEOUS
Qty: 5 PEN | Refills: 7 | Status: SHIPPED | OUTPATIENT
Start: 2019-06-11 | End: 2019-09-11 | Stop reason: SDUPTHER

## 2019-06-11 RX ORDER — LEVOTHYROXINE SODIUM 137 UG/1
137 TABLET ORAL
Qty: 90 TAB | Refills: 2 | Status: SHIPPED | OUTPATIENT
Start: 2019-06-11 | End: 2019-07-01 | Stop reason: SDUPTHER

## 2019-06-20 DIAGNOSIS — F43.10 PTSD (POST-TRAUMATIC STRESS DISORDER): Primary | ICD-10-CM

## 2019-06-20 RX ORDER — HYDROXYZINE 50 MG/1
50 TABLET, FILM COATED ORAL
Qty: 90 TAB | Refills: 1 | Status: SHIPPED | OUTPATIENT
Start: 2019-06-20 | End: 2020-01-31

## 2019-06-21 DIAGNOSIS — N39.3 STRESS INCONTINENCE OF URINE: ICD-10-CM

## 2019-06-24 RX ORDER — OXYBUTYNIN CHLORIDE 10 MG/1
TABLET, EXTENDED RELEASE ORAL
Qty: 90 TAB | Refills: 2 | OUTPATIENT
Start: 2019-06-24

## 2019-07-01 DIAGNOSIS — E03.4 HYPOTHYROIDISM DUE TO ACQUIRED ATROPHY OF THYROID: ICD-10-CM

## 2019-07-01 DIAGNOSIS — E78.00 HYPERCHOLESTEREMIA: ICD-10-CM

## 2019-07-01 DIAGNOSIS — E11.9 TYPE 2 DIABETES MELLITUS WITHOUT COMPLICATION (HCC): ICD-10-CM

## 2019-07-02 RX ORDER — ROSUVASTATIN CALCIUM 40 MG/1
40 TABLET, COATED ORAL
Qty: 90 TAB | Refills: 0 | Status: SHIPPED | OUTPATIENT
Start: 2019-07-02 | End: 2019-09-29 | Stop reason: SDUPTHER

## 2019-07-02 RX ORDER — LEVOTHYROXINE SODIUM 137 UG/1
137 TABLET ORAL
Qty: 90 TAB | Refills: 0 | Status: SHIPPED | OUTPATIENT
Start: 2019-07-02 | End: 2019-08-07 | Stop reason: SDUPTHER

## 2019-07-03 DIAGNOSIS — N39.3 STRESS INCONTINENCE OF URINE: ICD-10-CM

## 2019-07-03 RX ORDER — OXYBUTYNIN CHLORIDE 10 MG/1
10 TABLET, EXTENDED RELEASE ORAL DAILY
Qty: 90 TAB | Refills: 2 | Status: CANCELLED | OUTPATIENT
Start: 2019-07-03

## 2019-07-03 NOTE — TELEPHONE ENCOUNTER
Nurse called patient, two patient identifiers used and confirmed by patient. Nurse spoke to pharmacy and order is being shipped. Advised patient to attempt to call pharmacy first for refill. Pt verbalized understanding, this encounter will be closed.      oxybutynin chloride XL (DITROPAN XL) 10 mg CR tablet

## 2019-07-03 NOTE — TELEPHONE ENCOUNTER
Requested Prescriptions     Pending Prescriptions Disp Refills    oxybutynin chloride XL (DITROPAN XL) 10 mg CR tablet 90 Tab 2     Sig: Take 1 Tab by mouth daily. Requesting 3 month supply sent to [facility]. Please advise, thank you.

## 2019-07-22 DIAGNOSIS — R80.9 TYPE 2 DIABETES MELLITUS WITH MICROALBUMINURIA, WITH LONG-TERM CURRENT USE OF INSULIN (HCC): ICD-10-CM

## 2019-07-22 DIAGNOSIS — Z79.4 TYPE 2 DIABETES MELLITUS WITH MICROALBUMINURIA, WITH LONG-TERM CURRENT USE OF INSULIN (HCC): ICD-10-CM

## 2019-07-22 DIAGNOSIS — E11.29 TYPE 2 DIABETES MELLITUS WITH MICROALBUMINURIA, WITH LONG-TERM CURRENT USE OF INSULIN (HCC): ICD-10-CM

## 2019-07-23 RX ORDER — PEN NEEDLE, DIABETIC 31 GX3/16"
NEEDLE, DISPOSABLE MISCELLANEOUS
Qty: 200 PEN NEEDLE | Refills: 11 | Status: SHIPPED | OUTPATIENT
Start: 2019-07-23 | End: 2020-06-03 | Stop reason: SDUPTHER

## 2019-07-25 ENCOUNTER — HOSPITAL ENCOUNTER (OUTPATIENT)
Dept: LAB | Age: 54
Discharge: HOME | End: 2019-07-25
Payer: MEDICARE

## 2019-07-25 ENCOUNTER — OFFICE VISIT (OUTPATIENT)
Dept: FAMILY MEDICINE CLINIC | Age: 54
End: 2019-07-25

## 2019-07-25 VITALS
WEIGHT: 184 LBS | SYSTOLIC BLOOD PRESSURE: 101 MMHG | HEART RATE: 74 BPM | OXYGEN SATURATION: 98 % | RESPIRATION RATE: 18 BRPM | BODY MASS INDEX: 31.41 KG/M2 | DIASTOLIC BLOOD PRESSURE: 70 MMHG | HEIGHT: 64 IN | TEMPERATURE: 98.3 F

## 2019-07-25 DIAGNOSIS — E53.8 VITAMIN B12 DEFICIENCY: ICD-10-CM

## 2019-07-25 DIAGNOSIS — E78.00 HYPERCHOLESTEREMIA: ICD-10-CM

## 2019-07-25 DIAGNOSIS — E03.4 HYPOTHYROIDISM DUE TO ACQUIRED ATROPHY OF THYROID: ICD-10-CM

## 2019-07-25 DIAGNOSIS — R80.9 TYPE 2 DIABETES MELLITUS WITH MICROALBUMINURIA, WITH LONG-TERM CURRENT USE OF INSULIN (HCC): ICD-10-CM

## 2019-07-25 DIAGNOSIS — E11.29 TYPE 2 DIABETES MELLITUS WITH MICROALBUMINURIA, WITH LONG-TERM CURRENT USE OF INSULIN (HCC): Primary | ICD-10-CM

## 2019-07-25 DIAGNOSIS — Z79.4 TYPE 2 DIABETES MELLITUS WITH MICROALBUMINURIA, WITH LONG-TERM CURRENT USE OF INSULIN (HCC): Primary | ICD-10-CM

## 2019-07-25 DIAGNOSIS — F31.74 BIPOLAR 1 DISORDER, MANIC, FULL REMISSION (HCC): ICD-10-CM

## 2019-07-25 DIAGNOSIS — Z79.4 TYPE 2 DIABETES MELLITUS WITH MICROALBUMINURIA, WITH LONG-TERM CURRENT USE OF INSULIN (HCC): ICD-10-CM

## 2019-07-25 DIAGNOSIS — R80.9 TYPE 2 DIABETES MELLITUS WITH MICROALBUMINURIA, WITH LONG-TERM CURRENT USE OF INSULIN (HCC): Primary | ICD-10-CM

## 2019-07-25 DIAGNOSIS — E11.29 TYPE 2 DIABETES MELLITUS WITH MICROALBUMINURIA, WITH LONG-TERM CURRENT USE OF INSULIN (HCC): ICD-10-CM

## 2019-07-25 LAB
ALBUMIN SERPL-MCNC: 4.1 G/DL (ref 3.4–5)
ALBUMIN/GLOB SERPL: 1.2 {RATIO} (ref 0.8–1.7)
ALP SERPL-CCNC: 86 U/L (ref 45–117)
ALT SERPL-CCNC: 25 U/L (ref 13–56)
ANION GAP SERPL CALC-SCNC: 6 MMOL/L (ref 3–18)
AST SERPL-CCNC: 14 U/L (ref 10–38)
BILIRUB SERPL-MCNC: 0.5 MG/DL (ref 0.2–1)
BUN SERPL-MCNC: 8 MG/DL (ref 7–18)
BUN/CREAT SERPL: 9 (ref 12–20)
CALCIUM SERPL-MCNC: 9.6 MG/DL (ref 8.5–10.1)
CHLORIDE SERPL-SCNC: 102 MMOL/L (ref 100–111)
CHOLEST SERPL-MCNC: 137 MG/DL
CO2 SERPL-SCNC: 29 MMOL/L (ref 21–32)
CREAT SERPL-MCNC: 0.85 MG/DL (ref 0.6–1.3)
EST. AVERAGE GLUCOSE BLD GHB EST-MCNC: 203 MG/DL
FOLATE SERPL-MCNC: >20 NG/ML (ref 3.1–17.5)
GLOBULIN SER CALC-MCNC: 3.4 G/DL (ref 2–4)
GLUCOSE SERPL-MCNC: 161 MG/DL (ref 74–99)
HBA1C MFR BLD: 8.7 % (ref 4.2–5.6)
HDLC SERPL-MCNC: 47 MG/DL (ref 40–60)
HDLC SERPL: 2.9 {RATIO} (ref 0–5)
LDLC SERPL CALC-MCNC: 44.6 MG/DL (ref 0–100)
LIPID PROFILE,FLP: ABNORMAL
POTASSIUM SERPL-SCNC: 4.6 MMOL/L (ref 3.5–5.5)
PROT SERPL-MCNC: 7.5 G/DL (ref 6.4–8.2)
SODIUM SERPL-SCNC: 137 MMOL/L (ref 136–145)
T4 FREE SERPL-MCNC: 1 NG/DL (ref 0.7–1.5)
TRIGL SERPL-MCNC: 227 MG/DL (ref ?–150)
TSH SERPL DL<=0.05 MIU/L-ACNC: 4.37 UIU/ML (ref 0.36–3.74)
VIT B12 SERPL-MCNC: 801 PG/ML (ref 211–911)
VLDLC SERPL CALC-MCNC: 45.4 MG/DL

## 2019-07-25 PROCEDURE — 83036 HEMOGLOBIN GLYCOSYLATED A1C: CPT

## 2019-07-25 PROCEDURE — 36415 COLL VENOUS BLD VENIPUNCTURE: CPT

## 2019-07-25 PROCEDURE — 80061 LIPID PANEL: CPT

## 2019-07-25 PROCEDURE — 82607 VITAMIN B-12: CPT

## 2019-07-25 PROCEDURE — 84439 ASSAY OF FREE THYROXINE: CPT

## 2019-07-25 PROCEDURE — 80053 COMPREHEN METABOLIC PANEL: CPT

## 2019-07-25 NOTE — PROGRESS NOTES
Jorje Ho is a 62 y.o.  female and presents with    Chief Complaint   Patient presents with    Medication Evaluation       Subjective:  Patient of Dr. Tj Sprague  Ms. Tricia Lenz presents today for medication refill and labs. She reports fatigue. She states she was on vitamin b12 injection every 3 months and would like her levels checked today. She would like a stool kit for colon cancer screening. Diabetes Mellitus:  She has diabetes mellitus, and  hyperlipidemia. Diabetic ROS - medication compliance: compliant all of the time, diabetic diet compliance: compliant most of the time, home glucose monitoring: fasting values range 130-200. Taking Lantus 10 units nightly and sliding scale Humalog       She goes to [facility] for her bipolar disorder. Additional Concerns: No         Patient Active Problem List   Diagnosis Code    Hypothyroidism E03.9    Hypercholesteremia E78.00    Type 2 diabetes mellitus with microalbuminuria, with long-term current use of insulin ([facility]) E11.29, R80.9, Z79.4    Tear of meniscus of left knee S83.207A    Recurrent depression ([address] F33.9    Left low back pain M54.5    History of bipolar disorder Z86.59    History of alcohol abuse Z87.898    Lumbar degenerative disc disease M51.36    Hypothyroidism due to acquired atrophy of thyroid E03.4    Vitamin B12 deficiency E53.8    Bipolar 1 disorder, manic, full remission ([facility]) F31.74    PTSD (post-traumatic stress disorder) F43.10     Current Outpatient Medications   Medication Sig Dispense Refill    Insulin Needles, Disposable, (BD ULTRA-FINE MINI PEN NEEDLE) 31 gauge x 3/16\" ndle Use 4 times daily 200 Pen Needle 11    rosuvastatin (CRESTOR) 40 mg tablet Take 1 Tab by mouth nightly. 90 Tab 0    SITagliptin-metFORMIN (JANUMET) 50-1,000 mg per tablet TAKE ONE TABLET BY MOUTH TWICE A  Tab 0    levothyroxine (SYNTHROID) 137 mcg tablet Take 137 mcg by mouth Daily (before breakfast).  80 Tab 0    hydrOXYzine HCl (ATARAX) 50 mg tablet Take 1 Tab by mouth three (3) times daily as needed for Anxiety. 90 Tab 1    oxybutynin chloride XL (DITROPAN XL) 10 mg CR tablet Take 1 Tab by mouth daily. 90 Tab 2    insulin glargine (LANTUS,BASAGLAR) 100 unit/mL (3 mL) inpn 10 Units by SubCUTAneous route nightly. 5 Pen 7    insulin lispro (HUMALOG KWIKPEN INSULIN) 100 unit/mL kwikpen Administer  6- 8 units three times daily before each meal per sliding scale 5 Pen 5    omega-3 acid ethyl esters (LOVAZA) 1 gram capsule Take 2 Caps by mouth two (2) times daily (with meals). 360 Cap 3    haloperidol (HALDOL) 5 mg tablet Take 5 mg by mouth two (2) times a day.  melatonin tab tablet Take 5 mg by mouth nightly.  cyanocobalamin (VITAMIN B-12) 500 mcg tablet Take 500 mcg by mouth daily.  ferrous sulfate 325 mg (65 mg iron) tablet Take 1 Tab by mouth two (2) times a day. 60 Tab 3    benztropine (COGENTIN) 0.5 mg tablet Take 0.5 mg by mouth two (2) times a day.  sertraline (ZOLOFT) 100 mg tablet Take 200 mg by mouth daily.  OLANZapine (ZYPREXA) 5 mg tablet       ibuprofen (MOTRIN) 800 mg tablet Take 1 Tab by mouth every eight (8) hours as needed for Pain. 60 Tab 0    varicella-zoster recombinant, PF, (SHINGRIX, PF,) 50 mcg/0.5 mL susr injection 0.5mL by IntraMUSCular route once now and then repeat in 2-6 months 0.5 mL 1    gemfibrozil (LOPID) 600 mg tablet Take 1 Tab by mouth two (2) times a day. 180 Tab 1    loxapine (LOXITANE) 25 mg capsule Take 25 mg by mouth nightly.        Allergies   Allergen Reactions    Lipitor [Atorvastatin] Other (comments)     myalgias    Lisinopril Cough    Aspirin Other (comments)     Nose bleeds    Zocor [Simvastatin] Other (comments)     Causes pain in the left leg     Past Medical History:   Diagnosis Date    Arthritis     Chronic pain     Depression     Diabetes [facility]     Hyperlipidemia     Hypothyroid     Medial meniscus tear     Left knee    Psychiatric disorder     Tobacco abuse, in remission      Past Surgical History:   Procedure Laterality Date    HX ORTHOPAEDIC      Hand reconstructive surgery    HX TUBAL LIGATION       Family History   Problem Relation Age of Onset    Alcohol abuse Mother    Deneen Boy Mother    Ric Games Mother 61        breast cancer    Diabetes Mother     Hypertension Mother    Davila Psychiatric Disorder Mother    Davila Stroke Mother     Breast Cancer Mother     Alcohol abuse Father     Heart Disease Father     Diabetes Father     Hypertension Father     Lung Disease Father     Colon Cancer Father 72    Alcohol abuse Paternal Grandmother     Ovarian Cancer Sister     Alcohol abuse Sister     Diabetes Sister     Psychiatric Disorder Sister     Alcohol abuse Brother     Psychiatric Disorder Son     Psychiatric Disorder Other         nephew     Social History     Tobacco Use    Smoking status: Former Smoker     Packs/day: 1.00     Years: 35.00     Pack years: 35.00     Types: Cigarettes     Last attempt to quit: 2011     Years since quittin.7    Smokeless tobacco: Never Used    Tobacco comment: quit    Substance Use Topics    Alcohol use: No     Alcohol/week: 0.0 standard drinks     Comment: quit 10 years ago       ROS   History obtained from the patient  General ROS: negative for - chills or fever, positive for  fatigue   Psychological ROS: positive for - anxiety and depression  Respiratory ROS: no cough, shortness of breath, or wheezing  Cardiovascular ROS: no chest pain or dyspnea on exertion  Gastrointestinal ROS: no abdominal pain, change in bowel habits, or black or bloody stools  Genito-Urinary ROS: no dysuria, trouble voiding, or hematuria    All other systems reviewed and are negative.       Objective:  Vitals:    19 1008   BP: 101/70   Pulse: 74   Resp: 18   Temp: 98.3 °F (36.8 °C)   TempSrc: Oral   SpO2: 98%   Weight: 184 lb (83.5 kg)   Height: 5' 4\" (1.626 m)   PainSc:   0 - No pain   LMP: 06/05/2014       PE  General appearance - alert, well appearing, and in no distress and overweight  Mental status - normal mood, behavior, speech, dress, motor activity, and thought processes  Chest - clear to auscultation, no wheezes, rales or rhonchi, symmetric air entry  Heart - normal rate and regular rhythm  Abdomen - soft, nontender, nondistended, no masses or organomegaly        Assessment/Plan:   1. Type 2 diabetes mellitus with microalbuminuria, with long-term current use of insulin (HCC)  -     METABOLIC PANEL, COMPREHENSIVE; Future  -     HEMOGLOBIN A1C WITH EAG; Future    2. Hypercholesteremia  -     METABOLIC PANEL, COMPREHENSIVE; Future  -     LIPID PANEL; Future    3. Hypothyroidism due to acquired atrophy of thyroid  -     TSH AND FREE T4; Future    4. Vitamin B12 deficiency  -     VITAMIN B12 & FOLATE; Future    5. Bipolar 1 disorder, manic, full remission ([facility]       -management per [facility]      Lab review: orders written for new lab studies as appropriate; see orders        Health Maintenance:     I have discussed the diagnosis with the patient and the intended plan as seen in the above orders. The patient has received an after-visit summary and questions were answered concerning future plans. I have discussed medication side effects and warnings with the patient as well. I have reviewed the plan of care with the patient, accepted their input and they are in agreement with the treatment goals. Follow-up and Dispositions    · Return in about 3 months (around 10/25/2019) for to see Dr. Dave Rivera for routine office visit . More than 1/2 of this 15 minute visit was spent in counseling and coordination of care, as described above.     Rachel Hinojosa DNP, FNP-C

## 2019-07-25 NOTE — TELEPHONE ENCOUNTER
Pt called in stating that ignacio told her that the test strips she requested would be sent to [facility] and she talked to [facility] and they told her they did not have a prescription for it and she needs them sent to [facility]. She is requesting the one touch ultra test strips and asked for a 3 month supply. Please advise.

## 2019-07-25 NOTE — PATIENT INSTRUCTIONS

## 2019-08-13 ENCOUNTER — HOSPITAL ENCOUNTER (OUTPATIENT)
Dept: MAMMOGRAPHY | Age: 54
Discharge: HOME | End: 2019-08-13
Attending: INTERNAL MEDICINE
Payer: MEDICARE

## 2019-08-13 DIAGNOSIS — Z12.39 BREAST SCREENING, UNSPECIFIED: ICD-10-CM

## 2019-08-13 PROCEDURE — 77067 SCR MAMMO BI INCL CAD: CPT

## 2019-08-29 ENCOUNTER — OFFICE VISIT (OUTPATIENT)
Dept: FAMILY MEDICINE CLINIC | Age: 54
End: 2019-08-29

## 2019-08-29 VITALS
WEIGHT: 186 LBS | HEART RATE: 85 BPM | OXYGEN SATURATION: 100 % | BODY MASS INDEX: 31.76 KG/M2 | DIASTOLIC BLOOD PRESSURE: 69 MMHG | RESPIRATION RATE: 18 BRPM | HEIGHT: 64 IN | TEMPERATURE: 97.9 F | SYSTOLIC BLOOD PRESSURE: 95 MMHG

## 2019-08-29 DIAGNOSIS — R80.9 TYPE 2 DIABETES MELLITUS WITH MICROALBUMINURIA, WITH LONG-TERM CURRENT USE OF INSULIN (HCC): Primary | ICD-10-CM

## 2019-08-29 DIAGNOSIS — Z79.4 TYPE 2 DIABETES MELLITUS WITH MICROALBUMINURIA, WITH LONG-TERM CURRENT USE OF INSULIN (HCC): Primary | ICD-10-CM

## 2019-08-29 DIAGNOSIS — E11.29 TYPE 2 DIABETES MELLITUS WITH MICROALBUMINURIA, WITH LONG-TERM CURRENT USE OF INSULIN (HCC): Primary | ICD-10-CM

## 2019-08-29 NOTE — PROGRESS NOTES
Mary Olvera is a 62 y.o. female  Chief Complaint   Patient presents with    Blood sugar problem     1. Have you been to the ER, urgent care clinic since your last visit? Hospitalized since your last visit? No    2. Have you seen or consulted any other health care providers outside of the [address] [place] [address] since your last visit? Include any pap smears or colon screening.  No

## 2019-08-29 NOTE — PROGRESS NOTES
Alvarez Petty is a 62 y.o.  female and presents with    Chief Complaint   Patient presents with    Blood sugar problem       Subjective:  Ms. Moy Miller presents today to follow up on her diabetes and insulin. Last office visit labs were taken and hemoglobin a1c was 8.7. She had been on Lantus 10 units every evening. She was told to increase lantus by 3 units every 3 days until fasting blood sugar was around 100. She is currently on 25 units of Lantus every evening  Diabetes Mellitus:  She has diabetes mellitus, and  hyperlipidemia. Diabetic ROS - medication compliance: compliant all of the time, diabetic diet compliance: compliant most of the time, home glucose monitoring: is performed regularly. Lowest blood sugar was 122.  She is still taking Humalog based on the sliding scale three times daily    Additional Concerns: No        Patient Active Problem List   Diagnosis Code    Hypothyroidism E03.9    Hypercholesteremia E78.00    Type 2 diabetes mellitus with microalbuminuria, with long-term current use of insulin ([facility] E11.29, R80.9, Z79.4    Tear of meniscus of left knee S83.207A    Recurrent depression ([address] F33.9    Left low back pain M54.5    History of bipolar disorder Z86.59    History of alcohol abuse Z87.898    Lumbar degenerative disc disease M51.36    Hypothyroidism due to acquired atrophy of thyroid E03.4    Vitamin B12 deficiency E53.8    Bipolar 1 disorder, manic, full remission ([facility]) F31.74    PTSD (post-traumatic stress disorder) F43.10     Current Outpatient Medications   Medication Sig Dispense Refill    levothyroxine (SYNTHROID) 137 mcg tablet TAKE 1 TABLET BY MOUTH  DAILY  (BEFORE BREAKFAST) 30 Tab 0    glucose blood VI test strips (ONETOUCH ULTRA BLUE TEST STRIP) strip Test blood glucose 4 times a day 400 Strip 4    Insulin Needles, Disposable, (BD ULTRA-FINE MINI PEN NEEDLE) 31 gauge x 3/16\" ndle Use 4 times daily 200 Pen Needle 11    rosuvastatin (CRESTOR) 40 mg tablet Take 1 Tab by mouth nightly. 90 Tab 0    SITagliptin-metFORMIN (JANUMET) 50-1,000 mg per tablet TAKE ONE TABLET BY MOUTH TWICE A  Tab 0    hydrOXYzine HCl (ATARAX) 50 mg tablet Take 1 Tab by mouth three (3) times daily as needed for Anxiety. 90 Tab 1    oxybutynin chloride XL (DITROPAN XL) 10 mg CR tablet Take 1 Tab by mouth daily. 90 Tab 2    insulin glargine (LANTUS,BASAGLAR) 100 unit/mL (3 mL) inpn 10 Units by SubCUTAneous route nightly. (Patient taking differently: 25 Units by SubCUTAneous route nightly.) 5 Pen 7    insulin lispro (HUMALOG KWIKPEN INSULIN) 100 unit/mL kwikpen Administer  6- 8 units three times daily before each meal per sliding scale 5 Pen 5    omega-3 acid ethyl esters (LOVAZA) 1 gram capsule Take 2 Caps by mouth two (2) times daily (with meals). 360 Cap 3    haloperidol (HALDOL) 5 mg tablet Take 5 mg by mouth two (2) times a day.  OLANZapine (ZYPREXA) 5 mg tablet       ibuprofen (MOTRIN) 800 mg tablet Take 1 Tab by mouth every eight (8) hours as needed for Pain. 60 Tab 0    varicella-zoster recombinant, PF, (SHINGRIX, PF,) 50 mcg/0.5 mL susr injection 0.5mL by IntraMUSCular route once now and then repeat in 2-6 months 0.5 mL 1    melatonin tab tablet Take 5 mg by mouth nightly.  cyanocobalamin (VITAMIN B-12) 500 mcg tablet Take 500 mcg by mouth daily.  gemfibrozil (LOPID) 600 mg tablet Take 1 Tab by mouth two (2) times a day. 180 Tab 1    loxapine (LOXITANE) 25 mg capsule Take 25 mg by mouth nightly.  ferrous sulfate 325 mg (65 mg iron) tablet Take 1 Tab by mouth two (2) times a day. 60 Tab 3    benztropine (COGENTIN) 0.5 mg tablet Take 0.5 mg by mouth two (2) times a day.  sertraline (ZOLOFT) 100 mg tablet Take 200 mg by mouth daily.        Allergies   Allergen Reactions    Lipitor [Atorvastatin] Other (comments)     myalgias    Lisinopril Cough    Aspirin Other (comments)     Nose bleeds    Zocor [Simvastatin] Other (comments)     Causes pain in the left leg     Past Medical History:   Diagnosis Date    Arthritis     Chronic pain     Depression     Diabetes ([address]     Hyperlipidemia     Hypothyroid     Medial meniscus tear     Left knee    Psychiatric disorder     Tobacco abuse, in remission      Past Surgical History:   Procedure Laterality Date    HX ORTHOPAEDIC      Hand reconstructive surgery    HX TUBAL LIGATION  2004     Family History   Problem Relation Age of Onset    Alcohol abuse Mother    Sloane Alvarez Mother    [place] Mos[place]o Mother 61        breast cancer    Diabetes Mother     Hypertension Mother    [facility] Psychiatric Disorder Mother    [facility] Stroke Mother     Breast Cancer Mother     Alcohol abuse Father     Heart Disease Father     Diabetes Father     Hypertension Father     Lung Disease Father     Colon Cancer Father 72    Alcohol abuse Paternal Grandmother     Ovarian Cancer Sister     Alcohol abuse Sister     Diabetes Sister     Psychiatric Disorder Sister     Alcohol abuse Brother     Psychiatric Disorder Son     Psychiatric Disorder Other         nephew     Social History     Tobacco Use    Smoking status: Former Smoker     Packs/day: 1.00     Years: 35.00     Pack years: 35.00     Types: Cigarettes     Last attempt to quit: 2011     Years since quittin.8    Smokeless tobacco: Never Used    Tobacco comment: quit    Substance Use Topics    Alcohol use: No     Alcohol/week: 0.0 standard drinks     Comment: quit 10 years ago       ROS   History obtained from the patient  General ROS: negative for - chills or fever  Respiratory ROS: no cough, shortness of breath, or wheezing  Cardiovascular ROS: no chest pain or dyspnea on exertion    All other systems reviewed and are negative.       Objective:  Vitals:    19 1449   BP: 95/69   Pulse: 85   Resp: 18   Temp: 97.9 °F (36.6 °C)   TempSrc: Oral   SpO2: 100%   Weight: 186 lb (84.4 kg)   Height: 5' 4\" (1.626 m)   PainSc:   0 - No pain   LMP: 2014 PE  General appearance - alert, well appearing, and in no distress  Mental status - normal mood, behavior, speech, dress, motor activity, and thought processes  Chest - clear to auscultation, no wheezes, rales or rhonchi, symmetric air entry  Heart - normal rate and regular rhythm        LABS   Lab Results   Component Value Date/Time    Cholesterol, total 137 07/25/2019 11:05 AM    HDL Cholesterol 47 07/25/2019 11:05 AM    LDL, calculated 44.6 07/25/2019 11:05 AM    Triglyceride 227 (H) 07/25/2019 11:05 AM    CHOL/HDL Ratio 2.9 07/25/2019 11:05 AM     Lab Results   Component Value Date/Time    TSH 4.37 (H) 07/25/2019 11:05 AM    Triiodothyronine (T3), free 1.3 (L) 10/22/2018 11:00 AM    T4, Free 1.0 07/25/2019 11:05 AM      Lab Results   Component Value Date/Time    Hemoglobin A1c 8.7 (H) 07/25/2019 11:05 AM    Hemoglobin A1c (POC) 6.7 07/02/2018 09:00 AM      Lab Results   Component Value Date/Time    Vitamin B12 801 07/25/2019 11:05 AM    Folate >20.0 (H) 07/25/2019 11:05 AM         Assessment/Plan:    1. Type 2 diabetes mellitus with microalbuminuria, with long-term current use of insulin (HCC)       -continue with Lantus 25 units every evening;        -keep log of blood sugar       -may increase Lantus by 3 units every 3 days if blood sugar remains elevated > 120    Lab review: no lab studies available for review at time of visit        Health Maintenance:    I have discussed the diagnosis with the patient and the intended plan as seen in the above orders. The patient has received an after-visit summary and questions were answered concerning future plans. I have discussed medication side effects and warnings with the patient as well. I have reviewed the plan of care with the patient, accepted their input and they are in agreement with the treatment goals. Follow-up and Dispositions    · Return in about 1 month (around 9/29/2019) for follow up diabetes .        More than 1/2 of this 15 minute visit was spent in counseling and coordination of care, as described above.     Jamila Love DNP, FNP-C

## 2019-08-29 NOTE — PATIENT INSTRUCTIONS

## 2019-09-11 DIAGNOSIS — R80.9 TYPE 2 DIABETES MELLITUS WITH MICROALBUMINURIA, WITH LONG-TERM CURRENT USE OF INSULIN (HCC): ICD-10-CM

## 2019-09-11 DIAGNOSIS — E11.29 TYPE 2 DIABETES MELLITUS WITH MICROALBUMINURIA, WITH LONG-TERM CURRENT USE OF INSULIN (HCC): ICD-10-CM

## 2019-09-11 DIAGNOSIS — Z79.4 TYPE 2 DIABETES MELLITUS WITH MICROALBUMINURIA, WITH LONG-TERM CURRENT USE OF INSULIN (HCC): ICD-10-CM

## 2019-09-11 NOTE — TELEPHONE ENCOUNTER
Patient called in and was requesting a refill of her lantus as she is using 37 units and is getting ready to go out of town next week and she tried to explain to Palak that she only gets one box and needs a month supply since she will be gone for a month and felt like Palak didn't listen to her. I let her know I would put a message in and see what could be done. Please advise.

## 2019-09-12 RX ORDER — INSULIN GLARGINE 100 [IU]/ML
37 INJECTION, SOLUTION SUBCUTANEOUS
Qty: 15 PEN | Refills: 0 | Status: SHIPPED | OUTPATIENT
Start: 2019-09-12 | End: 2019-10-24 | Stop reason: SDUPTHER

## 2019-09-12 NOTE — TELEPHONE ENCOUNTER
Order updated, per patient currently taking 37 units.  Pt request order sent to [facility]

## 2019-09-29 DIAGNOSIS — E78.00 HYPERCHOLESTEREMIA: ICD-10-CM

## 2019-09-29 RX ORDER — ROSUVASTATIN CALCIUM 40 MG/1
TABLET, COATED ORAL
Qty: 90 TAB | Refills: 0 | Status: SHIPPED | OUTPATIENT
Start: 2019-09-29 | End: 2019-10-24 | Stop reason: SDUPTHER

## 2019-09-30 DIAGNOSIS — E78.00 HYPERCHOLESTEREMIA: ICD-10-CM

## 2019-09-30 RX ORDER — ROSUVASTATIN CALCIUM 40 MG/1
TABLET, COATED ORAL
Qty: 90 TAB | Refills: 0 | Status: CANCELLED | OUTPATIENT
Start: 2019-09-30

## 2019-09-30 NOTE — TELEPHONE ENCOUNTER
Patient called in and requested a refill of this medication for a 3 month supply. Patient was last seen on August 29th and an upcoming appt on October 24th. Please advise.

## 2019-10-01 ENCOUNTER — TELEPHONE (OUTPATIENT)
Dept: FAMILY MEDICINE CLINIC | Age: 54
End: 2019-10-01

## 2019-10-01 NOTE — TELEPHONE ENCOUNTER
Patient is requesting refill on gabapentin, said Deion prescribed it for her awhile ago and is requesting a one month supply. Please advise, thank you.

## 2019-10-08 DIAGNOSIS — E11.9 TYPE 2 DIABETES MELLITUS WITHOUT COMPLICATION (HCC): ICD-10-CM

## 2019-10-08 NOTE — TELEPHONE ENCOUNTER
Patient called in and was requesting a refill of her Janumet to be sent to the [address] [place] [address]. Please advise.

## 2019-10-14 ENCOUNTER — OFFICE VISIT (OUTPATIENT)
Dept: FAMILY MEDICINE CLINIC | Age: 54
End: 2019-10-14

## 2019-10-14 VITALS
DIASTOLIC BLOOD PRESSURE: 69 MMHG | BODY MASS INDEX: 32.17 KG/M2 | SYSTOLIC BLOOD PRESSURE: 100 MMHG | HEART RATE: 79 BPM | TEMPERATURE: 98.2 F | WEIGHT: 188.4 LBS | HEIGHT: 64 IN | OXYGEN SATURATION: 97 % | RESPIRATION RATE: 20 BRPM

## 2019-10-14 DIAGNOSIS — Z86.39 HISTORY OF DIABETIC NEUROPATHY: Primary | ICD-10-CM

## 2019-10-14 RX ORDER — GABAPENTIN 100 MG/1
100 CAPSULE ORAL
Qty: 90 CAP | Refills: 3 | Status: SHIPPED | OUTPATIENT
Start: 2019-10-14 | End: 2020-01-31 | Stop reason: SDUPTHER

## 2019-10-14 NOTE — PROGRESS NOTES
Room # 4  Chief Complaint:  Foot pain     HPI:    Sharon Soto is a 62 y.o. female who presents today for c/o medication refill  1. Have you been to the ER, urgent care clinic since your last visit? Hospitalized since your last visit? NO    2. Have you seen or consulted any other health care providers outside of the [address] [place] [address] since your last visit? Include any pap smears or colon screening. NO  When :  Reason:    Health Maintenance reviewed Yes    Health Maintenance Due   Topic Date Due    FOOT EXAM Q1  04/18/2018    EYE EXAM RETINAL OR DILATED  05/19/2018    PAP AKA CERVICAL CYTOLOGY  04/26/2019    FOBT Q 1 YEAR AGE 50-75  05/13/2019    Influenza Age 5 to Adult  08/01/2019    Shingrix Vaccine Age 50> (2 of 2) 09/25/2019

## 2019-10-14 NOTE — PROGRESS NOTES
Impression / Plan     Diagnoses and all orders for this visit:    1. History of diabetic neuropathy  -     gabapentin (NEURONTIN) 100 mg capsule; Take 1 Cap by mouth nightly. Max Daily Amount: 100 mg. Plan: Will restart Neurontin 100 mg QHS and Monitor for clinical benefit, appointment pending with PCP 10/24/2019. Due to have Foot Examination. No Drug-Drug Interactions seen with Pharmacy. Follow-up and Dispositions    · Return for CPE with PCP 10/24/2019. HPI   Blair Bone is a 62 y. o.female presenting for renewal of Gabapentin. Mrs. Billie Gill has a history of DM, Last HtF5i-8.7% 7/25/2019, and Vitamin B12 Deficiency currently on Injection Therapy. Mrs. Billie Gill also seen by [facility] for Bipolar Disorder, currently Stable. STOP Gabapentin voluntarily 12 months ago with recurrence of symptoms, effecting ADL's and NIGHT time symptoms. No FEVER or chills. No interval trauma. Medications     Outpatient Medications Prior to Visit   Medication Sig Dispense Refill    SITagliptin-metFORMIN (JANUMET) 50-1,000 mg per tablet TAKE 1 TABLET BY MOUTH  TWICE A  Tab 0    rosuvastatin (CRESTOR) 40 mg tablet TAKE 1 TABLET BY MOUTH  NIGHTLY 90 Tab 0    insulin glargine (LANTUS,BASAGLAR) 100 unit/mL (3 mL) inpn 37 Units by SubCUTAneous route nightly. 15 Pen 0    levothyroxine (SYNTHROID) 137 mcg tablet TAKE 1 TABLET BY MOUTH  DAILY  (BEFORE BREAKFAST) 30 Tab 0    glucose blood VI test strips (ONETOUCH ULTRA BLUE TEST STRIP) strip Test blood glucose 4 times a day 400 Strip 4    Insulin Needles, Disposable, (BD ULTRA-FINE MINI PEN NEEDLE) 31 gauge x 3/16\" ndle Use 4 times daily 200 Pen Needle 11    oxybutynin chloride XL (DITROPAN XL) 10 mg CR tablet Take 1 Tab by mouth daily. 90 Tab 2    omega-3 acid ethyl esters (LOVAZA) 1 gram capsule Take 2 Caps by mouth two (2) times daily (with meals). 360 Cap 3    haloperidol (HALDOL) 5 mg tablet Take 5 mg by mouth two (2) times a day.       ibuprofen (MOTRIN) 800 mg tablet Take 1 Tab by mouth every eight (8) hours as needed for Pain. 60 Tab 0    varicella-zoster recombinant, PF, (SHINGRIX, PF,) 50 mcg/0.5 mL susr injection 0.5mL by IntraMUSCular route once now and then repeat in 2-6 months 0.5 mL 1    melatonin tab tablet Take 5 mg by mouth nightly.  cyanocobalamin (VITAMIN B-12) 500 mcg tablet Take 500 mcg by mouth daily.  ferrous sulfate 325 mg (65 mg iron) tablet Take 1 Tab by mouth two (2) times a day. 60 Tab 3    benztropine (COGENTIN) 0.5 mg tablet Take 0.5 mg by mouth two (2) times a day.  sertraline (ZOLOFT) 100 mg tablet Take 200 mg by mouth daily.  hydrOXYzine HCl (ATARAX) 50 mg tablet Take 1 Tab by mouth three (3) times daily as needed for Anxiety. 90 Tab 1    insulin lispro (HUMALOG KWIKPEN INSULIN) 100 unit/mL kwikpen Administer  6- 8 units three times daily before each meal per sliding scale 5 Pen 5    OLANZapine (ZYPREXA) 5 mg tablet       gemfibrozil (LOPID) 600 mg tablet Take 1 Tab by mouth two (2) times a day. 180 Tab 1    loxapine (LOXITANE) 25 mg capsule Take 25 mg by mouth nightly. No facility-administered medications prior to visit.          Allergies     Allergies   Allergen Reactions    Lipitor [Atorvastatin] Other (comments)     myalgias    Lisinopril Cough    Aspirin Other (comments)     Nose bleeds    Zocor [Simvastatin] Other (comments)     Causes pain in the left leg       Problem List     Patient Active Problem List    Diagnosis Date Noted    Vitamin B12 deficiency 04/25/2019    Bipolar 1 disorder, manic, full remission ([facility] 04/25/2019    PTSD (post-traumatic stress disorder) 04/25/2019    Hypothyroidism due to acquired atrophy of thyroid 05/02/2016    Left low back pain 04/13/2016    History of bipolar disorder 04/13/2016    History of alcohol abuse 04/13/2016    Lumbar degenerative disc disease 04/13/2016    Recurrent depression ([facility] 06/12/2013    Tear of meniscus of left knee 01/16/2013    Hypothyroidism 01/02/2013    Hypercholesteremia 01/02/2013    Type 2 diabetes mellitus with microalbuminuria, with long-term current use of insulin ([facility] [address] 01/02/2013     Medical / Surgical / Family History     Past Medical History:   Diagnosis Date    Arthritis     Chronic pain     Depression     Diabetes ([facility]     Hyperlipidemia     Hypothyroid     Medial meniscus tear     Left knee    Psychiatric disorder     Tobacco abuse, in remission      Past Surgical History:   Procedure Laterality Date    HX ORTHOPAEDIC      Hand reconstructive surgery    HX TUBAL LIGATION  2004     Family History   Problem Relation Age of Onset    Alcohol abuse Mother    Bartholome Tyrese Mother    Byron Hof Mother 61        breast cancer    Diabetes Mother     Hypertension Mother     Psychiatric Disorder Mother    [facility] Stroke Mother     Breast Cancer Mother     Alcohol abuse Father     Heart Disease Father    [facility] Diabetes Father     Hypertension Father     Lung Disease Father     Colon Cancer Father 72    Alcohol abuse Paternal Grandmother     Ovarian Cancer Sister     Alcohol abuse Sister     Diabetes Sister     Psychiatric Disorder Sister     Alcohol abuse Brother     Psychiatric Disorder Son     Psychiatric Disorder Other         nephew     Social History     Social History     Socioeconomic History    Marital status:      Spouse name: Not on file    Number of children: Not on file    Years of education: Not on file    Highest education level: Not on file   Occupational History    Not on file   Social Needs    Financial resource strain: Not on file    Food insecurity:     Worry: Not on file     Inability: Not on file    Transportation needs:     Medical: Not on file     Non-medical: Not on file   Tobacco Use    Smoking status: Former Smoker     Packs/day: 1.00     Years: 35.00     Pack years: 35.00     Types: Cigarettes     Last attempt to quit: 11/7/2011     Years since quitting: 7.9    Smokeless tobacco: Never Used    Tobacco comment: quit 2010   Substance and Sexual Activity    Alcohol use: No     Alcohol/week: 0.0 standard drinks     Comment: quit 10 years ago    Drug use: No    Sexual activity: Yes     Partners: Male   Lifestyle    Physical activity:     Days per week: Not on file     Minutes per session: Not on file    Stress: Not on file   Relationships    Social connections:     Talks on phone: Not on file     Gets together: Not on file     Attends [employer] service: Not on file     Active member of club or organization: Not on file     Attends meetings of clubs or organizations: Not on file     Relationship status: Not on file    Intimate partner violence:     Fear of current or ex partner: Not on file     Emotionally abused: Not on file     Physically abused: Not on file     Forced sexual activity: Not on file   Other Topics Concern     Service No    Blood Transfusions No    Caffeine Concern No    Occupational Exposure No    Hobby Hazards Not Asked    Sleep Concern Yes     Comment: takes sleeping pills    Stress Concern Not Asked    Weight Concern Yes    Special Diet Not Asked    Back Care Not Asked    Exercise Yes    Bike Helmet Not Asked    Seat Belt Yes    Self-Exams Yes   Social History Narrative    Not on file     ROS   Review of Systems    10 Element ROS negative unless specifically stated in History of Present Illness.      Health Maintenance     Health Maintenance   Topic Date Due    FOOT EXAM Q1  04/18/2018    EYE EXAM RETINAL OR DILATED  05/19/2018    PAP AKA CERVICAL CYTOLOGY  04/26/2019    FOBT Q 1 YEAR AGE 50-75  05/13/2019    Influenza Age 5 to Adult  08/01/2019    Shingrix Vaccine Age 50> (2 of 2) 09/25/2019    Pneumococcal 0-64 years (1 of 1 - PPSV23) 05/06/2020 (Originally 4/1/1971)    HEMOGLOBIN A1C Q6M  01/25/2020    MEDICARE YEARLY EXAM  04/25/2020    MICROALBUMIN Q1  04/25/2020    LIPID PANEL Q1  07/25/2020    BREAST CANCER SCRN MAMMOGRAM  08/13/2021    Bone Densitometry (Dexa) Screening  04/01/2028    DTaP/Tdap/Td series (3 - Td) 07/31/2029    Hepatitis C Screening  Completed     Physical Exam   /69 (BP 1 Location: Right arm, BP Patient Position: Sitting)   Pulse 79   Temp 98.2 °F (36.8 °C) (Oral)   Resp 20   Ht 5' 4\" (1.626 m)   Wt 188 lb 6.4 oz (85.5 kg)   LMP 06/05/2014   SpO2 97%   BMI 32.34 kg/m²     Physical Exam   Constitutional: She is oriented to person, place, and time. She appears well-developed and well-nourished. No distress. HENT:   Head: Normocephalic and atraumatic. Eyes: Pupils are equal, round, and reactive to light. Conjunctivae and EOM are normal.   Cardiovascular: Normal rate, regular rhythm, normal heart sounds and intact distal pulses. No murmur heard. Pulmonary/Chest: Effort normal and breath sounds normal. No respiratory distress. She has no wheezes. Neurological: She is alert and oriented to person, place, and time. No cranial nerve deficit. Coordination normal.   Skin: Skin is warm and dry. No rash noted. No erythema. Psychiatric: She has a normal mood and affect.  Her behavior is normal.     Ortho Exam    Toro Magaña DO

## 2019-10-24 ENCOUNTER — HOSPITAL ENCOUNTER (OUTPATIENT)
Dept: LAB | Age: 54
Discharge: HOME | End: 2019-10-24
Payer: MEDICARE

## 2019-10-24 ENCOUNTER — OFFICE VISIT (OUTPATIENT)
Dept: FAMILY MEDICINE CLINIC | Age: 54
End: 2019-10-24

## 2019-10-24 VITALS
DIASTOLIC BLOOD PRESSURE: 71 MMHG | SYSTOLIC BLOOD PRESSURE: 104 MMHG | WEIGHT: 191.8 LBS | OXYGEN SATURATION: 96 % | BODY MASS INDEX: 32.74 KG/M2 | HEART RATE: 84 BPM | HEIGHT: 64 IN | TEMPERATURE: 98.1 F | RESPIRATION RATE: 18 BRPM

## 2019-10-24 DIAGNOSIS — Z12.11 SCREENING FOR MALIGNANT NEOPLASM OF COLON: ICD-10-CM

## 2019-10-24 DIAGNOSIS — E53.8 VITAMIN B12 DEFICIENCY: ICD-10-CM

## 2019-10-24 DIAGNOSIS — Z79.4 TYPE 2 DIABETES MELLITUS WITH DIABETIC NEUROPATHY, WITH LONG-TERM CURRENT USE OF INSULIN (HCC): ICD-10-CM

## 2019-10-24 DIAGNOSIS — E11.29 TYPE 2 DIABETES MELLITUS WITH MICROALBUMINURIA, WITH LONG-TERM CURRENT USE OF INSULIN (HCC): ICD-10-CM

## 2019-10-24 DIAGNOSIS — E11.40 TYPE 2 DIABETES MELLITUS WITH DIABETIC NEUROPATHY, WITH LONG-TERM CURRENT USE OF INSULIN (HCC): ICD-10-CM

## 2019-10-24 DIAGNOSIS — R80.9 TYPE 2 DIABETES MELLITUS WITH MICROALBUMINURIA, WITH LONG-TERM CURRENT USE OF INSULIN (HCC): Primary | ICD-10-CM

## 2019-10-24 DIAGNOSIS — E11.29 TYPE 2 DIABETES MELLITUS WITH MICROALBUMINURIA, WITH LONG-TERM CURRENT USE OF INSULIN (HCC): Primary | ICD-10-CM

## 2019-10-24 DIAGNOSIS — F33.9 RECURRENT DEPRESSION (HCC): ICD-10-CM

## 2019-10-24 DIAGNOSIS — R80.9 TYPE 2 DIABETES MELLITUS WITH MICROALBUMINURIA, WITH LONG-TERM CURRENT USE OF INSULIN (HCC): ICD-10-CM

## 2019-10-24 DIAGNOSIS — E03.4 HYPOTHYROIDISM DUE TO ACQUIRED ATROPHY OF THYROID: ICD-10-CM

## 2019-10-24 DIAGNOSIS — Z79.4 TYPE 2 DIABETES MELLITUS WITH MICROALBUMINURIA, WITH LONG-TERM CURRENT USE OF INSULIN (HCC): ICD-10-CM

## 2019-10-24 DIAGNOSIS — Z79.4 TYPE 2 DIABETES MELLITUS WITH MICROALBUMINURIA, WITH LONG-TERM CURRENT USE OF INSULIN (HCC): Primary | ICD-10-CM

## 2019-10-24 DIAGNOSIS — E78.00 HYPERCHOLESTEREMIA: ICD-10-CM

## 2019-10-24 DIAGNOSIS — F31.74 BIPOLAR 1 DISORDER, MANIC, FULL REMISSION (HCC): ICD-10-CM

## 2019-10-24 LAB
ANION GAP SERPL CALC-SCNC: 6 MMOL/L (ref 3–18)
BUN SERPL-MCNC: 12 MG/DL (ref 7–18)
BUN/CREAT SERPL: 13 (ref 12–20)
CALCIUM SERPL-MCNC: 10.1 MG/DL (ref 8.5–10.1)
CHLORIDE SERPL-SCNC: 99 MMOL/L (ref 100–111)
CO2 SERPL-SCNC: 29 MMOL/L (ref 21–32)
CREAT SERPL-MCNC: 0.89 MG/DL (ref 0.6–1.3)
GLUCOSE SERPL-MCNC: 135 MG/DL (ref 74–99)
POTASSIUM SERPL-SCNC: 4.2 MMOL/L (ref 3.5–5.5)
SODIUM SERPL-SCNC: 134 MMOL/L (ref 136–145)
TSH SERPL DL<=0.05 MIU/L-ACNC: 3.57 UIU/ML (ref 0.36–3.74)
VIT B12 SERPL-MCNC: 476 PG/ML (ref 211–911)

## 2019-10-24 PROCEDURE — 80048 BASIC METABOLIC PNL TOTAL CA: CPT

## 2019-10-24 PROCEDURE — 84443 ASSAY THYROID STIM HORMONE: CPT

## 2019-10-24 PROCEDURE — 36415 COLL VENOUS BLD VENIPUNCTURE: CPT

## 2019-10-24 PROCEDURE — 82607 VITAMIN B-12: CPT

## 2019-10-24 RX ORDER — ROSUVASTATIN CALCIUM 20 MG/1
20 TABLET, COATED ORAL
Qty: 90 TAB | Refills: 1 | Status: SHIPPED | OUTPATIENT
Start: 2019-10-24 | End: 2020-04-16

## 2019-10-24 RX ORDER — INSULIN GLARGINE 100 [IU]/ML
40 INJECTION, SOLUTION SUBCUTANEOUS
Qty: 15 PEN | Refills: 0
Start: 2019-10-24 | End: 2019-10-28 | Stop reason: SDUPTHER

## 2019-10-24 RX ORDER — LEVOTHYROXINE SODIUM 137 UG/1
TABLET ORAL
Qty: 90 TAB | Refills: 3 | Status: SHIPPED | OUTPATIENT
Start: 2019-10-24 | End: 2020-09-19

## 2019-10-24 NOTE — PROGRESS NOTES
History of Present Illness  Abdirahman Carlos is a 62 y.o. female who presents today for management of    Chief Complaint   Patient presents with    Diabetes    Thyroid Problem       Diabetes Mellitus:  She has diabetes mellitus, and  hypertension and hyperlipidemia. Diabetic ROS - medication compliance: compliant all of the time, diabetic diet compliance: compliant most of the time, home glucose monitoring: fasting values range 160-190. Lab review: orders written for new lab studies as appropriate; see orders. Cardiovascular Review:  The patient has hyperlipidemia and obesity. Diet and Lifestyle: generally follows a low fat low cholesterol diet, generally follows a low sodium diet, sedentary, nonsmoker  Home BP Monitoring: is not measured at home. Pertinent ROS: taking medications as instructed, no medication side effects noted, no TIA's, no chest pain on exertion, no dyspnea on exertion, no swelling of ankles.      Problem List  Patient Active Problem List    Diagnosis Date Noted    Type 2 diabetes mellitus with diabetic neuropathy ([address] 10/24/2019    Vitamin B12 deficiency 04/25/2019    Bipolar 1 disorder, manic, full remission ([address] 04/25/2019    PTSD (post-traumatic stress disorder) 04/25/2019    Hypothyroidism due to acquired atrophy of thyroid 05/02/2016    Left low back pain 04/13/2016    History of alcohol abuse 04/13/2016    Lumbar degenerative disc disease 04/13/2016    Recurrent depression ([address] 06/12/2013    Tear of meniscus of left knee 01/16/2013    Hypercholesteremia 01/02/2013    Type 2 diabetes mellitus with microalbuminuria, with long-term current use of insulin ([address] 01/02/2013       Past Medical History  Past Medical History:   Diagnosis Date    Arthritis     Chronic pain     Depression     Diabetes ([address]     Hyperlipidemia     Hypothyroid     Medial meniscus tear     Left knee    Psychiatric disorder     Tobacco abuse, in remission         Surgical History  Past Surgical History:   Procedure Laterality Date    HX ORTHOPAEDIC      Hand reconstructive surgery    HX TUBAL LIGATION  2004        Current Medications  Current Outpatient Medications   Medication Sig    insulin glargine (LANTUS,BASAGLAR) 100 unit/mL (3 mL) inpn 40 Units by SubCUTAneous route nightly.  levothyroxine (SYNTHROID) 137 mcg tablet TAKE 1 TABLET BY MOUTH  DAILY  (BEFORE BREAKFAST)    rosuvastatin (CRESTOR) 20 mg tablet Take 1 Tab by mouth nightly.  gabapentin (NEURONTIN) 100 mg capsule Take 1 Cap by mouth nightly. Max Daily Amount: 100 mg.    SITagliptin-metFORMIN (JANUMET) 50-1,000 mg per tablet TAKE 1 TABLET BY MOUTH  TWICE A DAY    glucose blood VI test strips (ONETOUCH ULTRA BLUE TEST STRIP) strip Test blood glucose 4 times a day    Insulin Needles, Disposable, (BD ULTRA-FINE MINI PEN NEEDLE) 31 gauge x 3/16\" ndle Use 4 times daily    hydrOXYzine HCl (ATARAX) 50 mg tablet Take 1 Tab by mouth three (3) times daily as needed for Anxiety.  oxybutynin chloride XL (DITROPAN XL) 10 mg CR tablet Take 1 Tab by mouth daily.  insulin lispro (HUMALOG KWIKPEN INSULIN) 100 unit/mL kwikpen Administer  6- 8 units three times daily before each meal per sliding scale    omega-3 acid ethyl esters (LOVAZA) 1 gram capsule Take 2 Caps by mouth two (2) times daily (with meals).  haloperidol (HALDOL) 5 mg tablet Take 5 mg by mouth two (2) times a day.  ibuprofen (MOTRIN) 800 mg tablet Take 1 Tab by mouth every eight (8) hours as needed for Pain.  melatonin tab tablet Take 5 mg by mouth nightly.  cyanocobalamin (VITAMIN B-12) 500 mcg tablet Take 500 mcg by mouth daily.  ferrous sulfate 325 mg (65 mg iron) tablet Take 1 Tab by mouth two (2) times a day.  benztropine (COGENTIN) 0.5 mg tablet Take 0.5 mg by mouth two (2) times a day.  sertraline (ZOLOFT) 100 mg tablet Take 200 mg by mouth daily.  loxapine (LOXITANE) 25 mg capsule Take 25 mg by mouth nightly.      No current facility-administered medications for this visit.         Allergies/Drug Reactions  Allergies   Allergen Reactions    Lipitor [Atorvastatin] Other (comments)     myalgias    Lisinopril Cough    Aspirin Other (comments)     Nose bleeds    Zocor [Simvastatin] Other (comments)     Causes pain in the left leg        Family History  Family History   Problem Relation Age of Onset    Alcohol abuse Mother     Arthritis-osteo Mother    Maya Stager Mother 61        breast cancer    Diabetes Mother     Hypertension Mother     Psychiatric Disorder Mother    [facility] Stroke Mother     Breast Cancer Mother     Alcohol abuse Father     Heart Disease Father     Diabetes Father     Hypertension Father     Lung Disease Father     Colon Cancer Father 72    Alcohol abuse Paternal Grandmother     Ovarian Cancer Sister     Alcohol abuse Sister     Diabetes Sister     Psychiatric Disorder Sister     Alcohol abuse Brother     Psychiatric Disorder Son     Psychiatric Disorder Other         nephew        Social History  Social History     Socioeconomic History    Marital status:      Spouse name: Not on file    Number of children: Not on file    Years of education: Not on file    Highest education level: Not on file   Occupational History    Not on file   Social Needs    Financial resource strain: Not on file    Food insecurity:     Worry: Not on file     Inability: Not on file    Transportation needs:     Medical: Not on file     Non-medical: Not on file   Tobacco Use    Smoking status: Former Smoker     Packs/day: 1.00     Years: 35.00     Pack years: 35.00     Types: Cigarettes     Last attempt to quit: 2011     Years since quittin.9    Smokeless tobacco: Never Used    Tobacco comment: quit    Substance and Sexual Activity    Alcohol use: No     Alcohol/week: 0.0 standard drinks     Comment: quit 10 years ago    Drug use: No    Sexual activity: Yes     Partners: Male   Lifestyle    Physical activity:     Days per week: Not on file     Minutes per session: Not on file    Stress: Not on file   Relationships    Social connections:     Talks on phone: Not on file     Gets together: Not on file     Attends [employer] service: Not on file     Active member of club or organization: Not on file     Attends meetings of clubs or organizations: Not on file     Relationship status: Not on file    Intimate partner violence:     Fear of current or ex partner: Not on file     Emotionally abused: Not on file     Physically abused: Not on file     Forced sexual activity: Not on file   Other Topics Concern     Service No    Blood Transfusions No    Caffeine Concern No    Occupational Exposure No    Hobby Hazards Not Asked    Sleep Concern Yes     Comment: takes sleeping pills    Stress Concern Not Asked    Weight Concern Yes    Special Diet Not Asked    Back Care Not Asked    Exercise Yes    Bike Helmet Not Asked    Seat Belt Yes    Self-Exams Yes   Social History Narrative    Not on file       Review of Systems  Negative except as mentioned in HPI      Physical Exam  Vital signs:   Vitals:    10/24/19 0947   BP: 104/71   Pulse: 84   Resp: 18   Temp: 98.1 °F (36.7 °C)   TempSrc: Oral   SpO2: 96%   Weight: 191 lb 12.8 oz (87 kg)   Height: 5' 4\" (1.626 m)       General: alert, oriented, not in distress  Eyes: clear conjunctivae, anicteric sclerae, full and equal ROMs  Chest/Lungs: clear breath sounds, no wheezing or crackles  Heart: normal rate, regular rhythm, no murmur  Extremities: no focal deformities, no edema  Neuro: AAOx3, CN's grossly intact  Skin: no visible abnormalities      Laboratory/Tests:  Component      Latest Ref Rng & Units 7/25/2019 7/25/2019 7/25/2019 7/25/2019          11:05 AM 11:05 AM 11:05 AM 11:05 AM   Sodium      136 - 145 mmol/L    137   Potassium      3.5 - 5.5 mmol/L    4.6   Chloride      100 - 111 mmol/L    102   CO2      21 - 32 mmol/L    29   Anion gap      3.0 - 18 mmol/L    6   Glucose      74 - 99 mg/dL    161 (H)   BUN      7.0 - 18 MG/DL    8   Creatinine      0.6 - 1.3 MG/DL    0.85   BUN/Creatinine ratio      12 - 20      9 (L)   GFR est AA      >60 ml/min/1.73m2    >60   GFR est non-AA      >60 ml/min/1.73m2    >60   Calcium      8.5 - 10.1 MG/DL    9.6   Bilirubin, total      0.2 - 1.0 MG/DL    0.5   ALT (SGPT)      13 - 56 U/L    25   AST      10 - 38 U/L    14   Alk. phosphatase      45 - 117 U/L    86   Protein, total      6.4 - 8.2 g/dL    7.5   Albumin      3.4 - 5.0 g/dL    4.1   Globulin      2.0 - 4.0 g/dL    3.4   A-G Ratio      0.8 - 1.7      1.2   Cholesterol, total      <200 MG/DL   137    Triglyceride      <150 MG/DL   227 (H)    HDL Cholesterol      40 - 60 MG/DL   47    LDL, calculated      0 - 100 MG/DL   44.6    VLDL, calculated      MG/DL   45.4    CHOL/HDL Ratio      0 - 5.0     2.9    Vitamin B12      211 - 911 pg/mL  801     Folate      3.10 - 17.50 ng/mL  >20.0 (H)     TSH      0.36 - 3.74 uIU/mL 4.37 (H)      T4, Free      0.7 - 1.5 NG/DL 1.0        Component      Latest Ref Rng & Units 4/25/2019          10:47 AM   Cholesterol, total      <200 MG/DL    Triglyceride      <150 MG/DL    HDL Cholesterol      40 - 60 MG/DL    LDL, calculated      0 - 100 MG/DL    VLDL, calculated      MG/DL    CHOL/HDL Ratio      0 - 5.0      Microalbumin,urine random      0 - 3.0 MG/DL 1.65   Creatinine, urine      30 - 125 mg/dL <13.00 (L)   Microalbumin/Creat. Ratio      0 - 30 mg/g Cannot calculate ratio due to creatinine result outside reportable range. Assessment/Plan:    1. Type 2 diabetes mellitus with microalbuminuria, with long-term current use of insulin (HCC)  - much improved  - AMB POC HEMOGLOBIN U7E 6.0%  - METABOLIC PANEL, BASIC; Future  - increase  insulin glargine (LANTUS,BASAGLAR) 100 unit/mL (3 mL) inpn; 40 Units by SubCUTAneous route nightly. Dispense: 15 Pen; Refill: 0  - continue Humalog and Janumet    2.  Type 2 diabetes mellitus with diabetic neuropathy, with long-term current use of insulin (HCC)  - AMB POC HEMOGLOBIN U4P  - METABOLIC PANEL, BASIC; Future  - continue gabapentin QHS    3. Hypercholesteremia  - decreased Crestor to 20mg due to leg cramps  - last LDL 44  - check lipid panel in 3 months  - rosuvastatin (CRESTOR) 20 mg tablet; Take 1 Tab by mouth nightly. Dispense: 90 Tab; Refill: 1    4. Hypothyroidism due to acquired atrophy of thyroid  - TSH 3RD GENERATION; Future  - levothyroxine (SYNTHROID) 137 mcg tablet; TAKE 1 TABLET BY MOUTH  DAILY  (BEFORE BREAKFAST)  Dispense: 90 Tab; Refill: 3    5. Recurrent depression ([address]  - stable  - psych follow-up    6. Bipolar 1 disorder, manic, full remission ([address]  - stable  - psych follow-up    7. Vitamin B12 deficiency  - continue vitamin B12 500mcg per day  - VITAMIN B12; Future        Follow-up and Dispositions    · Return in about 3 months (around 1/24/2020) for ROV. I have discussed the diagnosis with the patient and the intended plan as seen in the above orders. The patient has received an after-visit summary and questions were answered concerning future plans. I have discussed medication side effects and warnings with the patient as well. I have reviewed the plan of care with the patient, accepted their input and they are in agreement with the treatment goals.        Luis Reddy MD  October 24, 2019

## 2019-11-27 DIAGNOSIS — E11.9 TYPE 2 DIABETES MELLITUS WITHOUT COMPLICATION (HCC): ICD-10-CM

## 2019-11-27 NOTE — TELEPHONE ENCOUNTER
Requested Prescriptions     Pending Prescriptions Disp Refills    SITagliptin-metFORMIN (CleankeysUMET) 50-1,000 mg per tablet [Pharmacy Med Name: JANUMET  TAB  50 1000] 180 Tab 0     Sig: TAKE 1 TABLET BY MOUTH  TWICE A DAY     Last appt. 10/24/19  Next appt.  1/24/20

## 2020-01-31 ENCOUNTER — HOSPITAL ENCOUNTER (OUTPATIENT)
Dept: LAB | Age: 55
Discharge: HOME | End: 2020-01-31
Payer: MEDICARE

## 2020-01-31 ENCOUNTER — OFFICE VISIT (OUTPATIENT)
Dept: FAMILY MEDICINE CLINIC | Age: 55
End: 2020-01-31

## 2020-01-31 VITALS
BODY MASS INDEX: 32.58 KG/M2 | HEIGHT: 64 IN | OXYGEN SATURATION: 99 % | WEIGHT: 190.8 LBS | RESPIRATION RATE: 18 BRPM | DIASTOLIC BLOOD PRESSURE: 78 MMHG | HEART RATE: 78 BPM | TEMPERATURE: 97 F | SYSTOLIC BLOOD PRESSURE: 131 MMHG

## 2020-01-31 DIAGNOSIS — E78.00 HYPERCHOLESTEREMIA: ICD-10-CM

## 2020-01-31 DIAGNOSIS — E11.9 TYPE 2 DIABETES MELLITUS WITHOUT COMPLICATION, UNSPECIFIED WHETHER LONG TERM INSULIN USE (HCC): ICD-10-CM

## 2020-01-31 DIAGNOSIS — Z79.4 TYPE 2 DIABETES MELLITUS WITH MICROALBUMINURIA, WITH LONG-TERM CURRENT USE OF INSULIN (HCC): ICD-10-CM

## 2020-01-31 DIAGNOSIS — E03.4 HYPOTHYROIDISM DUE TO ACQUIRED ATROPHY OF THYROID: ICD-10-CM

## 2020-01-31 DIAGNOSIS — E11.29 TYPE 2 DIABETES MELLITUS WITH MICROALBUMINURIA, WITH LONG-TERM CURRENT USE OF INSULIN (HCC): Primary | ICD-10-CM

## 2020-01-31 DIAGNOSIS — E11.40 TYPE 2 DIABETES MELLITUS WITH DIABETIC NEUROPATHY, WITH LONG-TERM CURRENT USE OF INSULIN (HCC): ICD-10-CM

## 2020-01-31 DIAGNOSIS — R80.9 TYPE 2 DIABETES MELLITUS WITH MICROALBUMINURIA, WITH LONG-TERM CURRENT USE OF INSULIN (HCC): ICD-10-CM

## 2020-01-31 DIAGNOSIS — Z79.4 TYPE 2 DIABETES MELLITUS WITH MICROALBUMINURIA, WITH LONG-TERM CURRENT USE OF INSULIN (HCC): Primary | ICD-10-CM

## 2020-01-31 DIAGNOSIS — M23.204 OLD TEAR OF MEDIAL MENISCUS OF LEFT KNEE, UNSPECIFIED TEAR TYPE: ICD-10-CM

## 2020-01-31 DIAGNOSIS — R80.9 TYPE 2 DIABETES MELLITUS WITH MICROALBUMINURIA, WITH LONG-TERM CURRENT USE OF INSULIN (HCC): Primary | ICD-10-CM

## 2020-01-31 DIAGNOSIS — F33.9 RECURRENT DEPRESSION (HCC): ICD-10-CM

## 2020-01-31 DIAGNOSIS — E11.29 TYPE 2 DIABETES MELLITUS WITH MICROALBUMINURIA, WITH LONG-TERM CURRENT USE OF INSULIN (HCC): ICD-10-CM

## 2020-01-31 DIAGNOSIS — Z79.4 TYPE 2 DIABETES MELLITUS WITH DIABETIC NEUROPATHY, WITH LONG-TERM CURRENT USE OF INSULIN (HCC): ICD-10-CM

## 2020-01-31 DIAGNOSIS — F31.74 BIPOLAR 1 DISORDER, MANIC, FULL REMISSION (HCC): ICD-10-CM

## 2020-01-31 DIAGNOSIS — J30.89 NON-SEASONAL ALLERGIC RHINITIS, UNSPECIFIED TRIGGER: ICD-10-CM

## 2020-01-31 LAB
ALBUMIN SERPL-MCNC: 4.3 G/DL (ref 3.4–5)
ALBUMIN/GLOB SERPL: 1.1 {RATIO} (ref 0.8–1.7)
ALP SERPL-CCNC: 85 U/L (ref 45–117)
ALT SERPL-CCNC: 24 U/L (ref 13–56)
ANION GAP SERPL CALC-SCNC: 1 MMOL/L (ref 3–18)
AST SERPL-CCNC: 16 U/L (ref 10–38)
BASOPHILS # BLD: 0 K/UL (ref 0–0.1)
BASOPHILS NFR BLD: 0 % (ref 0–2)
BILIRUB SERPL-MCNC: 0.4 MG/DL (ref 0.2–1)
BUN SERPL-MCNC: 12 MG/DL (ref 7–18)
BUN/CREAT SERPL: 16 (ref 12–20)
CALCIUM SERPL-MCNC: 9.6 MG/DL (ref 8.5–10.1)
CHLORIDE SERPL-SCNC: 100 MMOL/L (ref 100–111)
CHOLEST SERPL-MCNC: 132 MG/DL
CO2 SERPL-SCNC: 30 MMOL/L (ref 21–32)
CREAT SERPL-MCNC: 0.73 MG/DL (ref 0.6–1.3)
DIFFERENTIAL METHOD BLD: ABNORMAL
EOSINOPHIL # BLD: 0.1 K/UL (ref 0–0.4)
EOSINOPHIL NFR BLD: 1 % (ref 0–5)
ERYTHROCYTE [DISTWIDTH] IN BLOOD BY AUTOMATED COUNT: 14.2 % (ref 11.6–14.5)
GLOBULIN SER CALC-MCNC: 3.9 G/DL (ref 2–4)
GLUCOSE SERPL-MCNC: 152 MG/DL (ref 74–99)
HCT VFR BLD AUTO: 36 % (ref 35–45)
HDLC SERPL-MCNC: 46 MG/DL (ref 40–60)
HDLC SERPL: 2.9 {RATIO} (ref 0–5)
HGB BLD-MCNC: 11.7 G/DL (ref 12–16)
LDLC SERPL CALC-MCNC: 36.6 MG/DL (ref 0–100)
LIPID PROFILE,FLP: ABNORMAL
LYMPHOCYTES # BLD: 2.9 K/UL (ref 0.9–3.6)
LYMPHOCYTES NFR BLD: 34 % (ref 21–52)
MCH RBC QN AUTO: 27.3 PG (ref 24–34)
MCHC RBC AUTO-ENTMCNC: 32.5 G/DL (ref 31–37)
MCV RBC AUTO: 84.1 FL (ref 74–97)
MONOCYTES # BLD: 0.7 K/UL (ref 0.05–1.2)
MONOCYTES NFR BLD: 8 % (ref 3–10)
NEUTS SEG # BLD: 4.9 K/UL (ref 1.8–8)
NEUTS SEG NFR BLD: 57 % (ref 40–73)
PLATELET # BLD AUTO: 296 K/UL (ref 135–420)
PMV BLD AUTO: 10.9 FL (ref 9.2–11.8)
POTASSIUM SERPL-SCNC: 4.7 MMOL/L (ref 3.5–5.5)
PROT SERPL-MCNC: 8.2 G/DL (ref 6.4–8.2)
RBC # BLD AUTO: 4.28 M/UL (ref 4.2–5.3)
SODIUM SERPL-SCNC: 131 MMOL/L (ref 136–145)
TRIGL SERPL-MCNC: 247 MG/DL (ref ?–150)
VLDLC SERPL CALC-MCNC: 49.4 MG/DL
WBC # BLD AUTO: 8.6 K/UL (ref 4.6–13.2)

## 2020-01-31 PROCEDURE — 85025 COMPLETE CBC W/AUTO DIFF WBC: CPT

## 2020-01-31 PROCEDURE — 80053 COMPREHEN METABOLIC PANEL: CPT

## 2020-01-31 PROCEDURE — 36415 COLL VENOUS BLD VENIPUNCTURE: CPT

## 2020-01-31 PROCEDURE — 80061 LIPID PANEL: CPT

## 2020-01-31 RX ORDER — MINERAL OIL
ENEMA (ML) RECTAL
COMMUNITY
End: 2020-01-31 | Stop reason: SDUPTHER

## 2020-01-31 RX ORDER — MINERAL OIL
180 ENEMA (ML) RECTAL
Qty: 90 TAB | Refills: 3 | Status: SHIPPED | OUTPATIENT
Start: 2020-01-31 | End: 2021-11-03

## 2020-01-31 RX ORDER — GABAPENTIN 300 MG/1
300 CAPSULE ORAL
Qty: 90 CAP | Refills: 2 | Status: SHIPPED | OUTPATIENT
Start: 2020-01-31 | End: 2020-09-28

## 2020-01-31 RX ORDER — INSULIN LISPRO 100 [IU]/ML
INJECTION, SOLUTION INTRAVENOUS; SUBCUTANEOUS
Qty: 5 PEN | Refills: 5 | Status: SHIPPED | OUTPATIENT
Start: 2020-01-31 | End: 2020-05-26 | Stop reason: SDUPTHER

## 2020-01-31 NOTE — PATIENT INSTRUCTIONS
Humalog sliding scale  For blood sugar 120-150: 4 units,   151-180: 5 units,   181-210: 6 units,   211-240: 8 units.

## 2020-01-31 NOTE — PROGRESS NOTES
Chief Complaint   Patient presents with    Foot Pain     right    Medication Evaluation     gabapentin     Joint Pain     fingers     Nasal Polyps     fexofenadine      1. Have you been to the ER, urgent care clinic since your last visit? Hospitalized since your last visit? No    2. Have you seen or consulted any other health care providers outside of the [address] [place] [address] since your last visit? Include any pap smears or colon screening.  No

## 2020-02-03 LAB — HBA1C MFR BLD HPLC: 7.3 %

## 2020-02-11 DIAGNOSIS — N39.3 STRESS INCONTINENCE OF URINE: ICD-10-CM

## 2020-02-11 RX ORDER — OXYBUTYNIN CHLORIDE 10 MG/1
TABLET, EXTENDED RELEASE ORAL
Qty: 90 TAB | Refills: 2 | Status: SHIPPED | OUTPATIENT
Start: 2020-02-11 | End: 2020-11-11

## 2020-03-27 ENCOUNTER — TELEPHONE (OUTPATIENT)
Dept: FAMILY MEDICINE CLINIC | Age: 55
End: 2020-03-27

## 2020-03-27 NOTE — TELEPHONE ENCOUNTER
Pt. Stated she need a letter sent to [facility], which is an apartment complex in [place], stating she is high risk for COVID-19 due to her current health conditions and is unable to travel. She stated she has to be out of the apartment by 03/31/2020, but does not want to get her belongings out of the apartment due to the virus. . She will call back with fax number.  Please assist.

## 2020-04-16 DIAGNOSIS — E78.00 HYPERCHOLESTEREMIA: ICD-10-CM

## 2020-04-16 RX ORDER — ROSUVASTATIN CALCIUM 20 MG/1
TABLET, COATED ORAL
Qty: 90 TAB | Refills: 1 | Status: SHIPPED | OUTPATIENT
Start: 2020-04-16 | End: 2020-10-04

## 2020-04-24 ENCOUNTER — TELEPHONE (OUTPATIENT)
Dept: OTHER | Age: 55
End: 2020-04-24

## 2020-04-24 NOTE — TELEPHONE ENCOUNTER
Attempted outreach to patient related to [employer]. This writer left a voicemail \" I am calling from [employer], we are outreaching in efforts to sign everyone up for the [facility] web portal\".

## 2020-04-30 ENCOUNTER — VIRTUAL VISIT (OUTPATIENT)
Dept: FAMILY MEDICINE CLINIC | Age: 55
End: 2020-04-30

## 2020-04-30 NOTE — PROGRESS NOTES
Cyn Gilmore presents today for No chief complaint on file. Diabetes      Is someone accompanying this pt? na    Is the patient using any DME equipment during OV? na    Depression Screening:  3 most recent PHQ Screens 4/25/2019   Little interest or pleasure in doing things Not at all   Feeling down, depressed, irritable, or hopeless Several days   Total Score PHQ 2 1       Learning Assessment:  Learning Assessment 4/25/2019   PRIMARY LEARNER Patient   HIGHEST LEVEL OF EDUCATION - PRIMARY LEARNER  SOME COLLEGE   BARRIERS PRIMARY LEARNER NONE   CO-LEARNER CAREGIVER No   CO-LEARNER NAME -   PRIMARY LANGUAGE ENGLISH   LEARNER PREFERENCE PRIMARY LISTENING     -   ANSWERED BY patient   RELATIONSHIP SELF       Abuse Screening:  Abuse Screening Questionnaire 10/14/2019   Do you ever feel afraid of your partner? N   Are you in a relationship with someone who physically or mentally threatens you? N   Is it safe for you to go home? Y       Fall Risk  Fall Risk Assessment, last 12 mths 10/14/2019   Able to walk? Yes   Fall in past 12 months? Yes   Fall with injury? No   Number of falls in past 12 months 1   Fall Risk Score 1       Health Maintenance reviewed and discussed and ordered per Provider. Health Maintenance Due   Topic Date Due    PAP AKA CERVICAL CYTOLOGY  04/26/2019    MICROALBUMIN Q1  04/25/2020    Medicare Yearly Exam  04/25/2020   . Coordination of Care:  1. Have you been to the ER, urgent care clinic since your last visit? Hospitalized since your last visit? no    2. Have you seen or consulted any other health care providers outside of the [address] [place] [address] since your last visit? Include any pap smears or colon screening. no      Last  Checked na  Last UDS Checked na  Last Pain contract signed: na    Patient presents in office today for routine care.   Patient concerns: lab results

## 2020-05-26 DIAGNOSIS — E11.9 TYPE 2 DIABETES MELLITUS WITHOUT COMPLICATION, UNSPECIFIED WHETHER LONG TERM INSULIN USE (HCC): ICD-10-CM

## 2020-05-26 RX ORDER — INSULIN LISPRO 100 [IU]/ML
INJECTION, SOLUTION INTRAVENOUS; SUBCUTANEOUS
Qty: 5 PEN | Refills: 5 | Status: SHIPPED | OUTPATIENT
Start: 2020-05-26 | End: 2020-08-20 | Stop reason: SDUPTHER

## 2020-05-26 NOTE — TELEPHONE ENCOUNTER
Requested Prescriptions     Pending Prescriptions Disp Refills    insulin lispro (HumaLOG KwikPen Insulin) 100 unit/mL kwikpen 5 Pen 5     Sig: Administer TID before each meal per sliding scale

## 2020-05-27 DIAGNOSIS — E78.00 HYPERCHOLESTEREMIA: ICD-10-CM

## 2020-05-27 RX ORDER — OMEGA-3-ACID ETHYL ESTERS 1 G/1
CAPSULE, LIQUID FILLED ORAL
Qty: 360 CAP | Refills: 3 | Status: SHIPPED | OUTPATIENT
Start: 2020-05-27 | End: 2021-03-29 | Stop reason: SDUPTHER

## 2020-06-03 DIAGNOSIS — Z79.4 TYPE 2 DIABETES MELLITUS WITH MICROALBUMINURIA, WITH LONG-TERM CURRENT USE OF INSULIN (HCC): ICD-10-CM

## 2020-06-03 DIAGNOSIS — R80.9 TYPE 2 DIABETES MELLITUS WITH MICROALBUMINURIA, WITH LONG-TERM CURRENT USE OF INSULIN (HCC): ICD-10-CM

## 2020-06-03 DIAGNOSIS — E11.29 TYPE 2 DIABETES MELLITUS WITH MICROALBUMINURIA, WITH LONG-TERM CURRENT USE OF INSULIN (HCC): ICD-10-CM

## 2020-06-03 RX ORDER — PEN NEEDLE, DIABETIC 31 GX3/16"
NEEDLE, DISPOSABLE MISCELLANEOUS
Qty: 200 PEN NEEDLE | Refills: 3 | Status: SHIPPED | OUTPATIENT
Start: 2020-06-03 | End: 2020-06-10 | Stop reason: SDUPTHER

## 2020-06-03 NOTE — TELEPHONE ENCOUNTER
Requested Prescriptions     Pending Prescriptions Disp Refills    Insulin Needles, Disposable, (BD Ultra-Fine Mini Pen Needle) 31 gauge x 3/16\" ndle 200 Pen Needle 11     Sig: Use 4 times daily

## 2020-06-10 ENCOUNTER — TELEPHONE (OUTPATIENT)
Dept: FAMILY MEDICINE CLINIC | Age: 55
End: 2020-06-10

## 2020-06-10 DIAGNOSIS — R80.9 TYPE 2 DIABETES MELLITUS WITH MICROALBUMINURIA, WITH LONG-TERM CURRENT USE OF INSULIN (HCC): ICD-10-CM

## 2020-06-10 DIAGNOSIS — Z79.4 TYPE 2 DIABETES MELLITUS WITH MICROALBUMINURIA, WITH LONG-TERM CURRENT USE OF INSULIN (HCC): ICD-10-CM

## 2020-06-10 DIAGNOSIS — E11.29 TYPE 2 DIABETES MELLITUS WITH MICROALBUMINURIA, WITH LONG-TERM CURRENT USE OF INSULIN (HCC): ICD-10-CM

## 2020-06-10 RX ORDER — PEN NEEDLE, DIABETIC 31 GX3/16"
NEEDLE, DISPOSABLE MISCELLANEOUS
Qty: 100 PEN NEEDLE | Refills: 0 | Status: SHIPPED | OUTPATIENT
Start: 2020-06-10 | End: 2021-03-29 | Stop reason: SDUPTHER

## 2020-06-10 NOTE — TELEPHONE ENCOUNTER
PT called in and informed me she is out of town and she forgot her pen needles so she can take her insulin. She was wanting to know if Dr. Keena Lott would be able to call in one box to the [facility] [address] [facility] this one time so she can take her insulin. Please advise.

## 2020-06-24 ENCOUNTER — VIRTUAL VISIT (OUTPATIENT)
Dept: FAMILY MEDICINE CLINIC | Age: 55
End: 2020-06-24

## 2020-06-24 DIAGNOSIS — E78.00 HYPERCHOLESTEREMIA: ICD-10-CM

## 2020-06-24 DIAGNOSIS — Z79.4 TYPE 2 DIABETES MELLITUS WITH MICROALBUMINURIA, WITH LONG-TERM CURRENT USE OF INSULIN (HCC): Primary | ICD-10-CM

## 2020-06-24 DIAGNOSIS — R80.9 TYPE 2 DIABETES MELLITUS WITH MICROALBUMINURIA, WITH LONG-TERM CURRENT USE OF INSULIN (HCC): Primary | ICD-10-CM

## 2020-06-24 DIAGNOSIS — Z13.39 SCREENING FOR ALCOHOLISM: ICD-10-CM

## 2020-06-24 DIAGNOSIS — E03.4 HYPOTHYROIDISM DUE TO ACQUIRED ATROPHY OF THYROID: ICD-10-CM

## 2020-06-24 DIAGNOSIS — Z00.00 MEDICARE ANNUAL WELLNESS VISIT, INITIAL: ICD-10-CM

## 2020-06-24 DIAGNOSIS — E11.29 TYPE 2 DIABETES MELLITUS WITH MICROALBUMINURIA, WITH LONG-TERM CURRENT USE OF INSULIN (HCC): Primary | ICD-10-CM

## 2020-06-24 DIAGNOSIS — F33.9 RECURRENT DEPRESSION (HCC): ICD-10-CM

## 2020-06-24 RX ORDER — INSULIN GLARGINE 100 [IU]/ML
50 INJECTION, SOLUTION SUBCUTANEOUS
Qty: 15 PEN | Refills: 5 | Status: SHIPPED | OUTPATIENT
Start: 2020-06-24 | End: 2020-08-06 | Stop reason: SDUPTHER

## 2020-06-24 NOTE — PATIENT INSTRUCTIONS
Medicare Wellness Visit, Female The best way to live healthy is to have a lifestyle where you eat a well-balanced diet, exercise regularly, limit alcohol use, and quit all forms of tobacco/nicotine, if applicable. Regular preventive services are another way to keep healthy. Preventive services (vaccines, screening tests, monitoring & exams) can help personalize your care plan, which helps you manage your own care. Screening tests can find health problems at the earliest stages, when they are easiest to treat. [facility] follows the current, evidence-based guidelines published by the [facility] ([facility]) when recommending preventive services for our patients. Because we follow these guidelines, sometimes recommendations change over time as research supports it. (For example, mammograms used to be recommended annually. Even though Medicare will still pay for an annual mammogram, the newer guidelines recommend a mammogram every two years for women of average risk). Of course, you and your doctor may decide to screen more often for some diseases, based on your risk and your co-morbidities (chronic disease you are already diagnosed with). Preventive services for you include: - Medicare offers their members a free annual wellness visit, which is time for you and your primary care provider to discuss and plan for your preventive service needs. Take advantage of this benefit every year! 
-All adults over the age of 72 should receive the recommended pneumonia vaccines. Current USPSTF guidelines recommend a series of two vaccines for the best pneumonia protection.  
-All adults should have a flu vaccine yearly and a tetanus vaccine every 10 years.  
-All adults age 48 and older should receive the shingles vaccines (series of two vaccines). -All adults age 38-68 who are overweight should have a diabetes screening test once every three years. -All adults born between [birth year] and 1967 should be screened once for Hepatitis C. 
-Other screening tests and preventive services for persons with diabetes include: an eye exam to screen for diabetic retinopathy, a kidney function test, a foot exam, and stricter control over your cholesterol.  
-Cardiovascular screening for adults with routine risk involves an electrocardiogram (ECG) at intervals determined by your doctor.  
-Colorectal cancer screenings should be done for adults age 54-65 with no increased risk factors for colorectal cancer. There are a number of acceptable methods of screening for this type of cancer. Each test has its own benefits and drawbacks. Discuss with your doctor what is most appropriate for you during your annual wellness visit. The different tests include: colonoscopy (considered the best screening method), a fecal occult blood test, a fecal DNA test, and sigmoidoscopy. 
 
-A bone mass density test is recommended when a woman turns 65 to screen for osteoporosis. This test is only recommended one time, as a screening. Some providers will use this same test as a disease monitoring tool if you already have osteoporosis. -Breast cancer screenings are recommended every other year for women of normal risk, age 54-69. 
-Cervical cancer screenings for women over age 72 are only recommended with certain risk factors. Here is a list of your current Health Maintenance items (your personalized list of preventive services) with a due date: 
Health Maintenance Due Topic Date Due  Pneumococcal Vaccine (1 of 1 - PPSV23) [birth year]01/1971  Pap Test  04/26/2019 Debi Meng Annual Well Visit  04/25/2020  Albumin Urine Test  04/25/2020

## 2020-06-24 NOTE — PROGRESS NOTES
Freddy Flood is a 60 y.o. female who was seen by synchronous (real-time) audio-video technology using doxy. me on 6/24/2020. Location of the patient: Home    Location of the provider: [facility]    Consent:  She and/or health care decision maker is aware that that she may receive a bill for this telehealth service, depending on her insurance coverage, and has provided verbal consent to proceed: Yes    Subjective:   Freddy Flood is a 60 y.o. female who presents today for management of    Chief Complaint   Patient presents with    Diabetes    Cholesterol Problem    Thyroid Problem       Diabetes Mellitus:  She has diabetes mellitus, and  hyperlipidemia and obesity. Diabetic ROS - medication compliance: compliant all of the time, diabetic diet compliance: compliant most of the time, home glucose monitoring: fasting values range 200s, nonfasting values range 220s, further diabetic ROS: no polyuria or polydipsia, no chest pain, dyspnea or TIA's, no numbness, tingling or pain in extremities. Lab review: orders written for new lab studies as appropriate; see orders.      Problem List  Patient Active Problem List    Diagnosis Date Noted    Type 2 diabetes mellitus with diabetic neuropathy ([facility] 10/24/2019    Vitamin B12 deficiency 04/25/2019    Bipolar 1 disorder, manic, full remission ([address] 04/25/2019    PTSD (post-traumatic stress disorder) 04/25/2019    Hypothyroidism due to acquired atrophy of thyroid 05/02/2016    Left low back pain 04/13/2016    History of alcohol abuse 04/13/2016    Lumbar degenerative disc disease 04/13/2016    Recurrent depression ([facility] 06/12/2013    Tear of meniscus of left knee 01/16/2013    Hypercholesteremia 01/02/2013    Type 2 diabetes mellitus with microalbuminuria, with long-term current use of insulin ([facility]) 01/02/2013       Current Medications  Current Outpatient Medications   Medication Sig    Insulin Needles, Disposable, (BD Ultra-Fine Mini Pen Needle) 31 gauge x 3/16\" ndle Use 4 times daily    omega-3 acid ethyl esters (LOVAZA) 1 gram capsule TAKE 2 CAPSULES BY MOUTH  TWO TIMES DAILY WITH MEALS    insulin lispro (HumaLOG KwikPen Insulin) 100 unit/mL kwikpen Administer TID before each meal per sliding scale    rosuvastatin (CRESTOR) 20 mg tablet TAKE 1 TABLET BY MOUTH  NIGHTLY    oxybutynin chloride XL (DITROPAN XL) 10 mg CR tablet TAKE 1 TABLET BY MOUTH  DAILY    fexofenadine (ALLEGRA) 180 mg tablet Take 1 Tab by mouth daily as needed for Allergies.  gabapentin (NEURONTIN) 300 mg capsule Take 1 Cap by mouth nightly. Max Daily Amount: 300 mg.    SITagliptin-metFORMIN (JANUMET) 50-1,000 mg per tablet TAKE 1 TABLET BY MOUTH  TWICE A DAY    insulin glargine (LANTUS,BASAGLAR) 100 unit/mL (3 mL) inpn 40 Units by SubCUTAneous route nightly.  levothyroxine (SYNTHROID) 137 mcg tablet TAKE 1 TABLET BY MOUTH  DAILY  (BEFORE BREAKFAST)    glucose blood VI test strips (ONETOUCH ULTRA BLUE TEST STRIP) strip Test blood glucose 4 times a day    haloperidol (HALDOL) 5 mg tablet Take 5 mg by mouth two (2) times a day.  ibuprofen (MOTRIN) 800 mg tablet Take 1 Tab by mouth every eight (8) hours as needed for Pain.  melatonin tab tablet Take 5 mg by mouth nightly.  cyanocobalamin (VITAMIN B-12) 500 mcg tablet Take 500 mcg by mouth daily.  ferrous sulfate 325 mg (65 mg iron) tablet Take 1 Tab by mouth two (2) times a day.  benztropine (COGENTIN) 0.5 mg tablet Take 0.5 mg by mouth two (2) times a day.  sertraline (ZOLOFT) 100 mg tablet Take 200 mg by mouth daily. No current facility-administered medications for this visit.         Allergies/Drug Reactions  Allergies   Allergen Reactions    Lipitor [Atorvastatin] Other (comments)     myalgias    Lisinopril Cough    Aspirin Other (comments)     Nose bleeds    Zocor [Simvastatin] Other (comments)     Causes pain in the left leg        Social History  Social History     Tobacco Use    Smoking status: Former Smoker     Packs/day: 1.00     Years: 35.00     Pack years: 35.00     Types: Cigarettes     Last attempt to quit: 2011     Years since quittin.6    Smokeless tobacco: Never Used    Tobacco comment: quit    Substance Use Topics    Alcohol use: No     Alcohol/week: 0.0 standard drinks     Comment: quit 10 years ago    Drug use: No        Review of Systems  Review of Systems   Constitutional: Negative for chills, fever, malaise/fatigue and weight loss. Eyes: Negative for blurred vision and double vision. Respiratory: Negative for shortness of breath. Cardiovascular: Negative for chest pain and leg swelling. Gastrointestinal: Negative for abdominal pain, heartburn, nausea and vomiting. Neurological: Negative for dizziness and headaches. Psychiatric/Behavioral: Negative for depression. The patient is not nervous/anxious. Objective:     General: alert, cooperative, no distress   Mental  status: mental status: alert, oriented to person, place, and time, normal mood, behavior, speech, dress, motor activity, and thought processes   Resp: resp: normal effort and no respiratory distress   Neuro: neuro: no gross deficits   Skin: skin: no discoloration or lesions of concern on visible areas     Due to this being a TeleHealth evaluation, many elements of the physical examination are unable to be assessed. Assessment & Plan:   1. Type 2 diabetes mellitus with microalbuminuria, with long-term current use of insulin (HCC)  - home blood sugar running high  - increase Lantus to 50 units daily. May increase 3 units every 3 days to reach goal of fasting blood sugar <150 (max dose 56 units per day until her next appt)  - continue Janumet and Humalog SS  - MICROALBUMIN, UR, RAND W/ MICROALB/CREAT RATIO; Future  - HEMOGLOBIN A1C WITH EAG; Future  - METABOLIC PANEL, BASIC; Future    2. Hypothyroidism due to acquired atrophy of thyroid  - stable    3.  Hypercholesteremia  - controlled    4. Recurrent depression ([facility]  - stable  - psychiatry follow-up      Follow-up and Dispositions    · Return in about 6 weeks (around 8/5/2020) for DM DOXY. We discussed the expected course, resolution and complications of the diagnosis(es) in detail. Medication risks, benefits, costs, interactions, and alternatives were discussed as indicated. I advised her to contact the office if her condition worsens, changes or fails to improve as anticipated. She expressed understanding with the diagnosis(es) and plan. Pursuant to the emergency declaration under the [facility], [facility] waiver authority and the [employer] Act, this Virtual  Visit was conducted, with patient's consent, to reduce the patient's risk of exposure to COVID-19 and provide continuity of care for an established patient. Services were provided through a video synchronous discussion virtually to substitute for in-person clinic visit. Luh Acevedo MD     This is an Initial Medicare Annual Wellness Exam (AWV) (Performed 12 months after IPPE or effective date of Medicare Part B enrollment, Once in a lifetime)    Consent: Rambo Kim, who was seen by synchronous (real-time) audio-video technology, and/or her healthcare decision maker, is aware that this patient-initiated, Telehealth encounter on 6/24/2020 is a billable service. While AWVs are fully covered by Medicare, any services rendered on this date that are not included in an AWV are subject to additional billing, with coverage as determined by her insurance carrier. She is aware that she may receive a bill for any such additional services and has provided verbal consent to proceed: Yes. I have reviewed the patient's medical history in detail and updated the computerized patient record.      History     Patient Active Problem List   Diagnosis Code    Hypercholesteremia E78.00    Type 2 diabetes mellitus with microalbuminuria, with long-term current use of insulin ([facility]) E11.29, R80.9, Z79.4    Tear of meniscus of left knee S83.207A    Recurrent depression ([facility]) F33.9    Left low back pain M54.5    History of alcohol abuse F10.11    Lumbar degenerative disc disease M51.36    Hypothyroidism due to acquired atrophy of thyroid E03.4    Vitamin B12 deficiency E53.8    Bipolar 1 disorder, manic, full remission ([facility]) F31.74    PTSD (post-traumatic stress disorder) F43.10    Type 2 diabetes mellitus with diabetic neuropathy ([facility]) E11.40     Past Medical History:   Diagnosis Date    Arthritis     Chronic pain     Depression     Diabetes ([facility]     Hyperlipidemia     Hypothyroid     Medial meniscus tear     Left knee    Psychiatric disorder     Tobacco abuse, in remission       Past Surgical History:   Procedure Laterality Date    HX ORTHOPAEDIC      Hand reconstructive surgery    HX TUBAL LIGATION  2004     Current Outpatient Medications   Medication Sig Dispense Refill    Insulin Needles, Disposable, (BD Ultra-Fine Mini Pen Needle) 31 gauge x 3/16\" ndle Use 4 times daily 100 Pen Needle 0    omega-3 acid ethyl esters (LOVAZA) 1 gram capsule TAKE 2 CAPSULES BY MOUTH  TWO TIMES DAILY WITH MEALS 360 Cap 3    insulin lispro (HumaLOG KwikPen Insulin) 100 unit/mL kwikpen Administer TID before each meal per sliding scale 5 Pen 5    rosuvastatin (CRESTOR) 20 mg tablet TAKE 1 TABLET BY MOUTH  NIGHTLY 90 Tab 1    oxybutynin chloride XL (DITROPAN XL) 10 mg CR tablet TAKE 1 TABLET BY MOUTH  DAILY 90 Tab 2    fexofenadine (ALLEGRA) 180 mg tablet Take 1 Tab by mouth daily as needed for Allergies. 90 Tab 3    gabapentin (NEURONTIN) 300 mg capsule Take 1 Cap by mouth nightly.  Max Daily Amount: 300 mg. 90 Cap 2    SITagliptin-metFORMIN (JANUMET) 50-1,000 mg per tablet TAKE 1 TABLET BY MOUTH  TWICE A  Tab 3    insulin glargine (LANTUS,BASAGLAR) 100 unit/mL (3 mL) inpn 40 Units by SubCUTAneous route nightly. 15 Pen 5    levothyroxine (SYNTHROID) 137 mcg tablet TAKE 1 TABLET BY MOUTH  DAILY  (BEFORE BREAKFAST) 90 Tab 3    glucose blood VI test strips (ONETOUCH ULTRA BLUE TEST STRIP) strip Test blood glucose 4 times a day 400 Strip 4    haloperidol (HALDOL) 5 mg tablet Take 5 mg by mouth two (2) times a day.  ibuprofen (MOTRIN) 800 mg tablet Take 1 Tab by mouth every eight (8) hours as needed for Pain. 60 Tab 0    melatonin tab tablet Take 5 mg by mouth nightly.  cyanocobalamin (VITAMIN B-12) 500 mcg tablet Take 500 mcg by mouth daily.  ferrous sulfate 325 mg (65 mg iron) tablet Take 1 Tab by mouth two (2) times a day. 60 Tab 3    benztropine (COGENTIN) 0.5 mg tablet Take 0.5 mg by mouth two (2) times a day.  sertraline (ZOLOFT) 100 mg tablet Take 200 mg by mouth daily.        Allergies   Allergen Reactions    Lipitor [Atorvastatin] Other (comments)     myalgias    Lisinopril Cough    Aspirin Other (comments)     Nose bleeds    Zocor [Simvastatin] Other (comments)     Causes pain in the left leg       Family History   Problem Relation Age of Onset    Alcohol abuse Mother     Arthritis-osteo Mother    Dorisann Bias Mother 61        breast cancer    Diabetes Mother     Hypertension Mother     Psychiatric Disorder Mother    Davila Stroke Mother     Breast Cancer Mother     Alcohol abuse Father     Heart Disease Father     Diabetes Father     Hypertension Father     Lung Disease Father     Colon Cancer Father 72    Alcohol abuse Paternal Grandmother     Ovarian Cancer Sister     Alcohol abuse Sister     Diabetes Sister     Psychiatric Disorder Sister     Alcohol abuse Brother     Psychiatric Disorder Son     Psychiatric Disorder Other         nephew     Social History     Tobacco Use    Smoking status: Former Smoker     Packs/day: 1.00     Years: 35.00     Pack years: 35.00     Types: Cigarettes     Last attempt to quit: 11/7/2011     Years since quittin.6    Smokeless tobacco: Never Used    Tobacco comment: quit    Substance Use Topics    Alcohol use: No     Alcohol/week: 0.0 standard drinks     Comment: quit 10 years ago       Depression Risk Factor Screening:     3 most recent PHQ Screens 2020   Little interest or pleasure in doing things More than half the days   Feeling down, depressed, irritable, or hopeless Not at all   Total Score PHQ 2 2       Alcohol Risk Factor Screening:   Do you average 1 drink per night or more than 7 drinks a week:  No    On any one occasion in the past three months have you have had more than 3 drinks containing alcohol:  No      Functional Ability and Level of Safety:   Hearing: Hearing is good. Activities of Daily Living: The home contains: no safety equipment. Patient does total self care     Ambulation: with no difficulty      Fall Risk:  Fall Risk Assessment, last 12 mths 2020   Able to walk? Yes   Fall in past 12 months? Yes   Fall with injury? No   Number of falls in past 12 months 1   Fall Risk Score 1     Abuse Screen:  Patient is not abused       Cognitive Screening   Has your family/caregiver stated any concerns about your memory: no     Cognitive Screening: not done    Patient Care Team   Patient Care Team:  Indra Robledo MD as PCP - General (Internal Medicine)  Indra Robledo MD as PCP - [address] [place] [address]  [facility] Provider  Jose Luis Campos MD (Cardiology)  Jerry Gutiérrez MD (Ophthalmology)  [facility] as Physician Assistant (Psychiatry)    Assessment/Plan   Education and counseling provided:  Are appropriate based on today's review and evaluation  Screening Mammography  Diabetes outpatient self-management training services    Diagnoses and all orders for this visit:    1. Type 2 diabetes mellitus with microalbuminuria, with long-term current use of insulin (HCC)  -     MICROALBUMIN, UR, RAND W/ MICROALB/CREAT RATIO; Future  -     HEMOGLOBIN A1C WITH EAG;  Future  - METABOLIC PANEL, BASIC; Future    2. Hypothyroidism due to acquired atrophy of thyroid    3. Hypercholesteremia    4. Medicare annual wellness visit, initial    5. Screening for alcoholism  -     DC ANNUAL ALCOHOL SCREEN 15 MIN    6. Recurrent depression [facility])         Health Maintenance Due   Topic Date Due    Pneumococcal 0-64 years (1 of 1 - PPSV23) [birth year]/1969    PAP AKA CERVICAL CYTOLOGY  04/26/2019    Medicare Yearly Exam  04/25/2020    MICROALBUMIN Q1  04/25/2020         Senia Clark is a 60 y.o. female who was evaluated by an audio-video encounter for concerns as above. Patient identification was verified prior to start of the visit. A caregiver was present when appropriate. Due to this being a TeleHealth encounter (During KRGYR-37 public health emergency), evaluation of the following organ systems was limited: Vitals/Constitutional/EENT/Resp/CV/GI//MS/Neuro/Skin/Heme-Lymph-Imm. Pursuant to the emergency declaration under the [facility] waiver authority and the [employer] Act, this Virtual Visit was conducted, with patient's (and/or legal guardian's) consent, to reduce the patient's risk of exposure to COVID-19 and provide necessary medical care. Services were provided through a synchronous discussion virtually to substitute for in-person clinic visit. I was in the office. The patient was at home.         Sara Kathleen MD

## 2020-08-06 ENCOUNTER — VIRTUAL VISIT (OUTPATIENT)
Dept: FAMILY MEDICINE CLINIC | Age: 55
End: 2020-08-06

## 2020-08-06 DIAGNOSIS — E11.29 TYPE 2 DIABETES MELLITUS WITH MICROALBUMINURIA, WITH LONG-TERM CURRENT USE OF INSULIN (HCC): ICD-10-CM

## 2020-08-06 DIAGNOSIS — R80.9 TYPE 2 DIABETES MELLITUS WITH MICROALBUMINURIA, WITH LONG-TERM CURRENT USE OF INSULIN (HCC): ICD-10-CM

## 2020-08-06 DIAGNOSIS — Z79.4 TYPE 2 DIABETES MELLITUS WITH MICROALBUMINURIA, WITH LONG-TERM CURRENT USE OF INSULIN (HCC): ICD-10-CM

## 2020-08-06 RX ORDER — INSULIN GLARGINE 100 [IU]/ML
64 INJECTION, SOLUTION SUBCUTANEOUS
Qty: 15 PEN | Refills: 5 | Status: SHIPPED | OUTPATIENT
Start: 2020-08-06 | End: 2020-08-20

## 2020-08-06 NOTE — PROGRESS NOTES
Hilary Urbina is a 60 y.o. female who was seen by synchronous (real-time) audio-video technology using doxy. me on 8/6/2020. Location of the patient: Home    Location of the provider: [facility]    Consent:  She and/or health care decision maker is aware that that she may receive a bill for this telehealth service, depending on her insurance coverage, and has provided verbal consent to proceed: Yes    Subjective:   Hilary Urbina is a 60 y.o. female who presents today for management of    Chief Complaint   Patient presents with    Diabetes       Since last office visit, patient has increased the Lantus to 56 units per day. Fasting blood sugar 179, 219, 200, 189  Before lunch 219, 175    Problem List  Patient Active Problem List    Diagnosis Date Noted    Type 2 diabetes mellitus with diabetic neuropathy ([facility] [address] 10/24/2019    Vitamin B12 deficiency 04/25/2019    Bipolar 1 disorder, manic, full remission ([address] 04/25/2019    PTSD (post-traumatic stress disorder) 04/25/2019    Hypothyroidism due to acquired atrophy of thyroid 05/02/2016    Left low back pain 04/13/2016    History of alcohol abuse 04/13/2016    Lumbar degenerative disc disease 04/13/2016    Recurrent depression ([facility] [address] 06/12/2013    Tear of meniscus of left knee 01/16/2013    Hypercholesteremia 01/02/2013    Type 2 diabetes mellitus with microalbuminuria, with long-term current use of insulin (HCC) 01/02/2013       Current Medications  Current Outpatient Medications   Medication Sig    insulin glargine (LANTUS,BASAGLAR) 100 unit/mL (3 mL) inpn 64 Units by SubCUTAneous route nightly.     Insulin Needles, Disposable, (BD Ultra-Fine Mini Pen Needle) 31 gauge x 3/16\" ndle Use 4 times daily    omega-3 acid ethyl esters (LOVAZA) 1 gram capsule TAKE 2 CAPSULES BY MOUTH  TWO TIMES DAILY WITH MEALS    insulin lispro (HumaLOG KwikPen Insulin) 100 unit/mL kwikpen Administer TID before each meal per sliding scale    rosuvastatin (CRESTOR) 20 mg tablet TAKE 1 TABLET BY MOUTH  NIGHTLY    oxybutynin chloride XL (DITROPAN XL) 10 mg CR tablet TAKE 1 TABLET BY MOUTH  DAILY    fexofenadine (ALLEGRA) 180 mg tablet Take 1 Tab by mouth daily as needed for Allergies.  gabapentin (NEURONTIN) 300 mg capsule Take 1 Cap by mouth nightly. Max Daily Amount: 300 mg.    SITagliptin-metFORMIN (JANUMET) 50-1,000 mg per tablet TAKE 1 TABLET BY MOUTH  TWICE A DAY    levothyroxine (SYNTHROID) 137 mcg tablet TAKE 1 TABLET BY MOUTH  DAILY  (BEFORE BREAKFAST)    glucose blood VI test strips (ONETOUCH ULTRA BLUE TEST STRIP) strip Test blood glucose 4 times a day    haloperidol (HALDOL) 5 mg tablet Take 5 mg by mouth two (2) times a day.  ibuprofen (MOTRIN) 800 mg tablet Take 1 Tab by mouth every eight (8) hours as needed for Pain.  melatonin tab tablet Take 5 mg by mouth nightly.  cyanocobalamin (VITAMIN B-12) 500 mcg tablet Take 500 mcg by mouth daily.  ferrous sulfate 325 mg (65 mg iron) tablet Take 1 Tab by mouth two (2) times a day.  benztropine (COGENTIN) 0.5 mg tablet Take 0.5 mg by mouth two (2) times a day.  sertraline (ZOLOFT) 100 mg tablet Take 200 mg by mouth daily. No current facility-administered medications for this visit.         Allergies/Drug Reactions  Allergies   Allergen Reactions    Lipitor [Atorvastatin] Other (comments)     myalgias    Lisinopril Cough    Aspirin Other (comments)     Nose bleeds    Zocor [Simvastatin] Other (comments)     Causes pain in the left leg        Social History  Social History     Tobacco Use    Smoking status: Former Smoker     Packs/day: 1.00     Years: 35.00     Pack years: 35.00     Types: Cigarettes     Last attempt to quit: 2011     Years since quittin.7    Smokeless tobacco: Never Used    Tobacco comment: quit    Substance Use Topics    Alcohol use: No     Alcohol/week: 0.0 standard drinks     Comment: quit 10 years ago    Drug use: No        Review of Systems  Review of Systems   Constitutional: Negative for chills, fever and weight loss. Respiratory: Negative for shortness of breath. Cardiovascular: Negative for chest pain and leg swelling. Gastrointestinal: Negative for heartburn, nausea and vomiting. Genitourinary: Positive for frequency. Neurological: Negative for dizziness, sensory change and headaches. Objective:     General: alert, cooperative, no distress   Mental  status: mental status: alert, oriented to person, place, and time, normal mood, behavior, speech, dress, motor activity, and thought processes   Resp: resp: normal effort and no respiratory distress   Neuro: neuro: no gross deficits   Skin: skin: no discoloration or lesions of concern on visible areas     Due to this being a TeleHealth evaluation, many elements of the physical examination are unable to be assessed. Assessment & Plan:   1. Type 2 diabetes mellitus with microalbuminuria, with long-term current use of insulin (HCC)  - still with hyperglycemia  - increase insulin glargine (LANTUS,BASAGLAR) 100 unit/mL (3 mL) inpn; 64 Units by SubCUTAneous route nightly. Dispense: 15 Pen; Refill: 5  - continue Novolog 8-10 units premeals  - increase Lantus by 3 units every 3 days to reach goal fasting blood sugar <140 mg/dl    Follow-up and Dispositions    · Return in about 2 weeks (around 8/20/2020) for DM. We discussed the expected course, resolution and complications of the diagnosis(es) in detail. Medication risks, benefits, costs, interactions, and alternatives were discussed as indicated. I advised her to contact the office if her condition worsens, changes or fails to improve as anticipated. She expressed understanding with the diagnosis(es) and plan.          Pursuant to the emergency declaration under the [facility], [address] [place] [address] authority and the [employer] and [employer] Act, this Virtual  Visit was conducted, with patient's consent, to reduce the patient's risk of exposure to COVID-19 and provide continuity of care for an established patient. Services were provided through a video synchronous discussion virtually to substitute for in-person clinic visit.     Li Jennings MD

## 2020-08-10 LAB — HBA1C MFR BLD HPLC: 9.7 %

## 2020-08-14 ENCOUNTER — HOSPITAL ENCOUNTER (OUTPATIENT)
Dept: MAMMOGRAPHY | Age: 55
Discharge: HOME | End: 2020-08-14
Payer: MEDICARE

## 2020-08-14 DIAGNOSIS — Z12.31 VISIT FOR SCREENING MAMMOGRAM: ICD-10-CM

## 2020-08-14 PROCEDURE — 77063 BREAST TOMOSYNTHESIS BI: CPT

## 2020-08-20 ENCOUNTER — VIRTUAL VISIT (OUTPATIENT)
Dept: FAMILY MEDICINE CLINIC | Age: 55
End: 2020-08-20

## 2020-08-20 DIAGNOSIS — E11.29 TYPE 2 DIABETES MELLITUS WITH MICROALBUMINURIA, WITH LONG-TERM CURRENT USE OF INSULIN (HCC): Primary | ICD-10-CM

## 2020-08-20 DIAGNOSIS — Z79.4 TYPE 2 DIABETES MELLITUS WITH MICROALBUMINURIA, WITH LONG-TERM CURRENT USE OF INSULIN (HCC): Primary | ICD-10-CM

## 2020-08-20 DIAGNOSIS — R80.9 TYPE 2 DIABETES MELLITUS WITH MICROALBUMINURIA, WITH LONG-TERM CURRENT USE OF INSULIN (HCC): Primary | ICD-10-CM

## 2020-08-20 DIAGNOSIS — E11.9 TYPE 2 DIABETES MELLITUS WITHOUT COMPLICATION, UNSPECIFIED WHETHER LONG TERM INSULIN USE (HCC): ICD-10-CM

## 2020-08-20 RX ORDER — INSULIN GLARGINE 100 [IU]/ML
70 INJECTION, SOLUTION SUBCUTANEOUS
Qty: 15 PEN | Refills: 0
Start: 2020-08-20 | End: 2020-08-27

## 2020-08-20 RX ORDER — INSULIN LISPRO 100 [IU]/ML
12 INJECTION, SOLUTION INTRAVENOUS; SUBCUTANEOUS
Qty: 5 PEN | Refills: 0
Start: 2020-08-20 | End: 2020-08-27 | Stop reason: SDUPTHER

## 2020-08-20 NOTE — PROGRESS NOTES
Edward Hathaway is a 60 y.o. female who was seen by synchronous (real-time) audio-video technology using doxy. me on 8/20/2020. Location of the patient: Home    Location of the provider: [facility]    Consent:  She and/or health care decision maker is aware that that she may receive a bill for this telehealth service, depending on her insurance coverage, and has provided verbal consent to proceed: Yes    Subjective:   Edward Hathaway is a 60 y.o. female who presents today for management of    Chief Complaint   Patient presents with    Diabetes     Patient's current diabetes regimen: Lantus 64 units daily, Novolog 8-10 units premeals  Fasting blood sugar - 238, 218, 210, 254, 150, 180  Pre meal blood sugar - 223, 160, 270, 241, 164, 213, 257, 335, 296, 290  Patient had a HbA1c done 3 weeks ago through her insurance which was 9.7%. Problem List  Patient Active Problem List    Diagnosis Date Noted    Type 2 diabetes mellitus with diabetic neuropathy ([facility] [address] 10/24/2019    Vitamin B12 deficiency 04/25/2019    Bipolar 1 disorder, manic, full remission ([facility] [address] 04/25/2019    PTSD (post-traumatic stress disorder) 04/25/2019    Hypothyroidism due to acquired atrophy of thyroid 05/02/2016    Left low back pain 04/13/2016    History of alcohol abuse 04/13/2016    Lumbar degenerative disc disease 04/13/2016    Recurrent depression ([facility] [address] 06/12/2013    Tear of meniscus of left knee 01/16/2013    Hypercholesteremia 01/02/2013    Type 2 diabetes mellitus with microalbuminuria, with long-term current use of insulin ([facility]) 01/02/2013       Current Medications  Current Outpatient Medications   Medication Sig    insulin glargine (LANTUS,BASAGLAR) 100 unit/mL (3 mL) inpn 70 Units by SubCUTAneous route nightly.  insulin lispro (HumaLOG KwikPen Insulin) 100 unit/mL kwikpen 12 Units by SubCUTAneous route Before breakfast, lunch, and dinner.  Administer TID before each meal per sliding scale    Insulin Needles, Disposable, (BD Ultra-Fine Mini Pen Needle) 31 gauge x 316\" ndle Use 4 times daily    omega-3 acid ethyl esters (LOVAZA) 1 gram capsule TAKE 2 CAPSULES BY MOUTH  TWO TIMES DAILY WITH MEALS    rosuvastatin (CRESTOR) 20 mg tablet TAKE 1 TABLET BY MOUTH  NIGHTLY    oxybutynin chloride XL (DITROPAN XL) 10 mg CR tablet TAKE 1 TABLET BY MOUTH  DAILY    fexofenadine (ALLEGRA) 180 mg tablet Take 1 Tab by mouth daily as needed for Allergies.  gabapentin (NEURONTIN) 300 mg capsule Take 1 Cap by mouth nightly. Max Daily Amount: 300 mg.    SITagliptin-metFORMIN (JANUMET) 50-1,000 mg per tablet TAKE 1 TABLET BY MOUTH  TWICE A DAY    levothyroxine (SYNTHROID) 137 mcg tablet TAKE 1 TABLET BY MOUTH  DAILY  (BEFORE BREAKFAST)    glucose blood VI test strips (ONETOUCH ULTRA BLUE TEST STRIP) strip Test blood glucose 4 times a day    haloperidol (HALDOL) 5 mg tablet Take 5 mg by mouth two (2) times a day.  ibuprofen (MOTRIN) 800 mg tablet Take 1 Tab by mouth every eight (8) hours as needed for Pain.  melatonin tab tablet Take 5 mg by mouth nightly.  cyanocobalamin (VITAMIN B-12) 500 mcg tablet Take 500 mcg by mouth daily.  ferrous sulfate 325 mg (65 mg iron) tablet Take 1 Tab by mouth two (2) times a day.  benztropine (COGENTIN) 0.5 mg tablet Take 0.5 mg by mouth two (2) times a day.  sertraline (ZOLOFT) 100 mg tablet Take 200 mg by mouth daily. No current facility-administered medications for this visit.         Allergies/Drug Reactions  Allergies   Allergen Reactions    Lipitor [Atorvastatin] Other (comments)     myalgias    Lisinopril Cough    Aspirin Other (comments)     Nose bleeds    Zocor [Simvastatin] Other (comments)     Causes pain in the left leg        Social History  Social History     Tobacco Use    Smoking status: Former Smoker     Packs/day: 1.00     Years: 35.00     Pack years: 35.00     Types: Cigarettes     Last attempt to quit: 2011     Years since quittin.7    Smokeless tobacco: Never Used    Tobacco comment: quit 2010   Substance Use Topics    Alcohol use: No     Alcohol/week: 0.0 standard drinks     Comment: quit 10 years ago    Drug use: No        Review of Systems  Review of Systems   Constitutional: Negative for chills, fever, malaise/fatigue and weight loss. Respiratory: Negative. Cardiovascular: Negative. Gastrointestinal: Negative. Neurological: Negative. Objective:     General: alert, cooperative, no distress   Mental  status: mental status: alert, oriented to person, place, and time, normal mood, behavior, speech, dress, motor activity, and thought processes   Resp: resp: normal effort and no respiratory distress   Neuro: neuro: no gross deficits   Skin: skin: no discoloration or lesions of concern on visible areas     Due to this being a TeleHealth evaluation, many elements of the physical examination are unable to be assessed. Assessment & Plan:       ICD-10-CM ICD-9-CM    1. Type 2 diabetes mellitus with microalbuminuria, with long-term current use of insulin ([facility])  E11.29 250.40 HEMOGLOBIN A1C WITH EAG    F32.5 124.4 METABOLIC PANEL, BASIC    Q78.6 V58.67 MICROALBUMIN, UR, RAND W/ MICROALB/CREAT RATIO      insulin glargine (LANTUS,BASAGLAR) 100 unit/mL (3 mL) inpn   2. Type 2 diabetes mellitus without complication, unspecified whether long term insulin use (HCC)  E11.9 250.00 insulin lispro (HumaLOG KwikPen Insulin) 100 unit/mL kwikpen     Most recent HbA1c 9.7% done through [facility] - report not available  Increase Lantus to 70 units daily. Increase Lantus by 3 units every 3 days to reach goal fasting blood sugar <140 mg/dl. Increase Humalog to 12 units TID. Follow-up and Dispositions    · Return in about 1 week (around 8/27/2020) for DM, follow-up. We discussed the expected course, resolution and complications of the diagnosis(es) in detail.   Medication risks, benefits, costs, interactions, and alternatives were discussed as indicated. I advised her to contact the office if her condition worsens, changes or fails to improve as anticipated. She expressed understanding with the diagnosis(es) and plan. Pursuant to the emergency declaration under the [facility], [facility] waiver authority and the [employer] and [employer] Act, this Virtual  Visit was conducted, with patient's consent, to reduce the patient's risk of exposure to COVID-19 and provide continuity of care for an established patient. Services were provided through a video synchronous discussion virtually to substitute for in-person clinic visit.     Li Jennings MD

## 2020-08-27 ENCOUNTER — VIRTUAL VISIT (OUTPATIENT)
Dept: FAMILY MEDICINE CLINIC | Age: 55
End: 2020-08-27

## 2020-08-27 DIAGNOSIS — E11.9 TYPE 2 DIABETES MELLITUS WITHOUT COMPLICATION, UNSPECIFIED WHETHER LONG TERM INSULIN USE (HCC): ICD-10-CM

## 2020-08-27 DIAGNOSIS — R80.9 TYPE 2 DIABETES MELLITUS WITH MICROALBUMINURIA, WITH LONG-TERM CURRENT USE OF INSULIN (HCC): Primary | ICD-10-CM

## 2020-08-27 DIAGNOSIS — Z79.4 TYPE 2 DIABETES MELLITUS WITH MICROALBUMINURIA, WITH LONG-TERM CURRENT USE OF INSULIN (HCC): Primary | ICD-10-CM

## 2020-08-27 DIAGNOSIS — E11.29 TYPE 2 DIABETES MELLITUS WITH MICROALBUMINURIA, WITH LONG-TERM CURRENT USE OF INSULIN (HCC): Primary | ICD-10-CM

## 2020-08-27 RX ORDER — INSULIN GLARGINE 100 [IU]/ML
75 INJECTION, SOLUTION SUBCUTANEOUS
Qty: 15 PEN | Refills: 0
Start: 2020-08-27 | End: 2020-10-13

## 2020-08-27 RX ORDER — INSULIN LISPRO 100 [IU]/ML
10 INJECTION, SOLUTION INTRAVENOUS; SUBCUTANEOUS
Qty: 5 PEN | Refills: 0
Start: 2020-08-27 | End: 2020-09-18

## 2020-08-27 NOTE — PROGRESS NOTES
Maria E Maxwell is a 60 y.o. female who was seen by synchronous (real-time) audio-video technology using doxy. me on 8/27/2020. Location of the patient: Home    Location of the provider: [facility]    Consent:  She and/or health care decision maker is aware that that she may receive a bill for this telehealth service, depending on her insurance coverage, and has provided verbal consent to proceed: Yes    Subjective:   Maria E Maxwell is a 60 y.o. female who presents today for management of    Chief Complaint   Patient presents with    Diabetes     Patient has not been complaint with diabetic diet. She has been eating breads and potatoes more. Fasting blood sugar - 206, 226, 154, 166, 241   Pre-lunch blood sugar - 140, 128, 129, 156, 177  Pre-dinner blood sugar - 212, 228, 139, 151    Problem List  Patient Active Problem List    Diagnosis Date Noted    Type 2 diabetes mellitus with diabetic neuropathy ([facility] [address] 10/24/2019    Vitamin B12 deficiency 04/25/2019    Bipolar 1 disorder, manic, full remission ([facility] [address] 04/25/2019    PTSD (post-traumatic stress disorder) 04/25/2019    Hypothyroidism due to acquired atrophy of thyroid 05/02/2016    Left low back pain 04/13/2016    History of alcohol abuse 04/13/2016    Lumbar degenerative disc disease 04/13/2016    Recurrent depression ([facility] [address] 06/12/2013    Tear of meniscus of left knee 01/16/2013    Hypercholesteremia 01/02/2013    Type 2 diabetes mellitus with microalbuminuria, with long-term current use of insulin ([facility]) 01/02/2013       Current Medications  Current Outpatient Medications   Medication Sig    insulin glargine (LANTUS,BASAGLAR) 100 unit/mL (3 mL) inpn 75 Units by SubCUTAneous route nightly.  insulin lispro (HumaLOG KwikPen Insulin) 100 unit/mL kwikpen 10 Units by SubCUTAneous route Before breakfast, lunch, and dinner. 5 units SQ before snacks.  Maximum daily dose: 50 units    Insulin Needles, Disposable, (BD Ultra-Fine Mini Pen Needle) 31 gauge x 3/16\" ndle Use 4 times daily    omega-3 acid ethyl esters (LOVAZA) 1 gram capsule TAKE 2 CAPSULES BY MOUTH  TWO TIMES DAILY WITH MEALS    rosuvastatin (CRESTOR) 20 mg tablet TAKE 1 TABLET BY MOUTH  NIGHTLY    oxybutynin chloride XL (DITROPAN XL) 10 mg CR tablet TAKE 1 TABLET BY MOUTH  DAILY    fexofenadine (ALLEGRA) 180 mg tablet Take 1 Tab by mouth daily as needed for Allergies.  gabapentin (NEURONTIN) 300 mg capsule Take 1 Cap by mouth nightly. Max Daily Amount: 300 mg.    SITagliptin-metFORMIN (JANUMET) 50-1,000 mg per tablet TAKE 1 TABLET BY MOUTH  TWICE A DAY    levothyroxine (SYNTHROID) 137 mcg tablet TAKE 1 TABLET BY MOUTH  DAILY  (BEFORE BREAKFAST)    glucose blood VI test strips (ONETOUCH ULTRA BLUE TEST STRIP) strip Test blood glucose 4 times a day    haloperidol (HALDOL) 5 mg tablet Take 5 mg by mouth two (2) times a day.  ibuprofen (MOTRIN) 800 mg tablet Take 1 Tab by mouth every eight (8) hours as needed for Pain.  melatonin tab tablet Take 5 mg by mouth nightly.  cyanocobalamin (VITAMIN B-12) 500 mcg tablet Take 500 mcg by mouth daily.  ferrous sulfate 325 mg (65 mg iron) tablet Take 1 Tab by mouth two (2) times a day.  benztropine (COGENTIN) 0.5 mg tablet Take 0.5 mg by mouth two (2) times a day.  sertraline (ZOLOFT) 100 mg tablet Take 200 mg by mouth daily. No current facility-administered medications for this visit.         Allergies/Drug Reactions  Allergies   Allergen Reactions    Lipitor [Atorvastatin] Other (comments)     myalgias    Lisinopril Cough    Aspirin Other (comments)     Nose bleeds    Zocor [Simvastatin] Other (comments)     Causes pain in the left leg        Social History  Social History     Tobacco Use    Smoking status: Former Smoker     Packs/day: 1.00     Years: 35.00     Pack years: 35.00     Types: Cigarettes     Last attempt to quit: 2011     Years since quittin.8    Smokeless tobacco: Never Used    Tobacco comment: quit 2010   Substance Use Topics    Alcohol use: No     Alcohol/week: 0.0 standard drinks     Comment: quit 10 years ago    Drug use: No        Review of Systems  Review of Systems   Constitutional: Negative. Respiratory: Negative. Cardiovascular: Negative. Gastrointestinal: Negative. Genitourinary: Negative. Neurological: Negative. Psychiatric/Behavioral: Negative. Objective:     General: alert, cooperative, no distress   Mental  status: mental status: alert, oriented to person, place, and time, normal mood, behavior, speech, dress, motor activity, and thought processes   Resp: resp: normal effort and no respiratory distress   Neuro: neuro: no gross deficits   Skin: skin: no discoloration or lesions of concern on visible areas     Due to this being a TeleHealth evaluation, many elements of the physical examination are unable to be assessed. Assessment & Plan:       ICD-10-CM ICD-9-CM    1. Type 2 diabetes mellitus with microalbuminuria, with long-term current use of insulin (HCC)  E11.29 250.40 insulin glargine (LANTUS,BASAGLAR) 100 unit/mL (3 mL) in    R80.9 791.0     Z79.4 V58.67    2. Type 2 diabetes mellitus without complication, unspecified whether long term insulin use (HCC)  E11.9 250.00 insulin lispro (HumaLOG KwikPen Insulin) 100 unit/mL kwikpen     Insulin regimen adjusted  Stressed importance of compliance with diet. Follow-up and Dispositions    · Return in about 2 weeks (around 9/10/2020) for DM. We discussed the expected course, resolution and complications of the diagnosis(es) in detail. Medication risks, benefits, costs, interactions, and alternatives were discussed as indicated. I advised her to contact the office if her condition worsens, changes or fails to improve as anticipated. She expressed understanding with the diagnosis(es) and plan.          Pursuant to the emergency declaration under the [facility], [facility] authority and the [employer] and [employer] Act, this Virtual  Visit was conducted, with patient's consent, to reduce the patient's risk of exposure to COVID-19 and provide continuity of care for an established patient. Services were provided through a video synchronous discussion virtually to substitute for in-person clinic visit.     Sajan Turcios MD

## 2020-09-18 ENCOUNTER — VIRTUAL VISIT (OUTPATIENT)
Dept: FAMILY MEDICINE CLINIC | Age: 55
End: 2020-09-18

## 2020-09-18 DIAGNOSIS — E11.9 TYPE 2 DIABETES MELLITUS WITHOUT COMPLICATION, UNSPECIFIED WHETHER LONG TERM INSULIN USE (HCC): ICD-10-CM

## 2020-09-18 RX ORDER — INSULIN LISPRO 100 [IU]/ML
12 INJECTION, SOLUTION INTRAVENOUS; SUBCUTANEOUS
Qty: 5 PEN | Refills: 0
Start: 2020-09-18 | End: 2021-05-25 | Stop reason: SDUPTHER

## 2020-09-18 RX ORDER — BENZTROPINE MESYLATE 1 MG/1
TABLET ORAL
COMMUNITY
Start: 2020-09-09

## 2020-09-18 RX ORDER — LUMATEPERONE 42 MG/1
CAPSULE ORAL
COMMUNITY
Start: 2020-09-10 | End: 2020-12-07

## 2020-09-18 NOTE — PROGRESS NOTES
Maria E Maxwell is a 60 y.o. female who was seen by synchronous (real-time) audio-video technology using doxy. me on 9/18/2020. Location of the patient: Home    Location of the provider: [facility]    Consent:  She and/or health care decision maker is aware that that she may receive a bill for this telehealth service, depending on her insurance coverage, and has provided verbal consent to proceed: Yes    Subjective:   Maria E Maxwell is a 60 y.o. female who presents today for management of    Chief Complaint   Patient presents with    Diabetes       Since last office visit, patient increased Lantus from 70 units to 75 units. She has been using Humalog 12 units premeals. Fasting blood sugars - 115-140s  Before lunch - 120-208  Before dinner - 120-140    Problem List  Patient Active Problem List    Diagnosis Date Noted    Type 2 diabetes mellitus with diabetic neuropathy [facility] 10/24/2019    Vitamin B12 deficiency 04/25/2019    Bipolar 1 disorder, manic, full remission ([facility] 04/25/2019    PTSD (post-traumatic stress disorder) 04/25/2019    Hypothyroidism due to acquired atrophy of thyroid 05/02/2016    Left low back pain 04/13/2016    History of alcohol abuse 04/13/2016    Lumbar degenerative disc disease 04/13/2016    Recurrent depression ([facility] 06/12/2013    Tear of meniscus of left knee 01/16/2013    Hypercholesteremia 01/02/2013    Type 2 diabetes mellitus with microalbuminuria, with long-term current use of insulin ([facility]) 01/02/2013       Current Medications  Current Outpatient Medications   Medication Sig    insulin lispro (HumaLOG KwikPen Insulin) 100 unit/mL kwikpen 12 Units by SubCUTAneous route Before breakfast, lunch, and dinner. 5 units SQ before snacks.  Maximum daily dose: 50 units    Caplyta 42 mg cap take 1 capsule by mouth at bedtime with food    benztropine (COGENTIN) 1 mg tablet     insulin glargine (LANTUS,BASAGLAR) 100 unit/mL (3 mL) inpn 75 Units by SubCUTAneous route nightly.  Insulin Needles, Disposable, (BD Ultra-Fine Mini Pen Needle) 31 gauge x 3/16\" ndle Use 4 times daily    omega-3 acid ethyl esters (LOVAZA) 1 gram capsule TAKE 2 CAPSULES BY MOUTH  TWO TIMES DAILY WITH MEALS    rosuvastatin (CRESTOR) 20 mg tablet TAKE 1 TABLET BY MOUTH  NIGHTLY    oxybutynin chloride XL (DITROPAN XL) 10 mg CR tablet TAKE 1 TABLET BY MOUTH  DAILY    fexofenadine (ALLEGRA) 180 mg tablet Take 1 Tab by mouth daily as needed for Allergies.  gabapentin (NEURONTIN) 300 mg capsule Take 1 Cap by mouth nightly. Max Daily Amount: 300 mg.    SITagliptin-metFORMIN (JANUMET) 50-1,000 mg per tablet TAKE 1 TABLET BY MOUTH  TWICE A DAY    levothyroxine (SYNTHROID) 137 mcg tablet TAKE 1 TABLET BY MOUTH  DAILY  (BEFORE BREAKFAST)    glucose blood VI test strips (ONETOUCH ULTRA BLUE TEST STRIP) strip Test blood glucose 4 times a day    haloperidol (HALDOL) 5 mg tablet Take 5 mg by mouth two (2) times a day.  ibuprofen (MOTRIN) 800 mg tablet Take 1 Tab by mouth every eight (8) hours as needed for Pain.  melatonin tab tablet Take 5 mg by mouth nightly.  cyanocobalamin (VITAMIN B-12) 500 mcg tablet Take 500 mcg by mouth daily.  ferrous sulfate 325 mg (65 mg iron) tablet Take 1 Tab by mouth two (2) times a day.  sertraline (ZOLOFT) 100 mg tablet Take 200 mg by mouth daily. No current facility-administered medications for this visit.         Allergies/Drug Reactions  Allergies   Allergen Reactions    Lipitor [Atorvastatin] Other (comments)     myalgias    Lisinopril Cough    Aspirin Other (comments)     Nose bleeds    Zocor [Simvastatin] Other (comments)     Causes pain in the left leg        Social History  Social History     Tobacco Use    Smoking status: Former Smoker     Packs/day: 1.00     Years: 35.00     Pack years: 35.00     Types: Cigarettes     Last attempt to quit: 2011     Years since quittin.8    Smokeless tobacco: Never Used    Tobacco comment: quit  Substance Use Topics    Alcohol use: No     Alcohol/week: 0.0 standard drinks     Comment: quit 10 years ago    Drug use: No        Review of Systems  Review of Systems   Constitutional: Negative for chills, fever, malaise/fatigue and weight loss. Respiratory: Negative for shortness of breath. Cardiovascular: Negative for chest pain, palpitations and leg swelling. Gastrointestinal: Negative for abdominal pain, heartburn, nausea and vomiting. Neurological: Negative for dizziness and headaches. Psychiatric/Behavioral: Negative for depression. Objective:     General: alert, cooperative, no distress   Mental  status: mental status: alert, oriented to person, place, and time, normal mood, behavior, speech, dress, motor activity, and thought processes   Resp: resp: normal effort and no respiratory distress   Neuro: neuro: no gross deficits   Skin: skin: no discoloration or lesions of concern on visible areas     Due to this being a TeleHealth evaluation, many elements of the physical examination are unable to be assessed. Assessment & Plan:   1. Type 2 diabetes mellitus without complication, unspecified whether long term insulin use (HCC)  - blood sugars improved  - insulin lispro (HumaLOG KwikPen Insulin) 100 unit/mL kwikpen; 12 Units by SubCUTAneous route Before breakfast, lunch, and dinner. 5 units SQ before snacks. Maximum daily dose: 50 units  Dispense: 5 Pen; Refill: 0      Follow-up and Dispositions    · Return in about 1 month (around 10/18/2020) for ROV. We discussed the expected course, resolution and complications of the diagnosis(es) in detail. Medication risks, benefits, costs, interactions, and alternatives were discussed as indicated. I advised her to contact the office if her condition worsens, changes or fails to improve as anticipated. She expressed understanding with the diagnosis(es) and plan.          Pursuant to the emergency declaration under the [address] and the [address] authority and the Coronavirus Preparedness and [employer] Act, this Virtual  Visit was conducted, with patient's consent, to reduce the patient's risk of exposure to COVID-19 and provide continuity of care for an established patient. Services were provided through a video synchronous discussion virtually to substitute for in-person clinic visit.     Aleksandr Frank MD

## 2020-09-19 DIAGNOSIS — E03.4 HYPOTHYROIDISM DUE TO ACQUIRED ATROPHY OF THYROID: ICD-10-CM

## 2020-09-19 RX ORDER — LEVOTHYROXINE SODIUM 137 UG/1
TABLET ORAL
Qty: 90 TAB | Refills: 3 | Status: SHIPPED | OUTPATIENT
Start: 2020-09-19 | End: 2021-01-22 | Stop reason: SDUPTHER

## 2020-09-26 DIAGNOSIS — E11.40 TYPE 2 DIABETES MELLITUS WITH DIABETIC NEUROPATHY, WITH LONG-TERM CURRENT USE OF INSULIN (HCC): ICD-10-CM

## 2020-09-26 DIAGNOSIS — Z79.4 TYPE 2 DIABETES MELLITUS WITH DIABETIC NEUROPATHY, WITH LONG-TERM CURRENT USE OF INSULIN (HCC): ICD-10-CM

## 2020-09-26 DIAGNOSIS — M23.204 OLD TEAR OF MEDIAL MENISCUS OF LEFT KNEE, UNSPECIFIED TEAR TYPE: ICD-10-CM

## 2020-09-28 ENCOUNTER — TELEPHONE (OUTPATIENT)
Dept: FAMILY MEDICINE CLINIC | Age: 55
End: 2020-09-28

## 2020-09-28 RX ORDER — GABAPENTIN 300 MG/1
CAPSULE ORAL
Qty: 90 CAP | Refills: 3 | Status: SHIPPED | OUTPATIENT
Start: 2020-09-28 | End: 2020-11-10 | Stop reason: ALTCHOICE

## 2020-09-28 NOTE — TELEPHONE ENCOUNTER
Pt called in and informed me that her blood sugar reading [id number] at 8:30am and she is taking 75 units of her lantus, she did not take a dose last night but wants to know if she should continue at the 75 units. She asked for a call back today. Pt was advised that messages have up to a 72 hour response time and pt understood. Please advise.

## 2020-09-28 NOTE — TELEPHONE ENCOUNTER
118mg/dl is a normal fasting blood sugar. Yes, she should continue Lantus 75 units daily. If fasting blood sugar drops less than 100, would advise to cut back on Lantus to 70 units.

## 2020-10-03 DIAGNOSIS — E78.00 HYPERCHOLESTEREMIA: ICD-10-CM

## 2020-10-04 RX ORDER — ROSUVASTATIN CALCIUM 20 MG/1
TABLET, COATED ORAL
Qty: 90 TAB | Refills: 3 | Status: SHIPPED | OUTPATIENT
Start: 2020-10-04 | End: 2021-01-25 | Stop reason: SDUPTHER

## 2020-10-07 ENCOUNTER — HOSPITAL ENCOUNTER (EMERGENCY)
Age: 55
Discharge: HOME | End: 2020-10-07
Attending: EMERGENCY MEDICINE
Payer: MEDICARE

## 2020-10-07 ENCOUNTER — TELEPHONE (OUTPATIENT)
Dept: FAMILY MEDICINE CLINIC | Age: 55
End: 2020-10-07

## 2020-10-07 VITALS
HEART RATE: 79 BPM | DIASTOLIC BLOOD PRESSURE: 92 MMHG | SYSTOLIC BLOOD PRESSURE: 148 MMHG | WEIGHT: 200 LBS | HEIGHT: 63 IN | RESPIRATION RATE: 18 BRPM | OXYGEN SATURATION: 99 % | BODY MASS INDEX: 35.44 KG/M2 | TEMPERATURE: 98.3 F

## 2020-10-07 DIAGNOSIS — J02.9 SORE THROAT: Primary | ICD-10-CM

## 2020-10-07 LAB — DEPRECATED S PYO AG THROAT QL EIA: NEGATIVE

## 2020-10-07 PROCEDURE — 87070 CULTURE OTHR SPECIMN AEROBIC: CPT

## 2020-10-07 PROCEDURE — 99282 EMERGENCY DEPT VISIT SF MDM: CPT

## 2020-10-07 PROCEDURE — 87880 STREP A ASSAY W/OPTIC: CPT

## 2020-10-07 RX ORDER — CETIRIZINE HCL 10 MG
10 TABLET ORAL
Qty: 30 TAB | Refills: 0 | Status: SHIPPED | OUTPATIENT
Start: 2020-10-07 | End: 2021-11-03

## 2020-10-07 NOTE — DISCHARGE INSTRUCTIONS
Patient Education      Please return immediately to the Emergency Room for re-evaluation if you are not improving, develop any new symptoms, or develop worsening of current symptoms! If you have been prescribed a medication and are unable to take this medication for any reason, please return to the Emergency Department for further evaluation! If you have been referred for follow-up to a specialist, but are unable to follow-up and your symptoms are either not improving or are worsening, please return to the Emergency Department for further evaluation! Sore Throat: Care Instructions  Your Care Instructions     Infection by bacteria or a virus causes most sore throats. Cigarette smoke, dry air, air pollution, allergies, and yelling can also cause a sore throat. Sore throats can be painful and annoying. Fortunately, most sore throats go away on their own. If you have a bacterial infection, your doctor may prescribe antibiotics. Follow-up care is a key part of your treatment and safety. Be sure to make and go to all appointments, and call your doctor if you are having problems. It's also a good idea to know your test results and keep a list of the medicines you take. How can you care for yourself at home? · If your doctor prescribed antibiotics, take them as directed. Do not stop taking them just because you feel better. You need to take the full course of antibiotics. · Gargle with warm salt water once an hour to help reduce swelling and relieve discomfort. Use 1 teaspoon of salt mixed in 1 cup of warm water. · Take an over-the-counter pain medicine, such as acetaminophen (Tylenol), ibuprofen (Advil, Motrin), or naproxen (Aleve). Read and follow all instructions on the label. · Be careful when taking over-the-counter cold or flu medicines and Tylenol at the same time. Many of these medicines have acetaminophen, which is Tylenol.  Read the labels to make sure that you are not taking more than the recommended dose. Too much acetaminophen (Tylenol) can be harmful. · Drink plenty of fluids. Fluids may help soothe an irritated throat. Hot fluids, such as tea or soup, may help decrease throat pain. · Use over-the-counter throat lozenges to soothe pain. Regular cough drops or hard candy may also help. These should not be given to young children because of the risk of choking. · Do not smoke or allow others to smoke around you. If you need help quitting, talk to your doctor about stop-smoking programs and medicines. These can increase your chances of quitting for good. · Use a vaporizer or humidifier to add moisture to your bedroom. Follow the directions for cleaning the machine. When should you call for help? Call your doctor now or seek immediate medical care if:    · You have new or worse trouble swallowing.     · Your sore throat gets much worse on one side. Watch closely for changes in your health, and be sure to contact your doctor if you do not get better as expected. Where can you learn more? Go to http://www.gray.com/  Enter U420 in the search box to learn more about \"Sore Throat: Care Instructions. \"  Current as of: April 15, 2020               Content Version: 12.6  © 2493-1065 [employer]. Care instructions adapted under license by [employer] (which disclaims liability or warranty for this information). If you have questions about a medical condition or this instruction, always ask your healthcare professional. [address] any warranty or liability for your use of this information.

## 2020-10-07 NOTE — ED PROVIDER NOTES
EMERGENCY DEPARTMENT HISTORY AND PHYSICAL EXAM    12:28 PM      Date: 10/7/2020  Patient Name: Marylee Leeds    History of Presenting Illness     Chief Complaint   Patient presents with    Sore Throat       History Provided By: Patient    Chief Complaint: sore throat, rhinorrhea  Duration: 1 Weeks  Timing:  Acute  Location:   Quality: Aching  Severity: Moderate  Modifying Factors: none  Associated Symptoms: denies any other associated signs or symptoms      Additional History (Context):Hina Rodriguez is a 60 y.o. female who presents to the emergency department for evaluation of sore throat, worse on the right with associated rhinorrhea and right ear pain for the past week. Patient reports symptoms began after getting the flu shot. No associated cough or congestion. No chest pain, fever, chills, shortness of breath, nausea, vomiting, diarrhea, or any other concerns. No treatments prior to arrival.  Patient does admit to some seasonal allergic rhinitis. PCP:  Jing Baez MD      Current Outpatient Medications   Medication Sig Dispense Refill    cetirizine (ZyrTEC) 10 mg tablet Take 1 Tab by mouth nightly. Take one tablets by mouth daily for 7 days and then as needed after that. Restart forseven days if sinus congestion recurs. 30 Tab 0    rosuvastatin (CRESTOR) 20 mg tablet TAKE 1 TABLET BY MOUTH  NIGHTLY 90 Tab 3    gabapentin (NEURONTIN) 300 mg capsule TAKE 1 CAPSULE BY MOUTH  NIGHTLY 90 Cap 3    levothyroxine (SYNTHROID) 137 mcg tablet TAKE 1 TABLET BY MOUTH  DAILY BEFORE BREAKFAST 90 Tab 3    Caplyta 42 mg cap take 1 capsule by mouth at bedtime with food      benztropine (COGENTIN) 1 mg tablet       insulin lispro (HumaLOG KwikPen Insulin) 100 unit/mL kwikpen 12 Units by SubCUTAneous route Before breakfast, lunch, and dinner. 5 units SQ before snacks. Maximum daily dose: 50 units 5 Pen 0    insulin glargine (LANTUS,BASAGLAR) 100 unit/mL (3 mL) inpn 75 Units by SubCUTAneous route nightly. 15 Pen 0    Insulin Needles, Disposable, (BD Ultra-Fine Mini Pen Needle) 31 gauge x 3/16\" ndle Use 4 times daily 100 Pen Needle 0    omega-3 acid ethyl esters (LOVAZA) 1 gram capsule TAKE 2 CAPSULES BY MOUTH  TWO TIMES DAILY WITH MEALS 360 Cap 3    oxybutynin chloride XL (DITROPAN XL) 10 mg CR tablet TAKE 1 TABLET BY MOUTH  DAILY 90 Tab 2    fexofenadine (ALLEGRA) 180 mg tablet Take 1 Tab by mouth daily as needed for Allergies. 90 Tab 3    SITagliptin-metFORMIN (JANUMET) 50-1,000 mg per tablet TAKE 1 TABLET BY MOUTH  TWICE A  Tab 3    glucose blood VI test strips (ONETOUCH ULTRA BLUE TEST STRIP) strip Test blood glucose 4 times a day 400 Strip 4    haloperidol (HALDOL) 5 mg tablet Take 5 mg by mouth two (2) times a day.  ibuprofen (MOTRIN) 800 mg tablet Take 1 Tab by mouth every eight (8) hours as needed for Pain. 60 Tab 0    melatonin tab tablet Take 5 mg by mouth nightly.  cyanocobalamin (VITAMIN B-12) 500 mcg tablet Take 500 mcg by mouth daily.  ferrous sulfate 325 mg (65 mg iron) tablet Take 1 Tab by mouth two (2) times a day. 60 Tab 3    sertraline (ZOLOFT) 100 mg tablet Take 200 mg by mouth daily.          Past History     Past Medical History:  Past Medical History:   Diagnosis Date    Arthritis     Chronic pain     Depression     Diabetes ([address]     Hyperlipidemia     Hypothyroid     Medial meniscus tear     Left knee    Menopause     Psychiatric disorder     Tobacco abuse, in remission        Past Surgical History:  Past Surgical History:   Procedure Laterality Date    HX ORTHOPAEDIC      Hand reconstructive surgery    HX TUBAL LIGATION  2004       Family History:  Family History   Problem Relation Age of Onset    Alcohol abuse Mother     Arthritis-osteo Mother     Cancer Mother 61        breast cancer    Diabetes Mother     Hypertension Mother     Psychiatric Disorder Mother     Stroke Mother     Breast Cancer Mother 72    Alcohol abuse Father     Heart Disease Father     Diabetes Father     Hypertension Father     Lung Disease Father     Colon Cancer Father 72    Alcohol abuse Paternal Grandmother     Ovarian Cancer Sister 32    Alcohol abuse Sister     Diabetes Sister     Psychiatric Disorder Sister     Alcohol abuse Brother     Psychiatric Disorder Son     Psychiatric Disorder Other         nephew       Social History:  Social History     Tobacco Use    Smoking status: Former Smoker     Packs/day: 1.00     Years: 35.00     Pack years: 35.00     Types: Cigarettes     Last attempt to quit: 2011     Years since quittin.9    Smokeless tobacco: Never Used    Tobacco comment: quit    Substance Use Topics    Alcohol use: No     Alcohol/week: 0.0 standard drinks     Comment: quit 10 years ago    Drug use: No       Allergies: Allergies   Allergen Reactions    Lipitor [Atorvastatin] Other (comments)     myalgias    Lisinopril Cough    Aspirin Other (comments)     Nose bleeds    Zocor [Simvastatin] Other (comments)     Causes pain in the left leg         Review of Systems       Review of Systems   Constitutional: Negative for chills and fever. HENT: Positive for ear pain, rhinorrhea and sore throat. Negative for congestion. Respiratory: Negative for cough and shortness of breath. Cardiovascular: Negative for chest pain. Gastrointestinal: Negative for abdominal pain, blood in stool, constipation, diarrhea, nausea and vomiting. Genitourinary: Negative for dysuria, frequency and hematuria. Musculoskeletal: Negative for back pain and myalgias. Skin: Negative for rash and wound. Neurological: Negative for dizziness and headaches. All other systems reviewed and are negative.         Physical Exam     Visit Vitals  BP (!) 148/92 (BP 1 Location: Right arm, BP Patient Position: At rest)   Pulse 79   Temp 98.3 °F (36.8 °C)   Resp 18   Ht 5' 3\" (1.6 m)   Wt 90.7 kg (200 lb)   LMP 2014   SpO2 99%   BMI 35.43 kg/m² Physical Exam  Vitals signs and nursing note reviewed. Constitutional:       General: She is not in acute distress. Appearance: She is well-developed. She is not diaphoretic. HENT:      Head: Normocephalic and atraumatic. Nose: Congestion and rhinorrhea present. Comments: Pale, edematous bilateral nasal turbinates with clear rhinorrhea     Mouth/Throat:      Mouth: Mucous membranes are moist.      Pharynx: Oropharynx is clear. No pharyngeal swelling, oropharyngeal exudate or posterior oropharyngeal erythema. Eyes:      Conjunctiva/sclera: Conjunctivae normal.   Neck:      Musculoskeletal: Normal range of motion and neck supple. Cardiovascular:      Rate and Rhythm: Normal rate and regular rhythm. Heart sounds: Normal heart sounds. Pulmonary:      Effort: Pulmonary effort is normal. No respiratory distress. Breath sounds: Normal breath sounds. Chest:      Chest wall: No tenderness. Abdominal:      General: Bowel sounds are normal. There is no distension. Palpations: Abdomen is soft. Tenderness: There is no abdominal tenderness. There is no guarding or rebound. Musculoskeletal:         General: No deformity. Skin:     General: Skin is warm and dry. Neurological:      Mental Status: She is alert and oriented to person, place, and time. Deep Tendon Reflexes: Reflexes are normal and symmetric. Diagnostic Study Results     Labs -  Recent Results (from the past 12 hour(s))   STREP AG SCREEN, GROUP A    Collection Time: 10/07/20 11:16 AM    Specimen: Throat   Result Value Ref Range    Group A Strep Ag ID Negative         Radiologic Studies -   No results found. Medical Decision Making   I am the first provider for this patient. I reviewed the vital signs, available nursing notes, past medical history, past surgical history, family history and social history. Vital Signs-Reviewed the patient's vital signs.     Pulse Oximetry Analysis -  99% on room air (Interpretation)    Records Reviewed: Nursing Notes and Old Medical Records (Time of Review: 12:28 PM)    ED Course: Progress Notes, Reevaluation, and Consults:    Provider Notes (Medical Decision Making):   Differential Diagnosis: viral URI, streptococcal pharyngitis, allergic rhinitis      Plan: Patient presents ambulatory no significant distress with elevated blood pressure and otherwise normal vitals. Exam and HPI consistent with allergic rhinitis. Strep test negative. Will discharge home with Zyrtec. At this time, patient is stable and appropriate for discharge home. Patient demonstrates understanding of current diagnoses and is in agreement with the treatment plan. They are advised that while the likelihood of serious underlying condition is low at this point given the evaluation performed today, we cannot fully rule it out. They are advised to immediately return with any new symptoms or worsening of current condition. All questions have been answered. Patient is given educational material regarding their diagnoses, including danger symptoms and when to return to the ED. Diagnosis     Clinical Impression:   1. Sore throat        Disposition: DC Home    Follow-up Information     Follow up With Specialties Details Why Contact Info    Nicolette Delgado MD Internal Medicine Call in 2 days As needed [id number] [facility] [phone]      [facility] EMERGENCY DEPT Emergency Medicine Go to If symptoms worsen, As needed [address] [phone]. [phone]           Patient's Medications   Start Taking    CETIRIZINE (ZYRTEC) 10 MG TABLET    Take 1 Tab by mouth nightly. Take one tablets by mouth daily for 7 days and then as needed after that. Restart forseven days if sinus congestion recurs.    Continue Taking    BENZTROPINE (COGENTIN) 1 MG TABLET        CAPLYTA 42 MG CAP    take 1 capsule by mouth at bedtime with food    CYANOCOBALAMIN (VITAMIN B-12) 500 MCG TABLET    Take 500 mcg by mouth daily. FERROUS SULFATE 325 MG (65 MG IRON) TABLET    Take 1 Tab by mouth two (2) times a day. FEXOFENADINE (ALLEGRA) 180 MG TABLET    Take 1 Tab by mouth daily as needed for Allergies. GABAPENTIN (NEURONTIN) 300 MG CAPSULE    TAKE 1 CAPSULE BY MOUTH  NIGHTLY    GLUCOSE BLOOD VI TEST STRIPS (ONETOUCH ULTRA BLUE TEST STRIP) STRIP    Test blood glucose 4 times a day    HALOPERIDOL (HALDOL) 5 MG TABLET    Take 5 mg by mouth two (2) times a day. IBUPROFEN (MOTRIN) 800 MG TABLET    Take 1 Tab by mouth every eight (8) hours as needed for Pain. INSULIN GLARGINE (LANTUS,BASAGLAR) 100 UNIT/ML (3 ML) INPN    75 Units by SubCUTAneous route nightly. INSULIN LISPRO (HUMALOG KWIKPEN INSULIN) 100 UNIT/ML KWIKPEN    12 Units by SubCUTAneous route Before breakfast, lunch, and dinner. 5 units SQ before snacks. Maximum daily dose: 50 units    INSULIN NEEDLES, DISPOSABLE, (BD ULTRA-FINE MINI PEN NEEDLE) 31 GAUGE X 3/16\" NDLE    Use 4 times daily    LEVOTHYROXINE (SYNTHROID) 137 MCG TABLET    TAKE 1 TABLET BY MOUTH  DAILY BEFORE BREAKFAST    MELATONIN TAB TABLET    Take 5 mg by mouth nightly. OMEGA-3 ACID ETHYL ESTERS (LOVAZA) 1 GRAM CAPSULE    TAKE 2 CAPSULES BY MOUTH  TWO TIMES DAILY WITH MEALS    OXYBUTYNIN CHLORIDE XL (DITROPAN XL) 10 MG CR TABLET    TAKE 1 TABLET BY MOUTH  DAILY    ROSUVASTATIN (CRESTOR) 20 MG TABLET    TAKE 1 TABLET BY MOUTH  NIGHTLY    SERTRALINE (ZOLOFT) 100 MG TABLET    Take 200 mg by mouth daily. SITAGLIPTIN-METFORMIN (JANUMET) 50-1,000 MG PER TABLET    TAKE 1 TABLET BY MOUTH  TWICE A DAY   These Medications have changed    No medications on file   Stop Taking    No medications on file     _______________________________    This note was dictated utilizing voice recognition software which may lead to typographical errors. I apologize in advance if the situation occurs.   If questions arise please do not hesitate to contact me or call our department.   Ava Milligan PA-C

## 2020-10-07 NOTE — TELEPHONE ENCOUNTER
Pt called in to the office and informed me that she had a sore throat and I offered her Dr. Lovena Meckel next available which was on Tuesday and she said well I need to come in and see her I think I might have an ear infection. I told her to go ahead and go to an urgent care then and we would go ahead and schedule her to follow up with Dr. Jarad Quijano on Tuesday. Pt understood and scheduled but was wanting to know if nurse could call with advice as well.  Please assist.

## 2020-10-07 NOTE — ED TRIAGE NOTES
Patient ambulatory to triage c/o a sore throat and right ear pain x 1 week.  States symptoms started after receiving flu shot

## 2020-10-08 ENCOUNTER — PATIENT OUTREACH (OUTPATIENT)
Dept: CASE MANAGEMENT | Age: 55
End: 2020-10-08

## 2020-10-09 LAB
BACTERIA SPEC CULT: NORMAL
SERVICE CMNT-IMP: NORMAL

## 2020-10-09 NOTE — TELEPHONE ENCOUNTER
Nurse spoke to patient, advised patient to keep scheduled appointment. Pt verbalized understanding. This encounter will be closed.

## 2020-10-13 ENCOUNTER — PATIENT OUTREACH (OUTPATIENT)
Dept: CASE MANAGEMENT | Age: 55
End: 2020-10-13

## 2020-10-13 ENCOUNTER — OFFICE VISIT (OUTPATIENT)
Dept: FAMILY MEDICINE CLINIC | Age: 55
End: 2020-10-13
Payer: MEDICARE

## 2020-10-13 ENCOUNTER — HOSPITAL ENCOUNTER (OUTPATIENT)
Dept: LAB | Age: 55
Discharge: HOME | End: 2020-10-13
Payer: MEDICARE

## 2020-10-13 VITALS
OXYGEN SATURATION: 98 % | SYSTOLIC BLOOD PRESSURE: 115 MMHG | BODY MASS INDEX: 34.38 KG/M2 | HEART RATE: 75 BPM | HEIGHT: 63 IN | DIASTOLIC BLOOD PRESSURE: 68 MMHG | WEIGHT: 194 LBS | TEMPERATURE: 97.7 F | RESPIRATION RATE: 15 BRPM

## 2020-10-13 DIAGNOSIS — R13.10 ODYNOPHAGIA: ICD-10-CM

## 2020-10-13 DIAGNOSIS — R80.9 TYPE 2 DIABETES MELLITUS WITH MICROALBUMINURIA, WITH LONG-TERM CURRENT USE OF INSULIN (HCC): ICD-10-CM

## 2020-10-13 DIAGNOSIS — E11.29 TYPE 2 DIABETES MELLITUS WITH MICROALBUMINURIA, WITH LONG-TERM CURRENT USE OF INSULIN (HCC): ICD-10-CM

## 2020-10-13 DIAGNOSIS — R07.0 THROAT PAIN: Primary | ICD-10-CM

## 2020-10-13 DIAGNOSIS — Z79.4 TYPE 2 DIABETES MELLITUS WITH MICROALBUMINURIA, WITH LONG-TERM CURRENT USE OF INSULIN (HCC): ICD-10-CM

## 2020-10-13 DIAGNOSIS — K12.1 ORAL ULCER: ICD-10-CM

## 2020-10-13 LAB
ANION GAP SERPL CALC-SCNC: 6 MMOL/L (ref 3–18)
BUN SERPL-MCNC: 8 MG/DL (ref 7–18)
BUN/CREAT SERPL: 11 (ref 12–20)
CALCIUM SERPL-MCNC: 9.4 MG/DL (ref 8.5–10.1)
CHLORIDE SERPL-SCNC: 104 MMOL/L (ref 100–111)
CO2 SERPL-SCNC: 29 MMOL/L (ref 21–32)
CREAT SERPL-MCNC: 0.76 MG/DL (ref 0.6–1.3)
EST. AVERAGE GLUCOSE BLD GHB EST-MCNC: 154 MG/DL
GLUCOSE SERPL-MCNC: 140 MG/DL (ref 74–99)
HBA1C MFR BLD: 7 % (ref 4.2–5.6)
POTASSIUM SERPL-SCNC: 4.6 MMOL/L (ref 3.5–5.5)
SODIUM SERPL-SCNC: 139 MMOL/L (ref 136–145)

## 2020-10-13 PROCEDURE — 36415 COLL VENOUS BLD VENIPUNCTURE: CPT

## 2020-10-13 PROCEDURE — G9717 DOC PT DX DEP/BP F/U NT REQ: HCPCS | Performed by: INTERNAL MEDICINE

## 2020-10-13 PROCEDURE — 2022F DILAT RTA XM EVC RTNOPTHY: CPT | Performed by: INTERNAL MEDICINE

## 2020-10-13 PROCEDURE — G9899 SCRN MAM PERF RSLTS DOC: HCPCS | Performed by: INTERNAL MEDICINE

## 2020-10-13 PROCEDURE — 80048 BASIC METABOLIC PNL TOTAL CA: CPT

## 2020-10-13 PROCEDURE — 83036 HEMOGLOBIN GLYCOSYLATED A1C: CPT

## 2020-10-13 PROCEDURE — G8427 DOCREV CUR MEDS BY ELIG CLIN: HCPCS | Performed by: INTERNAL MEDICINE

## 2020-10-13 PROCEDURE — G8417 CALC BMI ABV UP PARAM F/U: HCPCS | Performed by: INTERNAL MEDICINE

## 2020-10-13 PROCEDURE — 99214 OFFICE O/P EST MOD 30 MIN: CPT | Performed by: INTERNAL MEDICINE

## 2020-10-13 PROCEDURE — 3051F HG A1C>EQUAL 7.0%<8.0%: CPT | Performed by: INTERNAL MEDICINE

## 2020-10-13 PROCEDURE — 3017F COLORECTAL CA SCREEN DOC REV: CPT | Performed by: INTERNAL MEDICINE

## 2020-10-13 RX ORDER — LIDOCAINE HYDROCHLORIDE 20 MG/ML
15 SOLUTION OROPHARYNGEAL AS NEEDED
Qty: 1 BOTTLE | Refills: 0 | Status: SHIPPED | OUTPATIENT
Start: 2020-10-13 | End: 2020-10-19

## 2020-10-13 RX ORDER — INSULIN GLARGINE 100 [IU]/ML
60 INJECTION, SOLUTION SUBCUTANEOUS
Qty: 15 PEN | Refills: 0 | Status: SHIPPED | OUTPATIENT
Start: 2020-10-13 | End: 2020-11-10 | Stop reason: SDUPTHER

## 2020-10-13 NOTE — PROGRESS NOTES
Martínez Monzon presents today for throat pain,   - Is someone accompanying this pt? no  - Is the patient using any durable medical equipment during office visit? no    Coordination of Care:  1. Have you been to the ER, urgent care clinic since your last visit? Hospitalized since your last visit? 10/7/20 Sore throat     2. Have you seen or consulted any other health care providers outside of the [address] [place] [address] since your last visit? Include any pap smears or colon screening. No    Depression Screening:  3 most recent PHQ Screens 9/18/2020   Little interest or pleasure in doing things Not at all   Feeling down, depressed, irritable, or hopeless Not at all   Total Score PHQ 2 0       Learning Assessment:  Learning Assessment 4/25/2019   PRIMARY LEARNER Patient   HIGHEST LEVEL OF EDUCATION - PRIMARY LEARNER  SOME COLLEGE   BARRIERS PRIMARY LEARNER NONE   CO-LEARNER CAREGIVER No   CO-LEARNER NAME -   PRIMARY LANGUAGE ENGLISH   LEARNER PREFERENCE PRIMARY LISTENING     -   ANSWERED BY patient   RELATIONSHIP SELF       Abuse Screening:  Abuse Screening Questionnaire 10/14/2019   Do you ever feel afraid of your partner? N   Are you in a relationship with someone who physically or mentally threatens you? N   Is it safe for you to go home? Y       Fall Risk  Fall Risk Assessment, last 12 mths 6/24/2020   Able to walk? Yes   Fall in past 12 months? Yes   Fall with injury? No   Number of falls in past 12 months 1   Fall Risk Score 1       Health Maintenance reviewed and discussed and ordered per Provider.   Health Maintenance Due   Topic Date Due    Pneumococcal 0-64 years (1 of 1 - PPSV23) [birth year]/1969    Eye Exam Retinal or Dilated  05/19/2018    PAP AKA CERVICAL CYTOLOGY  04/26/2019    MICROALBUMIN Q1  04/25/2020

## 2020-10-13 NOTE — PROGRESS NOTES
History of Present Illness  Red Hamman is a 60 y.o. female who presents today for management of    Chief Complaint   Patient presents with    Sore Throat       Sore Throat  Patient complains of sore throat. Associated symptoms include odynophagia. Denies fever, cough, tender or enlarged lymph nodes. Onset of symptoms was 1 week ago, unchanged since that time. She is drinking plenty of fluids. She has not had recent close exposure to someone with proven streptococcal pharyngitis. Patient was seen at [address] [place] [address] ER one week ago. Throat culture and rapid strep test was negative. She was prescribed cetirizine. Patient has a new ulcer on the roof her mouth. Onset was 10 days ago. The sores she had one month ago have healed. She had dental work done 2 weeks ago. Fasting blood sugar 50-    Problem List  Patient Active Problem List    Diagnosis Date Noted    Type 2 diabetes mellitus with diabetic neuropathy ([facility] 10/24/2019    Vitamin B12 deficiency 04/25/2019    Bipolar 1 disorder, manic, full remission ([facility] 04/25/2019    PTSD (post-traumatic stress disorder) 04/25/2019    Hypothyroidism due to acquired atrophy of thyroid 05/02/2016    Left low back pain 04/13/2016    History of alcohol abuse 04/13/2016    Lumbar degenerative disc disease 04/13/2016    Recurrent depression ([facility] 06/12/2013    Tear of meniscus of left knee 01/16/2013    Hypercholesteremia 01/02/2013    Type 2 diabetes mellitus with microalbuminuria, with long-term current use of insulin ([facility]) 01/02/2013       Current Medications  Current Outpatient Medications   Medication Sig    lidocaine (XYLOCAINE) 2 % solution Take 15 mL by mouth as needed for Pain.  insulin glargine (LANTUS,BASAGLAR) 100 unit/mL (3 mL) inpn 60 Units by SubCUTAneous route nightly.  cetirizine (ZyrTEC) 10 mg tablet Take 1 Tab by mouth nightly. Take one tablets by mouth daily for 7 days and then as needed after that.   Restart forseven days if sinus congestion recurs.  rosuvastatin (CRESTOR) 20 mg tablet TAKE 1 TABLET BY MOUTH  NIGHTLY    gabapentin (NEURONTIN) 300 mg capsule TAKE 1 CAPSULE BY MOUTH  NIGHTLY    levothyroxine (SYNTHROID) 137 mcg tablet TAKE 1 TABLET BY MOUTH  DAILY BEFORE BREAKFAST    Caplyta 42 mg cap take 1 capsule by mouth at bedtime with food    benztropine (COGENTIN) 1 mg tablet     insulin lispro (HumaLOG KwikPen Insulin) 100 unit/mL kwikpen 12 Units by SubCUTAneous route Before breakfast, lunch, and dinner. 5 units SQ before snacks. Maximum daily dose: 50 units    Insulin Needles, Disposable, (BD Ultra-Fine Mini Pen Needle) 31 gauge x 3/16\" ndle Use 4 times daily    omega-3 acid ethyl esters (LOVAZA) 1 gram capsule TAKE 2 CAPSULES BY MOUTH  TWO TIMES DAILY WITH MEALS    oxybutynin chloride XL (DITROPAN XL) 10 mg CR tablet TAKE 1 TABLET BY MOUTH  DAILY    fexofenadine (ALLEGRA) 180 mg tablet Take 1 Tab by mouth daily as needed for Allergies.  SITagliptin-metFORMIN (JANUMET) 50-1,000 mg per tablet TAKE 1 TABLET BY MOUTH  TWICE A DAY    glucose blood VI test strips (ONETOUCH ULTRA BLUE TEST STRIP) strip Test blood glucose 4 times a day    ibuprofen (MOTRIN) 800 mg tablet Take 1 Tab by mouth every eight (8) hours as needed for Pain.  melatonin tab tablet Take 5 mg by mouth nightly.  cyanocobalamin (VITAMIN B-12) 500 mcg tablet Take 500 mcg by mouth daily.  ferrous sulfate 325 mg (65 mg iron) tablet Take 1 Tab by mouth two (2) times a day.  sertraline (ZOLOFT) 100 mg tablet Take 200 mg by mouth daily.  haloperidol (HALDOL) 5 mg tablet Take 5 mg by mouth two (2) times a day. No current facility-administered medications for this visit.         Allergies/Drug Reactions  Allergies   Allergen Reactions    Lipitor [Atorvastatin] Other (comments)     myalgias    Lisinopril Cough    Aspirin Other (comments)     Nose bleeds    Zocor [Simvastatin] Other (comments)     Causes pain in the left leg        Review of Systems  Review of Systems   Constitutional: Negative for chills, fever, malaise/fatigue and weight loss. HENT: Positive for sore throat. Respiratory: Negative. Gastrointestinal: Negative. Genitourinary: Negative. Musculoskeletal: Negative. Neurological: Negative. Psychiatric/Behavioral: Negative. Physical Exam  Vital signs:   Vitals:    10/13/20 0958   BP: 115/68   Pulse: 75   Resp: 15   Temp: 97.7 °F (36.5 °C)   TempSrc: Temporal   SpO2: 98%   Weight: 194 lb (88 kg)   Height: 5' 3\" (1.6 m)       General: alert, oriented, not in distress  Head: scalp normal, atraumatic  Eyes: pupils are equal and reactive, full and intact EOM's  Mouth: moist buccal mucosa, single ulcer on the roof of the mouth, clear pharyngeal walls. Neck: supple, no JVD, no lymphadenopathy, non-palpable thyroid  Chest/Lungs: clear breath sounds, no wheezing or crackles  Heart: normal rate, regular rhythm, no murmur  Extremities: no focal deformities, no edema  Skin: no active skin lesions      Assessment/Plan:    1. Throat pain  - symptomatic care  - lidocaine (XYLOCAINE) 2 % solution; Take 15 mL by mouth as needed for Pain. Dispense: 1 Bottle; Refill: 0  - monitor symptoms    2. Odynophagia  - monitor for now. - will consider ENT or GI evaluation if with persistent symptoms    3. Oral ulcer  - new onset, ? Trauma from dentures and/or dental work  - viral culture negative  - will recommend biopsy of the lesion if persistent    4. Type 2 diabetes mellitus with microalbuminuria, with long-term current use of insulin ([facility])  - had hypoglycemic episodes  - decreased insulin glargine (LANTUS,BASAGLAR) 100 unit/mL (3 mL) inpn; 60 Units by SubCUTAneous route nightly. Dispense: 15 Pen; Refill: 0      Follow-up and Dispositions    · Return in 1 week (on 10/20/2020), or if symptoms worsen or fail to improve. I have discussed the diagnosis with the patient and the intended plan as seen in the above orders.   The patient has received an after-visit summary and questions were answered concerning future plans. I have discussed medication side effects and warnings with the patient as well. I have reviewed the plan of care with the patient, accepted their input and they are in agreement with the treatment goals.        Kathe Bundy MD  October 13, 2020

## 2020-10-14 ENCOUNTER — PATIENT OUTREACH (OUTPATIENT)
Dept: CASE MANAGEMENT | Age: 55
End: 2020-10-14

## 2020-10-14 NOTE — PROGRESS NOTES
Attempted to contact patient for Complex Care Management. No answer, left message with contact information provided. Unable to contact, will close episode.

## 2020-10-19 DIAGNOSIS — R07.0 THROAT PAIN: ICD-10-CM

## 2020-10-19 RX ORDER — LIDOCAINE HYDROCHLORIDE 20 MG/ML
SOLUTION ORAL; TOPICAL
Qty: 100 ML | Refills: 0 | Status: SHIPPED | OUTPATIENT
Start: 2020-10-19 | End: 2020-10-21 | Stop reason: SDUPTHER

## 2020-10-21 DIAGNOSIS — R07.0 THROAT PAIN: ICD-10-CM

## 2020-10-21 RX ORDER — LIDOCAINE HYDROCHLORIDE 20 MG/ML
15 SOLUTION OROPHARYNGEAL
Qty: 100 ML | Refills: 0 | Status: SHIPPED | OUTPATIENT
Start: 2020-10-21 | End: 2020-11-10 | Stop reason: SDUPTHER

## 2020-11-04 ENCOUNTER — HOSPITAL ENCOUNTER (EMERGENCY)
Age: 55
Discharge: HOME | End: 2020-11-04
Attending: EMERGENCY MEDICINE
Payer: MEDICARE

## 2020-11-04 VITALS
RESPIRATION RATE: 18 BRPM | TEMPERATURE: 98.2 F | HEART RATE: 70 BPM | DIASTOLIC BLOOD PRESSURE: 73 MMHG | SYSTOLIC BLOOD PRESSURE: 124 MMHG | OXYGEN SATURATION: 98 %

## 2020-11-04 DIAGNOSIS — J02.9 PHARYNGITIS, UNSPECIFIED ETIOLOGY: Primary | ICD-10-CM

## 2020-11-04 PROCEDURE — 99282 EMERGENCY DEPT VISIT SF MDM: CPT

## 2020-11-04 RX ORDER — NAPROXEN 500 MG/1
500 TABLET ORAL 2 TIMES DAILY WITH MEALS
Qty: 20 TAB | Refills: 0 | Status: SHIPPED | OUTPATIENT
Start: 2020-11-04 | End: 2020-11-10

## 2020-11-04 RX ORDER — CLINDAMYCIN HYDROCHLORIDE 300 MG/1
300 CAPSULE ORAL 4 TIMES DAILY
Qty: 28 CAP | Refills: 0 | Status: SHIPPED | OUTPATIENT
Start: 2020-11-04 | End: 2020-11-11

## 2020-11-04 NOTE — ED TRIAGE NOTES
Patient co sore throat and bumps on tongue x 1 month. Denies difficulty swallowing, breathing, or maintaining secretions.

## 2020-11-04 NOTE — ED PROVIDER NOTES
[address] [facility]  EMERGENCY DEPARTMENT HISTORY AND PHYSICAL EXAM       Date: 2020   Patient Name: Anton Macedo   YOB: 1965  Medical Record Number: [id number]    HISTORY OF PRESENTING ILLNESS:     Anton Macedo is a 60 y.o. female presenting with the noted PMH to the ED c/o sore throat and bumps. Patient states she been having a sore throat for the last month. She states been taking Zyrtec without relief. She states it started after she had dental work done. She states she had none. She states it is constant. Increases when she tries to eat. No decreasing factors. She states she notices small bumps on the back of her tongue. She denies any fevers or chills. Denies any nausea or vomiting. Denies any cough or cold symptoms. Denies any chest pain or shortness of breath. Denies abdominal pain. Denies any urine or bowel changes. Rest of complete systems reviewed and negative    Primary Care Provider: Pita Masters MD   Specialist:    Past Medical History:   Past Medical History:   Diagnosis Date    Arthritis     Chronic pain     Depression     Diabetes ([address]     Hyperlipidemia     Hypothyroid     Medial meniscus tear     Left knee    Menopause     Psychiatric disorder     Tobacco abuse, in remission         Past Surgical History:   Past Surgical History:   Procedure Laterality Date    HX ORTHOPAEDIC      Hand reconstructive surgery    HX TUBAL LIGATION          Social History:   Social History     Tobacco Use    Smoking status: Former Smoker     Packs/day: 1.00     Years: 35.00     Pack years: 35.00     Types: Cigarettes     Last attempt to quit: 2011     Years since quittin.0    Smokeless tobacco: Never Used    Tobacco comment: quit    Substance Use Topics    Alcohol use: No     Alcohol/week: 0.0 standard drinks     Comment: quit 10 years ago    Drug use: No        Allergies:    Allergies   Allergen Reactions    Lipitor [Atorvastatin] Other (comments)     myalgias    Lisinopril Cough    Aspirin Other (comments)     Nose bleeds    Zocor [Simvastatin] Other (comments)     Causes pain in the left leg        REVIEW OF SYSTEMS:  Review of Systems      PHYSICAL EXAM:  There were no vitals filed for this visit. Physical Exam   Vital signs reviewed. Alert oriented x 3 in NAD. HEENT: normocephalic atraumatic. Eyes are PERRLA EOMI. Conjunctiva normal.    External ears and nose normal.    Edentulous on the top portion of her mouth. She only has 6 teeth on the bottom on the anterior portion. With caries noted. And erosion of the gumline. No tongue elevation. Midline uvula. Does have a small callus formation on the roof of her mouth from her dentures. No stridor. Neck: normal external exam. No midline neck or back TTP. Lungs are clear to ascultation bilaterally. normal effort  Heart is regular rate and rhythm with no murmurs. Abdomen soft and nontender. No rebound rigidity or guarding. Extremities: Moves all 4 extremities and no distress. Full range of motion. 2+ pulses and BCR in all 4 extremities. Neuro: Normal gait. 5 out of 5 strength in all 4 extremities. No facial droop. Skin examination: intact. no rashes. No petechia or purpura. Medications - No data to display    RESULTS:    Labs -   Labs Reviewed - No data to display    Radiologic Studies -  No results found. MEDICAL DECISION MAKING    No evidence of En's angina or peritonsillar abscess. No fevers or sepsis. No stridor. No evidence of epiglottitis. Recommending patient to follow-up with ENT to be rechecked. Patient to come back if symptoms change or worsen. No evidence of facial cellulitis or dental abscess seen. Precautions explained. Patient states understanding and agrees with plan. Patient has no new complaints, changes, or physical findings. Results were reviewed with the patient.   Pt's questions and concerns were addressed. Care plan was outlined, including follow-up with PCP/specialist and return precautions were discussed. Patient is felt to be stable for discharge at this time. Diagnosis   Clinical Impression:   1. Pharyngitis, unspecified etiology           Follow-up Information     Follow up With Specialties Details Why Contact Info    Kylie Patel MD Internal Medicine Call today  [phone] [facility]  [phone]  [phone]      [facility] EMERGENCY DEPT Emergency Medicine Go in 2 days If symptoms worsen, As needed [address]  [phone]    [facility] Department of Otolaryngology  Call today  [address] [facility]  [phone]          Current Discharge Medication List      START taking these medications    Details   clindamycin (CLEOCIN) 300 mg capsule Take 1 Cap by mouth four (4) times daily for 7 days. Qty: 28 Cap, Refills: 0      naproxen (Naprosyn) 500 mg tablet Take 1 Tab by mouth two (2) times daily (with meals) for 10 days. As needed for pain  Qty: 20 Tab, Refills: 0             Discharged in stable and improved condition. This chart was completed using Dragon, a dictation transcription service. Errors may have resulted from using this device.

## 2020-11-04 NOTE — DISCHARGE INSTRUCTIONS
Thank you so much for visiting us today. Please make sure to call your doctor today to be rechecked in 1-3 days. Bring your results from here with you when you do. Return immediately if any fevers, headaches, chest pain, difficulty breathing, abdominal pain, worsening or changing symptoms, or any other concerns. Patient Education        Sore Throat: Care Instructions  Your Care Instructions     Infection by bacteria or a virus causes most sore throats. Cigarette smoke, dry air, air pollution, allergies, and yelling can also cause a sore throat. Sore throats can be painful and annoying. Fortunately, most sore throats go away on their own. If you have a bacterial infection, your doctor may prescribe antibiotics. Follow-up care is a key part of your treatment and safety. Be sure to make and go to all appointments, and call your doctor if you are having problems. It's also a good idea to know your test results and keep a list of the medicines you take. How can you care for yourself at home? · If your doctor prescribed antibiotics, take them as directed. Do not stop taking them just because you feel better. You need to take the full course of antibiotics. · Gargle with warm salt water once an hour to help reduce swelling and relieve discomfort. Use 1 teaspoon of salt mixed in 1 cup of warm water. · Take an over-the-counter pain medicine, such as acetaminophen (Tylenol), ibuprofen (Advil, Motrin), or naproxen (Aleve). Read and follow all instructions on the label. · Be careful when taking over-the-counter cold or flu medicines and Tylenol at the same time. Many of these medicines have acetaminophen, which is Tylenol. Read the labels to make sure that you are not taking more than the recommended dose. Too much acetaminophen (Tylenol) can be harmful. · Drink plenty of fluids. Fluids may help soothe an irritated throat. Hot fluids, such as tea or soup, may help decrease throat pain.   · Use over-the-counter throat lozenges to soothe pain. Regular cough drops or hard candy may also help. These should not be given to young children because of the risk of choking. · Do not smoke or allow others to smoke around you. If you need help quitting, talk to your doctor about stop-smoking programs and medicines. These can increase your chances of quitting for good. · Use a vaporizer or humidifier to add moisture to your bedroom. Follow the directions for cleaning the machine. When should you call for help? Call your doctor now or seek immediate medical care if:    · You have new or worse trouble swallowing.     · Your sore throat gets much worse on one side. Watch closely for changes in your health, and be sure to contact your doctor if you do not get better as expected. Where can you learn more? Go to http://www.gray.com/  Enter U420 in the search box to learn more about \"Sore Throat: Care Instructions. \"  Current as of: April 15, 2020               Content Version: 12.6  © 2006-2020 [employer]. Care instructions adapted under license by [employer] (which disclaims liability or warranty for this information). If you have questions about a medical condition or this instruction, always ask your healthcare professional. [address] any warranty or liability for your use of this information.

## 2020-11-05 ENCOUNTER — PATIENT OUTREACH (OUTPATIENT)
Dept: CASE MANAGEMENT | Age: 55
End: 2020-11-05

## 2020-11-05 NOTE — PROGRESS NOTES
Patient contacted regarding recent discharge and COVID-19 risk. Discussed COVID-19 related testing which was not done at this time. Test results were not done. Patient informed of results, if available? no    Care Transition Nurse/ Ambulatory Care Manager/ LPN Care Coordinator contacted the patient by telephone to perform post discharge assessment. Verified name and  with patient as identifiers. Patient has following risk factors of: diabetes. CTN/ACM/LPN reviewed discharge instructions, medical action plan and red flags related to discharge diagnosis. Reviewed and educated them on any new and changed medications related to discharge diagnosis. Advised obtaining a 90-day supply of all daily and as-needed medications. Advance Care Planning:   Does patient have an Advance Directive: currently not on file; education provided     Education provided regarding infection prevention, and signs and symptoms of COVID-19 and when to seek medical attention with patient who verbalized understanding. Discussed exposure protocols and quarantine from [address] you at higher risk for severe illness  and given an opportunity for questions and concerns. The patient agrees to contact the COVID-19 hotline [phone] or PCP office for questions related to their healthcare. CTN/ACM/LPN provided contact information for future reference. From CDC: Are you at higher risk for severe illness?  Wash your hands often.  Avoid close contact (6 feet, which is about two arm lengths) with people who are sick.  Put distance between yourself and other people if COVID-19 is spreading in your community.  Clean and disinfect frequently touched surfaces.  Avoid all cruise travel and non-essential air travel.  Call your healthcare professional if you have concerns about COVID-19 and your underlying condition or if you are sick.     For more information on steps you can take to protect yourself, see CDC's How to Protect Yourself      Patient/family/caregiver given information for [employer] and agrees to enroll no      Plan for follow-up call in 7-14 days based on severity of symptoms and risk factors.

## 2020-11-10 ENCOUNTER — TELEPHONE (OUTPATIENT)
Dept: FAMILY MEDICINE CLINIC | Age: 55
End: 2020-11-10

## 2020-11-10 ENCOUNTER — VIRTUAL VISIT (OUTPATIENT)
Dept: FAMILY MEDICINE CLINIC | Age: 55
End: 2020-11-10
Payer: MEDICARE

## 2020-11-10 DIAGNOSIS — E78.00 HYPERCHOLESTEREMIA: ICD-10-CM

## 2020-11-10 DIAGNOSIS — Z79.4 TYPE 2 DIABETES MELLITUS WITH MICROALBUMINURIA, WITH LONG-TERM CURRENT USE OF INSULIN (HCC): Primary | ICD-10-CM

## 2020-11-10 DIAGNOSIS — R13.10 ODYNOPHAGIA: ICD-10-CM

## 2020-11-10 DIAGNOSIS — E03.4 HYPOTHYROIDISM DUE TO ACQUIRED ATROPHY OF THYROID: ICD-10-CM

## 2020-11-10 DIAGNOSIS — R80.9 TYPE 2 DIABETES MELLITUS WITH MICROALBUMINURIA, WITH LONG-TERM CURRENT USE OF INSULIN (HCC): Primary | ICD-10-CM

## 2020-11-10 DIAGNOSIS — R07.0 THROAT PAIN: ICD-10-CM

## 2020-11-10 DIAGNOSIS — E11.29 TYPE 2 DIABETES MELLITUS WITH MICROALBUMINURIA, WITH LONG-TERM CURRENT USE OF INSULIN (HCC): Primary | ICD-10-CM

## 2020-11-10 DIAGNOSIS — E53.8 VITAMIN B12 DEFICIENCY: ICD-10-CM

## 2020-11-10 PROCEDURE — 99214 OFFICE O/P EST MOD 30 MIN: CPT | Performed by: INTERNAL MEDICINE

## 2020-11-10 PROCEDURE — 2022F DILAT RTA XM EVC RTNOPTHY: CPT | Performed by: INTERNAL MEDICINE

## 2020-11-10 PROCEDURE — 3051F HG A1C>EQUAL 7.0%<8.0%: CPT | Performed by: INTERNAL MEDICINE

## 2020-11-10 PROCEDURE — G9899 SCRN MAM PERF RSLTS DOC: HCPCS | Performed by: INTERNAL MEDICINE

## 2020-11-10 PROCEDURE — 3017F COLORECTAL CA SCREEN DOC REV: CPT | Performed by: INTERNAL MEDICINE

## 2020-11-10 PROCEDURE — G9717 DOC PT DX DEP/BP F/U NT REQ: HCPCS | Performed by: INTERNAL MEDICINE

## 2020-11-10 PROCEDURE — G8427 DOCREV CUR MEDS BY ELIG CLIN: HCPCS | Performed by: INTERNAL MEDICINE

## 2020-11-10 RX ORDER — LIDOCAINE HYDROCHLORIDE 20 MG/ML
15 SOLUTION OROPHARYNGEAL
Qty: 100 ML | Refills: 0 | Status: SHIPPED | OUTPATIENT
Start: 2020-11-10 | End: 2021-11-03

## 2020-11-10 RX ORDER — IBUPROFEN 800 MG/1
800 TABLET ORAL
Qty: 40 TAB | Refills: 0 | Status: SHIPPED | OUTPATIENT
Start: 2020-11-10 | End: 2021-05-04 | Stop reason: ALTCHOICE

## 2020-11-10 RX ORDER — INSULIN GLARGINE 100 [IU]/ML
60 INJECTION, SOLUTION SUBCUTANEOUS
Qty: 15 PEN | Refills: 2 | Status: SHIPPED | OUTPATIENT
Start: 2020-11-10 | End: 2021-03-15 | Stop reason: SDUPTHER

## 2020-11-10 NOTE — TELEPHONE ENCOUNTER
Patient was just seen today 11/10/2020. Pt explained that the ENT doctor (doesn't know the name) she was referred by Dr. Estela Galvin doesn't take her insurance. Pt would like to be referred somewhere else Pt insisted that she needs a call back today. Pt was then informed about the phone call turn around time. Asking to be called back.

## 2020-11-10 NOTE — PROGRESS NOTES
Aleida Watson is a 60 y.o. female who was seen by synchronous (real-time) audio-video technology using doxy. me on 11/10/2020. Location of the patient: Home    Location of the provider: [facility]    Consent:  She and/or health care decision maker is aware that that she may receive a bill for this telehealth service, depending on her insurance coverage, and has provided verbal consent to proceed: Yes    Subjective:   Aleida Watson is a 60 y.o. female who presents today for management of    Chief Complaint   Patient presents with    Diabetes       Patient was seen at the ER 6 days ago for sore throat. Onset of sore throat was one month ago, gradually getting worse since then. She was prescribed clindamycin and naproxen. She continues to have sharp pain on the right side of her throat. Denies oral lesions. Diabetes Mellitus:  She has diabetes mellitus, and  hyperlipidemia and obesity.   Diabetic ROS - medication compliance: compliant all of the time, diabetic diet compliance: compliant most of the time, home glucose monitoring: values are usually normal.   Lab review: labs reviewed, I note that glycosylated hemoglobin normal.     Problem List  Patient Active Problem List    Diagnosis Date Noted    Type 2 diabetes mellitus with diabetic neuropathy ([address] 10/24/2019    Vitamin B12 deficiency 04/25/2019    Bipolar 1 disorder, manic, full remission ([address] 04/25/2019    PTSD (post-traumatic stress disorder) 04/25/2019    Hypothyroidism due to acquired atrophy of thyroid 05/02/2016    Left low back pain 04/13/2016    History of alcohol abuse 04/13/2016    Lumbar degenerative disc disease 04/13/2016    Recurrent depression ([address] 06/12/2013    Tear of meniscus of left knee 01/16/2013    Hypercholesteremia 01/02/2013    Type 2 diabetes mellitus with microalbuminuria, with long-term current use of insulin ([address] 01/02/2013       Current Medications  Current Outpatient Medications   Medication Sig  lidocaine (Lidocaine Viscous) 2 % solution Take 15 mL by mouth every four (4) hours as needed for Pain.  ibuprofen (MOTRIN) 800 mg tablet Take 1 Tab by mouth every eight (8) hours as needed for Pain.  insulin glargine (LANTUS,BASAGLAR) 100 unit/mL (3 mL) inpn 60 Units by SubCUTAneous route nightly.  clindamycin (CLEOCIN) 300 mg capsule Take 1 Cap by mouth four (4) times daily for 7 days.  cetirizine (ZyrTEC) 10 mg tablet Take 1 Tab by mouth nightly. Take one tablets by mouth daily for 7 days and then as needed after that. Restart forseven days if sinus congestion recurs.  rosuvastatin (CRESTOR) 20 mg tablet TAKE 1 TABLET BY MOUTH  NIGHTLY    levothyroxine (SYNTHROID) 137 mcg tablet TAKE 1 TABLET BY MOUTH  DAILY BEFORE BREAKFAST    Caplyta 42 mg cap take 1 capsule by mouth at bedtime with food    benztropine (COGENTIN) 1 mg tablet     insulin lispro (HumaLOG KwikPen Insulin) 100 unit/mL kwikpen 12 Units by SubCUTAneous route Before breakfast, lunch, and dinner. 5 units SQ before snacks. Maximum daily dose: 50 units    Insulin Needles, Disposable, (BD Ultra-Fine Mini Pen Needle) 31 gauge x 3/16\" ndle Use 4 times daily    omega-3 acid ethyl esters (LOVAZA) 1 gram capsule TAKE 2 CAPSULES BY MOUTH  TWO TIMES DAILY WITH MEALS    oxybutynin chloride XL (DITROPAN XL) 10 mg CR tablet TAKE 1 TABLET BY MOUTH  DAILY    fexofenadine (ALLEGRA) 180 mg tablet Take 1 Tab by mouth daily as needed for Allergies.  SITagliptin-metFORMIN (JANUMET) 50-1,000 mg per tablet TAKE 1 TABLET BY MOUTH  TWICE A DAY    glucose blood VI test strips (ONETOUCH ULTRA BLUE TEST STRIP) strip Test blood glucose 4 times a day    haloperidol (HALDOL) 5 mg tablet Take 5 mg by mouth two (2) times a day.  melatonin tab tablet Take 5 mg by mouth nightly.  cyanocobalamin (VITAMIN B-12) 500 mcg tablet Take 500 mcg by mouth daily.  ferrous sulfate 325 mg (65 mg iron) tablet Take 1 Tab by mouth two (2) times a day.     sertraline (ZOLOFT) 100 mg tablet Take 200 mg by mouth daily. No current facility-administered medications for this visit. Allergies/Drug Reactions  Allergies   Allergen Reactions    Lipitor [Atorvastatin] Other (comments)     myalgias    Lisinopril Cough    Aspirin Other (comments)     Nose bleeds    Zocor [Simvastatin] Other (comments)     Causes pain in the left leg        Social History  Social History     Tobacco Use    Smoking status: Former Smoker     Packs/day: 1.00     Years: 35.00     Pack years: 35.00     Types: Cigarettes     Last attempt to quit: 2011     Years since quittin.0    Smokeless tobacco: Never Used    Tobacco comment: quit    Substance Use Topics    Alcohol use: No     Alcohol/week: 0.0 standard drinks     Comment: quit 10 years ago    Drug use: No        Review of Systems  Review of Systems   Constitutional: Negative for chills, fever, malaise/fatigue and weight loss. HENT: Positive for sore throat. Negative for congestion, hearing loss, sinus pain and tinnitus. Respiratory: Negative for cough, shortness of breath, wheezing and stridor. Cardiovascular: Negative for chest pain, palpitations and leg swelling. Gastrointestinal: Negative for abdominal pain, heartburn, nausea and vomiting. Musculoskeletal: Negative. Neurological: Negative for dizziness and headaches. Psychiatric/Behavioral: Negative for depression. Objective:     General: alert, cooperative, no distress   Mental  status: mental status: alert, oriented to person, place, and time, normal mood, behavior, speech, dress, motor activity, and thought processes   Resp: resp: normal effort and no respiratory distress   Neuro: neuro: no gross deficits   Skin: skin: no discoloration or lesions of concern on visible areas     Due to this being a TeleHealth evaluation, many elements of the physical examination are unable to be assessed. Assessment & Plan:   1.  Throat pain  - lidocaine (Lidocaine Viscous) 2 % solution; Take 15 mL by mouth every four (4) hours as needed for Pain. Dispense: 100 mL; Refill: 0  - ibuprofen (MOTRIN) 800 mg tablet; Take 1 Tab by mouth every eight (8) hours as needed for Pain. Dispense: 40 Tab; Refill: 0  - REFERRAL TO ENT-OTOLARYNGOLOGY    2. Odynophagia  - REFERRAL TO ENT-OTOLARYNGOLOGY    3. Type 2 diabetes mellitus with microalbuminuria, with long-term current use of insulin (HCC)  - controlled  - insulin glargine (LANTUS,BASAGLAR) 100 unit/mL (3 mL) inpn; 60 Units by SubCUTAneous route nightly. Dispense: 15 Pen; Refill: 2  - CBC WITH AUTOMATED DIFF; Future  - METABOLIC PANEL, COMPREHENSIVE; Future  - HEMOGLOBIN A1C WITH EAG; Future    4. Hypothyroidism due to acquired atrophy of thyroid  - stable  - TSH 3RD GENERATION; Future    5. Vitamin B12 deficiency  - VITAMIN B12; Future    6. Hypercholesteremia  - controlled  - LIPID PANEL; Future  - METABOLIC PANEL, COMPREHENSIVE; Future      Follow-up and Dispositions    · Return in about 3 months (around 2/10/2021) for ROV. We discussed the expected course, resolution and complications of the diagnosis(es) in detail. Medication risks, benefits, costs, interactions, and alternatives were discussed as indicated. I advised her to contact the office if her condition worsens, changes or fails to improve as anticipated. She expressed understanding with the diagnosis(es) and plan. Pursuant to the emergency declaration under the [facility] waiver authority and the [employer] and [employer] Act, this Virtual  Visit was conducted, with patient's consent, to reduce the patient's risk of exposure to COVID-19 and provide continuity of care for an established patient. Services were provided through a video synchronous discussion virtually to substitute for in-person clinic visit.     Yolis Hensley MD

## 2020-11-10 NOTE — TELEPHONE ENCOUNTER
Pt unable to verify name. Stated the number started with a [phone]. [facility] does not take patient insurance. Pt will be referred to provider that accepts insurance. This encounter will be closed.

## 2020-11-11 DIAGNOSIS — N39.3 STRESS INCONTINENCE OF URINE: ICD-10-CM

## 2020-11-11 RX ORDER — OXYBUTYNIN CHLORIDE 10 MG/1
TABLET, EXTENDED RELEASE ORAL
Qty: 90 TAB | Refills: 3 | Status: SHIPPED | OUTPATIENT
Start: 2020-11-11 | End: 2021-03-29 | Stop reason: SDUPTHER

## 2020-11-12 ENCOUNTER — PATIENT OUTREACH (OUTPATIENT)
Dept: CASE MANAGEMENT | Age: 55
End: 2020-11-12

## 2020-11-20 ENCOUNTER — PATIENT OUTREACH (OUTPATIENT)
Dept: CASE MANAGEMENT | Age: 55
End: 2020-11-20

## 2020-11-20 NOTE — PROGRESS NOTES
Patient resolved from Transition of Care episode on 11/20/2020   Patient/family has been provided the following resources and education related to COVID-19:                         Signs, symptoms and red flags related to COVID-19            CDC exposure and quarantine guidelines            [facility] exposure contact - [phone]            Contact for their local Department of Health                 Message left on VM making patient aware of the above and making myself available if the patient had any questions. No further outreach scheduled with this CTN/ACM. Episode of Care resolved. Patient has this CTN/ACM contact information if future needs arise.

## 2020-12-06 ENCOUNTER — HOSPITAL ENCOUNTER (EMERGENCY)
Age: 55
Discharge: HOME | End: 2020-12-07
Attending: EMERGENCY MEDICINE
Payer: MEDICARE

## 2020-12-06 DIAGNOSIS — F25.9 SCHIZOAFFECTIVE DISORDER, UNSPECIFIED TYPE (HCC): ICD-10-CM

## 2020-12-06 DIAGNOSIS — R44.0 AUDITORY HALLUCINATIONS: ICD-10-CM

## 2020-12-06 DIAGNOSIS — N30.90 CYSTITIS: Primary | ICD-10-CM

## 2020-12-06 LAB
ALBUMIN SERPL-MCNC: 4.3 G/DL (ref 3.4–5)
ALBUMIN/GLOB SERPL: 1.3 {RATIO} (ref 0.8–1.7)
ALP SERPL-CCNC: 68 U/L (ref 45–117)
ALT SERPL-CCNC: 26 U/L (ref 13–56)
AMPHET UR QL SCN: NEGATIVE
ANION GAP SERPL CALC-SCNC: 8 MMOL/L (ref 3–18)
APPEARANCE UR: CLEAR
AST SERPL-CCNC: 16 U/L (ref 10–38)
BACTERIA URNS QL MICRO: ABNORMAL /HPF
BARBITURATES UR QL SCN: NEGATIVE
BASOPHILS # BLD: 0 K/UL (ref 0–0.1)
BASOPHILS NFR BLD: 0 % (ref 0–2)
BENZODIAZ UR QL: NEGATIVE
BILIRUB SERPL-MCNC: 0.6 MG/DL (ref 0.2–1)
BILIRUB UR QL: NEGATIVE
BUN SERPL-MCNC: 6 MG/DL (ref 7–18)
BUN/CREAT SERPL: 10 (ref 12–20)
CALCIUM SERPL-MCNC: 9.7 MG/DL (ref 8.5–10.1)
CANNABINOIDS UR QL SCN: NEGATIVE
CHLORIDE SERPL-SCNC: 93 MMOL/L (ref 100–111)
CO2 SERPL-SCNC: 25 MMOL/L (ref 21–32)
COCAINE UR QL SCN: NEGATIVE
COLOR UR: YELLOW
CREAT SERPL-MCNC: 0.58 MG/DL (ref 0.6–1.3)
DIFFERENTIAL METHOD BLD: ABNORMAL
EOSINOPHIL # BLD: 0.1 K/UL (ref 0–0.4)
EOSINOPHIL NFR BLD: 1 % (ref 0–5)
EPITH CASTS URNS QL MICRO: ABNORMAL /LPF (ref 0–5)
ERYTHROCYTE [DISTWIDTH] IN BLOOD BY AUTOMATED COUNT: 13.6 % (ref 11.6–14.5)
ETHANOL SERPL-MCNC: <3 MG/DL (ref 0–3)
GLOBULIN SER CALC-MCNC: 3.3 G/DL (ref 2–4)
GLUCOSE SERPL-MCNC: 107 MG/DL (ref 74–99)
GLUCOSE UR STRIP.AUTO-MCNC: NEGATIVE MG/DL
HCT VFR BLD AUTO: 34.1 % (ref 35–45)
HDSCOM,HDSCOM: NORMAL
HGB BLD-MCNC: 11.2 G/DL (ref 12–16)
HGB UR QL STRIP: NEGATIVE
KETONES UR QL STRIP.AUTO: NEGATIVE MG/DL
LEUKOCYTE ESTERASE UR QL STRIP.AUTO: ABNORMAL
LYMPHOCYTES # BLD: 3.6 K/UL (ref 0.9–3.6)
LYMPHOCYTES NFR BLD: 32 % (ref 21–52)
MCH RBC QN AUTO: 27.5 PG (ref 24–34)
MCHC RBC AUTO-ENTMCNC: 32.8 G/DL (ref 31–37)
MCV RBC AUTO: 83.8 FL (ref 74–97)
METHADONE UR QL: NEGATIVE
MONOCYTES # BLD: 0.7 K/UL (ref 0.05–1.2)
MONOCYTES NFR BLD: 6 % (ref 3–10)
NEUTS SEG # BLD: 6.7 K/UL (ref 1.8–8)
NEUTS SEG NFR BLD: 61 % (ref 40–73)
NITRITE UR QL STRIP.AUTO: NEGATIVE
OPIATES UR QL: NEGATIVE
PCP UR QL: NEGATIVE
PH UR STRIP: 7 [PH] (ref 5–8)
PLATELET # BLD AUTO: 339 K/UL (ref 135–420)
PMV BLD AUTO: 10.4 FL (ref 9.2–11.8)
POTASSIUM SERPL-SCNC: 4.1 MMOL/L (ref 3.5–5.5)
PROT SERPL-MCNC: 7.6 G/DL (ref 6.4–8.2)
PROT UR STRIP-MCNC: NEGATIVE MG/DL
RBC # BLD AUTO: 4.07 M/UL (ref 4.2–5.3)
RBC #/AREA URNS HPF: ABNORMAL /HPF (ref 0–5)
SODIUM SERPL-SCNC: 126 MMOL/L (ref 136–145)
SP GR UR REFRACTOMETRY: <1.005 (ref 1–1.03)
UROBILINOGEN UR QL STRIP.AUTO: 0.2 EU/DL (ref 0.2–1)
WBC # BLD AUTO: 11.1 K/UL (ref 4.6–13.2)
WBC URNS QL MICRO: ABNORMAL /HPF (ref 0–4)

## 2020-12-06 PROCEDURE — 80053 COMPREHEN METABOLIC PANEL: CPT

## 2020-12-06 PROCEDURE — 80307 DRUG TEST PRSMV CHEM ANLYZR: CPT

## 2020-12-06 PROCEDURE — 74011250637 HC RX REV CODE- 250/637: Performed by: EMERGENCY MEDICINE

## 2020-12-06 PROCEDURE — 87635 SARS-COV-2 COVID-19 AMP PRB: CPT

## 2020-12-06 PROCEDURE — 85025 COMPLETE CBC W/AUTO DIFF WBC: CPT

## 2020-12-06 PROCEDURE — 81001 URINALYSIS AUTO W/SCOPE: CPT

## 2020-12-06 PROCEDURE — 99285 EMERGENCY DEPT VISIT HI MDM: CPT

## 2020-12-06 PROCEDURE — 96360 HYDRATION IV INFUSION INIT: CPT

## 2020-12-06 PROCEDURE — 74011250636 HC RX REV CODE- 250/636: Performed by: EMERGENCY MEDICINE

## 2020-12-06 RX ORDER — IBUPROFEN 600 MG/1
600 TABLET ORAL
Status: COMPLETED | OUTPATIENT
Start: 2020-12-06 | End: 2020-12-06

## 2020-12-06 RX ORDER — ONDANSETRON 4 MG/1
4 TABLET, ORALLY DISINTEGRATING ORAL
Status: COMPLETED | OUTPATIENT
Start: 2020-12-06 | End: 2020-12-06

## 2020-12-06 RX ORDER — OLANZAPINE 5 MG/1
5 TABLET ORAL EVERY EVENING
Status: DISCONTINUED | OUTPATIENT
Start: 2020-12-06 | End: 2020-12-08 | Stop reason: HOSPADM

## 2020-12-06 RX ORDER — INSULIN GLARGINE 100 [IU]/ML
60 INJECTION, SOLUTION SUBCUTANEOUS
Status: DISCONTINUED | OUTPATIENT
Start: 2020-12-06 | End: 2020-12-08 | Stop reason: HOSPADM

## 2020-12-06 RX ORDER — INSULIN LISPRO 100 [IU]/ML
12 INJECTION, SOLUTION INTRAVENOUS; SUBCUTANEOUS
Status: DISCONTINUED | OUTPATIENT
Start: 2020-12-07 | End: 2020-12-08 | Stop reason: HOSPADM

## 2020-12-06 RX ORDER — ROSUVASTATIN CALCIUM 10 MG/1
10 TABLET, COATED ORAL
Status: DISCONTINUED | OUTPATIENT
Start: 2020-12-06 | End: 2020-12-08 | Stop reason: HOSPADM

## 2020-12-06 RX ORDER — CEPHALEXIN 500 MG/1
500 CAPSULE ORAL 2 TIMES DAILY
Qty: 14 CAP | Refills: 0 | Status: SHIPPED | OUTPATIENT
Start: 2020-12-06 | End: 2020-12-07

## 2020-12-06 RX ORDER — CEPHALEXIN 250 MG/1
500 CAPSULE ORAL 2 TIMES DAILY
Status: DISCONTINUED | OUTPATIENT
Start: 2020-12-06 | End: 2020-12-08 | Stop reason: HOSPADM

## 2020-12-06 RX ADMIN — ONDANSETRON 4 MG: 4 TABLET, ORALLY DISINTEGRATING ORAL at 22:03

## 2020-12-06 RX ADMIN — CEPHALEXIN 500 MG: 250 CAPSULE ORAL at 22:03

## 2020-12-06 RX ADMIN — SODIUM CHLORIDE 1000 ML: 900 INJECTION, SOLUTION INTRAVENOUS at 22:03

## 2020-12-06 RX ADMIN — IBUPROFEN 600 MG: 600 TABLET, FILM COATED ORAL at 22:03

## 2020-12-06 NOTE — ED PROVIDER NOTES
EMERGENCY DEPARTMENT HISTORY AND PHYSICAL EXAM    4:15 PM    Date: 12/6/2020  Patient Name: Anupama Rodas    History of Presenting Illness     Chief Complaint   Patient presents with   [address] Problem    Sore Throat       History Provided By: Patient  Location/Duration/Severity/Modifying factors   Patient is a 57-year-old female with a history of diabetes, depression, psychiatric disorder which she indicates is \"schizoaffective side effects\". She presents today with concerns for hearing voices, chronic sore throat going on for 6 to 8 weeks. She also reports that she has not been able to eat or sleep. She reports that she has been seen a couple of times for sore throat since she started taking Caplyta several weeks ago. Reports this at manufacturing her old medication and placed her on this new drug. She denies any fevers or chills. She is able to swallow although occasionally experiences pain with this. No suicidal or homicidal ideations. The voices she hears are nonthreatening and mostly vague. She reports that this actually started today. The sore throat tends to worsen with swallowing, but present and worsens when she feels anxious as well. Nothing at this point has seemed to make it better or worse and is intermittent in nature.           PCP: Carrie Erazo MD    Current Facility-Administered Medications   Medication Dose Route Frequency Provider Last Rate Last Dose    cephALEXin (KEFLEX) capsule 500 mg  500 mg Oral BID Thelma Vika K, DO   500 mg at 12/07/20 0842    OLANZapine (ZyPREXA) tablet 5 mg  5 mg Oral QPM Leane Burkitt, MD   5 mg at 12/07/20 0008    insulin glargine (LANTUS) injection 60 Units  60 Units SubCUTAneous QHS Leane Burkitt, MD   60 Units at 12/07/20 0007    rosuvastatin (CRESTOR) tablet 10 mg  10 mg Oral QHS Leane Burkitt, MD   10 mg at 12/07/20 0008    levothyroxine (SYNTHROID) tablet 137 mcg  137 mcg Oral Isac Santiago MD   137 mcg at 12/07/20 0755    insulin lispro (HUMALOG) injection 12 Units  12 Units SubCUTAneous TIDAC Garrison Bone MD         Current Outpatient Medications   Medication Sig Dispense Refill    cephALEXin (Keflex) 500 mg capsule Take 1 Cap by mouth two (2) times a day for 7 days. 14 Cap 0    oxybutynin chloride XL (DITROPAN XL) 10 mg CR tablet TAKE 1 TABLET BY MOUTH  DAILY 90 Tab 3    lidocaine (Lidocaine Viscous) 2 % solution Take 15 mL by mouth every four (4) hours as needed for Pain. 100 mL 0    ibuprofen (MOTRIN) 800 mg tablet Take 1 Tab by mouth every eight (8) hours as needed for Pain. 40 Tab 0    insulin glargine (LANTUS,BASAGLAR) 100 unit/mL (3 mL) inpn 60 Units by SubCUTAneous route nightly. 15 Pen 2    cetirizine (ZyrTEC) 10 mg tablet Take 1 Tab by mouth nightly. Take one tablets by mouth daily for 7 days and then as needed after that. Restart forseven days if sinus congestion recurs. 30 Tab 0    rosuvastatin (CRESTOR) 20 mg tablet TAKE 1 TABLET BY MOUTH  NIGHTLY 90 Tab 3    levothyroxine (SYNTHROID) 137 mcg tablet TAKE 1 TABLET BY MOUTH  DAILY BEFORE BREAKFAST 90 Tab 3    Caplyta 42 mg cap take 1 capsule by mouth at bedtime with food      benztropine (COGENTIN) 1 mg tablet       insulin lispro (HumaLOG KwikPen Insulin) 100 unit/mL kwikpen 12 Units by SubCUTAneous route Before breakfast, lunch, and dinner. 5 units SQ before snacks. Maximum daily dose: 50 units 5 Pen 0    Insulin Needles, Disposable, (BD Ultra-Fine Mini Pen Needle) 31 gauge x 3/16\" ndle Use 4 times daily 100 Pen Needle 0    omega-3 acid ethyl esters (LOVAZA) 1 gram capsule TAKE 2 CAPSULES BY MOUTH  TWO TIMES DAILY WITH MEALS 360 Cap 3    fexofenadine (ALLEGRA) 180 mg tablet Take 1 Tab by mouth daily as needed for Allergies.  90 Tab 3    SITagliptin-metFORMIN (JANUMET) 50-1,000 mg per tablet TAKE 1 TABLET BY MOUTH  TWICE A  Tab 3    glucose blood VI test strips (ONETOUCH ULTRA BLUE TEST STRIP) strip Test blood glucose 4 times a day 400 Strip 4    haloperidol (HALDOL) 5 mg tablet Take 5 mg by mouth two (2) times a day.  melatonin tab tablet Take 5 mg by mouth nightly.  cyanocobalamin (VITAMIN B-12) 500 mcg tablet Take 500 mcg by mouth daily.  ferrous sulfate 325 mg (65 mg iron) tablet Take 1 Tab by mouth two (2) times a day. 60 Tab 3    sertraline (ZOLOFT) 100 mg tablet Take 200 mg by mouth daily.          Past History     Past Medical History:  Past Medical History:   Diagnosis Date    Arthritis     Chronic pain     Depression     Diabetes ([address]     Hyperlipidemia     Hypertension     Hypothyroid     Medial meniscus tear     Left knee    Menopause     Psychiatric disorder     Tobacco abuse, in remission        Past Surgical History:  Past Surgical History:   Procedure Laterality Date    HX ORTHOPAEDIC      Hand reconstructive surgery    HX TUBAL LIGATION         Family History:  Family History   Problem Relation Age of Onset    Alcohol abuse Mother     Arthritis-osteo Mother    Lucian Negus Mother 61        breast cancer    Diabetes Mother     Hypertension Mother    Davila Psychiatric Disorder Mother    Davila Stroke Mother     Breast Cancer Mother 72    Alcohol abuse Father     Heart Disease Father    Davila Diabetes Father     Hypertension Father     Lung Disease Father     Colon Cancer Father 72    Alcohol abuse Paternal Grandmother     Ovarian Cancer Sister 32    Alcohol abuse Sister     Diabetes Sister     Psychiatric Disorder Sister     Alcohol abuse Brother     Psychiatric Disorder Son     Psychiatric Disorder Other         nephew       Social History:  Social History     Tobacco Use    Smoking status: Former Smoker     Packs/day: 1.00     Years: 35.00     Pack years: 35.00     Types: Cigarettes     Last attempt to quit: 2011     Years since quittin.0    Smokeless tobacco: Never Used    Tobacco comment: quit    Substance Use Topics    Alcohol use: No     Alcohol/week: 0.0 standard drinks     Comment: quit 10 years ago    Drug use: No       Allergies: Allergies   Allergen Reactions    Lipitor [Atorvastatin] Other (comments)     myalgias    Lisinopril Cough    Aspirin Other (comments)     Nose bleeds    Zocor [Simvastatin] Other (comments)     Causes pain in the left leg         Review of Systems     Review of Systems   Constitutional: Negative for chills and fever. HENT: Positive for sore throat (Chronic). Negative for congestion, rhinorrhea, sinus pressure and sneezing. Eyes: Negative for visual disturbance. Respiratory: Negative for cough, shortness of breath, wheezing and stridor. Cardiovascular: Negative for chest pain. Gastrointestinal: Negative for abdominal pain, diarrhea, nausea and vomiting. Genitourinary: Negative for dysuria, frequency and urgency. Musculoskeletal: Negative for back pain and neck pain. Skin: Negative for rash. Neurological: Negative for syncope, numbness and headaches. Psychiatric/Behavioral: Positive for dysphoric mood. The patient is nervous/anxious. Auditory hallucinations       Physical Exam     Visit Vitals  /86   Pulse 75   Temp 97.6 °F (36.4 °C)   Resp 18   Ht 5' 6\" (1.676 m)   Wt 89.4 kg (197 lb)   LMP 06/05/2014   SpO2 98%   BMI 31.80 kg/m²       Physical Exam  Vitals signs and nursing note reviewed. Constitutional:       General: She is not in acute distress. Appearance: Normal appearance. She is normal weight. HENT:      Head: Normocephalic and atraumatic. Right Ear: External ear normal.      Left Ear: External ear normal.      Nose: Nose normal.      Mouth/Throat:      Mouth: Mucous membranes are moist.      Pharynx: Oropharynx is clear. Uvula midline. Tonsils: No tonsillar exudate or tonsillar abscesses. Comments: No submandibular or sublingual swelling. No tonsillar erythema or swelling. No stridor.   Eyes:      Conjunctiva/sclera: Conjunctivae normal.   Neck: Musculoskeletal: Normal range of motion and neck supple. Cardiovascular:      Rate and Rhythm: Normal rate and regular rhythm. Pulses: Normal pulses. Heart sounds: Normal heart sounds. No murmur. Pulmonary:      Effort: Pulmonary effort is normal.      Breath sounds: Normal breath sounds. No wheezing, rhonchi or rales. Abdominal:      General: Abdomen is flat. Tenderness: There is no abdominal tenderness. There is no guarding or rebound. Musculoskeletal: Normal range of motion. General: No swelling or tenderness. Right lower leg: No edema. Left lower leg: No edema. Skin:     General: Skin is warm and dry. Capillary Refill: Capillary refill takes less than 2 seconds. Findings: No rash. Neurological:      General: No focal deficit present. Mental Status: She is alert. Diagnostic Study Results     Labs -  Recent Results (from the past 24 hour(s))   CBC WITH AUTOMATED DIFF    Collection Time: 12/06/20  6:13 PM   Result Value Ref Range    WBC 11.1 4.6 - 13.2 K/uL    RBC 4.07 (L) 4.20 - 5.30 M/uL    HGB 11.2 (L) 12.0 - 16.0 g/dL    HCT 34.1 (L) 35.0 - 45.0 %    MCV 83.8 74.0 - 97.0 FL    MCH 27.5 24.0 - 34.0 PG    MCHC 32.8 31.0 - 37.0 g/dL    RDW 13.6 11.6 - 14.5 %    PLATELET 561 932 - 731 K/uL    MPV 10.4 9.2 - 11.8 FL    NEUTROPHILS 61 40 - 73 %    LYMPHOCYTES 32 21 - 52 %    MONOCYTES 6 3 - 10 %    EOSINOPHILS 1 0 - 5 %    BASOPHILS 0 0 - 2 %    ABS. NEUTROPHILS 6.7 1.8 - 8.0 K/UL    ABS. LYMPHOCYTES 3.6 0.9 - 3.6 K/UL    ABS. MONOCYTES 0.7 0.05 - 1.2 K/UL    ABS. EOSINOPHILS 0.1 0.0 - 0.4 K/UL    ABS.  BASOPHILS 0.0 0.0 - 0.1 K/UL    DF AUTOMATED     METABOLIC PANEL, COMPREHENSIVE    Collection Time: 12/06/20  6:13 PM   Result Value Ref Range    Sodium 126 (L) 136 - 145 mmol/L    Potassium 4.1 3.5 - 5.5 mmol/L    Chloride 93 (L) 100 - 111 mmol/L    CO2 25 21 - 32 mmol/L    Anion gap 8 3.0 - 18 mmol/L    Glucose 107 (H) 74 - 99 mg/dL    BUN 6 (L) 7.0 - 18 MG/DL    Creatinine 0.58 (L) 0.6 - 1.3 MG/DL    BUN/Creatinine ratio 10 (L) 12 - 20      GFR est AA >60 >60 ml/min/1.73m2    GFR est non-AA >60 >60 ml/min/1.73m2    Calcium 9.7 8.5 - 10.1 MG/DL    Bilirubin, total 0.6 0.2 - 1.0 MG/DL    ALT (SGPT) 26 13 - 56 U/L    AST (SGOT) 16 10 - 38 U/L    Alk.  phosphatase 68 45 - 117 U/L    Protein, total 7.6 6.4 - 8.2 g/dL    Albumin 4.3 3.4 - 5.0 g/dL    Globulin 3.3 2.0 - 4.0 g/dL    A-G Ratio 1.3 0.8 - 1.7     ETHYL ALCOHOL    Collection Time: 12/06/20  6:13 PM   Result Value Ref Range    ALCOHOL(ETHYL),SERUM <3 0 - 3 MG/DL   URINALYSIS W/ RFLX MICROSCOPIC    Collection Time: 12/06/20  7:56 PM   Result Value Ref Range    Color YELLOW      Appearance CLEAR      Specific gravity <1.005 (L) 1.005 - 1.030    pH (UA) 7.0 5.0 - 8.0      Protein Negative NEG mg/dL    Glucose Negative NEG mg/dL    Ketone Negative NEG mg/dL    Bilirubin Negative NEG      Blood Negative NEG      Urobilinogen 0.2 0.2 - 1.0 EU/dL    Nitrites Negative NEG      Leukocyte Esterase SMALL (A) NEG     DRUG SCREEN, URINE    Collection Time: 12/06/20  7:56 PM   Result Value Ref Range    BENZODIAZEPINES Negative NEG      BARBITURATES Negative NEG      THC (TH-CANNABINOL) Negative NEG      OPIATES Negative NEG      PCP(PHENCYCLIDINE) Negative NEG      COCAINE Negative NEG      AMPHETAMINES Negative NEG      METHADONE Negative NEG      HDSCOM (NOTE)    URINE MICROSCOPIC ONLY    Collection Time: 12/06/20  7:56 PM   Result Value Ref Range    WBC 21 to 35 0 - 4 /hpf    RBC 0 to 1 0 - 5 /hpf    Epithelial cells 1+ 0 - 5 /lpf    Bacteria 1+ (A) NEG /hpf   SARS-COV-2    Collection Time: 12/06/20 10:08 PM   Result Value Ref Range    Specimen source Nasopharyngeal      COVID-19 rapid test Not detected NOTD      Specimen type NP Swab      Health status Nasopharyngeal     GLUCOSE, POC    Collection Time: 12/07/20 12:02 AM   Result Value Ref Range    Glucose (POC) 128 (H) 70 - 624 mg/dL   METABOLIC PANEL, BASIC Collection Time: 12/07/20 12:14 AM   Result Value Ref Range    Sodium 134 (L) 136 - 145 mmol/L    Potassium 3.8 3.5 - 5.5 mmol/L    Chloride 101 100 - 111 mmol/L    CO2 25 21 - 32 mmol/L    Anion gap 8 3.0 - 18 mmol/L    Glucose 121 (H) 74 - 99 mg/dL    BUN 5 (L) 7.0 - 18 MG/DL    Creatinine 0.55 (L) 0.6 - 1.3 MG/DL    BUN/Creatinine ratio 9 (L) 12 - 20      GFR est AA >60 >60 ml/min/1.73m2    GFR est non-AA >60 >60 ml/min/1.73m2    Calcium 9.2 8.5 - 10.1 MG/DL   GLUCOSE, POC    Collection Time: 12/07/20  7:57 AM   Result Value Ref Range    Glucose (POC) 118 (H) 70 - 110 mg/dL         Radiologic Studies -   No orders to display           Medical Decision Making   I am the first provider for this patient. I reviewed the vital signs, available nursing notes, past medical history, past surgical history, family history and social history. Vital Signs-Reviewed the patient's vital signs. EKG: N.A    Records Reviewed: Nursing Notes, Old Medical Records and Previous Laboratory Studies (Time of Review: 4:15 PM)    ED Course: Progress Notes, Reevaluation, and Consults:         Provider Notes (Medical Decision Making):   MDM  Number of Diagnoses or Management Options  Auditory hallucinations:   Cystitis:   Schizoaffective disorder, unspecified type [facility]):   Diagnosis management comments: Patient with history of schizoaffective/schizophrenia presents today with complaints of chronic sore throat, insomnia and auditory hallucinations which she says are vague and nonthreatening. Reports that She was placed on Caplyta a few months ago and this preceded her symptom onset . On exam patient is well appearing, her oropharynx looks clear with no signs of pharyngitis, PTA, stridor or other acute emergent cause of sore throat. Medical screening labs performed showing mild hyponatremia, not terribly dissimilar from her previous ones, but lower than recently, doubt Na is causing patient's symptoms.  Will give 1L NS for now then have patient evaluated by psychiatry. Also pyuria in the UA, patient not having symptoms right now, but reports she did recently, would like to go ahead and treat, will treat with cephalexin. Differential includes major depressive disorder, psychotic disorder, schizophrenia, bipolar disorder, emotional crisis, malingering, generalized anxiety disorder, schizoaffective disorder, etc.    Psychiatry was consulted and evaluated the patient making recommendations for inpatient care. Patient is updated and in agreement with the plan. Patient's care is discussed and questions answered. Oncoming physician introduced to the patient. Patient signed off to the oncoming ED physician, Chris Hansen 10:30 PM  Patient is awaiting Placement  Please reevaluate prior to disposition. Procedures    Critical Care Time: 0    Diagnosis     Clinical Impression:   1. Cystitis    2. Auditory hallucinations    3. Schizoaffective disorder, unspecified type ([facility] [address]        Disposition: TBD    Follow-up Information    None          Current Discharge Medication List      START taking these medications    Details   cephALEXin (Keflex) 500 mg capsule Take 1 Cap by mouth two (2) times a day for 7 days. Qty: 14 Cap, Refills: 0         CONTINUE these medications which have NOT CHANGED    Details   oxybutynin chloride XL (DITROPAN XL) 10 mg CR tablet TAKE 1 TABLET BY MOUTH  DAILY  Qty: 90 Tab, Refills: 3    Comments: Requesting 1 year supply  Associated Diagnoses: Stress incontinence of urine      lidocaine (Lidocaine Viscous) 2 % solution Take 15 mL by mouth every four (4) hours as needed for Pain. Qty: 100 mL, Refills: 0    Associated Diagnoses: Throat pain      ibuprofen (MOTRIN) 800 mg tablet Take 1 Tab by mouth every eight (8) hours as needed for Pain. Qty: 40 Tab, Refills: 0    Associated Diagnoses: Throat pain      insulin glargine (LANTUS,BASAGLAR) 100 unit/mL (3 mL) inpn 60 Units by SubCUTAneous route nightly.   Qty: 15 Pen, Refills: 2    Associated Diagnoses: Type 2 diabetes mellitus with microalbuminuria, with long-term current use of insulin ([facility])      cetirizine (ZyrTEC) 10 mg tablet Take 1 Tab by mouth nightly. Take one tablets by mouth daily for 7 days and then as needed after that. Restart forseven days if sinus congestion recurs. Qty: 30 Tab, Refills: 0      rosuvastatin (CRESTOR) 20 mg tablet TAKE 1 TABLET BY MOUTH  NIGHTLY  Qty: 90 Tab, Refills: 3    Comments: Requesting 1 year supply  Associated Diagnoses: Hypercholesteremia      levothyroxine (SYNTHROID) 137 mcg tablet TAKE 1 TABLET BY MOUTH  DAILY BEFORE BREAKFAST  Qty: 90 Tab, Refills: 3    Comments: Requesting 1 year supply  Associated Diagnoses: Hypothyroidism due to acquired atrophy of thyroid      Caplyta 42 mg cap take 1 capsule by mouth at bedtime with food      benztropine (COGENTIN) 1 mg tablet       insulin lispro (HumaLOG KwikPen Insulin) 100 unit/mL kwikpen 12 Units by SubCUTAneous route Before breakfast, lunch, and dinner. 5 units SQ before snacks. Maximum daily dose: 50 units  Qty: 5 Pen, Refills: 0    Associated Diagnoses: Type 2 diabetes mellitus without complication, unspecified whether long term insulin use ([facility])      Insulin Needles, Disposable, (BD Ultra-Fine Mini Pen Needle) 31 gauge x 3/16\" ndle Use 4 times daily  Qty: 100 Pen Needle, Refills: 0    Associated Diagnoses: Type 2 diabetes mellitus with microalbuminuria, with long-term current use of insulin ([facility])      omega-3 acid ethyl esters (LOVAZA) 1 gram capsule TAKE 2 CAPSULES BY MOUTH  TWO TIMES DAILY WITH MEALS  Qty: 360 Cap, Refills: 3    Associated Diagnoses: Hypercholesteremia      fexofenadine (ALLEGRA) 180 mg tablet Take 1 Tab by mouth daily as needed for Allergies.   Qty: 90 Tab, Refills: 3    Associated Diagnoses: Non-seasonal allergic rhinitis, unspecified trigger      SITagliptin-metFORMIN (JANUMET) 50-1,000 mg per tablet TAKE 1 TABLET BY MOUTH  TWICE A DAY  Qty: 180 Tab, Refills: 3 Associated Diagnoses: Type 2 diabetes mellitus without complication (HCC)      glucose blood VI test strips (ONETOUCH ULTRA BLUE TEST STRIP) strip Test blood glucose 4 times a day  Qty: 400 Strip, Refills: 4    Associated Diagnoses: Type 2 diabetes mellitus with microalbuminuria, with long-term current use of insulin (HCC)      haloperidol (HALDOL) 5 mg tablet Take 5 mg by mouth two (2) times a day. melatonin tab tablet Take 5 mg by mouth nightly. cyanocobalamin (VITAMIN B-12) 500 mcg tablet Take 500 mcg by mouth daily. ferrous sulfate 325 mg (65 mg iron) tablet Take 1 Tab by mouth two (2) times a day. Qty: 60 Tab, Refills: 3    Associated Diagnoses: Other specified anemias      sertraline (ZOLOFT) 100 mg tablet Take 200 mg by mouth daily. Lindsey Albarran DO   Emergency Medicine   December 7, 2020, 4:15 PM     This note is dictated utilizing Dragon voice recognition software. Unfortunately this leads to occasional typographical errors using the voice recognition. I apologize in advance if the situation occurs. If questions occur please do not hesitate to contact me directly.     Lindsey Albarran, DO

## 2020-12-07 VITALS
OXYGEN SATURATION: 96 % | HEIGHT: 66 IN | WEIGHT: 197 LBS | HEART RATE: 77 BPM | RESPIRATION RATE: 18 BRPM | TEMPERATURE: 97.5 F | BODY MASS INDEX: 31.66 KG/M2 | DIASTOLIC BLOOD PRESSURE: 60 MMHG | SYSTOLIC BLOOD PRESSURE: 106 MMHG

## 2020-12-07 LAB
ANION GAP SERPL CALC-SCNC: 8 MMOL/L (ref 3–18)
BUN SERPL-MCNC: 5 MG/DL (ref 7–18)
BUN/CREAT SERPL: 9 (ref 12–20)
CALCIUM SERPL-MCNC: 9.2 MG/DL (ref 8.5–10.1)
CHLORIDE SERPL-SCNC: 101 MMOL/L (ref 100–111)
CO2 SERPL-SCNC: 25 MMOL/L (ref 21–32)
COVID-19 RAPID TEST, COVR: NOT DETECTED
CREAT SERPL-MCNC: 0.55 MG/DL (ref 0.6–1.3)
GLUCOSE BLD STRIP.AUTO-MCNC: 117 MG/DL (ref 70–110)
GLUCOSE BLD STRIP.AUTO-MCNC: 118 MG/DL (ref 70–110)
GLUCOSE BLD STRIP.AUTO-MCNC: 128 MG/DL (ref 70–110)
GLUCOSE BLD STRIP.AUTO-MCNC: 141 MG/DL (ref 70–110)
GLUCOSE BLD STRIP.AUTO-MCNC: 156 MG/DL (ref 70–110)
GLUCOSE SERPL-MCNC: 121 MG/DL (ref 74–99)
HEALTH STATUS, XMCV2T: NORMAL
POTASSIUM SERPL-SCNC: 3.8 MMOL/L (ref 3.5–5.5)
SODIUM SERPL-SCNC: 134 MMOL/L (ref 136–145)
SOURCE, COVRS: NORMAL
SPECIMEN TYPE, XMCV1T: NORMAL

## 2020-12-07 PROCEDURE — 82962 GLUCOSE BLOOD TEST: CPT

## 2020-12-07 PROCEDURE — 80048 BASIC METABOLIC PNL TOTAL CA: CPT

## 2020-12-07 PROCEDURE — 74011636637 HC RX REV CODE- 636/637: Performed by: EMERGENCY MEDICINE

## 2020-12-07 PROCEDURE — 74011250637 HC RX REV CODE- 250/637: Performed by: EMERGENCY MEDICINE

## 2020-12-07 RX ORDER — OLANZAPINE 5 MG/1
5 TABLET ORAL
Qty: 30 TAB | Refills: 0 | Status: SHIPPED | OUTPATIENT
Start: 2020-12-07 | End: 2021-02-11 | Stop reason: DRUGHIGH

## 2020-12-07 RX ORDER — CEPHALEXIN 500 MG/1
500 CAPSULE ORAL 2 TIMES DAILY
Qty: 14 CAP | Refills: 0 | Status: SHIPPED | OUTPATIENT
Start: 2020-12-07 | End: 2020-12-14

## 2020-12-07 RX ORDER — LORAZEPAM 1 MG/1
1 TABLET ORAL
Status: DISCONTINUED | OUTPATIENT
Start: 2020-12-07 | End: 2020-12-07 | Stop reason: HOSPADM

## 2020-12-07 RX ADMIN — ROSUVASTATIN CALCIUM 10 MG: 10 TABLET, COATED ORAL at 00:08

## 2020-12-07 RX ADMIN — CEPHALEXIN 500 MG: 250 CAPSULE ORAL at 18:35

## 2020-12-07 RX ADMIN — LEVOTHYROXINE SODIUM 137 MCG: 0.03 TABLET ORAL at 07:55

## 2020-12-07 RX ADMIN — INSULIN LISPRO 12 UNITS: 100 INJECTION, SOLUTION INTRAVENOUS; SUBCUTANEOUS at 18:38

## 2020-12-07 RX ADMIN — ROSUVASTATIN CALCIUM 10 MG: 10 TABLET, COATED ORAL at 21:08

## 2020-12-07 RX ADMIN — INSULIN GLARGINE 60 UNITS: 100 INJECTION, SOLUTION SUBCUTANEOUS at 21:08

## 2020-12-07 RX ADMIN — CEPHALEXIN 500 MG: 250 CAPSULE ORAL at 08:42

## 2020-12-07 RX ADMIN — INSULIN GLARGINE 60 UNITS: 100 INJECTION, SOLUTION SUBCUTANEOUS at 00:07

## 2020-12-07 RX ADMIN — INSULIN LISPRO 12 UNITS: 100 INJECTION, SOLUTION INTRAVENOUS; SUBCUTANEOUS at 12:25

## 2020-12-07 RX ADMIN — OLANZAPINE 5 MG: 5 TABLET, FILM COATED ORAL at 18:35

## 2020-12-07 RX ADMIN — OLANZAPINE 5 MG: 5 TABLET, FILM COATED ORAL at 00:08

## 2020-12-07 NOTE — ED NOTES
C/o nausea and throat pain. Patient has a sodium level of 126. Informed provider of patient's complaints and lab results.

## 2020-12-07 NOTE — CONSULTS
Name: Adam Brunson     : 1965   Date: 2020      Time:    Location of patient: [facility]    Location of doctor: [place]   This evaluation was performed via telepsych machine with the help of onsite staff. Chief Complaint: requesting psychiatric assessment due to psychosis  History of Present Illness: Patient is a 60year old  lady with history of schizoaffective disorder, bipolar type presenting to the emergency department complaining of mental health problems and sore throats. Patient shared that she had been compliant with outpatient psychiatric treatment taking Haldol but was experiencing increasing shakiness in her hands which led her outpatient psychiatrist to change her antipsychotic to a new one named Shaistadaniel Harrisonr on 2020. Patient reports that prior to the change, her psychiatric symptoms have been stable. She was doing fairly well until recently two weeks ago, her sister was hospitalized and having had toes amputated and possible bone infection, patient shared that since then, she has been exhibiting worsening symptoms, not sleeping, racing thoughts, uncontrollable anxiety, today, she started hearing voices. Patient reports that the voices are nonthreatening, non-command and very vague but they do appear to distract her from reality as throughout my interview I had to ask her the same questions multiple times and also to repeat myself. Patient states that she is feeling overwhelmed and so last week called her outpatient psychiatrist to see if she can be prescribe something for insomnia and racing thoughts the psychiatrist said that she is unable to do so and made an appointment for patient on . Patient felt that she cannot wait and therefore presented to the hospital today.  Patient does have significant symptoms of hypomania/maribel, she is overwhelmed with anxiety, she expresses racing thoughts with difficulty with focusing and concentrating during my interview in addition to being distracted by her auditory hallucinations. Patient symptoms of maribel has significantly worsened which including insomnia, not sleeping for several days, racing thoughts, mind will not shut down, increasing anxiety and tension, now experiencing auditory hallucinations. After much encouragement, patient is agreeable to inpatient psychiatric hospitalization at this time for safety, evaluation, and treatment. SI/ Self harm: Patient does have history of suicide attempt several years ago by overdose. HI/Violence: denied   Access to weapons: denied   Legal: denied   Psychiatric History/Treatment History: Georgie Ollie history of mental health issues diagnosed with schizophrenia most likely bipolar type. Patient had been compliant with outpatient treatment, Haldol with change to [facility] on September 11 initially tolerating it fairly well until more recently. She has history of multiple inpatient psychiatric hospitalizations, last admission was roughly 2 years ago. Drug/Alcohol History: denied, urine drug screen was negative. Medical History: diabetes  Medications & Freq: [facility] is only psychotropic medication  Allergies: Lipitor, lisinopril, aspirin, and Zocor  Family Psych History/History of suicide: unknown   Social History:  Unclear as patient was a limited historian regarding recent events and family situation. Stressors: reports multiple stressors though was not able to actually discuss some  Mental Status Exam:   Appearance and attire: slightly disheveled appearing, dressed in hospital gown  Attitude and behavior: pleasant and cooperative, though seems to be responding to internal stimuli, easily distracted and lacking focus. Limited historian as she had some thought blocking.   Speech: delayed responses, decreased volume, rate, and tone, none pressured  Affect and mood: this time a, anxious, mood is \"very anxious and wound up\"  Association and thought processes: patient endorses racing thoughts, she also exhibits thought blocking due to current psychotic state   Thought content: denies suicidal or homicidal ideations  Perception: denied visual hallucinations but currently experiencing auditory hallucination, denies paranoid ideations or delusions  Sensorium, memory, and orientation: no sensory deficits, memory grosslyintact, alert and oriented to person, place, time, and situation. Intellectual functioning: low average   Insight and judgment: limited due to current psychotic state  Impression/Risk Assessment: 47-year-old  lady with history of schizoaffective disorder has been experiencing worsening symptoms for severral weeks, now struggling with insomnia, significant anxiety, racing thoughts with mind not able to be slow down, and auditory hallucinations. She had recent change of psychotropic medication on September 11 from Haldol to Valderice. Her outpatient psychiatrist cannot see patient until December 21 and is not comfortable to adjust her medications on an outpatient basis. Diagnosis: schizoaffective disorder, bipolar type. Treatment Recommendations: Patient meets criteria for inpatient psychiatric hospitalization.  She is agreeable to voluntary admission at this time but if she changes her mind will meet criteria for TDO  Pharmacological: recommend stop Caplyta and try Zyprexa five mg at bedtime   Therapy: none  Level of Care: Inpatient psychiatric hospitalization

## 2020-12-07 NOTE — ED NOTES
Note:  Assuming care of patient   from leaving provider    2:37 PM  Robin VICTORIA MD, assumed care of patient from another provider who is ending their shift in the emergency department . 2:37 PM    Date: 12/6/2020  Patient Name: Guanako Noel    History of Presenting Illness     Chief Complaint   Patient presents with    Mental Health Problem    Sore Throat       Nursing notes regarding the HPI and triage nursing notes were reviewed. Prior medical records were reviewed. Current Facility-Administered Medications   Medication Dose Route Frequency Provider Last Rate Last Dose    LORazepam (ATIVAN) tablet 1 mg  1 mg Oral NOW Renaldo Manzano MD        cephALEXin (KEFLEX) capsule 500 mg  500 mg Oral BID Arely Flavin K, DO   500 mg at 12/07/20 0842    OLANZapine (ZyPREXA) tablet 5 mg  5 mg Oral QPM Alesia Parmar MD   5 mg at 12/07/20 0008    insulin glargine (LANTUS) injection 60 Units  60 Units SubCUTAneous QHS Alesia Parmar MD   60 Units at 12/07/20 0007    rosuvastatin (CRESTOR) tablet 10 mg  10 mg Oral QHS Alesia Parmar MD   10 mg at 12/07/20 0008    levothyroxine (SYNTHROID) tablet 137 mcg  137 mcg Oral Svetlana Crespo MD   137 mcg at 12/07/20 0755    insulin lispro (HUMALOG) injection 12 Units  12 Units SubCUTAneous Taylor Mcnamara MD   12 Units at 12/07/20 1225     Current Outpatient Medications   Medication Sig Dispense Refill    cephALEXin (Keflex) 500 mg capsule Take 1 Cap by mouth two (2) times a day for 7 days. 14 Cap 0    oxybutynin chloride XL (DITROPAN XL) 10 mg CR tablet TAKE 1 TABLET BY MOUTH  DAILY 90 Tab 3    lidocaine (Lidocaine Viscous) 2 % solution Take 15 mL by mouth every four (4) hours as needed for Pain. 100 mL 0    ibuprofen (MOTRIN) 800 mg tablet Take 1 Tab by mouth every eight (8) hours as needed for Pain. 40 Tab 0    insulin glargine (LANTUS,BASAGLAR) 100 unit/mL (3 mL) inpn 60 Units by SubCUTAneous route nightly.  15 Pen 2    cetirizine (ZyrTEC) 10 mg tablet Take 1 Tab by mouth nightly. Take one tablets by mouth daily for 7 days and then as needed after that. Restart forseven days if sinus congestion recurs. 30 Tab 0    rosuvastatin (CRESTOR) 20 mg tablet TAKE 1 TABLET BY MOUTH  NIGHTLY 90 Tab 3    levothyroxine (SYNTHROID) 137 mcg tablet TAKE 1 TABLET BY MOUTH  DAILY BEFORE BREAKFAST 90 Tab 3    Caplyta 42 mg cap take 1 capsule by mouth at bedtime with food      benztropine (COGENTIN) 1 mg tablet       insulin lispro (HumaLOG KwikPen Insulin) 100 unit/mL kwikpen 12 Units by SubCUTAneous route Before breakfast, lunch, and dinner. 5 units SQ before snacks. Maximum daily dose: 50 units 5 Pen 0    Insulin Needles, Disposable, (BD Ultra-Fine Mini Pen Needle) 31 gauge x 3/16\" ndle Use 4 times daily 100 Pen Needle 0    omega-3 acid ethyl esters (LOVAZA) 1 gram capsule TAKE 2 CAPSULES BY MOUTH  TWO TIMES DAILY WITH MEALS 360 Cap 3    fexofenadine (ALLEGRA) 180 mg tablet Take 1 Tab by mouth daily as needed for Allergies. 90 Tab 3    SITagliptin-metFORMIN (JANUMET) 50-1,000 mg per tablet TAKE 1 TABLET BY MOUTH  TWICE A  Tab 3    glucose blood VI test strips (ONETOUCH ULTRA BLUE TEST STRIP) strip Test blood glucose 4 times a day 400 Strip 4    haloperidol (HALDOL) 5 mg tablet Take 5 mg by mouth two (2) times a day.  melatonin tab tablet Take 5 mg by mouth nightly.  cyanocobalamin (VITAMIN B-12) 500 mcg tablet Take 500 mcg by mouth daily.  ferrous sulfate 325 mg (65 mg iron) tablet Take 1 Tab by mouth two (2) times a day. 60 Tab 3    sertraline (ZOLOFT) 100 mg tablet Take 200 mg by mouth daily.          Past History     Past Medical History:  Past Medical History:   Diagnosis Date    Arthritis     Chronic pain     Depression     Diabetes ([address]     Hyperlipidemia     Hypertension     Hypothyroid     Medial meniscus tear     Left knee    Menopause     Psychiatric disorder     Tobacco abuse, in remission Past Surgical History:  Past Surgical History:   Procedure Laterality Date    HX ORTHOPAEDIC      Hand reconstructive surgery    HX TUBAL LIGATION         Family History:  Family History   Problem Relation Age of Onset    Alcohol abuse Mother    Harpreet Meeks Mother    Héctor Vuong Mother 61        breast cancer    Diabetes Mother     Hypertension Mother     Psychiatric Disorder Mother     Stroke Mother     Breast Cancer Mother 72    Alcohol abuse Father     Heart Disease Father    Becca Nelson Diabetes Father     Hypertension Father     Lung Disease Father     Colon Cancer Father 72    Alcohol abuse Paternal Grandmother     Ovarian Cancer Sister 32    Alcohol abuse Sister     Diabetes Sister     Psychiatric Disorder Sister     Alcohol abuse Brother     Psychiatric Disorder Son     Psychiatric Disorder Other         nephew       Social History:  Social History     Tobacco Use    Smoking status: Former Smoker     Packs/day: 1.00     Years: 35.00     Pack years: 35.00     Types: Cigarettes     Last attempt to quit: 2011     Years since quittin.0    Smokeless tobacco: Never Used    Tobacco comment: quit    Substance Use Topics    Alcohol use: No     Alcohol/week: 0.0 standard drinks     Comment: quit 10 years ago    Drug use: No       Allergies:   Allergies   Allergen Reactions    Lipitor [Atorvastatin] Other (comments)     myalgias    Lisinopril Cough    Aspirin Other (comments)     Nose bleeds    Zocor [Simvastatin] Other (comments)     Causes pain in the left leg       Patient's primary care provider (as noted in EPIC):  Howie Duong MD    Abnormal lab results from this emergency department encounter:  Labs Reviewed   [address] DIFF - Abnormal; Notable for the following components:       Result Value    RBC 4.07 (*)     HGB 11.2 (*)     HCT 34.1 (*)     All other components within normal limits   METABOLIC PANEL, COMPREHENSIVE - Abnormal; Notable for the following components:    Sodium 126 (*)     Chloride 93 (*)     Glucose 107 (*)     BUN 6 (*)     Creatinine 0.58 (*)     BUN/Creatinine ratio 10 (*)     All other components within normal limits   URINALYSIS W/ RFLX MICROSCOPIC - Abnormal; Notable for the following components:    Specific gravity <1.005 (*)     Leukocyte Esterase SMALL (*)     All other components within normal limits   URINE MICROSCOPIC ONLY - Abnormal; Notable for the following components:    Bacteria 1+ (*)     All other components within normal limits   METABOLIC PANEL, BASIC - Abnormal; Notable for the following components:    Sodium 134 (*)     Glucose 121 (*)     BUN 5 (*)     Creatinine 0.55 (*)     BUN/Creatinine ratio 9 (*)     All other components within normal limits   GLUCOSE, POC - Abnormal; Notable for the following components:    Glucose (POC) 128 (*)     All other components within normal limits   GLUCOSE, POC - Abnormal; Notable for the following components:    Glucose (POC) 118 (*)     All other components within normal limits   GLUCOSE, POC - Abnormal; Notable for the following components:    Glucose (POC) 117 (*)     All other components within normal limits   ETHYL ALCOHOL   DRUG SCREEN, URINE   SARS-COV-2       Lab values for this patient within approximately the last 12 hours:  Recent Results (from the past 12 hour(s))   GLUCOSE, POC    Collection Time: 12/07/20  7:57 AM   Result Value Ref Range    Glucose (POC) 118 (H) 70 - 110 mg/dL   GLUCOSE, POC    Collection Time: 12/07/20 12:08 PM   Result Value Ref Range    Glucose (POC) 117 (H) 70 - 110 mg/dL       Radiologist and cardiologist interpretations if available at time of this note:  Radiology results:  No results found.       Medication(s) ordered for patient during this emergency visit encounter:  Medications   cephALEXin (KEFLEX) capsule 500 mg (500 mg Oral Given 12/7/20 0842)   OLANZapine (ZyPREXA) tablet 5 mg (5 mg Oral Given 12/7/20 0008)   insulin glargine (LANTUS) injection 60 Units (60 Units SubCUTAneous Given 12/7/20 0007)   rosuvastatin (CRESTOR) tablet 10 mg (10 mg Oral Given 12/7/20 0008)   levothyroxine (SYNTHROID) tablet 137 mcg (137 mcg Oral Given 12/7/20 0755)   insulin lispro (HUMALOG) injection 12 Units (12 Units SubCUTAneous Given 12/7/20 1225)   LORazepam (ATIVAN) tablet 1 mg (has no administration in time range)   sodium chloride 0.9 % bolus infusion 1,000 mL (0 mL IntraVENous IV Completed 12/6/20 2303)   ondansetron (ZOFRAN ODT) tablet 4 mg (4 mg Oral Given 12/6/20 2203)   ibuprofen (MOTRIN) tablet 600 mg (600 mg Oral Given 12/6/20 2203)       Pt care assumed from Dr. Brandon Kimble, ED provider. Pt complaint(s), current treatment plan, progression and available diagnostic results have been discussed thoroughly. Rounding occurred: no  Intended Disposition: Transfer. Pending diagnostic reports and/or labs (please list): [facility] case management transfer of a voluntary schizoaffective patient with auditory hallucinations to inpatient behavioral medicine facility. Dictation disclaimer:  Please note that this dictation was completed with MAZ, the [employer] voice recognition software. Quite often unanticipated grammatical, syntax, homophones, and other interpretive errors are inadvertently transcribed by the computer software. Please disregard these errors. Please excuse any errors that have escaped final proofreading. Coding Diagnoses     Clinical Impression:   1. Cystitis    2. Auditory hallucinations    3. [facility]izoaffective disorder, unspecified type [facility]        Disposition     Disposition: Transfer to an inpatient behavioral medicine facility. Kushal Meng Ma, M.D.   [place] Board Certified Emergency Physician

## 2020-12-07 NOTE — PROGRESS NOTES
Spoke with [employer] from [facility] and states they have beds but limited. She requested for pt information faxed. Pt information faxed.

## 2020-12-07 NOTE — PROGRESS NOTES
[facility] is currently no bed. [facility] is full. [facility] have discharges but also having admissions. Pt information faxed as requested.

## 2020-12-07 NOTE — ED NOTES
Vitals:  Patient Vitals for the past 12 hrs:   Temp Pulse Resp BP SpO2   12/07/20 0000 98.1 °F (36.7 °C) 78 18 (!) 156/96 100 %         Medications ordered:   Medications   cephALEXin (KEFLEX) capsule 500 mg (500 mg Oral Given 12/6/20 2203)   OLANZapine (ZyPREXA) tablet 5 mg (5 mg Oral Given 12/7/20 0008)   insulin glargine (LANTUS) injection 60 Units (60 Units SubCUTAneous Given 12/7/20 0007)   rosuvastatin (CRESTOR) tablet 10 mg (10 mg Oral Given 12/7/20 0008)   levothyroxine (SYNTHROID) tablet 137 mcg (has no administration in time range)   insulin lispro (HUMALOG) injection 12 Units (has no administration in time range)   sodium chloride 0.9 % bolus infusion 1,000 mL (1,000 mL IntraVENous New Bag 12/6/20 2203)   ondansetron (ZOFRAN ODT) tablet 4 mg (4 mg Oral Given 12/6/20 2203)   ibuprofen (MOTRIN) tablet 600 mg (600 mg Oral Given 12/6/20 2203)         Lab findings:  Recent Results (from the past 12 hour(s))   CBC WITH AUTOMATED DIFF    Collection Time: 12/06/20  6:13 PM   Result Value Ref Range    WBC 11.1 4.6 - 13.2 K/uL    RBC 4.07 (L) 4.20 - 5.30 M/uL    HGB 11.2 (L) 12.0 - 16.0 g/dL    HCT 34.1 (L) 35.0 - 45.0 %    MCV 83.8 74.0 - 97.0 FL    MCH 27.5 24.0 - 34.0 PG    MCHC 32.8 31.0 - 37.0 g/dL    RDW 13.6 11.6 - 14.5 %    PLATELET 377 040 - 161 K/uL    MPV 10.4 9.2 - 11.8 FL    NEUTROPHILS 61 40 - 73 %    LYMPHOCYTES 32 21 - 52 %    MONOCYTES 6 3 - 10 %    EOSINOPHILS 1 0 - 5 %    BASOPHILS 0 0 - 2 %    ABS. NEUTROPHILS 6.7 1.8 - 8.0 K/UL    ABS. LYMPHOCYTES 3.6 0.9 - 3.6 K/UL    ABS. MONOCYTES 0.7 0.05 - 1.2 K/UL    ABS. EOSINOPHILS 0.1 0.0 - 0.4 K/UL    ABS.  BASOPHILS 0.0 0.0 - 0.1 K/UL    DF AUTOMATED     METABOLIC PANEL, COMPREHENSIVE    Collection Time: 12/06/20  6:13 PM   Result Value Ref Range    Sodium 126 (L) 136 - 145 mmol/L    Potassium 4.1 3.5 - 5.5 mmol/L    Chloride 93 (L) 100 - 111 mmol/L    CO2 25 21 - 32 mmol/L    Anion gap 8 3.0 - 18 mmol/L    Glucose 107 (H) 74 - 99 mg/dL    BUN 6 (L) 7.0 - 18 MG/DL    Creatinine 0.58 (L) 0.6 - 1.3 MG/DL    BUN/Creatinine ratio 10 (L) 12 - 20      GFR est AA >60 >60 ml/min/1.73m2    GFR est non-AA >60 >60 ml/min/1.73m2    Calcium 9.7 8.5 - 10.1 MG/DL    Bilirubin, total 0.6 0.2 - 1.0 MG/DL    ALT (SGPT) 26 13 - 56 U/L    AST (SGOT) 16 10 - 38 U/L    Alk.  phosphatase 68 45 - 117 U/L    Protein, total 7.6 6.4 - 8.2 g/dL    Albumin 4.3 3.4 - 5.0 g/dL    Globulin 3.3 2.0 - 4.0 g/dL    A-G Ratio 1.3 0.8 - 1.7     ETHYL ALCOHOL    Collection Time: 12/06/20  6:13 PM   Result Value Ref Range    ALCOHOL(ETHYL),SERUM <3 0 - 3 MG/DL   URINALYSIS W/ RFLX MICROSCOPIC    Collection Time: 12/06/20  7:56 PM   Result Value Ref Range    Color YELLOW      Appearance CLEAR      Specific gravity <1.005 (L) 1.005 - 1.030    pH (UA) 7.0 5.0 - 8.0      Protein Negative NEG mg/dL    Glucose Negative NEG mg/dL    Ketone Negative NEG mg/dL    Bilirubin Negative NEG      Blood Negative NEG      Urobilinogen 0.2 0.2 - 1.0 EU/dL    Nitrites Negative NEG      Leukocyte Esterase SMALL (A) NEG     DRUG SCREEN, URINE    Collection Time: 12/06/20  7:56 PM   Result Value Ref Range    BENZODIAZEPINES Negative NEG      BARBITURATES Negative NEG      THC (TH-CANNABINOL) Negative NEG      OPIATES Negative NEG      PCP(PHENCYCLIDINE) Negative NEG      COCAINE Negative NEG      AMPHETAMINES Negative NEG      METHADONE Negative NEG      HDSCOM (NOTE)    URINE MICROSCOPIC ONLY    Collection Time: 12/06/20  7:56 PM   Result Value Ref Range    WBC 21 to 35 0 - 4 /hpf    RBC 0 to 1 0 - 5 /hpf    Epithelial cells 1+ 0 - 5 /lpf    Bacteria 1+ (A) NEG /hpf   SARS-COV-2    Collection Time: 12/06/20 10:08 PM   Result Value Ref Range    SARS-CoV-2 PENDING     Specimen source Nasopharyngeal      COVID-19 rapid test Not detected NOTD      Specimen type NP Swab      Health status Nasopharyngeal      COVID-19 PENDING    GLUCOSE, POC    Collection Time: 12/07/20 12:02 AM   Result Value Ref Range    Glucose (POC) 128 (H) 70 - 963 mg/dL   METABOLIC PANEL, BASIC    Collection Time: 12/07/20 12:14 AM   Result Value Ref Range    Sodium 134 (L) 136 - 145 mmol/L    Potassium 3.8 3.5 - 5.5 mmol/L    Chloride 101 100 - 111 mmol/L    CO2 25 21 - 32 mmol/L    Anion gap 8 3.0 - 18 mmol/L    Glucose 121 (H) 74 - 99 mg/dL    BUN 5 (L) 7.0 - 18 MG/DL    Creatinine 0.55 (L) 0.6 - 1.3 MG/DL    BUN/Creatinine ratio 9 (L) 12 - 20      GFR est AA >60 >60 ml/min/1.73m2    GFR est non-AA >60 >60 ml/min/1.73m2    Calcium 9.2 8.5 - 10.1 MG/DL       EKG interpretation by ED Physician:      X-Ray, CT or other radiology findings or impressions:  No orders to display       Progress notes, Consult notes or additional Procedure notes:   Patient was turned over to me to Dr. Bravo Harris to follow-up with placement. I reviewed the telepsychiatry note who recommended inpatient treatment along with starting Zyprexa. I have ordered her Zyprexa along with other medications from her list to include insulin. Have also ordered a diet as well. Patient  sodium has not improved from earlier. Will t/o to dr Katie Love in the morning at approximate 6 AM to follow-up with placement        Reevaluation of patient:   stable    Disposition:  Diagnosis:   1. Cystitis    2. Auditory hallucinations    3. Schizoaffective disorder, unspecified type ([facility]        Disposition: pending    Follow-up Information    None           Current Discharge Medication List      START taking these medications    Details   cephALEXin (Keflex) 500 mg capsule Take 1 Cap by mouth two (2) times a day for 7 days.   Qty: 14 Cap, Refills: 0         CONTINUE these medications which have NOT CHANGED    Details   oxybutynin chloride XL (DITROPAN XL) 10 mg CR tablet TAKE 1 TABLET BY MOUTH  DAILY  Qty: 90 Tab, Refills: 3    Comments: Requesting 1 year supply  Associated Diagnoses: Stress incontinence of urine      lidocaine (Lidocaine Viscous) 2 % solution Take 15 mL by mouth every four (4) hours as needed for Pain.  Qty: 100 mL, Refills: 0    Associated Diagnoses: Throat pain      ibuprofen (MOTRIN) 800 mg tablet Take 1 Tab by mouth every eight (8) hours as needed for Pain. Qty: 40 Tab, Refills: 0    Associated Diagnoses: Throat pain      insulin glargine (LANTUS,BASAGLAR) 100 unit/mL (3 mL) inpn 60 Units by SubCUTAneous route nightly. Qty: 15 Pen, Refills: 2    Associated Diagnoses: Type 2 diabetes mellitus with microalbuminuria, with long-term current use of insulin ([facility])      cetirizine (ZyrTEC) 10 mg tablet Take 1 Tab by mouth nightly. Take one tablets by mouth daily for 7 days and then as needed after that. Restart forseven days if sinus congestion recurs. Qty: 30 Tab, Refills: 0      rosuvastatin (CRESTOR) 20 mg tablet TAKE 1 TABLET BY MOUTH  NIGHTLY  Qty: 90 Tab, Refills: 3    Comments: Requesting 1 year supply  Associated Diagnoses: Hypercholesteremia      levothyroxine (SYNTHROID) 137 mcg tablet TAKE 1 TABLET BY MOUTH  DAILY BEFORE BREAKFAST  Qty: 90 Tab, Refills: 3    Comments: Requesting 1 year supply  Associated Diagnoses: Hypothyroidism due to acquired atrophy of thyroid      Caplyta 42 mg cap take 1 capsule by mouth at bedtime with food      benztropine (COGENTIN) 1 mg tablet       insulin lispro (HumaLOG KwikPen Insulin) 100 unit/mL kwikpen 12 Units by SubCUTAneous route Before breakfast, lunch, and dinner. 5 units SQ before snacks.  Maximum daily dose: 50 units  Qty: 5 Pen, Refills: 0    Associated Diagnoses: Type 2 diabetes mellitus without complication, unspecified whether long term insulin use ([facility])      Insulin Needles, Disposable, (BD Ultra-Fine Mini Pen Needle) 31 gauge x 3/16\" ndle Use 4 times daily  Qty: 100 Pen Needle, Refills: 0    Associated Diagnoses: Type 2 diabetes mellitus with microalbuminuria, with long-term current use of insulin ([facility])      omega-3 acid ethyl esters (LOVAZA) 1 gram capsule TAKE 2 CAPSULES BY MOUTH  TWO TIMES DAILY WITH MEALS  Qty: 360 Cap, Refills: 3    Associated Diagnoses: Hypercholesteremia      fexofenadine (ALLEGRA) 180 mg tablet Take 1 Tab by mouth daily as needed for Allergies. Qty: 90 Tab, Refills: 3    Associated Diagnoses: Non-seasonal allergic rhinitis, unspecified trigger      SITagliptin-metFORMIN (JANUMET) 50-1,000 mg per tablet TAKE 1 TABLET BY MOUTH  TWICE A DAY  Qty: 180 Tab, Refills: 3    Associated Diagnoses: Type 2 diabetes mellitus without complication ([facility])      glucose blood VI test strips (ONETOUCH ULTRA BLUE TEST STRIP) strip Test blood glucose 4 times a day  Qty: 400 Strip, Refills: 4    Associated Diagnoses: Type 2 diabetes mellitus with microalbuminuria, with long-term current use of insulin ([facility])      haloperidol (HALDOL) 5 mg tablet Take 5 mg by mouth two (2) times a day. melatonin tab tablet Take 5 mg by mouth nightly. cyanocobalamin (VITAMIN B-12) 500 mcg tablet Take 500 mcg by mouth daily. ferrous sulfate 325 mg (65 mg iron) tablet Take 1 Tab by mouth two (2) times a day. Qty: 60 Tab, Refills: 3    Associated Diagnoses: Other specified anemias      sertraline (ZOLOFT) 100 mg tablet Take 200 mg by mouth daily.

## 2020-12-07 NOTE — PROGRESS NOTES
Received a call from [facility] and spoke with Demetris Han. He states that pt doesn't meet criteria for admission and recommends outpatient follow up.

## 2020-12-07 NOTE — ED NOTES
0 600. Patient signed out to Dr. Jimmy Hodgkin. Patient with history of schizoaffective disorder and hallucinations. Patient has been seen by telepsych and recommending transfer for inpatient treatment. Pending placement. 0 622. Patient lying on the [facility] bed in no distress. Sleeping. Chest rise and fall noted. 1152. Spoke with Guillermo York. Patient this point wants to leave. Per telepsych recommending TDO if patient tries to leave. Ativan ordered. Will consult [facility] for possible TDO.  1400. Patient signed out to Dr. Preeti Renee. Still pending placement.

## 2020-12-08 ENCOUNTER — PATIENT OUTREACH (OUTPATIENT)
Dept: CASE MANAGEMENT | Age: 55
End: 2020-12-08

## 2020-12-08 DIAGNOSIS — R07.0 THROAT PAIN: ICD-10-CM

## 2020-12-08 RX ORDER — IBUPROFEN 800 MG/1
800 TABLET ORAL
Qty: 40 TAB | Refills: 0 | Status: CANCELLED | OUTPATIENT
Start: 2020-12-08

## 2020-12-08 NOTE — TELEPHONE ENCOUNTER
Pt. Stated she had side effects of  from Satya Mckeon 1943 that the ER discontinued it and started a new medication. She is asking if she need to go back to the ER if she gets side effects from this medication/.  Please assist.

## 2020-12-08 NOTE — TELEPHONE ENCOUNTER
Pt. Stated to disregard refill request. She made an appt. For 12/21/2020 to discuss naproxen medication and f/u from UTI.

## 2020-12-08 NOTE — CONSULTS
Name: Oliver Villalta   : 61  Date: 20   Time: 2301  Location of patient:  [facility] Location of doctor: [place]  Length of Eval:  15 min    Interim History:  Patient is a 60year old   female o presented to the ED on 20 with history of schizoaffective disorder, bipolar complaining of mental health problems and sore throats. Patient reported she had been compliant with outpatient psychiatric treatment taking Haldol but was experiencing increasing shakiness in her hands which led her outpatient psychiatrist to change her antipsychotic to a new one named Vitor Arzola on 2020. Patient reports that prior to the change, her psychiatric symptoms have been stable. She reports having a sore throat since starting the medication. She felt overwhelmed as her sister is in the hospital.  She had reported decreased sleep and manic  type symptoms. At that time she was evaluated by psychiatry who recommended inpatient psychiatric admission and to have [facility]  evaluate  for a TDO. They also recommended stopping caplyta  and starting Zyprexa 5 mg po q hs. Since then she has stopped her new medication and she was given Zyprexa 5 mg po q hs. She reports that she has slept and she feels better. She no longer has a sore throat. She has been seen by [facility]   by Jm Jeronimo and she does not meet criteria for TDO. The patient denies suicidal or homicidal ideations intents or plans. She denies auditory or visual hallucinations. She reports her plans are to go home and take care of herself. She reports she will call Deana Peralta for an earlier appointment. If she can not get one she will keep the   appointments. She reports she has family that is supportive.       Psych Meds:   Zyprexa 5 mg po q hs    Mental Status Exam:   Appearance and attire:  she is casually groomed and dressed  Attitude and behavior: calm and cooperative  Speech: normal rate and tone  Affect and mood:  with in normal range  Association and thought processes: organized  Thought content: she is future oriented and goal directed  Perception:   no AH/VH  Sensorium, memory, and orientation: alert and oriented times 3  Intellectual functioning: average  Insight and judgment: limited    Diagnosis:   .schizoaffective disorder bipolar  type     Risk/Assessment: 60year old woman  with a history of schizoaffective disorder who  had been stable on haldol and started having side effects. she was switched to a newer medication and was  soreness in her throat. One of the side effects of the new medication  is dysphagia. She was also feeling overwhelmed with decreased sleep and increased anxiety. Since stopping the new medication  and starting Zyprexa 5 mg poq hs  the patient has been able to sleep feels less anxious and overwhelmed. She continues to deny suicidal or homicidal ideations intents or plans. And auditory or visual hallucinations.   The patient appears to be at her baseline and can follow up with her outpatient provider       Plan:                  Continue Zyprexa 5 mg poq hs   Follow up outpatient with her provider at [facility]

## 2020-12-08 NOTE — ED NOTES
Vitals:  Patient Vitals for the past 12 hrs:   Temp Pulse Resp BP SpO2   12/07/20 2107 97.5 °F (36.4 °C) 77 18 106/60 96 %   12/07/20 1228 98.2 °F (36.8 °C) 80 18 115/73 96 %         Medications ordered:   Medications   cephALEXin (KEFLEX) capsule 500 mg (500 mg Oral Given 12/7/20 1835)   OLANZapine (ZyPREXA) tablet 5 mg (5 mg Oral Given 12/7/20 1835)   insulin glargine (LANTUS) injection 60 Units (60 Units SubCUTAneous Given 12/7/20 2108)   rosuvastatin (CRESTOR) tablet 10 mg (10 mg Oral Given 12/7/20 2108)   levothyroxine (SYNTHROID) tablet 137 mcg (137 mcg Oral Given 12/7/20 0755)   insulin lispro (HUMALOG) injection 12 Units (12 Units SubCUTAneous Given 12/7/20 1838)   LORazepam (ATIVAN) tablet 1 mg (1 mg Oral Refused 12/7/20 1230)   sodium chloride 0.9 % bolus infusion 1,000 mL (0 mL IntraVENous IV Completed 12/6/20 2303)   ondansetron (ZOFRAN ODT) tablet 4 mg (4 mg Oral Given 12/6/20 2203)   ibuprofen (MOTRIN) tablet 600 mg (600 mg Oral Given 12/6/20 2203)         Lab findings:  Recent Results (from the past 12 hour(s))   GLUCOSE, POC    Collection Time: 12/07/20 12:08 PM   Result Value Ref Range    Glucose (POC) 117 (H) 70 - 110 mg/dL   GLUCOSE, POC    Collection Time: 12/07/20  6:38 PM   Result Value Ref Range    Glucose (POC) 156 (H) 70 - 110 mg/dL   GLUCOSE, POC    Collection Time: 12/07/20  9:06 PM   Result Value Ref Range    Glucose (POC) 141 (H) 70 - 110 mg/dL       EKG interpretation by ED Physician:      X-Ray, CT or other radiology findings or impressions:  No orders to display       Progress notes, Consult notes or additional Procedure notes:   Patient initially turned over to me still awaiting placement. But per charge nurse patient was supposed to be getting reevaluated for discharge home. Patient does not meet TDO criteria and actually feels better taking Zyprexa. Patient was seen by telepsychiatry who recommended outpatient treatment and will continue on her Zyprexa.     I have discussed with patient and/or family/sig other the results, interpretation of any imaging if performed, suspected diagnosis and treatment plan to include instructions regarding the diagnoses listed to which understanding was expressed with all questions answered      Reevaluation of patient:   stable    Disposition:  Diagnosis:   1. Cystitis    2. Auditory hallucinations    3. Schizoaffective disorder, unspecified type ([facility]        Disposition: home    Follow-up Information     Follow up With Specialties Details Why Contact Info    Messi Olsen MD Internal Medicine Schedule an appointment as soon as possible for a visit  [id number] [place] [address]  [address] [place] [address] [phone]      Make appoint with your psychiatrist as soon as possible        [facility] EMERGENCY DEPT Emergency Medicine  If symptoms worsen [address] [facility]  [phone]            Patient's Medications   Start Taking    CEPHALEXIN (KEFLEX) 500 MG CAPSULE    Take 1 Cap by mouth two (2) times a day for 7 days. OLANZAPINE (ZYPREXA) 5 MG TABLET    Take 1 Tab by mouth nightly. Continue Taking    BENZTROPINE (COGENTIN) 1 MG TABLET        CETIRIZINE (ZYRTEC) 10 MG TABLET    Take 1 Tab by mouth nightly. Take one tablets by mouth daily for 7 days and then as needed after that. Restart forseven days if sinus congestion recurs. CYANOCOBALAMIN (VITAMIN B-12) 500 MCG TABLET    Take 500 mcg by mouth daily. FEXOFENADINE (ALLEGRA) 180 MG TABLET    Take 1 Tab by mouth daily as needed for Allergies. GLUCOSE BLOOD VI TEST STRIPS (ONETOUCH ULTRA BLUE TEST STRIP) STRIP    Test blood glucose 4 times a day    IBUPROFEN (MOTRIN) 800 MG TABLET    Take 1 Tab by mouth every eight (8) hours as needed for Pain. INSULIN GLARGINE (LANTUS,BASAGLAR) 100 UNIT/ML (3 ML) INPN    60 Units by SubCUTAneous route nightly.     INSULIN LISPRO (HUMALOG KWIKPEN INSULIN) 100 UNIT/ML KWIKPEN    12 Units by SubCUTAneous route Before breakfast, lunch, and dinner. 5 units SQ before snacks. Maximum daily dose: 50 units    INSULIN NEEDLES, DISPOSABLE, (BD ULTRA-FINE MINI PEN NEEDLE) 31 GAUGE X 3/16\" NDLE    Use 4 times daily    LEVOTHYROXINE (SYNTHROID) 137 MCG TABLET    TAKE 1 TABLET BY MOUTH  DAILY BEFORE BREAKFAST    LIDOCAINE (LIDOCAINE VISCOUS) 2 % SOLUTION    Take 15 mL by mouth every four (4) hours as needed for Pain. MELATONIN TAB TABLET    Take 5 mg by mouth nightly. OMEGA-3 ACID ETHYL ESTERS (LOVAZA) 1 GRAM CAPSULE    TAKE 2 CAPSULES BY MOUTH  TWO TIMES DAILY WITH MEALS    OXYBUTYNIN CHLORIDE XL (DITROPAN XL) 10 MG CR TABLET    TAKE 1 TABLET BY MOUTH  DAILY    ROSUVASTATIN (CRESTOR) 20 MG TABLET    TAKE 1 TABLET BY MOUTH  NIGHTLY    SITAGLIPTIN-METFORMIN (JANUMET) 50-1,000 MG PER TABLET    TAKE 1 TABLET BY MOUTH  TWICE A DAY   These Medications have changed    No medications on file   Stop Taking    CAPLYTA 42 MG CAP    take 1 capsule by mouth at bedtime with food    FERROUS SULFATE 325 MG (65 MG IRON) TABLET    Take 1 Tab by mouth two (2) times a day. HALOPERIDOL (HALDOL) 5 MG TABLET    Take 5 mg by mouth two (2) times a day. SERTRALINE (ZOLOFT) 100 MG TABLET    Take 200 mg by mouth daily.

## 2020-12-08 NOTE — PROGRESS NOTES
Patient contacted regarding recent discharge and COVID-19 risk. Discussed COVID-19 related testing which was available at this time. Test results were negative. Patient informed of results, if available? **      Care Transition Nurse contacted the patient by telephone to perform post discharge assessment. Verified name and  with patient as identifiers. Patient has following risk factors of: diabetes. CTN reviewed discharge instructions, medical action plan and red flags related to discharge diagnosis. Reviewed and educated them on any new and changed medications related to discharge diagnosis. The patient's pharmacy will deliver her medications today and she is aware of medication changes. She has reached out to her psychiatrist Nannette Kelley" to discuss her ED visit and medication changes. Her next appt is scheduled on . She has reached out to Dr. Lesvia Alas regarding medication changes and will reach out again to discuss ferrous sulfate, which was discontinued by the ED, and she will schedule a follow up appt. She denies any burning or pain with urination or fevers. She believes her urine is dark in color and has been drinking a lot of fluids. Encouraged her to continue pushing fluids and advised her the goal is for urine color to be pale yellow. She verbalized understanding. Advance Care Planning:   Does patient have an Advance Directive: not on file, plan to discuss during future call    Education provided regarding infection prevention, and signs and symptoms of COVID-19 and when to seek medical attention with patient who verbalized understanding. Discussed exposure protocols and quarantine from [address] you at higher risk for severe illness  and given an opportunity for questions and concerns. The patient declines the COVID-19 hotline [phone], as she has a lot going on right now. She agrees to contact her PCP office for questions related to their healthcare.  CTN provided contact information for future reference. From CDC: Are you at higher risk for severe illness?  Wash your hands often.  Avoid close contact (6 feet, which is about two arm lengths) with people who are sick.  Put distance between yourself and other people if COVID-19 is spreading in your community.  Clean and disinfect frequently touched surfaces.  Avoid all cruise travel and non-essential air travel.  Call your healthcare professional if you have concerns about COVID-19 and your underlying condition or if you are sick. For more information on steps you can take to protect yourself, see CDC's How to Protect Yourself      Patient/family/caregiver given information for [employer] and agrees to enroll no      Plan for follow-up call in 7-14 days based on severity of symptoms and risk factors.

## 2020-12-08 NOTE — DISCHARGE INSTRUCTIONS
Urinary Tract Infection in Women: Care Instructions  Your Care Instructions     A urinary tract infection, or UTI, is a general term for an infection anywhere between the kidneys and the urethra (where urine comes out). Most UTIs are bladder infections. They often cause pain or burning when you urinate. UTIs are caused by bacteria and can be cured with antibiotics. Be sure to complete your treatment so that the infection goes away. Follow-up care is a key part of your treatment and safety. Be sure to make and go to all appointments, and call your doctor if you are having problems. It's also a good idea to know your test results and keep a list of the medicines you take. How can you care for yourself at home? · Take your antibiotics as directed. Do not stop taking them just because you feel better. You need to take the full course of antibiotics. · Drink extra water and other fluids for the next day or two. This may help wash out the bacteria that are causing the infection. (If you have kidney, heart, or liver disease and have to limit fluids, talk with your doctor before you increase your fluid intake.)  · Avoid drinks that are carbonated or have caffeine. They can irritate the bladder. · Urinate often. Try to empty your bladder each time. · To relieve pain, take a hot bath or lay a heating pad set on low over your lower belly or genital area. Never go to sleep with a heating pad in place. To prevent UTIs  · Drink plenty of water each day. This helps you urinate often, which clears bacteria from your system. (If you have kidney, heart, or liver disease and have to limit fluids, talk with your doctor before you increase your fluid intake.)  · Urinate when you need to. · Urinate right after you have sex. · Change sanitary pads often. · Avoid douches, bubble baths, feminine hygiene sprays, and other feminine hygiene products that have deodorants.   · After going to the bathroom, wipe from front to back.  When should you call for help? Call your doctor now or seek immediate medical care if:    · Symptoms such as fever, chills, nausea, or vomiting get worse or appear for the first time.     · You have new pain in your back just below your rib cage. This is called flank pain.     · There is new blood or pus in your urine.     · You have any problems with your antibiotic medicine. Watch closely for changes in your health, and be sure to contact your doctor if:    · You are not getting better after taking an antibiotic for 2 days.     · Your symptoms go away but then come back. Where can you learn more? Go to http://www.gray.com/  Enter X180 in the search box to learn more about \"Urinary Tract Infection in Women: Care Instructions. \"  Current as of: June 29, 2020               Content Version: 12.6  © 5632-6684 [email or web address]. Care instructions adapted under license by [employer] (which disclaims liability or warranty for this information). If you have questions about a medical condition or this instruction, always ask your healthcare professional. [place] [address] any warranty or liability for your use of this information. Patient Education        Schizophrenia: Care Instructions  Your Care Instructions  Schizophrenia is a disease that makes it hard to think clearly, manage emotions, and interact with other people. It can cause:  · Delusions. These are beliefs that are not real.  · Hallucinations. These are things that you may see or hear that are not really there. · Paranoia. This is the belief that others are lying, cheating, using you, or trying to harm you. The disease may change your ability to enjoy life, express emotions, or function. At times, you may hear voices, behave strangely, have trouble speaking or understanding speech, or keep to yourself.   The goal of treatment is to lower your stress and help your brain function normally. You may need lifelong treatment with medicines and counseling to keep your schizophrenia under control. When schizophrenia is not treated, the risks are higher for suicide, a hospital stay, and other problems. Early treatment called coordinated specialty care [facility]) may help a person who is having his or her first episode of psychotic thoughts. Ask your doctor about [address]. Follow-up care is a key part of your treatment and safety. Be sure to make and go to all appointments, and call your doctor if you are having problems. It's also a good idea to know your test results and keep a list of the medicines you take. How can you care for yourself at home? · Be safe with medicines. Take your medicines exactly as prescribed. Call your doctor if you think you are having a problem with your medicine. When you feel good, you may think that you do not need your medicines. But it is important to keep taking them as scheduled. · Go to your counseling sessions. Call and talk with your counselor if you can't attend or if you don't think the sessions are helping. Do not just stop going. · Eat a healthy diet. Talk with a dietitian about what type of diet may be best for you. · Do not use alcohol or illegal drugs. · Keep the numbers for these national suicide hotlines: 9-730-568-TALK (1-623.764.2265) and 1-913-NVYPBKU (8-242.526.3947). If you or someone you know talks about suicide or feeling hopeless, get help right away. What should you do if someone in your family has schizophrenia? · Learn about the disease and how it may get worse over time. · Remind your family member that you love him or her. · Make a plan with all family members about how to take care of your loved one when his or her symptoms are bad. · Talk about your fears and concerns and those of other family members. Seek counseling if needed. · Do not focus attention only on the person who is in treatment.   · Remind yourself that it will take time for changes to occur. · Do not blame yourself for the disease. · Know your legal rights and the legal rights of your family member. · Take care of yourself. Stay involved with your own interests, such as your career, hobbies, and friends. Use exercise, positive self-talk, relaxation, and deep breathing to help lower your stress. · Give yourself time to grieve. You may need to deal with emotions such as anger, fear, and frustration. After you work through your feelings, you will be better able to care for yourself and your family. · If you are having a hard time with your feelings and your interactions with your family member, talk with a counselor. When should you call for help? Call 911 anytime you think you may need emergency care. For example, call if:    · You are thinking about suicide or are threatening suicide.     · You feel like hurting yourself or someone else. Call your doctor now or seek immediate medical care if:    · You hear voices.     · You think someone is trying to harm you.     · You cannot concentrate or are easily confused. Watch closely for changes in your health, and be sure to contact your doctor if:    · You are having trouble taking care of yourself.     · You cannot attend your counseling sessions. Where can you learn more? Go to http://www.gray.com/  Enter B628 in the search box to learn more about \"Schizophrenia: Care Instructions. \"  Current as of: January 31, 2020               Content Version: 12.6  © 2006-2020 [employer]. Care instructions adapted under license by [employer] (which disclaims liability or warranty for this information). If you have questions about a medical condition or this instruction, always ask your healthcare professional. [place] [address] any warranty or liability for your use of this information.          Patient Education        Medicine for Schizophrenia: Care Instructions  Your Care Instructions  Medicine is the best treatment for schizophrenia. But it can be hard to take the medicine. This may be because:  · You have severe side effects. · You don't believe you are ill. · You feel better. You may think you no longer need medicine. · You forget to take your medicine. This might be because of confused thinking or depression. · You have a drug or alcohol problem that gets in the way. · You don't want to be reminded that you have a mental health problem. Taking medicine every day reminds you. But if you stop taking your medicine, you probably will have a relapse. A relapse means your symptoms return or get worse after you have been feeling better. As long as you are taking medicines, you will need to see your doctor on a regular basis. You may need to go to a hospital while you are changing or stopping medicines. Follow-up care is a key part of your treatment and safety. Be sure to make and go to all appointments, and call your doctor if you are having problems. It's also a good idea to know your test results and keep a list of the medicines you take. What medicines are used for schizophrenia? Many types of medicines can help you. It might be best to use more than one, but it may take time to find which medicines work well for you. This may be frustrating. But your doctor and family can support you during this time. Medicines used most often include:  · First-generation antipsychotics. Examples are chlorpromazine, haloperidol (Haldol), and perphenazine. They are used to reduce anxiety and agitation. They also keep you from hearing or seeing things that aren't there (hallucinations) and from believing things that aren't true (delusions). · Second-generation antipsychotics. Examples are aripiprazole (Abilify) and risperidone (Risperdal).  These medicines keep you from hearing or seeing things that aren't there (hallucinations) and from believing things that aren't true (delusions). They also help the negative symptoms, like not caring about things or finding it hard to say how you feel. These medicines may have fewer side effects than first-generation medicines. These medicines sometimes have severe side effects. Always talk to your doctor about how they are working and how you are feeling. If you feel that a medicine isn't right for you, your doctor can help you find a new one. Don't stop taking your medicines unless you talk to your doctor. How can you care for yourself at home? Take your medicine  · Be safe with medicines. Take your medicines exactly as prescribed. Call your doctor if you think you are having a problem with your medicine. · If you are having trouble taking your medicines or feel you don't need to take them, talk to your doctor. Your doctor may be able to change the medicine or the amount you take. Ask about long-acting medicines  · Ask your doctor about long-acting medicines that are injected (shots). You get a shot every week or every few weeks. This may be a good choice because:  ? You have a set day and time to get the shot. ? If you don't show up for your shot, your doctor knows right away. ? The medicine stays in your body longer. If you are a little late for a shot, you have more time to get help before your symptoms return. ? You are not reminded every day that you have a mental health problem. ? You don't have to carry pills with you. Have a routine  · Make a schedule for taking your medicines. Follow it every day. · Identify things you do every day at the same time, such as brushing your teeth. Use these activities to help remind you to take your medicines. · Set your watch alarm or a kitchen timer to remind you when to take your medicines. Or ask a family member to help you remember to take your medicines. · Keep the numbers for these national suicide hotlines: 8-646-507-TALK (2-627.836.4823) and 8-629-QMVIALX (2-327.161.1204).  If you or someone you know talks about suicide or about feeling hopeless, get help right away. Use a pillbox  · Use a plastic pillbox with dividers for each day's medicines. It can have a few or many compartments. Some have timers you can program. Choose one that fits your needs. · Put your pillbox in a place where it will remind you to take your medicines. For example, if you need to take medicine 3 times a day with meals, put those medicines in a pillbox near where you eat. · Keep one pill in its original bottle. Then if you forget what a pill is for, you can find the bottle it came from. When should you call for help? Call 911 anytime you think you may need emergency care. For example, call if:    · You are thinking about suicide or are threatening suicide.     · You feel you cannot stop from hurting yourself or someone else.     · You hear voices that tell you to hurt yourself or someone else or to do something illegal, such as destroy property or steal.   Call your doctor now or seek immediate medical care if:    · You show warning signs of suicide, such as talking about death or spending long periods of time alone.     · You hear voices.     · You think someone is trying to harm you.     · You cannot concentrate or are easily confused.     · You are drinking a lot of alcohol or using illegal drugs.     · You have a hard time taking care of basic needs, such as grooming.     · You have signs of neuroleptic malignant syndrome, a side effect of a medicine you may be taking. Signs include:  ? A fever of 102°F to 103°F.  ? A fast or irregular heartbeat. ? Rapid breathing. ? Severe sweating.     · You have signs of tardive dyskinesia, a side effect of a medicine you may be taking. These include:  ? Lip-smacking or continuous chewing. ? Tongue-twitching or thrusting the tongue out of the mouth. ? Quick and jerky movements (tics) of the head.    Watch closely for changes in your health, and be sure to contact your doctor if:    · Your symptoms come back or are getting worse after you have been getting better.     · You cannot go to your counseling sessions.     · You are not taking your medicines or you are thinking about not taking them. Where can you learn more? Go to http://www.gray.com/  Enter T976 in the search box to learn more about \"Medicine for Schizophrenia: Care Instructions. \"  Current as of: January 31, 2020               Content Version: 12.6  © 2006-2020 [employer]. Care instructions adapted under license by [employer] (which disclaims liability or warranty for this information). If you have questions about a medical condition or this instruction, always ask your healthcare professional. [address] any warranty or liability for your use of this information.

## 2020-12-08 NOTE — ED NOTES
Verbal shift change report given to Yumiko (oncoming nurse) by Nick Omalley (offgoing nurse). Report included the following information SBAR, ED Summary and MAR.

## 2020-12-08 NOTE — ED NOTES
11:45 PM  12/07/20     Discharge instructions given to patient (name) with verbalization of understanding. Patient accompanied by self. Patient discharged with the following prescriptions Keflex, Zyprexa. Patient discharged to home (destination).       Sheridan Aase, RN

## 2020-12-10 DIAGNOSIS — E11.9 TYPE 2 DIABETES MELLITUS WITHOUT COMPLICATION (HCC): ICD-10-CM

## 2020-12-10 RX ORDER — SITAGLIPTIN AND METFORMIN HYDROCHLORIDE 50; 1000 MG/1; MG/1
TABLET, FILM COATED ORAL
Qty: 180 TAB | Refills: 3 | Status: SHIPPED | OUTPATIENT
Start: 2020-12-10 | End: 2021-03-29 | Stop reason: SDUPTHER

## 2020-12-15 ENCOUNTER — TELEPHONE (OUTPATIENT)
Dept: FAMILY MEDICINE CLINIC | Age: 55
End: 2020-12-15

## 2020-12-15 DIAGNOSIS — N39.0 ACUTE UTI: Primary | ICD-10-CM

## 2020-12-15 NOTE — TELEPHONE ENCOUNTER
Pt. Stated she recently had a UTI and finished her antibiotics. She is requesting an order for urinalysis to check to see if it is gone.  Please assist.

## 2020-12-16 ENCOUNTER — HOSPITAL ENCOUNTER (OUTPATIENT)
Dept: LAB | Age: 55
Discharge: HOME | End: 2020-12-16
Payer: MEDICARE

## 2020-12-16 ENCOUNTER — APPOINTMENT (OUTPATIENT)
Dept: FAMILY MEDICINE CLINIC | Age: 55
End: 2020-12-16

## 2020-12-16 DIAGNOSIS — N39.0 ACUTE UTI: ICD-10-CM

## 2020-12-16 DIAGNOSIS — Z79.4 TYPE 2 DIABETES MELLITUS WITH MICROALBUMINURIA, WITH LONG-TERM CURRENT USE OF INSULIN (HCC): ICD-10-CM

## 2020-12-16 DIAGNOSIS — E53.8 VITAMIN B12 DEFICIENCY: ICD-10-CM

## 2020-12-16 DIAGNOSIS — R80.9 TYPE 2 DIABETES MELLITUS WITH MICROALBUMINURIA, WITH LONG-TERM CURRENT USE OF INSULIN (HCC): ICD-10-CM

## 2020-12-16 DIAGNOSIS — E78.00 HYPERCHOLESTEREMIA: ICD-10-CM

## 2020-12-16 DIAGNOSIS — E11.29 TYPE 2 DIABETES MELLITUS WITH MICROALBUMINURIA, WITH LONG-TERM CURRENT USE OF INSULIN (HCC): ICD-10-CM

## 2020-12-16 DIAGNOSIS — E03.4 HYPOTHYROIDISM DUE TO ACQUIRED ATROPHY OF THYROID: ICD-10-CM

## 2020-12-16 LAB
ALBUMIN SERPL-MCNC: 4.3 G/DL (ref 3.4–5)
ALBUMIN/GLOB SERPL: 1.2 {RATIO} (ref 0.8–1.7)
ALP SERPL-CCNC: 60 U/L (ref 45–117)
ALT SERPL-CCNC: 34 U/L (ref 13–56)
ANION GAP SERPL CALC-SCNC: 9 MMOL/L (ref 3–18)
APPEARANCE UR: CLEAR
AST SERPL-CCNC: 20 U/L (ref 10–38)
BACTERIA URNS QL MICRO: ABNORMAL /HPF
BASOPHILS # BLD: 0.1 K/UL (ref 0–0.1)
BASOPHILS NFR BLD: 1 % (ref 0–2)
BILIRUB SERPL-MCNC: 0.4 MG/DL (ref 0.2–1)
BILIRUB UR QL: NEGATIVE
BUN SERPL-MCNC: 6 MG/DL (ref 7–18)
BUN/CREAT SERPL: 9 (ref 12–20)
CALCIUM SERPL-MCNC: 9.7 MG/DL (ref 8.5–10.1)
CHLORIDE SERPL-SCNC: 104 MMOL/L (ref 100–111)
CHOLEST SERPL-MCNC: 137 MG/DL
CO2 SERPL-SCNC: 27 MMOL/L (ref 21–32)
COLOR UR: YELLOW
CREAT SERPL-MCNC: 0.65 MG/DL (ref 0.6–1.3)
DIFFERENTIAL METHOD BLD: ABNORMAL
EOSINOPHIL # BLD: 0.1 K/UL (ref 0–0.4)
EOSINOPHIL NFR BLD: 2 % (ref 0–5)
EPITH CASTS URNS QL MICRO: ABNORMAL /LPF (ref 0–5)
ERYTHROCYTE [DISTWIDTH] IN BLOOD BY AUTOMATED COUNT: 14.3 % (ref 11.6–14.5)
EST. AVERAGE GLUCOSE BLD GHB EST-MCNC: 131 MG/DL
GLOBULIN SER CALC-MCNC: 3.5 G/DL (ref 2–4)
GLUCOSE SERPL-MCNC: 87 MG/DL (ref 74–99)
GLUCOSE UR STRIP.AUTO-MCNC: NEGATIVE MG/DL
HBA1C MFR BLD: 6.2 % (ref 4.2–5.6)
HCT VFR BLD AUTO: 35.6 % (ref 35–45)
HDLC SERPL-MCNC: 42 MG/DL (ref 40–60)
HDLC SERPL: 3.3 {RATIO} (ref 0–5)
HGB BLD-MCNC: 11.4 G/DL (ref 12–16)
HGB UR QL STRIP: NEGATIVE
KETONES UR QL STRIP.AUTO: NEGATIVE MG/DL
LDLC SERPL CALC-MCNC: 62.6 MG/DL (ref 0–100)
LEUKOCYTE ESTERASE UR QL STRIP.AUTO: ABNORMAL
LIPID PROFILE,FLP: ABNORMAL
LYMPHOCYTES # BLD: 3.2 K/UL (ref 0.9–3.6)
LYMPHOCYTES NFR BLD: 43 % (ref 21–52)
MCH RBC QN AUTO: 26.7 PG (ref 24–34)
MCHC RBC AUTO-ENTMCNC: 32 G/DL (ref 31–37)
MCV RBC AUTO: 83.4 FL (ref 74–97)
MONOCYTES # BLD: 0.7 K/UL (ref 0.05–1.2)
MONOCYTES NFR BLD: 10 % (ref 3–10)
NEUTS SEG # BLD: 3.3 K/UL (ref 1.8–8)
NEUTS SEG NFR BLD: 44 % (ref 40–73)
NITRITE UR QL STRIP.AUTO: NEGATIVE
PH UR STRIP: 7 [PH] (ref 5–8)
PLATELET # BLD AUTO: 360 K/UL (ref 135–420)
PMV BLD AUTO: 10.6 FL (ref 9.2–11.8)
POTASSIUM SERPL-SCNC: 3.8 MMOL/L (ref 3.5–5.5)
PROT SERPL-MCNC: 7.8 G/DL (ref 6.4–8.2)
PROT UR STRIP-MCNC: NEGATIVE MG/DL
RBC # BLD AUTO: 4.27 M/UL (ref 4.2–5.3)
RBC #/AREA URNS HPF: NEGATIVE /HPF (ref 0–5)
SODIUM SERPL-SCNC: 140 MMOL/L (ref 136–145)
SP GR UR REFRACTOMETRY: <1.005 (ref 1–1.03)
TRIGL SERPL-MCNC: 162 MG/DL (ref ?–150)
TSH SERPL DL<=0.05 MIU/L-ACNC: 0.74 UIU/ML (ref 0.36–3.74)
UROBILINOGEN UR QL STRIP.AUTO: 0.2 EU/DL (ref 0.2–1)
VIT B12 SERPL-MCNC: 493 PG/ML (ref 211–911)
VLDLC SERPL CALC-MCNC: 32.4 MG/DL
WBC # BLD AUTO: 7.4 K/UL (ref 4.6–13.2)
WBC URNS QL MICRO: ABNORMAL /HPF (ref 0–4)

## 2020-12-16 PROCEDURE — 80053 COMPREHEN METABOLIC PANEL: CPT

## 2020-12-16 PROCEDURE — 83036 HEMOGLOBIN GLYCOSYLATED A1C: CPT

## 2020-12-16 PROCEDURE — 85025 COMPLETE CBC W/AUTO DIFF WBC: CPT

## 2020-12-16 PROCEDURE — 84443 ASSAY THYROID STIM HORMONE: CPT

## 2020-12-16 PROCEDURE — 87086 URINE CULTURE/COLONY COUNT: CPT

## 2020-12-16 PROCEDURE — 81001 URINALYSIS AUTO W/SCOPE: CPT

## 2020-12-16 PROCEDURE — 82607 VITAMIN B-12: CPT

## 2020-12-16 PROCEDURE — 36415 COLL VENOUS BLD VENIPUNCTURE: CPT

## 2020-12-16 PROCEDURE — 80061 LIPID PANEL: CPT

## 2020-12-17 LAB
BACTERIA SPEC CULT: NORMAL
SERVICE CMNT-IMP: NORMAL

## 2020-12-21 ENCOUNTER — VIRTUAL VISIT (OUTPATIENT)
Dept: FAMILY MEDICINE CLINIC | Age: 55
End: 2020-12-21
Payer: MEDICARE

## 2020-12-21 DIAGNOSIS — F31.74 BIPOLAR 1 DISORDER, MANIC, FULL REMISSION (HCC): ICD-10-CM

## 2020-12-21 DIAGNOSIS — Z79.4 TYPE 2 DIABETES MELLITUS WITH MICROALBUMINURIA, WITH LONG-TERM CURRENT USE OF INSULIN (HCC): Primary | ICD-10-CM

## 2020-12-21 DIAGNOSIS — E78.00 HYPERCHOLESTEREMIA: ICD-10-CM

## 2020-12-21 DIAGNOSIS — R80.9 TYPE 2 DIABETES MELLITUS WITH MICROALBUMINURIA, WITH LONG-TERM CURRENT USE OF INSULIN (HCC): Primary | ICD-10-CM

## 2020-12-21 DIAGNOSIS — E53.8 VITAMIN B12 DEFICIENCY: ICD-10-CM

## 2020-12-21 DIAGNOSIS — F33.9 RECURRENT DEPRESSION (HCC): ICD-10-CM

## 2020-12-21 DIAGNOSIS — E11.29 TYPE 2 DIABETES MELLITUS WITH MICROALBUMINURIA, WITH LONG-TERM CURRENT USE OF INSULIN (HCC): Primary | ICD-10-CM

## 2020-12-21 PROCEDURE — 99443 PR PHYS/QHP TELEPHONE EVALUATION 21-30 MIN: CPT | Performed by: INTERNAL MEDICINE

## 2020-12-21 NOTE — PROGRESS NOTES
Anirudh Lin is a 60 y.o. female evaluated via telephone on 12/21/2020. Location of the patient:  Home    Location of the provider: [facility]    Consent:  She and/or health care decision maker is aware that that she may receive a bill for this telephone service, depending on her insurance coverage, and has provided verbal consent to proceed: Yes    I affirm this is a Patient Initiated Episode with an Established Patient who has not had a related appointment within my department in the past 7 days or scheduled within the next 24 hours. Total Time: minutes: 21-30 minutes    History of Present Illness  Anirudh Lin is a 60 y.o. female who presents today for management of    Chief Complaint   Patient presents with    Results    Diabetes         Patient reports of resolved UTI symptoms after she completed 7 days of antibiotics. Patient reports of depression, insomnia, anxiety and auditory hallucinations. She has history of depression and bipolar disorder. She is currently a patient at [facility], but would like to find another psychiatrist.    Diabetes Mellitus:  She has diabetes mellitus, and  hyperlipidemia.   Diabetic ROS - medication compliance: compliant all of the time, diabetic diet compliance: compliant most of the time, home glucose monitoring: values are usually normal.   Lab review: labs are reviewed, up to date and normal.       Problem List  Patient Active Problem List    Diagnosis Date Noted    Type 2 diabetes mellitus with diabetic neuropathy ([facility] 10/24/2019    Vitamin B12 deficiency 04/25/2019    Bipolar 1 disorder, manic, full remission ([facility] 04/25/2019    PTSD (post-traumatic stress disorder) 04/25/2019    Hypothyroidism due to acquired atrophy of thyroid 05/02/2016    Left low back pain 04/13/2016    History of alcohol abuse 04/13/2016    Lumbar degenerative disc disease 04/13/2016    Recurrent depression ([facility] [address] [facility] 06/12/2013    Tear of meniscus of left knee 01/16/2013    Hypercholesteremia 01/02/2013    Type 2 diabetes mellitus with microalbuminuria, with long-term current use of insulin ([facility] [address] 01/02/2013       Current Medications  Current Outpatient Medications   Medication Sig    SITagliptin-metFORMIN (Janumet) 50-1,000 mg per tablet TAKE 1 TABLET BY MOUTH  TWICE A DAY    OLANZapine (ZyPREXA) 5 mg tablet Take 1 Tab by mouth nightly.  oxybutynin chloride XL (DITROPAN XL) 10 mg CR tablet TAKE 1 TABLET BY MOUTH  DAILY    lidocaine (Lidocaine Viscous) 2 % solution Take 15 mL by mouth every four (4) hours as needed for Pain.  ibuprofen (MOTRIN) 800 mg tablet Take 1 Tab by mouth every eight (8) hours as needed for Pain.  insulin glargine (LANTUS,BASAGLAR) 100 unit/mL (3 mL) inpn 60 Units by SubCUTAneous route nightly.  cetirizine (ZyrTEC) 10 mg tablet Take 1 Tab by mouth nightly. Take one tablets by mouth daily for 7 days and then as needed after that. Restart forseven days if sinus congestion recurs.  rosuvastatin (CRESTOR) 20 mg tablet TAKE 1 TABLET BY MOUTH  NIGHTLY    levothyroxine (SYNTHROID) 137 mcg tablet TAKE 1 TABLET BY MOUTH  DAILY BEFORE BREAKFAST    benztropine (COGENTIN) 1 mg tablet     insulin lispro (HumaLOG KwikPen Insulin) 100 unit/mL kwikpen 12 Units by SubCUTAneous route Before breakfast, lunch, and dinner. 5 units SQ before snacks. Maximum daily dose: 50 units    Insulin Needles, Disposable, (BD Ultra-Fine Mini Pen Needle) 31 gauge x 3/16\" ndle Use 4 times daily    omega-3 acid ethyl esters (LOVAZA) 1 gram capsule TAKE 2 CAPSULES BY MOUTH  TWO TIMES DAILY WITH MEALS    fexofenadine (ALLEGRA) 180 mg tablet Take 1 Tab by mouth daily as needed for Allergies.  glucose blood VI test strips (ONETOUCH ULTRA BLUE TEST STRIP) strip Test blood glucose 4 times a day    melatonin tab tablet Take 5 mg by mouth nightly.  cyanocobalamin (VITAMIN B-12) 500 mcg tablet Take 500 mcg by mouth daily.      No current facility-administered medications for this visit. Review of Systems  Review of Systems   Constitutional: Negative for chills, fever, malaise/fatigue and weight loss. Respiratory: Negative for cough, shortness of breath and wheezing. Cardiovascular: Negative for chest pain, palpitations and leg swelling. Gastrointestinal: Negative for abdominal pain, heartburn, nausea and vomiting. Neurological: Negative for dizziness and headaches. Psychiatric/Behavioral: Positive for depression and hallucinations. The patient is nervous/anxious. Assessment/Plan:      1. Type 2 diabetes mellitus with microalbuminuria, with long-term current use of insulin (HCC)  - controlled    2. Hypercholesteremia  - controlled    3. Vitamin B12 deficiency  - controlled    4. Recurrent depression ([facility] [address] Bipolar 1 disorder, manic, full remission ([facility] [address]  - currently active  - advised to keep appointment with current psychiatrist until she finds a new one. Will send a list of facilities via [email or web address]    Follow-up and Dispositions    · Return in about 3 months (around 3/21/2021) for DM, HTN, HLD doxy.            Rodney Orona MD

## 2020-12-27 ENCOUNTER — PATIENT OUTREACH (OUTPATIENT)
Dept: CASE MANAGEMENT | Age: 55
End: 2020-12-27

## 2020-12-27 NOTE — PROGRESS NOTES
Patient resolved from Transitions of Care episode on 12/27/20. The patient reports she is not doing well mentally and emotionally. She has a lot of stressors related to family issues and her hallucinations have worsened lately. She states she is not currently suicidal, but she is hearing voices constantly. She states a plan to first call her psychiatrist, Seema Bellamy, today to request an increase in her Zyprexa. If she develops suicidal ideations, she states a plan to call [address] hotline or push the emergency button in her assisted living facility to call EMS. She has a new patient appointment [facility] a [facility] on 1/25/21, but understands that her current psychiatry office is a resource in the mean time. Patient/family has been provided the following resources and education related to COVID-19:                         Signs, symptoms and red flags related to COVID-19            CDC exposure and quarantine guidelines            [facility] exposure contact - [phone]            Contact for their local Department of Health                     No further outreach scheduled with this CTN. Episode of Care resolved. Patient has this CTN contact information if future needs arise.

## 2021-01-22 DIAGNOSIS — R80.9 TYPE 2 DIABETES MELLITUS WITH MICROALBUMINURIA, WITH LONG-TERM CURRENT USE OF INSULIN (HCC): ICD-10-CM

## 2021-01-22 DIAGNOSIS — E03.4 HYPOTHYROIDISM DUE TO ACQUIRED ATROPHY OF THYROID: ICD-10-CM

## 2021-01-22 DIAGNOSIS — E11.29 TYPE 2 DIABETES MELLITUS WITH MICROALBUMINURIA, WITH LONG-TERM CURRENT USE OF INSULIN (HCC): ICD-10-CM

## 2021-01-22 DIAGNOSIS — Z79.4 TYPE 2 DIABETES MELLITUS WITH MICROALBUMINURIA, WITH LONG-TERM CURRENT USE OF INSULIN (HCC): ICD-10-CM

## 2021-01-22 NOTE — TELEPHONE ENCOUNTER
Pt is calling for medication refill. Is requesting a 3 month supply of Levothyroxine. Also pt. Is calling for pharmacy change. [facility] [address] [facility].       Requested Prescriptions     Pending Prescriptions Disp Refills    levothyroxine (SYNTHROID) 137 mcg tablet 90 Tab 3     Sig: TAKE 1 TABLET BY MOUTH  DAILY BEFORE BREAKFAST

## 2021-01-25 ENCOUNTER — PATIENT MESSAGE (OUTPATIENT)
Dept: FAMILY MEDICINE CLINIC | Age: 56
End: 2021-01-25

## 2021-01-25 DIAGNOSIS — E78.00 HYPERCHOLESTEREMIA: ICD-10-CM

## 2021-01-25 RX ORDER — LEVOTHYROXINE SODIUM 137 UG/1
TABLET ORAL
Qty: 90 TAB | Refills: 3 | Status: SHIPPED | OUTPATIENT
Start: 2021-01-25 | End: 2021-11-03 | Stop reason: SDUPTHER

## 2021-01-25 RX ORDER — ROSUVASTATIN CALCIUM 20 MG/1
TABLET, COATED ORAL
Qty: 90 TAB | Refills: 3 | Status: SHIPPED | OUTPATIENT
Start: 2021-01-25 | End: 2021-11-03 | Stop reason: SDUPTHER

## 2021-01-25 NOTE — TELEPHONE ENCOUNTER
Requested Prescriptions     Pending Prescriptions Disp Refills    levothyroxine (SYNTHROID) 137 mcg tablet 90 Tab 3     Sig: TAKE 1 TABLET BY MOUTH  DAILY BEFORE BREAKFAST

## 2021-02-08 ENCOUNTER — TELEPHONE (OUTPATIENT)
Dept: FAMILY MEDICINE CLINIC | Age: 56
End: 2021-02-08

## 2021-02-08 ENCOUNTER — APPOINTMENT (OUTPATIENT)
Dept: FAMILY MEDICINE CLINIC | Age: 56
End: 2021-02-08

## 2021-02-08 ENCOUNTER — HOSPITAL ENCOUNTER (OUTPATIENT)
Dept: LAB | Age: 56
Discharge: HOME | End: 2021-02-08
Payer: MEDICARE

## 2021-02-08 DIAGNOSIS — E03.4 HYPOTHYROIDISM DUE TO ACQUIRED ATROPHY OF THYROID: ICD-10-CM

## 2021-02-08 DIAGNOSIS — R80.9 TYPE 2 DIABETES MELLITUS WITH MICROALBUMINURIA, WITH LONG-TERM CURRENT USE OF INSULIN (HCC): Primary | ICD-10-CM

## 2021-02-08 DIAGNOSIS — R80.9 TYPE 2 DIABETES MELLITUS WITH MICROALBUMINURIA, WITH LONG-TERM CURRENT USE OF INSULIN (HCC): ICD-10-CM

## 2021-02-08 DIAGNOSIS — E11.29 TYPE 2 DIABETES MELLITUS WITH MICROALBUMINURIA, WITH LONG-TERM CURRENT USE OF INSULIN (HCC): ICD-10-CM

## 2021-02-08 DIAGNOSIS — Z79.4 TYPE 2 DIABETES MELLITUS WITH MICROALBUMINURIA, WITH LONG-TERM CURRENT USE OF INSULIN (HCC): Primary | ICD-10-CM

## 2021-02-08 DIAGNOSIS — E11.29 TYPE 2 DIABETES MELLITUS WITH MICROALBUMINURIA, WITH LONG-TERM CURRENT USE OF INSULIN (HCC): Primary | ICD-10-CM

## 2021-02-08 DIAGNOSIS — Z79.4 TYPE 2 DIABETES MELLITUS WITH MICROALBUMINURIA, WITH LONG-TERM CURRENT USE OF INSULIN (HCC): ICD-10-CM

## 2021-02-08 LAB
ANION GAP SERPL CALC-SCNC: 6 MMOL/L (ref 3–18)
BUN SERPL-MCNC: 8 MG/DL (ref 7–18)
BUN/CREAT SERPL: 11 (ref 12–20)
CALCIUM SERPL-MCNC: 9.8 MG/DL (ref 8.5–10.1)
CHLORIDE SERPL-SCNC: 107 MMOL/L (ref 100–111)
CO2 SERPL-SCNC: 26 MMOL/L (ref 21–32)
CREAT SERPL-MCNC: 0.76 MG/DL (ref 0.6–1.3)
GLUCOSE SERPL-MCNC: 162 MG/DL (ref 74–99)
POTASSIUM SERPL-SCNC: 4.4 MMOL/L (ref 3.5–5.5)
SODIUM SERPL-SCNC: 139 MMOL/L (ref 136–145)
TSH SERPL DL<=0.05 MIU/L-ACNC: 0.89 UIU/ML (ref 0.36–3.74)

## 2021-02-08 PROCEDURE — 84443 ASSAY THYROID STIM HORMONE: CPT

## 2021-02-08 PROCEDURE — 80048 BASIC METABOLIC PNL TOTAL CA: CPT

## 2021-02-08 PROCEDURE — 36415 COLL VENOUS BLD VENIPUNCTURE: CPT

## 2021-02-11 ENCOUNTER — OFFICE VISIT (OUTPATIENT)
Dept: FAMILY MEDICINE CLINIC | Age: 56
End: 2021-02-11
Payer: MEDICARE

## 2021-02-11 ENCOUNTER — HOSPITAL ENCOUNTER (OUTPATIENT)
Dept: LAB | Age: 56
Discharge: HOME | End: 2021-02-11
Payer: MEDICARE

## 2021-02-11 VITALS
SYSTOLIC BLOOD PRESSURE: 115 MMHG | BODY MASS INDEX: 30.53 KG/M2 | DIASTOLIC BLOOD PRESSURE: 72 MMHG | WEIGHT: 190 LBS | HEIGHT: 66 IN | OXYGEN SATURATION: 100 % | TEMPERATURE: 97.2 F | RESPIRATION RATE: 16 BRPM | HEART RATE: 86 BPM

## 2021-02-11 DIAGNOSIS — E03.4 HYPOTHYROIDISM DUE TO ACQUIRED ATROPHY OF THYROID: ICD-10-CM

## 2021-02-11 DIAGNOSIS — Z79.4 TYPE 2 DIABETES MELLITUS WITH MICROALBUMINURIA, WITH LONG-TERM CURRENT USE OF INSULIN (HCC): ICD-10-CM

## 2021-02-11 DIAGNOSIS — E53.8 VITAMIN B12 DEFICIENCY: ICD-10-CM

## 2021-02-11 DIAGNOSIS — R80.9 TYPE 2 DIABETES MELLITUS WITH MICROALBUMINURIA, WITH LONG-TERM CURRENT USE OF INSULIN (HCC): Primary | ICD-10-CM

## 2021-02-11 DIAGNOSIS — Z23 ENCOUNTER FOR IMMUNIZATION: ICD-10-CM

## 2021-02-11 DIAGNOSIS — E11.29 TYPE 2 DIABETES MELLITUS WITH MICROALBUMINURIA, WITH LONG-TERM CURRENT USE OF INSULIN (HCC): Primary | ICD-10-CM

## 2021-02-11 DIAGNOSIS — Z79.4 TYPE 2 DIABETES MELLITUS WITH MICROALBUMINURIA, WITH LONG-TERM CURRENT USE OF INSULIN (HCC): Primary | ICD-10-CM

## 2021-02-11 DIAGNOSIS — E78.00 HYPERCHOLESTEREMIA: ICD-10-CM

## 2021-02-11 DIAGNOSIS — E11.29 TYPE 2 DIABETES MELLITUS WITH MICROALBUMINURIA, WITH LONG-TERM CURRENT USE OF INSULIN (HCC): ICD-10-CM

## 2021-02-11 DIAGNOSIS — F33.9 RECURRENT DEPRESSION (HCC): ICD-10-CM

## 2021-02-11 DIAGNOSIS — R80.9 TYPE 2 DIABETES MELLITUS WITH MICROALBUMINURIA, WITH LONG-TERM CURRENT USE OF INSULIN (HCC): ICD-10-CM

## 2021-02-11 DIAGNOSIS — F31.74 BIPOLAR 1 DISORDER, MANIC, FULL REMISSION (HCC): ICD-10-CM

## 2021-02-11 LAB
CREAT UR-MCNC: 41 MG/DL (ref 30–125)
MICROALBUMIN UR-MCNC: 11.4 MG/DL (ref 0–3)
MICROALBUMIN/CREAT UR-RTO: 278 MG/G (ref 0–30)

## 2021-02-11 PROCEDURE — 3046F HEMOGLOBIN A1C LEVEL >9.0%: CPT | Performed by: INTERNAL MEDICINE

## 2021-02-11 PROCEDURE — G0009 ADMIN PNEUMOCOCCAL VACCINE: HCPCS | Performed by: INTERNAL MEDICINE

## 2021-02-11 PROCEDURE — 3017F COLORECTAL CA SCREEN DOC REV: CPT | Performed by: INTERNAL MEDICINE

## 2021-02-11 PROCEDURE — G8417 CALC BMI ABV UP PARAM F/U: HCPCS | Performed by: INTERNAL MEDICINE

## 2021-02-11 PROCEDURE — G9717 DOC PT DX DEP/BP F/U NT REQ: HCPCS | Performed by: INTERNAL MEDICINE

## 2021-02-11 PROCEDURE — 2022F DILAT RTA XM EVC RTNOPTHY: CPT | Performed by: INTERNAL MEDICINE

## 2021-02-11 PROCEDURE — G8427 DOCREV CUR MEDS BY ELIG CLIN: HCPCS | Performed by: INTERNAL MEDICINE

## 2021-02-11 PROCEDURE — G9899 SCRN MAM PERF RSLTS DOC: HCPCS | Performed by: INTERNAL MEDICINE

## 2021-02-11 PROCEDURE — 99214 OFFICE O/P EST MOD 30 MIN: CPT | Performed by: INTERNAL MEDICINE

## 2021-02-11 PROCEDURE — 82043 UR ALBUMIN QUANTITATIVE: CPT

## 2021-02-11 PROCEDURE — 36415 COLL VENOUS BLD VENIPUNCTURE: CPT

## 2021-02-11 PROCEDURE — 90732 PPSV23 VACC 2 YRS+ SUBQ/IM: CPT | Performed by: INTERNAL MEDICINE

## 2021-02-11 RX ORDER — OLANZAPINE 15 MG/1
TABLET ORAL
COMMUNITY
Start: 2021-01-18 | End: 2022-04-29

## 2021-02-11 NOTE — PATIENT INSTRUCTIONS
Stool softener: docusate sodium [facility]    [address] [phone]

## 2021-03-15 DIAGNOSIS — E11.29 TYPE 2 DIABETES MELLITUS WITH MICROALBUMINURIA, WITH LONG-TERM CURRENT USE OF INSULIN (HCC): ICD-10-CM

## 2021-03-15 DIAGNOSIS — R80.9 TYPE 2 DIABETES MELLITUS WITH MICROALBUMINURIA, WITH LONG-TERM CURRENT USE OF INSULIN (HCC): ICD-10-CM

## 2021-03-15 DIAGNOSIS — Z79.4 TYPE 2 DIABETES MELLITUS WITH MICROALBUMINURIA, WITH LONG-TERM CURRENT USE OF INSULIN (HCC): ICD-10-CM

## 2021-03-15 RX ORDER — INSULIN GLARGINE 100 [IU]/ML
60 INJECTION, SOLUTION SUBCUTANEOUS
Qty: 15 PEN | Refills: 2 | Status: SHIPPED | OUTPATIENT
Start: 2021-03-15 | End: 2021-05-25 | Stop reason: SDUPTHER

## 2021-03-15 NOTE — TELEPHONE ENCOUNTER
Last seen on 21 and upcoming appointment on 21. Asking for 3 months supply instead of 2 months. Requested Prescriptions     Pending Prescriptions Disp Refills    insulin glargine (LANTUS,BASAGLAR) 100 unit/mL (3 mL) inpn 15 Pen 2     Si Units by SubCUTAneous route nightly.

## 2021-03-15 NOTE — TELEPHONE ENCOUNTER
Requested Prescriptions     Pending Prescriptions Disp Refills    insulin glargine (LANTUS,BASAGLAR) 100 unit/mL (3 mL) inpn 15 Pen 2     Si Units by SubCUTAneous route nightly.

## 2021-03-29 DIAGNOSIS — N39.3 STRESS INCONTINENCE OF URINE: ICD-10-CM

## 2021-03-29 DIAGNOSIS — E11.9 TYPE 2 DIABETES MELLITUS WITHOUT COMPLICATION (HCC): ICD-10-CM

## 2021-03-29 DIAGNOSIS — Z79.4 TYPE 2 DIABETES MELLITUS WITH MICROALBUMINURIA, WITH LONG-TERM CURRENT USE OF INSULIN (HCC): ICD-10-CM

## 2021-03-29 DIAGNOSIS — E11.29 TYPE 2 DIABETES MELLITUS WITH MICROALBUMINURIA, WITH LONG-TERM CURRENT USE OF INSULIN (HCC): ICD-10-CM

## 2021-03-29 DIAGNOSIS — E78.00 HYPERCHOLESTEREMIA: ICD-10-CM

## 2021-03-29 DIAGNOSIS — R80.9 TYPE 2 DIABETES MELLITUS WITH MICROALBUMINURIA, WITH LONG-TERM CURRENT USE OF INSULIN (HCC): ICD-10-CM

## 2021-03-29 RX ORDER — OXYBUTYNIN CHLORIDE 10 MG/1
TABLET, EXTENDED RELEASE ORAL
Qty: 90 TAB | Refills: 3 | Status: SHIPPED | OUTPATIENT
Start: 2021-03-29 | End: 2022-03-15 | Stop reason: SDUPTHER

## 2021-03-29 RX ORDER — OMEGA-3-ACID ETHYL ESTERS 1 G/1
CAPSULE, LIQUID FILLED ORAL
Qty: 360 CAP | Refills: 3 | Status: SHIPPED | OUTPATIENT
Start: 2021-03-29 | End: 2022-03-09 | Stop reason: SDUPTHER

## 2021-03-29 RX ORDER — PEN NEEDLE, DIABETIC 31 GX3/16"
NEEDLE, DISPOSABLE MISCELLANEOUS
Qty: 200 PEN NEEDLE | Refills: 3 | Status: SHIPPED | OUTPATIENT
Start: 2021-03-29 | End: 2021-12-23

## 2021-03-29 RX ORDER — SITAGLIPTIN AND METFORMIN HYDROCHLORIDE 50; 1000 MG/1; MG/1
TABLET, FILM COATED ORAL
Qty: 180 TAB | Refills: 3 | Status: SHIPPED | OUTPATIENT
Start: 2021-03-29 | End: 2022-03-09 | Stop reason: SDUPTHER

## 2021-04-22 ENCOUNTER — TELEPHONE (OUTPATIENT)
Dept: FAMILY MEDICINE CLINIC | Age: 56
End: 2021-04-22

## 2021-04-22 NOTE — TELEPHONE ENCOUNTER
Pt. Stated she was in [place] for recent fall and she injured her ribs, hands and chest. She is requesting an appt. For pain meds. She stated she is in pain.  Please assist.

## 2021-05-04 ENCOUNTER — HOSPITAL ENCOUNTER (OUTPATIENT)
Dept: LAB | Age: 56
Discharge: HOME | End: 2021-05-04
Payer: MEDICARE

## 2021-05-04 ENCOUNTER — OFFICE VISIT (OUTPATIENT)
Dept: FAMILY MEDICINE CLINIC | Age: 56
End: 2021-05-04
Payer: MEDICARE

## 2021-05-04 VITALS
DIASTOLIC BLOOD PRESSURE: 85 MMHG | RESPIRATION RATE: 16 BRPM | SYSTOLIC BLOOD PRESSURE: 135 MMHG | WEIGHT: 195 LBS | HEIGHT: 66 IN | HEART RATE: 86 BPM | TEMPERATURE: 97.3 F | BODY MASS INDEX: 31.34 KG/M2 | OXYGEN SATURATION: 97 %

## 2021-05-04 DIAGNOSIS — E11.40 TYPE 2 DIABETES MELLITUS WITH DIABETIC NEUROPATHY, WITH LONG-TERM CURRENT USE OF INSULIN (HCC): ICD-10-CM

## 2021-05-04 DIAGNOSIS — R07.89 STERNAL PAIN: ICD-10-CM

## 2021-05-04 DIAGNOSIS — Z09 HOSPITAL DISCHARGE FOLLOW-UP: ICD-10-CM

## 2021-05-04 DIAGNOSIS — Z79.4 TYPE 2 DIABETES MELLITUS WITH DIABETIC NEUROPATHY, WITH LONG-TERM CURRENT USE OF INSULIN (HCC): ICD-10-CM

## 2021-05-04 DIAGNOSIS — M25.531 RIGHT WRIST PAIN: Primary | ICD-10-CM

## 2021-05-04 LAB
ANION GAP SERPL CALC-SCNC: 9 MMOL/L (ref 3–18)
BUN SERPL-MCNC: 12 MG/DL (ref 7–18)
BUN/CREAT SERPL: 14 (ref 12–20)
CALCIUM SERPL-MCNC: 10.1 MG/DL (ref 8.5–10.1)
CHLORIDE SERPL-SCNC: 106 MMOL/L (ref 100–111)
CO2 SERPL-SCNC: 24 MMOL/L (ref 21–32)
CREAT SERPL-MCNC: 0.85 MG/DL (ref 0.6–1.3)
EST. AVERAGE GLUCOSE BLD GHB EST-MCNC: 194 MG/DL
GLUCOSE SERPL-MCNC: 153 MG/DL (ref 74–99)
HBA1C MFR BLD: 8.4 % (ref 4.2–5.6)
POTASSIUM SERPL-SCNC: 4.3 MMOL/L (ref 3.5–5.5)
SODIUM SERPL-SCNC: 139 MMOL/L (ref 136–145)

## 2021-05-04 PROCEDURE — G9717 DOC PT DX DEP/BP F/U NT REQ: HCPCS | Performed by: INTERNAL MEDICINE

## 2021-05-04 PROCEDURE — G8417 CALC BMI ABV UP PARAM F/U: HCPCS | Performed by: INTERNAL MEDICINE

## 2021-05-04 PROCEDURE — 1111F DSCHRG MED/CURRENT MED MERGE: CPT | Performed by: INTERNAL MEDICINE

## 2021-05-04 PROCEDURE — G8427 DOCREV CUR MEDS BY ELIG CLIN: HCPCS | Performed by: INTERNAL MEDICINE

## 2021-05-04 PROCEDURE — G9899 SCRN MAM PERF RSLTS DOC: HCPCS | Performed by: INTERNAL MEDICINE

## 2021-05-04 PROCEDURE — 83036 HEMOGLOBIN GLYCOSYLATED A1C: CPT

## 2021-05-04 PROCEDURE — 99214 OFFICE O/P EST MOD 30 MIN: CPT | Performed by: INTERNAL MEDICINE

## 2021-05-04 PROCEDURE — 80048 BASIC METABOLIC PNL TOTAL CA: CPT

## 2021-05-04 PROCEDURE — 2022F DILAT RTA XM EVC RTNOPTHY: CPT | Performed by: INTERNAL MEDICINE

## 2021-05-04 PROCEDURE — 3017F COLORECTAL CA SCREEN DOC REV: CPT | Performed by: INTERNAL MEDICINE

## 2021-05-04 PROCEDURE — 3046F HEMOGLOBIN A1C LEVEL >9.0%: CPT | Performed by: INTERNAL MEDICINE

## 2021-05-04 PROCEDURE — 36415 COLL VENOUS BLD VENIPUNCTURE: CPT

## 2021-05-04 RX ORDER — ETODOLAC 400 MG/1
TABLET, FILM COATED ORAL
COMMUNITY
Start: 2021-04-28 | End: 2021-05-04 | Stop reason: SDUPTHER

## 2021-05-04 RX ORDER — LIDOCAINE 50 MG/G
PATCH TOPICAL
Qty: 30 EACH | Refills: 0 | Status: SHIPPED | OUTPATIENT
Start: 2021-05-04 | End: 2021-11-03

## 2021-05-04 RX ORDER — ACETAMINOPHEN AND CODEINE PHOSPHATE 300; 30 MG/1; MG/1
TABLET ORAL
COMMUNITY
Start: 2021-04-28 | End: 2021-05-04 | Stop reason: ALTCHOICE

## 2021-05-04 RX ORDER — METHOCARBAMOL 750 MG/1
TABLET, FILM COATED ORAL
COMMUNITY
Start: 2021-04-28 | End: 2021-11-03

## 2021-05-04 RX ORDER — FLASH GLUCOSE SENSOR
KIT MISCELLANEOUS
Qty: 6 KIT | Refills: 3 | Status: SHIPPED | OUTPATIENT
Start: 2021-05-04

## 2021-05-04 RX ORDER — LIDOCAINE 50 MG/G
PATCH TOPICAL
COMMUNITY
Start: 2021-04-20 | End: 2021-05-04 | Stop reason: SDUPTHER

## 2021-05-04 RX ORDER — ETODOLAC 400 MG/1
TABLET, FILM COATED ORAL
Qty: 40 TAB | Refills: 0 | Status: SHIPPED | OUTPATIENT
Start: 2021-05-04 | End: 2021-11-03

## 2021-05-04 NOTE — PROGRESS NOTES
Hari Brewer presents today for fall on 4/20- ED follow up  - Is someone accompanying this pt? no  - Is the patient using any durable medical equipment during office visit? no    Coordination of Care:  1. Have you been to the ER, urgent care clinic since your last visit? Hospitalized since your last visit? 4/20 - for fall  2. Have you seen or consulted any other health care providers outside of the [address] [place] [address] since your last visit? Include any pap smears or colon screening.

## 2021-05-04 NOTE — PROGRESS NOTES
History of Present Illness  Ebony Alves is a 60 y.o. female who presents today for management of    Chief Complaint   Patient presents with   [facility] Follow Up     fall, syncope       Patient complains of right writs pain and sternal pain. Wrist pain and swelling are gradually improving. She also reports of improving chest wall pain. Pain intensity 7/10. Patient was seen and treated at [facility] on 4/20/2021 after a fall. She reports of fainting and falling on concrete. Patient has an appointment with Terry Mendoza next week.     Problem List  Patient Active Problem List    Diagnosis Date Noted    Type 2 diabetes mellitus with diabetic neuropathy ([facility] [address] 10/24/2019    Vitamin B12 deficiency 04/25/2019    Bipolar 1 disorder, manic, full remission ([facility] [address] 04/25/2019    PTSD (post-traumatic stress disorder) 04/25/2019    Hypothyroidism due to acquired atrophy of thyroid 05/02/2016    Left low back pain 04/13/2016    History of alcohol abuse 04/13/2016    Lumbar degenerative disc disease 04/13/2016    Recurrent depression ([facility] [address] 06/12/2013    Tear of meniscus of left knee 01/16/2013    Hypercholesteremia 01/02/2013    Type 2 diabetes mellitus with microalbuminuria, with long-term current use of insulin ([facility]) 01/02/2013       Current Medications  Current Outpatient Medications   Medication Sig    lidocaine (LIDODERM) 5 % Apply 1 patch as directed for 12 hours every 24 hours (12 hours on, 12 hours off)    etodolac (LODINE) 400 mg tablet take 1 tablet by mouth twice a day    flash glucose sensor (FreeStyle Jorge 14 Day Sensor) kit Use 4 times daily    methocarbamoL (ROBAXIN) 750 mg tablet take 1 tablet by mouth every 8 hours if needed    omega-3 acid ethyl esters (LOVAZA) 1 gram capsule TAKE 2 CAPSULES BY MOUTH  TWO TIMES DAILY WITH MEALS    SITagliptin-metFORMIN (Janumet) 50-1,000 mg per tablet TAKE 1 TABLET BY MOUTH  TWICE A DAY    Insulin Needles, Disposable, (BD Ultra-Fine Mini Pen Needle) 31 gauge x 3/16\" ndle Use 4 times daily    oxybutynin chloride XL (DITROPAN XL) 10 mg CR tablet TAKE 1 TABLET BY MOUTH  DAILY    insulin glargine (LANTUS,BASAGLAR) 100 unit/mL (3 mL) inpn 60 Units by SubCUTAneous route nightly.  OLANZapine (ZyPREXA) 15 mg tablet take 1 tablet by mouth at bedtime    levothyroxine (SYNTHROID) 137 mcg tablet TAKE 1 TABLET BY MOUTH  DAILY BEFORE BREAKFAST    rosuvastatin (CRESTOR) 20 mg tablet TAKE 1 TABLET BY MOUTH  NIGHTLY    lidocaine (Lidocaine Viscous) 2 % solution Take 15 mL by mouth every four (4) hours as needed for Pain.  cetirizine (ZyrTEC) 10 mg tablet Take 1 Tab by mouth nightly. Take one tablets by mouth daily for 7 days and then as needed after that. Restart forseven days if sinus congestion recurs.  benztropine (COGENTIN) 1 mg tablet     insulin lispro (HumaLOG KwikPen Insulin) 100 unit/mL kwikpen 12 Units by SubCUTAneous route Before breakfast, lunch, and dinner. 5 units SQ before snacks. Maximum daily dose: 50 units    fexofenadine (ALLEGRA) 180 mg tablet Take 1 Tab by mouth daily as needed for Allergies.  melatonin tab tablet Take 10 mg by mouth nightly.  cyanocobalamin (VITAMIN B-12) 500 mcg tablet Take 500 mcg by mouth daily. No current facility-administered medications for this visit. Allergies/Drug Reactions  Allergies   Allergen Reactions    Lipitor [Atorvastatin] Other (comments)     myalgias    Lisinopril Cough    Aspirin Other (comments)     Nose bleeds    Zocor [Simvastatin] Other (comments)     Causes pain in the left leg        Review of Systems  Review of Systems   Constitutional: Negative. Respiratory: Negative. Cardiovascular: Positive for chest pain. Negative for palpitations, orthopnea and claudication. Gastrointestinal: Negative. Musculoskeletal: Positive for joint pain. Neurological: Negative. Psychiatric/Behavioral: Negative.        Physical Exam  Vital signs:   Vitals:    05/04/21 1043 BP: 135/85   Pulse: 86   Resp: 16   Temp: 97.3 °F (36.3 °C)   TempSrc: Temporal   SpO2: 97%   Weight: 195 lb (88.5 kg)   Height: 5' 6\" (1.676 m)       General: alert, oriented, not in distress  Head: scalp normal, atraumatic  Eyes: pupils are equal and reactive, full and intact EOM's  Neck: supple, no JVD, no lymphadenopathy, non-palpable thyroid  Chest/Lungs: clear breath sounds, no wheezing or crackles  Heart: normal rate, regular rhythm, no murmur  Extremities: no focal deformities, no edema  Skin: no active skin lesions    Laboratory/Tests:  Lab Results   Component Value Date/Time    WBC 7.4 12/16/2020 09:55 AM    HGB 11.4 (L) 12/16/2020 09:55 AM    HCT 35.6 12/16/2020 09:55 AM    PLATELET 103 56/50/9457 09:55 AM    MCV 83.4 12/16/2020 09:55 AM     Lab Results   Component Value Date/Time    Cholesterol, total 137 12/16/2020 09:55 AM    HDL Cholesterol 42 12/16/2020 09:55 AM    LDL, calculated 62.6 12/16/2020 09:55 AM    Triglyceride 162 (H) 12/16/2020 09:55 AM    CHOL/HDL Ratio 3.3 12/16/2020 09:55 AM     Lab Results   Component Value Date/Time    Sodium 139 02/08/2021 10:45 AM    Potassium 4.4 02/08/2021 10:45 AM    Chloride 107 02/08/2021 10:45 AM    CO2 26 02/08/2021 10:45 AM    Anion gap 6 02/08/2021 10:45 AM    Glucose 162 (H) 02/08/2021 10:45 AM    BUN 8 02/08/2021 10:45 AM    Creatinine 0.76 02/08/2021 10:45 AM    BUN/Creatinine ratio 11 (L) 02/08/2021 10:45 AM    GFR est AA >60 02/08/2021 10:45 AM    GFR est non-AA >60 02/08/2021 10:45 AM    Calcium 9.8 02/08/2021 10:45 AM         XR - WRIST COMPLETE RT4/20/2021  [facility]  Result Impression     Age-indeterminate ulnar styloid fracture. If focally tender here, presumably acute. XR - CHEST PA AND LATERAL4/20/2021  [facility]  Result Impression   1. No acute chest finding       Assessment/Plan:    1.  Right wrist pain  - slowly improving  - pain control  - advised patient to keep appointment with orthopedics  - lidocaine (LIDODERM) 5 %; Apply 1 patch as directed for 12 hours every 24 hours (12 hours on, 12 hours off)  Dispense: 30 Each; Refill: 0  - etodolac (LODINE) 400 mg tablet; take 1 tablet by mouth twice a day  Dispense: 40 Tab; Refill: 0    2. Sternal pain  - improving  - lidocaine (LIDODERM) 5 %; Apply 1 patch as directed for 12 hours every 24 hours (12 hours on, 12 hours off)  Dispense: 30 Each; Refill: 0  - etodolac (LODINE) 400 mg tablet; take 1 tablet by mouth twice a day  Dispense: 40 Tab; Refill: 0    3. Type 2 diabetes mellitus with diabetic neuropathy, with long-term current use of insulin (HCC)  - controlled  - flash glucose sensor (FreeStyle Jorge 14 Day Sensor) kit; Use 4 times daily  Dispense: 6 Kit; Refill: 3  - HEMOGLOBIN A1C WITH EAG; Future  - METABOLIC PANEL, BASIC; Future    4. Hospital discharge follow-up  - ME DISCHARGE MEDS RECONCILED W/ CURRENT OUTPATIENT MED LIST      Follow-up and Dispositions    · Return in about 3 months (around 8/4/2021) for ROV, in-person. I have discussed the diagnosis with the patient and the intended plan as seen in the above orders. The patient has received an after-visit summary and questions were answered concerning future plans. I have discussed medication side effects and warnings with the patient as well. I have reviewed the plan of care with the patient, accepted their input and they are in agreement with the treatment goals.        Scarlet Morales MD  May 4, 2021

## 2021-05-12 ENCOUNTER — OFFICE VISIT (OUTPATIENT)
Dept: ORTHOPEDIC SURGERY | Age: 56
End: 2021-05-12
Payer: MEDICARE

## 2021-05-12 VITALS
HEIGHT: 66 IN | TEMPERATURE: 97 F | BODY MASS INDEX: 31.66 KG/M2 | HEART RATE: 87 BPM | WEIGHT: 197 LBS | OXYGEN SATURATION: 96 %

## 2021-05-12 DIAGNOSIS — S52.501A CLOSED FRACTURE OF DISTAL ENDS OF RIGHT RADIUS AND ULNA, INITIAL ENCOUNTER: Primary | ICD-10-CM

## 2021-05-12 DIAGNOSIS — S52.601A CLOSED FRACTURE OF DISTAL ENDS OF RIGHT RADIUS AND ULNA, INITIAL ENCOUNTER: Primary | ICD-10-CM

## 2021-05-12 PROCEDURE — G9899 SCRN MAM PERF RSLTS DOC: HCPCS | Performed by: ORTHOPAEDIC SURGERY

## 2021-05-12 PROCEDURE — 73110 X-RAY EXAM OF WRIST: CPT | Performed by: ORTHOPAEDIC SURGERY

## 2021-05-12 PROCEDURE — G8427 DOCREV CUR MEDS BY ELIG CLIN: HCPCS | Performed by: ORTHOPAEDIC SURGERY

## 2021-05-12 PROCEDURE — G8417 CALC BMI ABV UP PARAM F/U: HCPCS | Performed by: ORTHOPAEDIC SURGERY

## 2021-05-12 PROCEDURE — 3017F COLORECTAL CA SCREEN DOC REV: CPT | Performed by: ORTHOPAEDIC SURGERY

## 2021-05-12 PROCEDURE — G9717 DOC PT DX DEP/BP F/U NT REQ: HCPCS | Performed by: ORTHOPAEDIC SURGERY

## 2021-05-12 PROCEDURE — 99203 OFFICE O/P NEW LOW 30 MIN: CPT | Performed by: ORTHOPAEDIC SURGERY

## 2021-05-12 NOTE — PROGRESS NOTES
Sharyle Givens is a 60 y.o. female right handed unspecified employment. Worker's Compensation and legal considerations: none filed. Vitals:    05/12/21 1237   Pulse: 87   Temp: 97 °F (36.1 °C)   TempSrc: Temporal   SpO2: 96%   Weight: 197 lb (89.4 kg)   Height: 5' 6\" (1.676 m)   PainSc:  10 - Worst pain ever   PainLoc: Hand   LMP: 06/05/2014           Chief Complaint   Patient presents with    Hand Pain     right hand    Wrist Pain     right wrist         HPI: Patient presents today after injuring her wrist 3 weeks ago. She was told she might have a fracture of part of her wrist.  She has picked up a brace and has been wearing it recently.     Date of onset: 4/20/2021    Injury: Yes: Comment: Fall    Prior Treatment:  Yes: Comment: Brace    Numbness/ Tingling: No      ROS: Review of Systems - General ROS: negative  Psychological ROS: negative  ENT ROS: negative  Allergy and Immunology ROS: negative  Hematological and Lymphatic ROS: negative  Respiratory ROS: no cough, shortness of breath, or wheezing  Cardiovascular ROS: no chest pain or dyspnea on exertion  Gastrointestinal ROS: no abdominal pain, change in bowel habits, or black or bloody stools  Musculoskeletal ROS: positive for - pain in wrist - right  Neurological ROS: negative  Dermatological ROS: negative    Past Medical History:   Diagnosis Date    Arthritis     Chronic pain     Depression     Diabetes [facility] [address]     Hyperlipidemia     Hypertension     Hypothyroid     Medial meniscus tear     Left knee    Menopause     Psychiatric disorder     Tobacco abuse, in remission        Past Surgical History:   Procedure Laterality Date    HAND/FINGER SURGERY UNLISTED Left 1989    reconstructive surgery    HX ORTHOPAEDIC      Hand reconstructive surgery    HX TONSIL AND ADENOIDECTOMY      HX TUBAL LIGATION  2004    HX WISDOM TEETH EXTRACTION         Current Outpatient Medications   Medication Sig Dispense Refill    multivit-min/iron/folic/lutein (CENTRUM SILVER WOMEN PO) Take  by mouth daily.  lidocaine (LIDODERM) 5 % Apply 1 patch as directed for 12 hours every 24 hours (12 hours on, 12 hours off) 30 Each 0    etodolac (LODINE) 400 mg tablet take 1 tablet by mouth twice a day 40 Tab 0    flash glucose sensor (FreeStyle Jorge 14 Day Sensor) kit Use 4 times daily 6 Kit 3    omega-3 acid ethyl esters (LOVAZA) 1 gram capsule TAKE 2 CAPSULES BY MOUTH  TWO TIMES DAILY WITH MEALS 360 Cap 3    SITagliptin-metFORMIN (Janumet) 50-1,000 mg per tablet TAKE 1 TABLET BY MOUTH  TWICE A  Tab 3    Insulin Needles, Disposable, (BD Ultra-Fine Mini Pen Needle) 31 gauge x 3/16\" ndle Use 4 times daily 200 Pen Needle 3    oxybutynin chloride XL (DITROPAN XL) 10 mg CR tablet TAKE 1 TABLET BY MOUTH  DAILY 90 Tab 3    insulin glargine (LANTUS,BASAGLAR) 100 unit/mL (3 mL) inpn 60 Units by SubCUTAneous route nightly. 15 Pen 2    OLANZapine (ZyPREXA) 15 mg tablet take 1 tablet by mouth at bedtime      levothyroxine (SYNTHROID) 137 mcg tablet TAKE 1 TABLET BY MOUTH  DAILY BEFORE BREAKFAST 90 Tab 3    rosuvastatin (CRESTOR) 20 mg tablet TAKE 1 TABLET BY MOUTH  NIGHTLY 90 Tab 3    cetirizine (ZyrTEC) 10 mg tablet Take 1 Tab by mouth nightly. Take one tablets by mouth daily for 7 days and then as needed after that. Restart forseven days if sinus congestion recurs. 30 Tab 0    benztropine (COGENTIN) 1 mg tablet       insulin lispro (HumaLOG KwikPen Insulin) 100 unit/mL kwikpen 12 Units by SubCUTAneous route Before breakfast, lunch, and dinner. 5 units SQ before snacks. Maximum daily dose: 50 units 5 Pen 0    fexofenadine (ALLEGRA) 180 mg tablet Take 1 Tab by mouth daily as needed for Allergies. 90 Tab 3    methocarbamoL (ROBAXIN) 750 mg tablet take 1 tablet by mouth every 8 hours if needed      lidocaine (Lidocaine Viscous) 2 % solution Take 15 mL by mouth every four (4) hours as needed for Pain. 100 mL 0    melatonin tab tablet Take 10 mg by mouth nightly.  cyanocobalamin (VITAMIN B-12) 500 mcg tablet Take 500 mcg by mouth daily. Allergies   Allergen Reactions    Lipitor [Atorvastatin] Other (comments)     myalgias    Lisinopril Cough    Aspirin Other (comments)     Nose bleeds    Zocor [Simvastatin] Other (comments)     Causes pain in the left leg           PE:     Physical Exam  Vitals signs and nursing note reviewed. Constitutional:       General: She is not in acute distress. Appearance: Normal appearance. She is not ill-appearing. Neck:      Musculoskeletal: Normal range of motion. Cardiovascular:      Pulses: Normal pulses. Pulmonary:      Effort: Pulmonary effort is normal.   Abdominal:      General: Abdomen is flat. Musculoskeletal:         General: Tenderness and signs of injury present. No swelling or deformity. Right lower leg: No edema. Left lower leg: No edema. Skin:     General: Skin is warm and dry. Capillary Refill: Capillary refill takes less than 2 seconds. Findings: No bruising or erythema. Neurological:      General: No focal deficit present. Mental Status: She is alert and oriented to person, place, and time. Cranial Nerves: No cranial nerve deficit. Sensory: No sensory deficit. Psychiatric:         Mood and Affect: Mood normal.         Behavior: Behavior normal.            Right wrist: There is mild tenderness to palpation over the dorsum of the wrist ulna and radius. There is no edema noted. Sensation grossly intact distally and cap refill brisk. Imagin2021 3 views of right wrist shows a nondisplaced fracture through the radial metaphysis distally. As well as a nondisplaced fracture of the ulnar styloid that may be chronic in nature.         ICD-10-CM ICD-9-CM    1. Closed fracture of distal ends of right radius and ulna, initial encounter  S52.501A 813.44 AMB POC XRAY, WRIST; COMPLETE, 3+ VIE    S52.601A  AMB SUPPLY ORDER         Plan:     Right wrist brace given today. Instructed the patient to wear the brace at all times except for hygiene. Follow-up and Dispositions    · Return in about 3 weeks (around 6/2/2021) for Reevaluation, x-rays, and gradual brace discontinuation.           Plan was reviewed with patient, who verbalized agreement and understanding of the plan

## 2021-05-25 ENCOUNTER — TELEPHONE (OUTPATIENT)
Dept: FAMILY MEDICINE CLINIC | Age: 56
End: 2021-05-25

## 2021-05-25 DIAGNOSIS — E11.9 TYPE 2 DIABETES MELLITUS WITHOUT COMPLICATION, UNSPECIFIED WHETHER LONG TERM INSULIN USE (HCC): ICD-10-CM

## 2021-05-25 DIAGNOSIS — E11.29 TYPE 2 DIABETES MELLITUS WITH MICROALBUMINURIA, WITH LONG-TERM CURRENT USE OF INSULIN (HCC): ICD-10-CM

## 2021-05-25 DIAGNOSIS — R80.9 TYPE 2 DIABETES MELLITUS WITH MICROALBUMINURIA, WITH LONG-TERM CURRENT USE OF INSULIN (HCC): ICD-10-CM

## 2021-05-25 DIAGNOSIS — Z79.4 TYPE 2 DIABETES MELLITUS WITH MICROALBUMINURIA, WITH LONG-TERM CURRENT USE OF INSULIN (HCC): ICD-10-CM

## 2021-05-26 RX ORDER — INSULIN LISPRO 100 [IU]/ML
12 INJECTION, SOLUTION INTRAVENOUS; SUBCUTANEOUS
Qty: 15 PEN | Refills: 1 | Status: SHIPPED | OUTPATIENT
Start: 2021-05-26 | End: 2021-08-04 | Stop reason: SDUPTHER

## 2021-05-26 RX ORDER — INSULIN GLARGINE 100 [IU]/ML
60 INJECTION, SOLUTION SUBCUTANEOUS
Qty: 15 PEN | Refills: 2 | Status: SHIPPED | OUTPATIENT
Start: 2021-05-26 | End: 2021-08-04 | Stop reason: SDUPTHER

## 2021-05-28 ENCOUNTER — PATIENT OUTREACH (OUTPATIENT)
Dept: CASE MANAGEMENT | Age: 56
End: 2021-05-28

## 2021-05-28 NOTE — PROGRESS NOTES
Complex Case Management      Date/Time:  5/28/2021 3:44 PM     Attempted to reach patient by telephone. Left HIPPA compliant message requesting a return call. Will attempt to reach patient at a later time.

## 2021-06-01 ENCOUNTER — PATIENT OUTREACH (OUTPATIENT)
Dept: CASE MANAGEMENT | Age: 56
End: 2021-06-01

## 2021-06-01 NOTE — PROGRESS NOTES
Sameera Barba is a 62 y.o. female  Chief Complaint   Patient presents with    Medication Evaluation       1. Have you been to the ER, urgent care clinic since your last visit? Hospitalized since your last visit? No    2. Have you seen or consulted any other health care providers outside of the [address] [place] [address] since your last visit? Include any pap smears or colon screening.  No Methotrexate Pregnancy And Lactation Text: This medication is Pregnancy Category X and is known to cause fetal harm. This medication is excreted in breast milk.

## 2021-06-01 NOTE — PROGRESS NOTES
Complex Case Management      Date/Time:  2021 10:49 AM    Method of communication with patient:phone    [facility] ([facility]) contacted the patient by telephone to perform Ambulatory Care Coordination. Verified name and  (PHI) with patient as identifiers. Provided introduction to self, and explanation of the Ambulatory Care Manager's role. [facility] follow up appointment(s):   Future Appointments   Date Time Provider Alka Cheema   2021  1:00 PM Juan David eLbron DO [facility] BS AMB   2021 10:45 AM Adri King MD [facility] BS AMB   2021 10:45 AM [address] [place] [address]        Patient declined CCM.

## 2021-06-02 ENCOUNTER — OFFICE VISIT (OUTPATIENT)
Dept: ORTHOPEDIC SURGERY | Age: 56
End: 2021-06-02
Payer: MEDICARE

## 2021-06-02 VITALS
HEART RATE: 94 BPM | WEIGHT: 196 LBS | BODY MASS INDEX: 31.5 KG/M2 | HEIGHT: 66 IN | OXYGEN SATURATION: 98 % | RESPIRATION RATE: 16 BRPM

## 2021-06-02 DIAGNOSIS — S52.501D CLOSED FRACTURE OF DISTAL ENDS OF RIGHT RADIUS AND ULNA WITH ROUTINE HEALING, SUBSEQUENT ENCOUNTER: Primary | ICD-10-CM

## 2021-06-02 DIAGNOSIS — S52.601D CLOSED FRACTURE OF DISTAL ENDS OF RIGHT RADIUS AND ULNA WITH ROUTINE HEALING, SUBSEQUENT ENCOUNTER: Primary | ICD-10-CM

## 2021-06-02 PROCEDURE — G8417 CALC BMI ABV UP PARAM F/U: HCPCS | Performed by: ORTHOPAEDIC SURGERY

## 2021-06-02 PROCEDURE — 73110 X-RAY EXAM OF WRIST: CPT | Performed by: ORTHOPAEDIC SURGERY

## 2021-06-02 PROCEDURE — G9717 DOC PT DX DEP/BP F/U NT REQ: HCPCS | Performed by: ORTHOPAEDIC SURGERY

## 2021-06-02 PROCEDURE — 3017F COLORECTAL CA SCREEN DOC REV: CPT | Performed by: ORTHOPAEDIC SURGERY

## 2021-06-02 PROCEDURE — 99213 OFFICE O/P EST LOW 20 MIN: CPT | Performed by: ORTHOPAEDIC SURGERY

## 2021-06-02 PROCEDURE — G9899 SCRN MAM PERF RSLTS DOC: HCPCS | Performed by: ORTHOPAEDIC SURGERY

## 2021-06-02 PROCEDURE — G8427 DOCREV CUR MEDS BY ELIG CLIN: HCPCS | Performed by: ORTHOPAEDIC SURGERY

## 2021-06-02 NOTE — PROGRESS NOTES
Jonathan Lucero is a 60 y.o. female right handed unspecified employment. Worker's Compensation and legal considerations: none filed. Vitals:    06/02/21 1242   Pulse: 94   Resp: 16   SpO2: 98%   Weight: 196 lb (88.9 kg)   Height: 5' 6\" (1.676 m)   PainSc:   3   PainLoc: Wrist   LMP: 06/05/2014           Chief Complaint   Patient presents with    Hand Pain     right hand Jadine Rattler       HPI: Patient returns today for follow-up of right distal radius fracture. She has been in a brace. She denies any new injuries or falls. Initial HPI: Patient presents today after injuring her wrist 3 weeks ago. She was told she might have a fracture of part of her wrist.  She has picked up a brace and has been wearing it recently.     Date of onset: 4/20/2021    Injury: Yes: Comment: Fall    Prior Treatment:  Yes: Comment: Brace    Numbness/ Tingling: No      ROS: Review of Systems - General ROS: negative  Psychological ROS: negative  ENT ROS: negative  Allergy and Immunology ROS: negative  Hematological and Lymphatic ROS: negative  Respiratory ROS: no cough, shortness of breath, or wheezing  Cardiovascular ROS: no chest pain or dyspnea on exertion  Gastrointestinal ROS: no abdominal pain, change in bowel habits, or black or bloody stools  Musculoskeletal ROS: positive for - pain in wrist - right  Neurological ROS: negative  Dermatological ROS: negative    Past Medical History:   Diagnosis Date    Arthritis     Chronic pain     Depression     Diabetes ([facility] [address]     Hyperlipidemia     Hypertension     Hypothyroid     Medial meniscus tear     Left knee    Menopause     Psychiatric disorder     Tobacco abuse, in remission        Past Surgical History:   Procedure Laterality Date    HAND/FINGER SURGERY UNLISTED Left 1989    reconstructive surgery    HX ORTHOPAEDIC      Hand reconstructive surgery    HX TONSIL AND ADENOIDECTOMY      HX TUBAL LIGATION  2004    HX WISDOM TEETH EXTRACTION         Current Outpatient Medications   Medication Sig Dispense Refill    insulin glargine (LANTUS,BASAGLAR) 100 unit/mL (3 mL) inpn 60 Units by SubCUTAneous route nightly. 15 Pen 2    insulin lispro (HumaLOG KwikPen Insulin) 100 unit/mL kwikpen 12 Units by SubCUTAneous route Before breakfast, lunch, and dinner. 5 units SQ before snacks. Maximum daily dose: 50 units 15 Pen 1    multivit-min/iron/folic/lutein (CENTRUM SILVER WOMEN PO) Take  by mouth daily.  methocarbamoL (ROBAXIN) 750 mg tablet take 1 tablet by mouth every 8 hours if needed      lidocaine (LIDODERM) 5 % Apply 1 patch as directed for 12 hours every 24 hours (12 hours on, 12 hours off) 30 Each 0    etodolac (LODINE) 400 mg tablet take 1 tablet by mouth twice a day 40 Tab 0    flash glucose sensor (FreeStyle Jorge 14 Day Sensor) kit Use 4 times daily 6 Kit 3    omega-3 acid ethyl esters (LOVAZA) 1 gram capsule TAKE 2 CAPSULES BY MOUTH  TWO TIMES DAILY WITH MEALS 360 Cap 3    SITagliptin-metFORMIN (Janumet) 50-1,000 mg per tablet TAKE 1 TABLET BY MOUTH  TWICE A  Tab 3    Insulin Needles, Disposable, (BD Ultra-Fine Mini Pen Needle) 31 gauge x 3/16\" ndle Use 4 times daily 200 Pen Needle 3    oxybutynin chloride XL (DITROPAN XL) 10 mg CR tablet TAKE 1 TABLET BY MOUTH  DAILY 90 Tab 3    OLANZapine (ZyPREXA) 15 mg tablet take 1 tablet by mouth at bedtime      levothyroxine (SYNTHROID) 137 mcg tablet TAKE 1 TABLET BY MOUTH  DAILY BEFORE BREAKFAST 90 Tab 3    rosuvastatin (CRESTOR) 20 mg tablet TAKE 1 TABLET BY MOUTH  NIGHTLY 90 Tab 3    lidocaine (Lidocaine Viscous) 2 % solution Take 15 mL by mouth every four (4) hours as needed for Pain. 100 mL 0    cetirizine (ZyrTEC) 10 mg tablet Take 1 Tab by mouth nightly. Take one tablets by mouth daily for 7 days and then as needed after that. Restart forseven days if sinus congestion recurs.  30 Tab 0    benztropine (COGENTIN) 1 mg tablet       fexofenadine (ALLEGRA) 180 mg tablet Take 1 Tab by mouth daily as needed for Allergies. 90 Tab 3    melatonin tab tablet Take 10 mg by mouth nightly.  cyanocobalamin (VITAMIN B-12) 500 mcg tablet Take 500 mcg by mouth daily. (Patient not taking: Reported on 2021)         Allergies   Allergen Reactions    Lipitor [Atorvastatin] Other (comments)     myalgias    Lisinopril Cough    Aspirin Other (comments)     Nose bleeds    Zocor [Simvastatin] Other (comments)     Causes pain in the left leg           PE:     Physical Exam  Vitals and nursing note reviewed. Constitutional:       General: She is not in acute distress. Appearance: Normal appearance. She is not ill-appearing. Cardiovascular:      Pulses: Normal pulses. Pulmonary:      Effort: Pulmonary effort is normal.   Abdominal:      General: Abdomen is flat. Musculoskeletal:         General: Tenderness present. No swelling, deformity or signs of injury. Cervical back: Normal range of motion. Right lower leg: No edema. Left lower leg: No edema. Skin:     General: Skin is warm and dry. Capillary Refill: Capillary refill takes less than 2 seconds. Findings: No bruising or erythema. Neurological:      General: No focal deficit present. Mental Status: She is alert and oriented to person, place, and time. Cranial Nerves: No cranial nerve deficit. Sensory: No sensory deficit. Psychiatric:         Mood and Affect: Mood normal.         Behavior: Behavior normal.            Right wrist: Tenderness at wrist is much improved. Range of motion is near full. Sensation intact distally and cap refill brisk. Imagin/2/2021 3 views of right wrist shows significant interval callus formation when compared to previous views. There is no interval displacement. 2021 3 views of right wrist shows a nondisplaced fracture through the radial metaphysis distally. As well as a nondisplaced fracture of the ulnar styloid that may be chronic in nature.         ICD-10-CM ICD-9-CM    1. Closed fracture of distal ends of right radius and ulna with routine healing, subsequent encounter  S52.501D V54.12 AMB POC XRAY, WRIST; COMPLETE, 3+ VIE    S52.601D           Plan:     Patient may discontinue brace whenever she is sitting at home and does not need any extra protection on her wrist.  I have instructed her to continue to wear the brace whenever she is outside of the house. Follow-up and Dispositions    · Return in about 6 weeks (around 7/14/2021) for Reevaluation and x-rays.           Plan was reviewed with patient, who verbalized agreement and understanding of the plan

## 2021-08-04 ENCOUNTER — VIRTUAL VISIT (OUTPATIENT)
Dept: FAMILY MEDICINE CLINIC | Age: 56
End: 2021-08-04
Payer: MEDICARE

## 2021-08-04 DIAGNOSIS — E78.00 HYPERCHOLESTEREMIA: ICD-10-CM

## 2021-08-04 DIAGNOSIS — E03.4 HYPOTHYROIDISM DUE TO ACQUIRED ATROPHY OF THYROID: ICD-10-CM

## 2021-08-04 DIAGNOSIS — E11.29 TYPE 2 DIABETES MELLITUS WITH MICROALBUMINURIA, WITH LONG-TERM CURRENT USE OF INSULIN (HCC): Primary | ICD-10-CM

## 2021-08-04 DIAGNOSIS — R80.9 TYPE 2 DIABETES MELLITUS WITH MICROALBUMINURIA, WITH LONG-TERM CURRENT USE OF INSULIN (HCC): Primary | ICD-10-CM

## 2021-08-04 DIAGNOSIS — E11.9 TYPE 2 DIABETES MELLITUS WITHOUT COMPLICATION, UNSPECIFIED WHETHER LONG TERM INSULIN USE (HCC): ICD-10-CM

## 2021-08-04 DIAGNOSIS — F33.9 RECURRENT DEPRESSION (HCC): ICD-10-CM

## 2021-08-04 DIAGNOSIS — Z79.4 TYPE 2 DIABETES MELLITUS WITH MICROALBUMINURIA, WITH LONG-TERM CURRENT USE OF INSULIN (HCC): Primary | ICD-10-CM

## 2021-08-04 DIAGNOSIS — Z00.00 MEDICARE ANNUAL WELLNESS VISIT, SUBSEQUENT: ICD-10-CM

## 2021-08-04 DIAGNOSIS — F31.74 BIPOLAR 1 DISORDER, MANIC, FULL REMISSION (HCC): ICD-10-CM

## 2021-08-04 PROCEDURE — 3017F COLORECTAL CA SCREEN DOC REV: CPT | Performed by: INTERNAL MEDICINE

## 2021-08-04 PROCEDURE — 3052F HG A1C>EQUAL 8.0%<EQUAL 9.0%: CPT | Performed by: INTERNAL MEDICINE

## 2021-08-04 PROCEDURE — 2022F DILAT RTA XM EVC RTNOPTHY: CPT | Performed by: INTERNAL MEDICINE

## 2021-08-04 PROCEDURE — G9899 SCRN MAM PERF RSLTS DOC: HCPCS | Performed by: INTERNAL MEDICINE

## 2021-08-04 PROCEDURE — G8417 CALC BMI ABV UP PARAM F/U: HCPCS | Performed by: INTERNAL MEDICINE

## 2021-08-04 PROCEDURE — G9717 DOC PT DX DEP/BP F/U NT REQ: HCPCS | Performed by: INTERNAL MEDICINE

## 2021-08-04 PROCEDURE — G0439 PPPS, SUBSEQ VISIT: HCPCS | Performed by: INTERNAL MEDICINE

## 2021-08-04 PROCEDURE — G8427 DOCREV CUR MEDS BY ELIG CLIN: HCPCS | Performed by: INTERNAL MEDICINE

## 2021-08-04 PROCEDURE — 99214 OFFICE O/P EST MOD 30 MIN: CPT | Performed by: INTERNAL MEDICINE

## 2021-08-04 RX ORDER — INSULIN LISPRO 100 [IU]/ML
12 INJECTION, SOLUTION INTRAVENOUS; SUBCUTANEOUS
Qty: 15 PEN | Refills: 5 | Status: SHIPPED | OUTPATIENT
Start: 2021-08-04 | End: 2022-05-18 | Stop reason: ALTCHOICE

## 2021-08-04 RX ORDER — INSULIN GLARGINE 100 [IU]/ML
60 INJECTION, SOLUTION SUBCUTANEOUS
Qty: 15 PEN | Refills: 5 | Status: SHIPPED | OUTPATIENT
Start: 2021-08-04 | End: 2022-01-27 | Stop reason: SDUPTHER

## 2021-08-04 NOTE — PATIENT INSTRUCTIONS
Medicare Wellness Visit, Female     The best way to live healthy is to have a lifestyle where you eat a well-balanced diet, exercise regularly, limit alcohol use, and quit all forms of tobacco/nicotine, if applicable. Regular preventive services are another way to keep healthy. Preventive services (vaccines, screening tests, monitoring & exams) can help personalize your care plan, which helps you manage your own care. Screening tests can find health problems at the earliest stages, when they are easiest to treat. [facility] follows the current, evidence-based guidelines published by the [facility] ([facility]) when recommending preventive services for our patients. Because we follow these guidelines, sometimes recommendations change over time as research supports it. (For example, mammograms used to be recommended annually. Even though Medicare will still pay for an annual mammogram, the newer guidelines recommend a mammogram every two years for women of average risk). Of course, you and your doctor may decide to screen more often for some diseases, based on your risk and your co-morbidities (chronic disease you are already diagnosed with). Preventive services for you include:  - Medicare offers their members a free annual wellness visit, which is time for you and your primary care provider to discuss and plan for your preventive service needs. Take advantage of this benefit every year!  -All adults over the age of 72 should receive the recommended pneumonia vaccines. Current USPSTF guidelines recommend a series of two vaccines for the best pneumonia protection.   -All adults should have a flu vaccine yearly and a tetanus vaccine every 10 years.   -All adults age 48 and older should receive the shingles vaccines (series of two vaccines).       -All adults age 38-68 who are overweight should have a diabetes screening test once every three years.   -All adults born between [birth year] and 1967 should be screened once for Hepatitis C.  -Other screening tests and preventive services for persons with diabetes include: an eye exam to screen for diabetic retinopathy, a kidney function test, a foot exam, and stricter control over your cholesterol.   -Cardiovascular screening for adults with routine risk involves an electrocardiogram (ECG) at intervals determined by your doctor.   -Colorectal cancer screenings should be done for adults age 54-65 with no increased risk factors for colorectal cancer. There are a number of acceptable methods of screening for this type of cancer. Each test has its own benefits and drawbacks. Discuss with your doctor what is most appropriate for you during your annual wellness visit. The different tests include: colonoscopy (considered the best screening method), a fecal occult blood test, a fecal DNA test, and sigmoidoscopy.    -A bone mass density test is recommended when a woman turns 65 to screen for osteoporosis. This test is only recommended one time, as a screening. Some providers will use this same test as a disease monitoring tool if you already have osteoporosis. -Breast cancer screenings are recommended every other year for women of normal risk, age 54-69.  -Cervical cancer screenings for women over age 72 are only recommended with certain risk factors.      Here is a list of your current Health Maintenance items (your personalized list of preventive services) with a due date:  Health Maintenance Due   Topic Date Due    COVID-19 Vaccine (1) Never done    Smoker or Former [address]  Never done    Pap Test  04/26/2019    Diabetic Foot Care  01/31/2021    Mammogram  08/14/2021

## 2021-08-04 NOTE — PROGRESS NOTES
Liliane Martin is a 60 y.o. female who was seen by synchronous (real-time) audio-video technology using doxy. me on 2021. Location of the patient: Home    Location of the provider: [facility]    Consent:  She and/or health care decision maker is aware that that she may receive a bill for this telehealth service, depending on her insurance coverage, and has provided verbal consent to proceed: Yes    Subjective:   Liliane Martin is a 60 y.o. female who presents today for management of    Chief Complaint   Patient presents with    Cholesterol Problem    Diabetes       Diabetes Mellitus:  The patient has diabetes, hyperlipidemia and obesity. Diabetic ROS - medication compliance: compliant all of the time, diabetic diet compliance: compliant most of the time, home glucose monitorin-160s, further diabetic ROS: no polyuria or polydipsia, no chest pain, dyspnea or TIA's, no numbness, tingling or pain in extremities. Lab review: labs reviewed, I note that glycosylated hemoglobin abnormal 8.4%, lipids LDL result meets goal.   Cardiovascular Review:  Diet and Lifestyle: generally follows a low fat low cholesterol diet, generally follows a low sodium diet, exercises regularly  Home BP Monitoring: is not measured at home. Pertinent ROS: taking medications as instructed, no medication side effects noted, no TIA's, no chest pain on exertion, no dyspnea on exertion, no swelling of ankles.        Problem List  Patient Active Problem List    Diagnosis Date Noted    Type 2 diabetes mellitus with diabetic neuropathy ([address] 10/24/2019    Vitamin B12 deficiency 2019    Bipolar 1 disorder, manic, full remission ([address] 2019    PTSD (post-traumatic stress disorder) 2019    Hypothyroidism due to acquired atrophy of thyroid 2016    Left low back pain 2016    History of alcohol abuse 2016    Lumbar degenerative disc disease 2016    Recurrent depression ([address] 06/12/2013    Tear of meniscus of left knee 01/16/2013    Hypercholesteremia 01/02/2013    Type 2 diabetes mellitus with microalbuminuria, with long-term current use of insulin ([facility]) 01/02/2013       Current Medications  Current Outpatient Medications   Medication Sig    insulin glargine (LANTUS,BASAGLAR) 100 unit/mL (3 mL) inpn 60 Units by SubCUTAneous route nightly.  insulin lispro (HumaLOG KwikPen Insulin) 100 unit/mL kwikpen 12 Units by SubCUTAneous route Before breakfast, lunch, and dinner. 5 units SQ before snacks. Maximum daily dose: 50 units    multivit-min/iron/folic/lutein (CENTRUM SILVER WOMEN PO) Take  by mouth daily.  methocarbamoL (ROBAXIN) 750 mg tablet take 1 tablet by mouth every 8 hours if needed    lidocaine (LIDODERM) 5 % Apply 1 patch as directed for 12 hours every 24 hours (12 hours on, 12 hours off)    etodolac (LODINE) 400 mg tablet take 1 tablet by mouth twice a day    flash glucose sensor (FreeStyle Jorge 14 Day Sensor) kit Use 4 times daily    omega-3 acid ethyl esters (LOVAZA) 1 gram capsule TAKE 2 CAPSULES BY MOUTH  TWO TIMES DAILY WITH MEALS    SITagliptin-metFORMIN (Janumet) 50-1,000 mg per tablet TAKE 1 TABLET BY MOUTH  TWICE A DAY    Insulin Needles, Disposable, (BD Ultra-Fine Mini Pen Needle) 31 gauge x 3/16\" ndle Use 4 times daily    oxybutynin chloride XL (DITROPAN XL) 10 mg CR tablet TAKE 1 TABLET BY MOUTH  DAILY    OLANZapine (ZyPREXA) 15 mg tablet take 1 tablet by mouth at bedtime    levothyroxine (SYNTHROID) 137 mcg tablet TAKE 1 TABLET BY MOUTH  DAILY BEFORE BREAKFAST    rosuvastatin (CRESTOR) 20 mg tablet TAKE 1 TABLET BY MOUTH  NIGHTLY    lidocaine (Lidocaine Viscous) 2 % solution Take 15 mL by mouth every four (4) hours as needed for Pain.  cetirizine (ZyrTEC) 10 mg tablet Take 1 Tab by mouth nightly. Take one tablets by mouth daily for 7 days and then as needed after that. Restart forseven days if sinus congestion recurs.     benztropine (COGENTIN) 1 mg tablet     fexofenadine (ALLEGRA) 180 mg tablet Take 1 Tab by mouth daily as needed for Allergies.  melatonin tab tablet Take 10 mg by mouth nightly.  cyanocobalamin (VITAMIN B-12) 500 mcg tablet Take 500 mcg by mouth daily. (Patient not taking: Reported on 2021)     No current facility-administered medications for this visit. Allergies/Drug Reactions  Allergies   Allergen Reactions    Lipitor [Atorvastatin] Other (comments)     myalgias    Lisinopril Cough    Aspirin Other (comments)     Nose bleeds    Zocor [Simvastatin] Other (comments)     Causes pain in the left leg        Social History  Social History     Tobacco Use    Smoking status: Former Smoker     Packs/day: 1.00     Years: 35.00     Pack years: 35.00     Types: Cigarettes     Quit date: 2011     Years since quittin.7    Smokeless tobacco: Never Used    Tobacco comment: quit    Substance Use Topics    Alcohol use: No     Alcohol/week: 0.0 standard drinks     Comment: quit 10 years ago    Drug use: No        Review of Systems  Review of Systems   Constitutional: Negative. Respiratory: Negative. Cardiovascular: Negative. Gastrointestinal: Negative. Musculoskeletal: Negative. Neurological: Negative. Psychiatric/Behavioral: Negative. Objective:     General: alert, cooperative, no distress   Mental  status: mental status: alert, oriented to person, place, and time, normal mood, behavior, speech, dress, motor activity, and thought processes   Resp: resp: normal effort and no respiratory distress   Neuro: neuro: no gross deficits   Skin: skin: no discoloration or lesions of concern on visible areas     Due to this being a TeleHealth evaluation, many elements of the physical examination are unable to be assessed.      Lab Results   Component Value Date/Time    Hemoglobin A1c 8.4 (H) 2021 11:18 AM    Hemoglobin A1c 6.2 (H) 2020 09:55 AM    Hemoglobin A1c 7.0 (H) 10/13/2020 10:20 AM    Hemoglobin A1c, External 9.7 08/10/2020 12:00 AM    Glucose 153 (H) 05/04/2021 11:18 AM    Glucose (POC) 141 (H) 12/07/2020 09:06 PM    Microalbumin/Creat ratio (mg/g creat) 278 (H) 02/11/2021 11:02 AM    Microalbumin,urine random 11.40 (H) 02/11/2021 11:02 AM    LDL, calculated 62.6 12/16/2020 09:55 AM    Creatinine 0.85 05/04/2021 11:18 AM      Lab Results   Component Value Date/Time    TSH 0.89 02/08/2021 10:45 AM    Triiodothyronine (T3), free 1.3 (L) 10/22/2018 11:00 AM    T4, Free 1.0 07/25/2019 11:05 AM         Assessment & Plan:   1. Type 2 diabetes mellitus with microalbuminuria, with long-term current use of insulin (HCC)  - most recent HbA1c 8.4%  - continue same meds  - continue with diet and regular exercise  - METABOLIC PANEL, BASIC; Future  - HEMOGLOBIN A1C WITH EAG; Future in 3 months  - insulin glargine (LANTUS,BASAGLAR) 100 unit/mL (3 mL) inpn; 60 Units by SubCUTAneous route nightly. Dispense: 15 Pen; Refill: 5  - insulin lispro (HumaLOG KwikPen Insulin) 100 unit/mL kwikpen; 12 Units by SubCUTAneous route Before breakfast, lunch, and dinner. 5 units SQ before snacks. Maximum daily dose: 50 units  Dispense: 15 Pen; Refill: 5    2. Hypercholesteremia  - controlled    3. Hypothyroidism due to acquired atrophy of thyroid  - stable    4. Bipolar 1 disorder, manic, full remission ([facility]  - stable  - defer to psychiatry    5. Recurrent depression ([address]  - stable  - managed by psychiatry      Follow-up and Dispositions    · Return in about 3 months (around 11/4/2021) for in-person, well woman exam, ROSONIDO. We discussed the expected course, resolution and complications of the diagnosis(es) in detail. Medication risks, benefits, costs, interactions, and alternatives were discussed as indicated. I advised her to contact the office if her condition worsens, changes or fails to improve as anticipated. She expressed understanding with the diagnosis(es) and plan.          Pursuant to the emergency declaration under the [facility] waiver authority and the [employer] Act, this Virtual  Visit was conducted, with patient's consent, to reduce the patient's risk of exposure to COVID-19 and provide continuity of care for an established patient. Services were provided through a video synchronous discussion virtually to substitute for in-person clinic visit. Wilman Pinzon MD     This is the Subsequent Medicare Annual Wellness Exam, performed 12 months or more after the Initial AWV or the last Subsequent AWV    I have reviewed the patient's medical history in detail and updated the computerized patient record. Assessment/Plan   Education and counseling provided:  End-of-Life planning (with patient's consent)  Diabetes outpatient self-management training services    1. Type 2 diabetes mellitus with microalbuminuria, with long-term current use of insulin (HCC)  -     METABOLIC PANEL, BASIC; Future  -     HEMOGLOBIN A1C WITH EAG; Future  -     insulin glargine (LANTUS,BASAGLAR) 100 unit/mL (3 mL) inpn; 60 Units by SubCUTAneous route nightly., Normal, Disp-15 Pen, R-5  2. Hypercholesteremia  3. Hypothyroidism due to acquired atrophy of thyroid  4. Bipolar 1 disorder, manic, full remission ([facility]  5. Recurrent depression ([facility]  6. Medicare annual wellness visit, subsequent  7. Type 2 diabetes mellitus without complication, unspecified whether long term insulin use (HCC)  -     insulin lispro (HumaLOG KwikPen Insulin) 100 unit/mL kwikpen; 12 Units by SubCUTAneous route Before breakfast, lunch, and dinner. 5 units SQ before snacks.  Maximum daily dose: 50 units, Normal, Disp-15 Pen, R-5       Depression Risk Factor Screening:     3 most recent PHQ Screens 9/18/2020   Little interest or pleasure in doing things Not at all   Feeling down, depressed, irritable, or hopeless Not at all   Total Score PHQ 2 0       Alcohol Risk Screen    Do you average more than 1 drink per night or more than 7 drinks a week:  No    On any one occasion in the past three months have you have had more than 3 drinks containing alcohol:  No        Functional Ability and Level of Safety:    Hearing: Hearing is good. Activities of Daily Living: The home contains: no safety equipment. Patient does total self care      Ambulation: with no difficulty     Fall Risk:  Fall Risk Assessment, last 12 mths 8/4/2021   Able to walk? Yes   Fall in past 12 months? 1   Do you feel unsteady? 0   Are you worried about falling 0   Is TUG test greater than 12 seconds? 0   Is the gait abnormal? 0   Number of falls in past 12 months 1   Fall with injury?  1      Abuse Screen:  Patient is not abused       Cognitive Screening    Has your family/caregiver stated any concerns about your memory: no    Cognitive Screening: Normal - Verbal Fluency Test    Health Maintenance Due     Health Maintenance Due   Topic Date Due    COVID-19 Vaccine (1) Never done    Low dose CT lung screening  Never done    PAP AKA CERVICAL CYTOLOGY  04/26/2019    Foot Exam Q1  01/31/2021    Breast Cancer Screen Mammogram  08/14/2021       Patient Care Team   Patient Care Team:  Tamika Mayen MD as PCP - General (Internal Medicine)  Tamika Mayen [facility]CP [facility]  Joel Harrell MD (Cardiology)  Gold Cramer MD (Ophthalmology)  LISA Paez as Physician Assistant (Psychiatry)    History     Patient Active Problem List   Diagnosis Code    Hypercholesteremia E78.00    Type 2 diabetes mellitus with microalbuminuria, with long-term current use of insulin ([facility] E11.29, R80.9, Z79.4    Tear of meniscus of left knee S83.207A    Recurrent depression ([facility] [facility] F33.9    Left low back pain M54.5    History of alcohol abuse F10.11    Lumbar degenerative disc disease M51.36    Hypothyroidism due to acquired atrophy of thyroid E03.4    Vitamin B12 deficiency E53.8    Bipolar 1 disorder, manic, full remission ([facility] F31.74    PTSD (post-traumatic stress disorder) F43.10    Type 2 diabetes mellitus with diabetic neuropathy (HCC) E11.40     Past Medical History:   Diagnosis Date    Arthritis     Chronic pain     Depression     Diabetes ([facility]     Hyperlipidemia     Hypertension     Hypothyroid     Medial meniscus tear     Left knee    Menopause     Psychiatric disorder     Tobacco abuse, in remission       Past Surgical History:   Procedure Laterality Date    HAND/FINGER SURGERY UNLISTED Left 1989    reconstructive surgery    HX ORTHOPAEDIC      Hand reconstructive surgery    HX TONSIL AND ADENOIDECTOMY      HX TUBAL LIGATION  2004    HX WISDOM TEETH EXTRACTION       Current Outpatient Medications   Medication Sig Dispense Refill    insulin glargine (LANTUS,BASAGLAR) 100 unit/mL (3 mL) inpn 60 Units by SubCUTAneous route nightly. 15 Pen 5    insulin lispro (HumaLOG KwikPen Insulin) 100 unit/mL kwikpen 12 Units by SubCUTAneous route Before breakfast, lunch, and dinner. 5 units SQ before snacks. Maximum daily dose: 50 units 15 Pen 5    multivit-min/iron/folic/lutein (CENTRUM SILVER WOMEN PO) Take  by mouth daily.       methocarbamoL (ROBAXIN) 750 mg tablet take 1 tablet by mouth every 8 hours if needed      lidocaine (LIDODERM) 5 % Apply 1 patch as directed for 12 hours every 24 hours (12 hours on, 12 hours off) 30 Each 0    etodolac (LODINE) 400 mg tablet take 1 tablet by mouth twice a day 40 Tab 0    flash glucose sensor (FreeStyle Jorge 14 Day Sensor) kit Use 4 times daily 6 Kit 3    omega-3 acid ethyl esters (LOVAZA) 1 gram capsule TAKE 2 CAPSULES BY MOUTH  TWO TIMES DAILY WITH MEALS 360 Cap 3    SITagliptin-metFORMIN (Janumet) 50-1,000 mg per tablet TAKE 1 TABLET BY MOUTH  TWICE A  Tab 3    Insulin Needles, Disposable, (BD Ultra-Fine Mini Pen Needle) 31 gauge x 3/16\" ndle Use 4 times daily 200 Pen Needle 3    oxybutynin chloride XL (DITROPAN XL) 10 mg CR tablet TAKE 1 TABLET BY MOUTH  DAILY 90 Tab 3    OLANZapine (ZyPREXA) 15 mg tablet take 1 tablet by mouth at bedtime      levothyroxine (SYNTHROID) 137 mcg tablet TAKE 1 TABLET BY MOUTH  DAILY BEFORE BREAKFAST 90 Tab 3    rosuvastatin (CRESTOR) 20 mg tablet TAKE 1 TABLET BY MOUTH  NIGHTLY 90 Tab 3    lidocaine (Lidocaine Viscous) 2 % solution Take 15 mL by mouth every four (4) hours as needed for Pain. 100 mL 0    cetirizine (ZyrTEC) 10 mg tablet Take 1 Tab by mouth nightly. Take one tablets by mouth daily for 7 days and then as needed after that. Restart forseven days if sinus congestion recurs. 30 Tab 0    benztropine (COGENTIN) 1 mg tablet       fexofenadine (ALLEGRA) 180 mg tablet Take 1 Tab by mouth daily as needed for Allergies. 90 Tab 3    melatonin tab tablet Take 10 mg by mouth nightly.  cyanocobalamin (VITAMIN B-12) 500 mcg tablet Take 500 mcg by mouth daily.  (Patient not taking: Reported on 6/2/2021)       Allergies   Allergen Reactions    Lipitor [Atorvastatin] Other (comments)     myalgias    Lisinopril Cough    Aspirin Other (comments)     Nose bleeds    Zocor [Simvastatin] Other (comments)     Causes pain in the left leg       Family History   Problem Relation Age of Onset    Alcohol abuse Mother     Arthritis-osteo Mother    Jewels Sapp Mother 61        breast cancer    Diabetes Mother     Hypertension Mother     Psychiatric Disorder Mother    [facility] Stroke Mother     Breast Cancer Mother 72    Alcohol abuse Father     Heart Disease Father     Diabetes Father     Hypertension Father     Lung Disease Father     Colon Cancer Father 72    Alcohol abuse Paternal Grandmother     Ovarian Cancer Sister 32    Alcohol abuse Sister     Diabetes Sister     Psychiatric Disorder Sister     Alcohol abuse Brother     Psychiatric Disorder Son     Psychiatric Disorder Other         nephew     Social History     Tobacco Use    Smoking status: Former Smoker     Packs/day: 1.00     Years: 35.00     Pack years: 35.00     Types: Cigarettes     Quit date: 2011     Years since quittin.7    Smokeless tobacco: Never Used    Tobacco comment: quit    Substance Use Topics    Alcohol use: No     Alcohol/week: 0.0 standard drinks     Comment: quit 10 years ago       Ben Guerrero, who was evaluated through a synchronous (real-time) audio-video encounter, and/or her healthcare decision maker, is aware that it is a billable service, with coverage as determined by her insurance carrier. She provided verbal consent to proceed: Yes, and patient identification was verified. It was conducted pursuant to the emergency declaration under the [address] [place] [address] authority and the [employer] Act. A caregiver was present when appropriate. Ability to conduct physical exam was limited. I was in the office. The patient was at home.     Luis Perez MD

## 2021-08-11 ENCOUNTER — TELEPHONE (OUTPATIENT)
Dept: FAMILY MEDICINE CLINIC | Age: 56
End: 2021-08-11

## 2021-08-11 DIAGNOSIS — E53.8 VITAMIN B12 DEFICIENCY: ICD-10-CM

## 2021-08-11 DIAGNOSIS — D64.9 MILD ANEMIA: ICD-10-CM

## 2021-08-11 DIAGNOSIS — E03.4 HYPOTHYROIDISM DUE TO ACQUIRED ATROPHY OF THYROID: Primary | ICD-10-CM

## 2021-08-11 NOTE — TELEPHONE ENCOUNTER
Pt. is requesting a call back regarding, TSH, B12, and iron levels. I think she is trying to get these labs done, but I'm not sure.  Please assist.

## 2021-08-13 ENCOUNTER — TELEPHONE (OUTPATIENT)
Dept: FAMILY MEDICINE CLINIC | Age: 56
End: 2021-08-13

## 2021-08-16 NOTE — TELEPHONE ENCOUNTER
Yes, pt. wants all the labs ordered, that were requested on 8/11, and she requests a call back.  Please assist.

## 2021-08-19 NOTE — TELEPHONE ENCOUNTER
Pt returned call, she would like TSH, b1, and iron added on to her  a1c and BMP due to feeling fatigued.

## 2021-08-20 ENCOUNTER — HOSPITAL ENCOUNTER (OUTPATIENT)
Dept: LAB | Age: 56
Discharge: HOME | End: 2021-08-20
Payer: MEDICARE

## 2021-08-20 ENCOUNTER — HOSPITAL ENCOUNTER (OUTPATIENT)
Dept: WOMENS IMAGING | Age: 56
Discharge: HOME | End: 2021-08-20
Payer: MEDICARE

## 2021-08-20 DIAGNOSIS — D64.9 MILD ANEMIA: ICD-10-CM

## 2021-08-20 DIAGNOSIS — E03.4 HYPOTHYROIDISM DUE TO ACQUIRED ATROPHY OF THYROID: ICD-10-CM

## 2021-08-20 DIAGNOSIS — Z12.31 VISIT FOR SCREENING MAMMOGRAM: ICD-10-CM

## 2021-08-20 DIAGNOSIS — E53.8 VITAMIN B12 DEFICIENCY: ICD-10-CM

## 2021-08-20 LAB
BASOPHILS # BLD: 0.1 K/UL (ref 0–0.1)
BASOPHILS NFR BLD: 1 % (ref 0–2)
DIFFERENTIAL METHOD BLD: ABNORMAL
EOSINOPHIL # BLD: 0.1 K/UL (ref 0–0.4)
EOSINOPHIL NFR BLD: 2 % (ref 0–5)
ERYTHROCYTE [DISTWIDTH] IN BLOOD BY AUTOMATED COUNT: 14 % (ref 11.6–14.5)
EST. AVERAGE GLUCOSE BLD GHB EST-MCNC: 192 MG/DL
FOLATE SERPL-MCNC: >20 NG/ML (ref 3.1–17.5)
HBA1C MFR BLD: 8.3 % (ref 4.2–5.6)
HCT VFR BLD AUTO: 36.7 % (ref 35–45)
HGB BLD-MCNC: 11.6 G/DL (ref 12–16)
IRON SATN MFR SERPL: 15 % (ref 20–50)
IRON SERPL-MCNC: 69 UG/DL (ref 50–175)
LYMPHOCYTES # BLD: 2.8 K/UL (ref 0.9–3.6)
LYMPHOCYTES NFR BLD: 40 % (ref 21–52)
MCH RBC QN AUTO: 26.5 PG (ref 24–34)
MCHC RBC AUTO-ENTMCNC: 31.6 G/DL (ref 31–37)
MCV RBC AUTO: 83.8 FL (ref 74–97)
MONOCYTES # BLD: 0.7 K/UL (ref 0.05–1.2)
MONOCYTES NFR BLD: 9 % (ref 3–10)
NEUTS SEG # BLD: 3.4 K/UL (ref 1.8–8)
NEUTS SEG NFR BLD: 48 % (ref 40–73)
PLATELET # BLD AUTO: 301 K/UL (ref 135–420)
PMV BLD AUTO: 11 FL (ref 9.2–11.8)
RBC # BLD AUTO: 4.38 M/UL (ref 4.2–5.3)
TIBC SERPL-MCNC: 457 UG/DL (ref 250–450)
TSH SERPL DL<=0.05 MIU/L-ACNC: 5.54 UIU/ML (ref 0.36–3.74)
VIT B12 SERPL-MCNC: 289 PG/ML (ref 211–911)
WBC # BLD AUTO: 7.2 K/UL (ref 4.6–13.2)

## 2021-08-20 PROCEDURE — 83036 HEMOGLOBIN GLYCOSYLATED A1C: CPT

## 2021-08-20 PROCEDURE — 36415 COLL VENOUS BLD VENIPUNCTURE: CPT

## 2021-08-20 PROCEDURE — 82746 ASSAY OF FOLIC ACID SERUM: CPT

## 2021-08-20 PROCEDURE — 83550 IRON BINDING TEST: CPT

## 2021-08-20 PROCEDURE — 77063 BREAST TOMOSYNTHESIS BI: CPT

## 2021-08-20 PROCEDURE — 84443 ASSAY THYROID STIM HORMONE: CPT

## 2021-08-20 PROCEDURE — 85025 COMPLETE CBC W/AUTO DIFF WBC: CPT

## 2021-10-14 ENCOUNTER — PATIENT OUTREACH (OUTPATIENT)
Dept: CASE MANAGEMENT | Age: 56
End: 2021-10-14

## 2021-10-14 NOTE — PROGRESS NOTES
Complex Case Management      Date/Time:  10/14/2021 1:40 PM    Method of communication with patient:phone    [facility] ([facility]) contacted the patient by telephone to perform Ambulatory Care Coordination. Verified name and  (PHI) with patient as identifiers. Provided introduction to self, and explanation of the Ambulatory Care Manager's role. [facility] follow up appointment(s):   Future Appointments   Date Time Provider Alka Cheema   2021 10:00 AM DMA, LAB DMA BS AMB   11/3/2021 10:45 AM Tressa King MD DMA BS AMB   2022 10:30 AM [address] [place] [facility] [address]      Patient declined CCM.

## 2021-11-02 ENCOUNTER — HOSPITAL ENCOUNTER (OUTPATIENT)
Dept: LAB | Age: 56
Discharge: HOME | End: 2021-11-02
Payer: MEDICARE

## 2021-11-02 DIAGNOSIS — Z79.4 TYPE 2 DIABETES MELLITUS WITH MICROALBUMINURIA, WITH LONG-TERM CURRENT USE OF INSULIN (HCC): Primary | ICD-10-CM

## 2021-11-02 DIAGNOSIS — R80.9 TYPE 2 DIABETES MELLITUS WITH MICROALBUMINURIA, WITH LONG-TERM CURRENT USE OF INSULIN (HCC): Primary | ICD-10-CM

## 2021-11-02 DIAGNOSIS — E03.4 HYPOTHYROIDISM DUE TO ACQUIRED ATROPHY OF THYROID: ICD-10-CM

## 2021-11-02 DIAGNOSIS — R80.9 TYPE 2 DIABETES MELLITUS WITH MICROALBUMINURIA, WITH LONG-TERM CURRENT USE OF INSULIN (HCC): ICD-10-CM

## 2021-11-02 DIAGNOSIS — E11.29 TYPE 2 DIABETES MELLITUS WITH MICROALBUMINURIA, WITH LONG-TERM CURRENT USE OF INSULIN (HCC): Primary | ICD-10-CM

## 2021-11-02 DIAGNOSIS — E11.29 TYPE 2 DIABETES MELLITUS WITH MICROALBUMINURIA, WITH LONG-TERM CURRENT USE OF INSULIN (HCC): ICD-10-CM

## 2021-11-02 DIAGNOSIS — Z79.4 TYPE 2 DIABETES MELLITUS WITH MICROALBUMINURIA, WITH LONG-TERM CURRENT USE OF INSULIN (HCC): ICD-10-CM

## 2021-11-02 LAB
ANION GAP SERPL CALC-SCNC: 4 MMOL/L (ref 3–18)
BUN SERPL-MCNC: 9 MG/DL (ref 7–18)
BUN/CREAT SERPL: 11 (ref 12–20)
CALCIUM SERPL-MCNC: 9.8 MG/DL (ref 8.5–10.1)
CHLORIDE SERPL-SCNC: 107 MMOL/L (ref 100–111)
CO2 SERPL-SCNC: 26 MMOL/L (ref 21–32)
CREAT SERPL-MCNC: 0.81 MG/DL (ref 0.6–1.3)
EST. AVERAGE GLUCOSE BLD GHB EST-MCNC: 177 MG/DL
GLUCOSE SERPL-MCNC: 240 MG/DL (ref 74–99)
HBA1C MFR BLD: 7.8 % (ref 4.2–5.6)
IRON SATN MFR SERPL: 17 % (ref 20–50)
IRON SERPL-MCNC: 85 UG/DL (ref 50–175)
POTASSIUM SERPL-SCNC: 4.1 MMOL/L (ref 3.5–5.5)
SODIUM SERPL-SCNC: 137 MMOL/L (ref 136–145)
TIBC SERPL-MCNC: 493 UG/DL (ref 250–450)
TSH SERPL DL<=0.05 MIU/L-ACNC: 2.89 UIU/ML (ref 0.36–3.74)
VIT B12 SERPL-MCNC: 543 PG/ML (ref 211–911)

## 2021-11-02 PROCEDURE — 80048 BASIC METABOLIC PNL TOTAL CA: CPT

## 2021-11-02 PROCEDURE — 84443 ASSAY THYROID STIM HORMONE: CPT

## 2021-11-02 PROCEDURE — 36415 COLL VENOUS BLD VENIPUNCTURE: CPT

## 2021-11-02 PROCEDURE — 83036 HEMOGLOBIN GLYCOSYLATED A1C: CPT

## 2021-11-02 PROCEDURE — 83540 ASSAY OF IRON: CPT

## 2021-11-02 PROCEDURE — 82607 VITAMIN B-12: CPT

## 2021-11-03 ENCOUNTER — OFFICE VISIT (OUTPATIENT)
Dept: FAMILY MEDICINE CLINIC | Age: 56
End: 2021-11-03
Payer: MEDICARE

## 2021-11-03 VITALS
HEART RATE: 89 BPM | WEIGHT: 199.6 LBS | DIASTOLIC BLOOD PRESSURE: 80 MMHG | HEIGHT: 66 IN | BODY MASS INDEX: 32.08 KG/M2 | SYSTOLIC BLOOD PRESSURE: 131 MMHG | OXYGEN SATURATION: 97 % | TEMPERATURE: 98.1 F

## 2021-11-03 DIAGNOSIS — E11.29 TYPE 2 DIABETES MELLITUS WITH MICROALBUMINURIA, WITH LONG-TERM CURRENT USE OF INSULIN (HCC): Primary | ICD-10-CM

## 2021-11-03 DIAGNOSIS — E78.00 HYPERCHOLESTEREMIA: ICD-10-CM

## 2021-11-03 DIAGNOSIS — R80.9 TYPE 2 DIABETES MELLITUS WITH MICROALBUMINURIA, WITH LONG-TERM CURRENT USE OF INSULIN (HCC): Primary | ICD-10-CM

## 2021-11-03 DIAGNOSIS — R29.6 RECURRENT FALLS WHILE WALKING: ICD-10-CM

## 2021-11-03 DIAGNOSIS — E03.4 HYPOTHYROIDISM DUE TO ACQUIRED ATROPHY OF THYROID: ICD-10-CM

## 2021-11-03 DIAGNOSIS — E53.8 VITAMIN B12 DEFICIENCY: ICD-10-CM

## 2021-11-03 DIAGNOSIS — Z79.4 TYPE 2 DIABETES MELLITUS WITH MICROALBUMINURIA, WITH LONG-TERM CURRENT USE OF INSULIN (HCC): Primary | ICD-10-CM

## 2021-11-03 PROCEDURE — G9899 SCRN MAM PERF RSLTS DOC: HCPCS | Performed by: INTERNAL MEDICINE

## 2021-11-03 PROCEDURE — 2022F DILAT RTA XM EVC RTNOPTHY: CPT | Performed by: INTERNAL MEDICINE

## 2021-11-03 PROCEDURE — 3017F COLORECTAL CA SCREEN DOC REV: CPT | Performed by: INTERNAL MEDICINE

## 2021-11-03 PROCEDURE — G8417 CALC BMI ABV UP PARAM F/U: HCPCS | Performed by: INTERNAL MEDICINE

## 2021-11-03 PROCEDURE — 99214 OFFICE O/P EST MOD 30 MIN: CPT | Performed by: INTERNAL MEDICINE

## 2021-11-03 PROCEDURE — G9717 DOC PT DX DEP/BP F/U NT REQ: HCPCS | Performed by: INTERNAL MEDICINE

## 2021-11-03 PROCEDURE — 3051F HG A1C>EQUAL 7.0%<8.0%: CPT | Performed by: INTERNAL MEDICINE

## 2021-11-03 PROCEDURE — G8427 DOCREV CUR MEDS BY ELIG CLIN: HCPCS | Performed by: INTERNAL MEDICINE

## 2021-11-03 RX ORDER — EXENATIDE 2 MG/.85ML
2 INJECTION, SUSPENSION, EXTENDED RELEASE SUBCUTANEOUS
Qty: 4 EACH | Refills: 2 | Status: SHIPPED | OUTPATIENT
Start: 2021-11-03 | End: 2022-04-29

## 2021-11-03 RX ORDER — ROSUVASTATIN CALCIUM 20 MG/1
TABLET, COATED ORAL
Qty: 90 TABLET | Refills: 3 | Status: SHIPPED | OUTPATIENT
Start: 2021-11-03

## 2021-11-03 RX ORDER — LEVOTHYROXINE SODIUM 137 UG/1
TABLET ORAL
Qty: 90 TABLET | Refills: 3 | Status: SHIPPED | OUTPATIENT
Start: 2021-11-03

## 2021-11-03 RX ORDER — ACETAMINOPHEN 325 MG/1
325 TABLET ORAL
COMMUNITY

## 2021-11-03 NOTE — PROGRESS NOTES
History of Present Illness  Shy Springer is a 60 y.o. female who presents today for management of    Chief Complaint   Patient presents with    Myalgia     7/10 back, neck, shoulders, knees, legs. fell 3 times in 6 months     Results     labs done yesterday     Other     insulin question        Recurrent Falls  Patient is here for evaluation of recurrent falls. Most recent fall occurred 3 months ago. At this time she complains of tingling on both hands and feet. Patient denies headache, visual change, hearing loss, numbness. She feels that it may be from her shoes. She has changed shoes since then. Associated symptoms: Unsteadiness: no; Use of medication that increases risk of falling: olanzapine; Presence of decreased or blurred vision: no; Muscle weakness: no; Dizziness or lightheadedness: no. Use of handrails or grab bars at home: yes. Diabetes Mellitus:  The patient has diabetes, hyperlipidemia and obesity. Diabetic ROS - medication compliance: compliant all of the time, diabetic diet compliance: compliant most of the time, home glucose monitoring: fasting values range 200s. Lab review: labs reviewed, I note that glycosylated hemoglobin abnormal 7.8%. Cardiovascular Review:  Diet and Lifestyle: generally follows a low fat low cholesterol diet, generally follows a low sodium diet, sedentary, nonsmoker  Home BP Monitoring: is not measured at home. Pertinent ROS: taking medications as instructed, no medication side effects noted, no TIA's, no chest pain on exertion, no dyspnea on exertion, no swelling of ankles. Thyroid Review:  Patient is seen for followup of hypothyroidism. Thyroid ROS: denies fatigue, weight changes, heat/cold intolerance, bowel/skin changes or CVS symptoms.     Problem List  Patient Active Problem List    Diagnosis Date Noted    Type 2 diabetes mellitus with diabetic neuropathy ([facility] 10/24/2019    Vitamin B12 deficiency 04/25/2019    Bipolar 1 disorder, manic, full remission ([facility] 04/25/2019    PTSD (post-traumatic stress disorder) 04/25/2019    Hypothyroidism due to acquired atrophy of thyroid 05/02/2016    Left low back pain 04/13/2016    History of alcohol abuse 04/13/2016    Lumbar degenerative disc disease 04/13/2016    Recurrent depression ([facility] 06/12/2013    Tear of meniscus of left knee 01/16/2013    Hypercholesteremia 01/02/2013    Type 2 diabetes mellitus with microalbuminuria, with long-term current use of insulin ([facility]) 01/02/2013       Current Medications  Current Outpatient Medications   Medication Sig    acetaminophen (TylenoL) 325 mg tablet Take 325 mg by mouth every four (4) hours as needed for Pain.  rosuvastatin (CRESTOR) 20 mg tablet TAKE 1 TABLET BY MOUTH  NIGHTLY    levothyroxine (SYNTHROID) 137 mcg tablet TAKE 1 TABLET BY MOUTH  DAILY BEFORE BREAKFAST    exenatide microspheres (Bydureon BCise) 2 mg/0.85 mL atIn 2 mg by SubCUTAneous route every seven (7) days.  insulin glargine (LANTUS,BASAGLAR) 100 unit/mL (3 mL) inpn 60 Units by SubCUTAneous route nightly.  insulin lispro (HumaLOG KwikPen Insulin) 100 unit/mL kwikpen 12 Units by SubCUTAneous route Before breakfast, lunch, and dinner. 5 units SQ before snacks. Maximum daily dose: 50 units    multivit-min/iron/folic/lutein (CENTRUM SILVER WOMEN PO) Take  by mouth daily.  flash glucose sensor (FreeStyle Jorge 14 Day Sensor) kit Use 4 times daily    omega-3 acid ethyl esters (LOVAZA) 1 gram capsule TAKE 2 CAPSULES BY MOUTH  TWO TIMES DAILY WITH MEALS    SITagliptin-metFORMIN (Janumet) 50-1,000 mg per tablet TAKE 1 TABLET BY MOUTH  TWICE A DAY    Insulin Needles, Disposable, (BD Ultra-Fine Mini Pen Needle) 31 gauge x 3/16\" ndle Use 4 times daily    OLANZapine (ZyPREXA) 15 mg tablet take 1 tablet by mouth at bedtime    benztropine (COGENTIN) 1 mg tablet     cyanocobalamin (VITAMIN B-12) 500 mcg tablet Take 500 mcg by mouth daily.     oxybutynin chloride XL (DITROPAN XL) 10 mg CR tablet TAKE 1 TABLET BY MOUTH  DAILY (Patient not taking: Reported on 11/3/2021)     No current facility-administered medications for this visit. Allergies/Drug Reactions  Allergies   Allergen Reactions    Lipitor [Atorvastatin] Other (comments)     myalgias    Lisinopril Cough    Aspirin Other (comments)     Nose bleeds    Zocor [Simvastatin] Other (comments)     Causes pain in the left leg        Review of Systems  Review of Systems   Constitutional: Negative. Respiratory: Negative. Cardiovascular: Negative. Gastrointestinal: Negative. Musculoskeletal: Positive for falls, joint pain and myalgias. Neurological: Positive for tingling. Psychiatric/Behavioral: Negative.          Physical Exam  Vital signs:   Vitals:    11/03/21 1105   BP: 131/80   Pulse: 89   Temp: 98.1 °F (36.7 °C)   TempSrc: Temporal   SpO2: 97%   Weight: 199 lb 9.6 oz (90.5 kg)   Height: 5' 6\" (1.676 m)       General: alert, oriented, not in distress  Head: scalp normal, atraumatic  Eyes: pupils are equal and reactive, full and intact EOM's  Neck: supple, no JVD, no lymphadenopathy, non-palpable thyroid  Chest/Lungs: clear breath sounds, no wheezing or crackles  Heart: normal rate, regular rhythm, no murmur  Extremities: no focal deformities, no edema  Skin: no active skin lesions  Neuro: AAOx3, gait normal but slow    Laboratory/Tests:  Lab Results   Component Value Date/Time    WBC 7.2 08/20/2021 09:18 AM    HGB 11.6 (L) 08/20/2021 09:18 AM    HCT 36.7 08/20/2021 09:18 AM    PLATELET 222 86/96/0093 09:18 AM    MCV 83.8 08/20/2021 09:18 AM     Lab Results   Component Value Date/Time    Hemoglobin A1c 7.8 (H) 11/02/2021 10:26 AM    Hemoglobin A1c 8.3 (H) 08/20/2021 09:18 AM    Hemoglobin A1c 8.4 (H) 05/04/2021 11:18 AM    Hemoglobin A1c, External 9.7 08/10/2020 12:00 AM    Glucose 240 (H) 11/02/2021 10:26 AM    Glucose (POC) 141 (H) 12/07/2020 09:06 PM    Microalbumin/Creat ratio (mg/g creat) 278 (H) 02/11/2021 11:02 AM Microalbumin,urine random 11.40 (H) 02/11/2021 11:02 AM    LDL, calculated 62.6 12/16/2020 09:55 AM    Creatinine 0.81 11/02/2021 10:26 AM      Lab Results   Component Value Date/Time    TSH 2.89 11/02/2021 10:26 AM    Triiodothyronine (T3), free 1.3 (L) 10/22/2018 11:00 AM    T4, Free 1.0 07/25/2019 11:05 AM         Assessment/Plan:    1. Recurrent falls while walking  - advised to start suing a cane  - avoid ill-fitting or elevated shoes  - Patient declined physical therapy at this time    2. Hypercholesteremia  - controlled  - rosuvastatin (CRESTOR) 20 mg tablet; TAKE 1 TABLET BY MOUTH  NIGHTLY  Dispense: 90 Tablet; Refill: 3    3. Hypothyroidism due to acquired atrophy of thyroid  - stable  - levothyroxine (SYNTHROID) 137 mcg tablet; TAKE 1 TABLET BY MOUTH  DAILY BEFORE BREAKFAST  Dispense: 90 Tablet; Refill: 3    4. Type 2 diabetes mellitus with microalbuminuria, with long-term current use of insulin (HCC)  - most recent HbA1c 7.8%  - start exenatide microspheres (Bydureon BCise) 2 mg/0.85 mL atIn; 2 mg by SubCUTAneous route every seven (7) days. Dispense: 4 Each; Refill: 2  - continue current dose of insulin, metformin and Januvia    5. Vitamin B12 deficiency  - improved      Follow-up and Dispositions    · Return in about 3 months (around 2/3/2022) for DM, HTN, HLD, establish care with a new PCP. I have discussed the diagnosis with the patient and the intended plan as seen in the above orders. The patient has received an after-visit summary and questions were answered concerning future plans. I have discussed medication side effects and warnings with the patient as well. I have reviewed the plan of care with the patient, accepted their input and they are in agreement with the treatment goals.        Max Salas MD  November 3, 2021

## 2021-11-03 NOTE — PROGRESS NOTES
1. Have you been to the ER, urgent care clinic since your last visit? Hospitalized since your last visit? No    2. Have you seen or consulted any other health care providers outside of the [address] [place] [address] since your last visit? Include any pap smears or colon screening.  No     Health Maintenance Due   Topic Date Due    Low dose CT lung screening  Never done    Foot Exam Q1  01/31/2021    Cervical cancer screen  04/26/2021     Pap smear done in sept/21

## 2021-11-29 ENCOUNTER — TELEPHONE (OUTPATIENT)
Dept: FAMILY MEDICINE CLINIC | Age: 56
End: 2021-11-29

## 2021-11-29 DIAGNOSIS — M51.36 LUMBAR DEGENERATIVE DISC DISEASE: Primary | ICD-10-CM

## 2021-11-30 RX ORDER — IBUPROFEN 800 MG/1
800 TABLET ORAL
COMMUNITY
End: 2021-11-30 | Stop reason: SDUPTHER

## 2021-11-30 NOTE — TELEPHONE ENCOUNTER
Patient was called back to ask for more information about requested medication refill. No answer, left message to call back. LOV 11/3/21.

## 2021-12-01 NOTE — TELEPHONE ENCOUNTER
Patient called back regarding refill. Patient stated that medication is for her muscle pain discussed in her last visit 11/7/21. Please advise.

## 2021-12-02 RX ORDER — IBUPROFEN 800 MG/1
800 TABLET ORAL
Qty: 45 TABLET | Refills: 0 | Status: SHIPPED | OUTPATIENT
Start: 2021-12-02 | End: 2022-01-12 | Stop reason: SDUPTHER

## 2021-12-22 DIAGNOSIS — E11.29 TYPE 2 DIABETES MELLITUS WITH MICROALBUMINURIA, WITH LONG-TERM CURRENT USE OF INSULIN (HCC): ICD-10-CM

## 2021-12-22 DIAGNOSIS — Z79.4 TYPE 2 DIABETES MELLITUS WITH MICROALBUMINURIA, WITH LONG-TERM CURRENT USE OF INSULIN (HCC): ICD-10-CM

## 2021-12-22 DIAGNOSIS — R80.9 TYPE 2 DIABETES MELLITUS WITH MICROALBUMINURIA, WITH LONG-TERM CURRENT USE OF INSULIN (HCC): ICD-10-CM

## 2021-12-23 RX ORDER — PEN NEEDLE, DIABETIC 31 GX3/16"
NEEDLE, DISPOSABLE MISCELLANEOUS
Qty: 200 PEN NEEDLE | Refills: 3 | Status: SHIPPED | OUTPATIENT
Start: 2021-12-23 | End: 2022-09-14 | Stop reason: SDUPTHER

## 2022-01-12 DIAGNOSIS — M51.36 LUMBAR DEGENERATIVE DISC DISEASE: ICD-10-CM

## 2022-01-12 NOTE — TELEPHONE ENCOUNTER
Patient called to request medication refill for Ibuprofen.   Please assist.    Future Appointments   Date Time Provider Alka Anette   1/17/2022 10:30 AM Andrea Centeno MD [facility]

## 2022-01-14 RX ORDER — IBUPROFEN 800 MG/1
800 TABLET ORAL
Qty: 45 TABLET | Refills: 0 | Status: SHIPPED | OUTPATIENT
Start: 2022-01-14

## 2022-01-20 ENCOUNTER — TELEPHONE (OUTPATIENT)
Dept: FAMILY MEDICINE CLINIC | Age: 57
End: 2022-01-20

## 2022-01-20 NOTE — TELEPHONE ENCOUNTER
----- Message from Spencer Samano sent at 1/19/2022  1:17 PM EST -----  Subject: Message to Provider    QUESTIONS  Information for Provider? PATIENT IS NOT YET ESTABLISHED WITH HER NEW PCP,   AS DR GUTIERREZ IS NO LONGER THERE, SHE IS HAVING BAD ACID REFLUX, NAUSEA   AND WOULD LIKE A PRESCRIPTION CALLED TO HER PHARMACY [facility]   [address] PLEASE CALL PATIENT ASAP, AS SHE FEELS THIS IS AN   EMERGENCY/URGANT CARE.   ---------------------------------------------------------------------------  --------------  CALL BACK INFO  What is the best way for the office to contact you? OK to leave message on   voicemail  Preferred Call Back Phone Number? [phone]  ---------------------------------------------------------------------------  --------------  SCRIPT ANSWERS  Relationship to Patient?  Self

## 2022-01-27 DIAGNOSIS — R80.9 TYPE 2 DIABETES MELLITUS WITH MICROALBUMINURIA, WITH LONG-TERM CURRENT USE OF INSULIN (HCC): ICD-10-CM

## 2022-01-27 DIAGNOSIS — E11.29 TYPE 2 DIABETES MELLITUS WITH MICROALBUMINURIA, WITH LONG-TERM CURRENT USE OF INSULIN (HCC): ICD-10-CM

## 2022-01-27 DIAGNOSIS — Z79.4 TYPE 2 DIABETES MELLITUS WITH MICROALBUMINURIA, WITH LONG-TERM CURRENT USE OF INSULIN (HCC): ICD-10-CM

## 2022-01-27 NOTE — TELEPHONE ENCOUNTER
Patient called to request medication refill. Please assist.    Requested Prescriptions     Pending Prescriptions Disp Refills    insulin glargine (LANTUS,BASAGLAR) 100 unit/mL (3 mL) inpn 15 Pen 5     Si Units by SubCUTAneous route nightly.        Future Appointments   Date Time Provider Alka Cheema   3/18/2022  2:30 PM Jaime Abraham MD [facility]

## 2022-01-29 RX ORDER — INSULIN GLARGINE 100 [IU]/ML
60 INJECTION, SOLUTION SUBCUTANEOUS
Qty: 15 PEN | Refills: 5 | Status: SHIPPED | OUTPATIENT
Start: 2022-01-29 | End: 2022-04-21 | Stop reason: SDUPTHER

## 2022-03-09 DIAGNOSIS — E78.00 HYPERCHOLESTEREMIA: ICD-10-CM

## 2022-03-09 DIAGNOSIS — E11.9 TYPE 2 DIABETES MELLITUS WITHOUT COMPLICATION (HCC): ICD-10-CM

## 2022-03-11 RX ORDER — SITAGLIPTIN AND METFORMIN HYDROCHLORIDE 50; 1000 MG/1; MG/1
TABLET, FILM COATED ORAL
Qty: 180 TABLET | Refills: 3 | Status: SHIPPED | OUTPATIENT
Start: 2022-03-11 | End: 2022-09-14 | Stop reason: SDUPTHER

## 2022-03-11 RX ORDER — OMEGA-3-ACID ETHYL ESTERS 1 G/1
CAPSULE, LIQUID FILLED ORAL
Qty: 360 CAPSULE | Refills: 3 | Status: SHIPPED | OUTPATIENT
Start: 2022-03-11

## 2022-03-15 DIAGNOSIS — N39.3 STRESS INCONTINENCE OF URINE: ICD-10-CM

## 2022-03-15 RX ORDER — OXYBUTYNIN CHLORIDE 10 MG/1
TABLET, EXTENDED RELEASE ORAL
Qty: 90 TABLET | Refills: 3 | Status: SHIPPED | OUTPATIENT
Start: 2022-03-15

## 2022-03-15 NOTE — TELEPHONE ENCOUNTER
Patient is requesting a refill for the following prescription.     Requested Prescriptions     Pending Prescriptions Disp Refills    oxybutynin chloride XL (DITROPAN XL) 10 mg CR tablet 90 Tablet 3     Sig: TAKE 1 TABLET BY MOUTH  DAILY     Future Appointments   Date Time Provider Alka Cheema   3/18/2022  2:30 PM Andrea Centeno MD [facility]

## 2022-03-18 ENCOUNTER — OFFICE VISIT (OUTPATIENT)
Dept: FAMILY MEDICINE CLINIC | Age: 57
End: 2022-03-18
Payer: MEDICARE

## 2022-03-18 VITALS
HEIGHT: 66 IN | DIASTOLIC BLOOD PRESSURE: 81 MMHG | TEMPERATURE: 98.1 F | RESPIRATION RATE: 16 BRPM | WEIGHT: 192.8 LBS | BODY MASS INDEX: 30.98 KG/M2 | OXYGEN SATURATION: 96 % | HEART RATE: 100 BPM | SYSTOLIC BLOOD PRESSURE: 117 MMHG

## 2022-03-18 DIAGNOSIS — E03.4 HYPOTHYROIDISM DUE TO ACQUIRED ATROPHY OF THYROID: ICD-10-CM

## 2022-03-18 DIAGNOSIS — E11.29 TYPE 2 DIABETES MELLITUS WITH MICROALBUMINURIA, WITH LONG-TERM CURRENT USE OF INSULIN (HCC): Primary | ICD-10-CM

## 2022-03-18 DIAGNOSIS — R80.9 TYPE 2 DIABETES MELLITUS WITH MICROALBUMINURIA, WITH LONG-TERM CURRENT USE OF INSULIN (HCC): Primary | ICD-10-CM

## 2022-03-18 DIAGNOSIS — F33.9 RECURRENT DEPRESSION (HCC): ICD-10-CM

## 2022-03-18 DIAGNOSIS — Z79.4 TYPE 2 DIABETES MELLITUS WITH MICROALBUMINURIA, WITH LONG-TERM CURRENT USE OF INSULIN (HCC): Primary | ICD-10-CM

## 2022-03-18 PROCEDURE — G8417 CALC BMI ABV UP PARAM F/U: HCPCS | Performed by: STUDENT IN AN ORGANIZED HEALTH CARE EDUCATION/TRAINING PROGRAM

## 2022-03-18 PROCEDURE — G9899 SCRN MAM PERF RSLTS DOC: HCPCS | Performed by: STUDENT IN AN ORGANIZED HEALTH CARE EDUCATION/TRAINING PROGRAM

## 2022-03-18 PROCEDURE — G9717 DOC PT DX DEP/BP F/U NT REQ: HCPCS | Performed by: STUDENT IN AN ORGANIZED HEALTH CARE EDUCATION/TRAINING PROGRAM

## 2022-03-18 PROCEDURE — 3017F COLORECTAL CA SCREEN DOC REV: CPT | Performed by: STUDENT IN AN ORGANIZED HEALTH CARE EDUCATION/TRAINING PROGRAM

## 2022-03-18 PROCEDURE — G8428 CUR MEDS NOT DOCUMENT: HCPCS | Performed by: STUDENT IN AN ORGANIZED HEALTH CARE EDUCATION/TRAINING PROGRAM

## 2022-03-18 PROCEDURE — 99214 OFFICE O/P EST MOD 30 MIN: CPT | Performed by: STUDENT IN AN ORGANIZED HEALTH CARE EDUCATION/TRAINING PROGRAM

## 2022-03-18 PROCEDURE — 2022F DILAT RTA XM EVC RTNOPTHY: CPT | Performed by: STUDENT IN AN ORGANIZED HEALTH CARE EDUCATION/TRAINING PROGRAM

## 2022-03-18 PROCEDURE — 3051F HG A1C>EQUAL 7.0%<8.0%: CPT | Performed by: STUDENT IN AN ORGANIZED HEALTH CARE EDUCATION/TRAINING PROGRAM

## 2022-03-18 RX ORDER — LOXAPINE SUCCINATE 10 MG/1
10 TABLET ORAL 2 TIMES DAILY
COMMUNITY
Start: 2022-03-08 | End: 2022-08-05

## 2022-03-18 NOTE — PROGRESS NOTES
Chief Complaint   Patient presents with    Establish Care    Ingrown Toenail     left big toe for a month     Diabetes     bs 235 fasting      1. Have you been to the ER, urgent care clinic since your last visit? Hospitalized since your last visit? No    2. Have you seen or consulted any other health care providers outside of the [address] [place] [address] since your last visit? Include any pap smears or colon screening. No     Health Maintenance Due   Topic Date Due    Low dose CT lung screening  Never done    Foot Exam Q1  01/31/2021    Cervical cancer screen  04/26/2021    Depression Monitoring  09/18/2021    Lipid Screen  12/16/2021    MICROALBUMIN Q1  02/11/2022   '  Pap smear done last September.

## 2022-03-18 NOTE — PROGRESS NOTES
Maikel Moran is a 60 y.o.  female and presents with    Chief Complaint   Patient presents with    Establish Care    Ingrown Toenail     left big toe for a month     Diabetes     bs 235 fasting            Subjective:  Pt is here to establish care    Feels like she has been more depressed. Feels like she needs to go back to her psychiatris. Dr. Mame Ortega - at [facility]. Has a hx of depression of schizophrenia. Has been feeling is more a mood part of it rather than hearing voices. Denies hurting herself or others. Diabetes  Taking medications as prescribed: YES  Currently on: Lantus 60 units PM, Humalog sliding scale QAD. Janumet  Checking blood sugars at home at home: YES  Symptoms: none  Diabetic diet: YES  Exercise: YES    Was taken of Zyprexa thinking this was an issue with DM. Changed med in January. Thyroid - takes medication as prescribed. Denies side effects from this.      Patient Active Problem List   Diagnosis Code    Hypercholesteremia E78.00    Type 2 diabetes mellitus with microalbuminuria, with long-term current use of insulin ([facility] [address] E11.29, R80.9, Z79.4    Tear of meniscus of left knee S83.207A    Recurrent depression (HCC) F33.9    Left low back pain M54.50    History of alcohol abuse F10.11    Lumbar degenerative disc disease M51.36    Hypothyroidism due to acquired atrophy of thyroid E03.4    Vitamin B12 deficiency E53.8    Bipolar 1 disorder, manic, full remission (HCC) F31.74    PTSD (post-traumatic stress disorder) F43.10    Type 2 diabetes mellitus with diabetic neuropathy (HCC) E11.40      Past Medical History:   Diagnosis Date    Arthritis     Chronic pain     Depression     Diabetes ([address]     Hyperlipidemia     Hypertension     Hypothyroid     Medial meniscus tear     Left knee    Menopause     Psychiatric disorder     Tobacco abuse, in remission       Past Surgical History:   Procedure Laterality Date    HAND/FINGER SURGERY UNLISTED Left 1989    reconstructive surgery    HX ORTHOPAEDIC      Hand reconstructive surgery    HX TONSIL AND ADENOIDECTOMY      HX TUBAL LIGATION  2004    HX WISDOM TEETH EXTRACTION        Family History   Problem Relation Age of Onset   [facility] Alcohol abuse Mother    Wil Haider Mother    Darline Ford Mother 61        breast cancer    Diabetes Mother     Hypertension Mother     Psychiatric Disorder Mother     Stroke Mother     Breast Cancer Mother 61    Alcohol abuse Father     Heart Disease Father    [facility] Diabetes Father     Hypertension Father     Lung Disease Father     Colon Cancer Father 72    Alcohol abuse Paternal Grandmother     Ovarian Cancer Sister 32    Alcohol abuse Sister     Diabetes Sister     Psychiatric Disorder Sister     Alcohol abuse Brother     Psychiatric Disorder Son     Psychiatric Disorder Other         nephew     Social History     Socioeconomic History    Marital status:      Spouse name: Not on file    Number of children: Not on file    Years of education: Not on file    Highest education level: Not on file   Occupational History    Not on file   Tobacco Use    Smoking status: Former Smoker     Packs/day: 1.00     Years: 35.00     Pack years: 35.00     Types: Cigarettes     Quit date: 11/7/2011     Years since quitting: 10.3    Smokeless tobacco: Never Used    Tobacco comment: quit 2010   Vaping Use    Vaping Use: Never used   Substance and Sexual Activity    Alcohol use: No     Alcohol/week: 0.0 standard drinks     Comment: quit 10 years ago    Drug use: No    Sexual activity: Yes     Partners: Male   Other Topics Concern     Service No    Blood Transfusions No    Caffeine Concern No    Occupational Exposure No    Hobby Hazards Not Asked    Sleep Concern Yes     Comment: takes sleeping pills    Stress Concern Not Asked    Weight Concern Yes    Special Diet Not Asked    Back Care Not Asked    Exercise Yes    Bike Helmet Not Asked   [place] Yes    Self-Exams Yes   Social History Narrative    Not on file     Social Determinants of Health     Financial Resource Strain:     Difficulty of Paying Living Expenses: Not on file   Food Insecurity:     Worried About Running Out of Food in the Last Year: Not on file    Ibis of Food in the Last Year: Not on file   Transportation Needs:     Lack of Transportation (Medical): Not on file    Lack of Transportation (Non-Medical): Not on file   Physical Activity:     Days of Exercise per Week: Not on file    Minutes of Exercise per Session: Not on file   Stress:     Feeling of Stress : Not on file   Social Connections:     Frequency of Communication with Friends and Family: Not on file    Frequency of Social Gatherings with Friends and Family: Not on file    Attends Christianity Services: Not on file    Active Member of [address] [place] [address] or Organizations: Not on file    Attends Club or Organization Meetings: Not on file    Marital Status: Not on file   Intimate Partner Violence:     Fear of Current or Ex-Partner: Not on file    Emotionally Abused: Not on file    Physically Abused: Not on file    Sexually Abused: Not on file   Housing Stability:     Unable to Pay for Housing in the Last Year: Not on file    Number of [place] in the Last Year: Not on file    Unstable Housing in the Last Year: Not on file        Current Outpatient Medications   Medication Sig Dispense Refill    loxapine (LOXITANE) 10 mg capsule Take 10 mg by mouth two (2) times a day.  oxybutynin chloride XL (DITROPAN XL) 10 mg CR tablet TAKE 1 TABLET BY MOUTH  DAILY 90 Tablet 3    SITagliptin-metFORMIN (Janumet) 50-1,000 mg per tablet TAKE 1 TABLET BY MOUTH  TWICE A  Tablet 3    omega-3 acid ethyl esters (LOVAZA) 1 gram capsule TAKE 2 CAPSULES BY MOUTH  TWO TIMES DAILY WITH MEALS 360 Capsule 3    insulin glargine (LANTUS,BASAGLAR) 100 unit/mL (3 mL) inpn 60 Units by SubCUTAneous route nightly.  15 Pen 5    Insulin Van, Disposable, (Droplet Pen Needle) 31 gauge x 3/16\" ndle use 1 PEN NEEDLE to inject MEDICATION subcutaneously four times a day 200 Pen Needle 3    acetaminophen (TylenoL) 325 mg tablet Take 325 mg by mouth every four (4) hours as needed for Pain.  rosuvastatin (CRESTOR) 20 mg tablet TAKE 1 TABLET BY MOUTH  NIGHTLY 90 Tablet 3    levothyroxine (SYNTHROID) 137 mcg tablet TAKE 1 TABLET BY MOUTH  DAILY BEFORE BREAKFAST 90 Tablet 3    insulin lispro (HumaLOG KwikPen Insulin) 100 unit/mL kwikpen 12 Units by SubCUTAneous route Before breakfast, lunch, and dinner. 5 units SQ before snacks. Maximum daily dose: 50 units 15 Pen 5    multivit-min/iron/folic/lutein (CENTRUM SILVER WOMEN PO) Take  by mouth daily.  flash glucose sensor (FreeStyle Jorge 14 Day Sensor) kit Use 4 times daily 6 Kit 3    benztropine (COGENTIN) 1 mg tablet       cyanocobalamin (VITAMIN B-12) 500 mcg tablet Take 500 mcg by mouth daily. 2 tablets daily      ibuprofen (MOTRIN) 800 mg tablet Take 1 Tablet by mouth every eight (8) hours as needed for Pain. (Patient not taking: Reported on 3/18/2022) 45 Tablet 0    exenatide microspheres (Bydureon BCise) 2 mg/0.85 mL atIn 2 mg by SubCUTAneous route every seven (7) days. 4 Each 2    OLANZapine (ZyPREXA) 15 mg tablet take 1 tablet by mouth at bedtime          ROS   Review of Systems   Constitutional: Negative for chills, fever and malaise/fatigue. HENT: Negative for congestion, ear discharge and ear pain. Eyes: Negative for blurred vision, pain and discharge. Respiratory: Negative for cough and shortness of breath. Cardiovascular: Negative for chest pain and palpitations. Gastrointestinal: Negative for abdominal pain, nausea and vomiting. Genitourinary: Negative for dysuria, frequency and urgency. Skin: Negative for itching and rash. Neurological: Negative for dizziness, seizures, loss of consciousness and headaches. Psychiatric/Behavioral: Negative for substance abuse. Objective:  Vitals:    03/18/22 1455   BP: 117/81   Pulse: 100   Resp: 16   Temp: 98.1 °F (36.7 °C)   TempSrc: Temporal   SpO2: 96%   Weight: 192 lb 12.8 oz (87.5 kg)   Height: 5' 6\" (1.676 m)   PainSc:   0 - No pain   LMP: 06/05/2014       Physical Exam  Vitals reviewed. Constitutional:       Appearance: Normal appearance. Eyes:      General: No scleral icterus. Right eye: No discharge. Left eye: No discharge. Cardiovascular:      Rate and Rhythm: Normal rate and regular rhythm. Pulmonary:      Effort: Pulmonary effort is normal. No respiratory distress. Breath sounds: Normal breath sounds. Musculoskeletal:      Cervical back: Normal range of motion and neck supple. Neurological:      Mental Status: She is alert and oriented to person, place, and time. Cranial Nerves: No cranial nerve deficit. Psychiatric:         Mood and Affect: Mood normal.         Behavior: Behavior normal.         Thought Content: Thought content normal.         Judgment: Judgment normal.           LABS     TESTS      Assessment/Plan:    1. Recurrent depression ([address]  F/u w/ psych    2. Type 2 diabetes mellitus with microalbuminuria, with long-term current use of insulin (HCC)  Continue  Lantus 60 units PM, Humalog sliding scale QAD. Janumet daily  - MICROALBUMIN, UR, RAND W/ MICROALB/CREAT RATIO; Future  - METABOLIC PANEL, COMPREHENSIVE; Future  - HEMOGLOBIN A1C WITH EAG; Future  - LIPID PANEL; Future    3. Hypothyroidism due to acquired atrophy of thyroid  Continue levothyroxine 137mcg daily  - TSH 3RD GENERATION; Future  - T4, FREE; Future    Lab review: orders written for new lab studies as appropriate; see orders      I have discussed the diagnosis with the patient and the intended plan as seen in the above orders. The patient has received an after-visit summary and questions were answered concerning future plans.   I have discussed medication side effects and warnings with the patient as well. I have reviewed the plan of care with the patient, accepted their input and they are in agreement with the treatment goals.          Rosi Garcia MD

## 2022-03-19 PROBLEM — E11.40 TYPE 2 DIABETES MELLITUS WITH DIABETIC NEUROPATHY (HCC): Status: ACTIVE | Noted: 2019-10-24

## 2022-03-19 PROBLEM — E53.8 VITAMIN B12 DEFICIENCY: Status: ACTIVE | Noted: 2019-04-25

## 2022-03-19 PROBLEM — F43.10 PTSD (POST-TRAUMATIC STRESS DISORDER): Status: ACTIVE | Noted: 2019-04-25

## 2022-03-20 PROBLEM — F31.74 BIPOLAR 1 DISORDER, MANIC, FULL REMISSION (HCC): Status: ACTIVE | Noted: 2019-04-25

## 2022-03-21 ENCOUNTER — CLINICAL SUPPORT (OUTPATIENT)
Dept: FAMILY MEDICINE CLINIC | Age: 57
End: 2022-03-21

## 2022-03-21 ENCOUNTER — HOSPITAL ENCOUNTER (OUTPATIENT)
Dept: LAB | Age: 57
Discharge: HOME | End: 2022-03-21
Payer: MEDICARE

## 2022-03-21 DIAGNOSIS — E03.4 HYPOTHYROIDISM DUE TO ACQUIRED ATROPHY OF THYROID: ICD-10-CM

## 2022-03-21 DIAGNOSIS — R80.9 TYPE 2 DIABETES MELLITUS WITH MICROALBUMINURIA, WITH LONG-TERM CURRENT USE OF INSULIN (HCC): Primary | ICD-10-CM

## 2022-03-21 DIAGNOSIS — Z79.4 TYPE 2 DIABETES MELLITUS WITH MICROALBUMINURIA, WITH LONG-TERM CURRENT USE OF INSULIN (HCC): ICD-10-CM

## 2022-03-21 DIAGNOSIS — E11.29 TYPE 2 DIABETES MELLITUS WITH MICROALBUMINURIA, WITH LONG-TERM CURRENT USE OF INSULIN (HCC): Primary | ICD-10-CM

## 2022-03-21 DIAGNOSIS — Z79.4 TYPE 2 DIABETES MELLITUS WITH MICROALBUMINURIA, WITH LONG-TERM CURRENT USE OF INSULIN (HCC): Primary | ICD-10-CM

## 2022-03-21 DIAGNOSIS — R80.9 TYPE 2 DIABETES MELLITUS WITH MICROALBUMINURIA, WITH LONG-TERM CURRENT USE OF INSULIN (HCC): ICD-10-CM

## 2022-03-21 DIAGNOSIS — E11.29 TYPE 2 DIABETES MELLITUS WITH MICROALBUMINURIA, WITH LONG-TERM CURRENT USE OF INSULIN (HCC): ICD-10-CM

## 2022-03-21 LAB
ALBUMIN SERPL-MCNC: 4.3 G/DL (ref 3.4–5)
ALBUMIN/GLOB SERPL: 1.2 {RATIO} (ref 0.8–1.7)
ALP SERPL-CCNC: 68 U/L (ref 45–117)
ALT SERPL-CCNC: 44 U/L (ref 13–56)
ANION GAP SERPL CALC-SCNC: 6 MMOL/L (ref 3–18)
AST SERPL-CCNC: 26 U/L (ref 10–38)
BILIRUB SERPL-MCNC: 0.5 MG/DL (ref 0.2–1)
BUN SERPL-MCNC: 8 MG/DL (ref 7–18)
BUN/CREAT SERPL: 10 (ref 12–20)
CALCIUM SERPL-MCNC: 10.6 MG/DL (ref 8.5–10.1)
CHLORIDE SERPL-SCNC: 104 MMOL/L (ref 100–111)
CHOLEST SERPL-MCNC: 137 MG/DL
CO2 SERPL-SCNC: 27 MMOL/L (ref 21–32)
CREAT SERPL-MCNC: 0.8 MG/DL (ref 0.6–1.3)
CREAT UR-MCNC: 38 MG/DL (ref 30–125)
EST. AVERAGE GLUCOSE BLD GHB EST-MCNC: 163 MG/DL
GLOBULIN SER CALC-MCNC: 3.7 G/DL (ref 2–4)
GLUCOSE SERPL-MCNC: 175 MG/DL (ref 74–99)
HBA1C MFR BLD: 7.3 % (ref 4.2–5.6)
HDLC SERPL-MCNC: 37 MG/DL (ref 40–60)
HDLC SERPL: 3.7 {RATIO} (ref 0–5)
LDLC SERPL CALC-MCNC: 51.4 MG/DL (ref 0–100)
LIPID PROFILE,FLP: ABNORMAL
MICROALBUMIN UR-MCNC: 7.5 MG/DL (ref 0–3)
MICROALBUMIN/CREAT UR-RTO: 197 MG/G (ref 0–30)
POTASSIUM SERPL-SCNC: 4.7 MMOL/L (ref 3.5–5.5)
PROT SERPL-MCNC: 8 G/DL (ref 6.4–8.2)
SODIUM SERPL-SCNC: 137 MMOL/L (ref 136–145)
T4 FREE SERPL-MCNC: 1.3 NG/DL (ref 0.7–1.5)
TRIGL SERPL-MCNC: 243 MG/DL (ref ?–150)
TSH SERPL DL<=0.05 MIU/L-ACNC: 2.41 UIU/ML (ref 0.36–3.74)
VLDLC SERPL CALC-MCNC: 48.6 MG/DL

## 2022-03-21 PROCEDURE — 83036 HEMOGLOBIN GLYCOSYLATED A1C: CPT

## 2022-03-21 PROCEDURE — 84443 ASSAY THYROID STIM HORMONE: CPT

## 2022-03-21 PROCEDURE — 80061 LIPID PANEL: CPT

## 2022-03-21 PROCEDURE — 36415 COLL VENOUS BLD VENIPUNCTURE: CPT

## 2022-03-21 PROCEDURE — 80053 COMPREHEN METABOLIC PANEL: CPT

## 2022-03-21 PROCEDURE — 82043 UR ALBUMIN QUANTITATIVE: CPT

## 2022-03-21 PROCEDURE — 84439 ASSAY OF FREE THYROXINE: CPT

## 2022-03-21 NOTE — PROGRESS NOTES
Dr. Moira Rhodes checked average 187 and  glucose readings from freestyle reader sensor. Insulin Lantus was increased to 65 units daily.

## 2022-04-21 ENCOUNTER — OFFICE VISIT (OUTPATIENT)
Dept: FAMILY MEDICINE CLINIC | Age: 57
End: 2022-04-21
Payer: MEDICARE

## 2022-04-21 VITALS
HEIGHT: 66 IN | SYSTOLIC BLOOD PRESSURE: 117 MMHG | WEIGHT: 190 LBS | HEART RATE: 97 BPM | TEMPERATURE: 97.3 F | RESPIRATION RATE: 16 BRPM | BODY MASS INDEX: 30.53 KG/M2 | DIASTOLIC BLOOD PRESSURE: 75 MMHG | OXYGEN SATURATION: 99 %

## 2022-04-21 DIAGNOSIS — R80.9 TYPE 2 DIABETES MELLITUS WITH MICROALBUMINURIA, WITH LONG-TERM CURRENT USE OF INSULIN (HCC): ICD-10-CM

## 2022-04-21 DIAGNOSIS — Z09 HOSPITAL DISCHARGE FOLLOW-UP: ICD-10-CM

## 2022-04-21 DIAGNOSIS — S39.012D STRAIN OF LUMBAR REGION, SUBSEQUENT ENCOUNTER: Primary | ICD-10-CM

## 2022-04-21 DIAGNOSIS — E11.29 TYPE 2 DIABETES MELLITUS WITH MICROALBUMINURIA, WITH LONG-TERM CURRENT USE OF INSULIN (HCC): ICD-10-CM

## 2022-04-21 DIAGNOSIS — Z79.4 TYPE 2 DIABETES MELLITUS WITH MICROALBUMINURIA, WITH LONG-TERM CURRENT USE OF INSULIN (HCC): ICD-10-CM

## 2022-04-21 DIAGNOSIS — E11.9 TYPE 2 DIABETES MELLITUS WITHOUT COMPLICATION, UNSPECIFIED WHETHER LONG TERM INSULIN USE (HCC): ICD-10-CM

## 2022-04-21 PROCEDURE — 3051F HG A1C>EQUAL 7.0%<8.0%: CPT | Performed by: NURSE PRACTITIONER

## 2022-04-21 PROCEDURE — 99214 OFFICE O/P EST MOD 30 MIN: CPT | Performed by: NURSE PRACTITIONER

## 2022-04-21 RX ORDER — CYCLOBENZAPRINE HCL 10 MG
10 TABLET ORAL
Qty: 12 TABLET | Refills: 0 | Status: SHIPPED | OUTPATIENT
Start: 2022-04-21 | End: 2022-05-01

## 2022-04-21 RX ORDER — LIDOCAINE 50 MG/G
PATCH TOPICAL EVERY 24 HOURS
COMMUNITY
End: 2022-04-21 | Stop reason: SDUPTHER

## 2022-04-21 RX ORDER — INSULIN LISPRO 100 [IU]/ML
12 INJECTION, SOLUTION INTRAVENOUS; SUBCUTANEOUS
Qty: 15 PEN | Refills: 5 | Status: CANCELLED | OUTPATIENT
Start: 2022-04-21

## 2022-04-21 RX ORDER — LIDOCAINE 50 MG/G
1 PATCH TOPICAL EVERY 24 HOURS
Qty: 15 EACH | Refills: 0 | Status: SHIPPED | OUTPATIENT
Start: 2022-04-21 | End: 2022-05-18 | Stop reason: SDUPTHER

## 2022-04-21 RX ORDER — SERTRALINE HYDROCHLORIDE 50 MG/1
50 TABLET, FILM COATED ORAL DAILY
COMMUNITY
Start: 2022-04-19 | End: 2022-08-05

## 2022-04-21 RX ORDER — INSULIN GLARGINE 100 [IU]/ML
65 INJECTION, SOLUTION SUBCUTANEOUS
Qty: 15 PEN | Refills: 5 | Status: SHIPPED | OUTPATIENT
Start: 2022-04-21 | End: 2022-05-18 | Stop reason: ALTCHOICE

## 2022-04-21 RX ORDER — ACETAMINOPHEN 500 MG
TABLET ORAL
COMMUNITY
Start: 2022-04-19 | End: 2022-07-05 | Stop reason: SDUPTHER

## 2022-04-21 RX ORDER — DICLOFENAC SODIUM 10 MG/G
2 GEL TOPICAL 4 TIMES DAILY
COMMUNITY
Start: 2022-04-17 | End: 2022-08-05

## 2022-04-21 RX ORDER — CYCLOBENZAPRINE HCL 10 MG
10 TABLET ORAL
COMMUNITY
Start: 2022-04-17 | End: 2022-04-21 | Stop reason: SDUPTHER

## 2022-04-21 RX ORDER — SERTRALINE HYDROCHLORIDE 25 MG/1
25 TABLET, FILM COATED ORAL DAILY
COMMUNITY
Start: 2022-03-23 | End: 2022-08-05

## 2022-04-21 NOTE — PROGRESS NOTES
John Rodrigues is a 59 y.o. female (: 1965) presenting to address:    Chief Complaint   Patient presents with    Back Pain       There were no vitals filed for this visit. Hearing/Vision:   No exam data present    Learning Assessment:     Learning Assessment 3/18/2022   PRIMARY LEARNER Patient   HIGHEST LEVEL OF EDUCATION - PRIMARY LEARNER  GRADUATED HIGH SCHOOL OR GED   BARRIERS PRIMARY LEARNER NONE   CO-LEARNER CAREGIVER -   CO-LEARNER NAME -   PRIMARY LANGUAGE ENGLISH   LEARNER PREFERENCE PRIMARY LISTENING     -   ANSWERED BY patient    RELATIONSHIP SELF     Depression Screening:     3 most recent PHQ Screens 2022   Little interest or pleasure in doing things Not at all   Feeling down, depressed, irritable, or hopeless Not at all   Total Score PHQ 2 0     Fall Risk Assessment:     Fall Risk Assessment, last 12 mths 2021   Able to walk? Yes   Fall in past 12 months? 1   Do you feel unsteady? 0   Are you worried about falling 0   Is TUG test greater than 12 seconds? 0   Is the gait abnormal? 0   Number of falls in past 12 months 1   Fall with injury? 1     Abuse Screening:     Abuse Screening Questionnaire 2022   Do you ever feel afraid of your partner? N   Are you in a relationship with someone who physically or mentally threatens you? N   Is it safe for you to go home? Y     ADL Assessment:   No flowsheet data found. Coordination of Care Questionaire:   1. \"Have you been to the ER, urgent care clinic since your last visit? Hospitalized since your last visit? \" yes Neeru Barron for back pain     2. \"Have you seen or consulted any other health care providers outside of the [address] [place] [address] [place] since your last visit? \" No     3. For patients aged 39-70: Has the patient had a colonoscopy? Yes - no Care Gap present     If the patient is female:    4. For patients aged 41-77: Has the patient had a mammogram within the past 2 years? Yes - no Care Gap present    5.  For patients aged 21-65: Has the patient had a pap smear? Yes - no Care Gap present    Advanced Directive:   1. Do you have an Advanced Directive? NO    2. Would you like information on Advanced Directives?  NO

## 2022-04-21 NOTE — PROGRESS NOTES
OFFICE NOTE    Jarrod Olivares is a 59 y.o. female presenting today for office visit. 4/21/2022  7:48 AM    Chief Complaint   Patient presents with    Back Pain     HPI:   Dr. Asif Kohler patient. Patient says she went to [facility] for a left lumber strain last Sunday. She was given Flexeril, diclofenac gel, Tyenlol and lidocaine. She was ordered PT and neurology. Reviewed notes and imaging. She says she fell 3 times last year and thinks this what started her back trouble. Her mattress of \"kind of new. \" She had an xray too and normal. She has declined physical therapy. She says she has had PT before got her back. She asked for Tylenol 3 and I declined. She has type 2 diabetes and needs her lantus 65 units nightly. Patient reports appetite is good, no urinary issues, sleeps well and regular bowel movements. Patient denies fever, chills, chest pain, shortness of breath, abdomen pain or dark tarry stool. ROS:    · General: negative for - chills, fever, weight changes or malaise  · HEENT: no sore throat, nasal congestion, vision problems or ear problems  · Respiratory: no cough, shortness of breath, or wheezing  · Cardiovascular: no chest pain, palpitations, or dyspnea on exertion  · Gastrointestinal: no abdominal pain, N/V, change in bowel habits, or black or bloody stools  · Musculoskeletal: + for right lumbar pain  · Neurological: no numbness, tingling, headache or dizziness  · Endo:  No polyuria or polydipsia. · : no hematuria, dysuria, frequency, hesitancy, or nocturia.     · Skin: No itching or rash, no open skin, no unusual lesions   · Psychological: negative for - anxiety, depression, sleep disturbances, suicidal or homicidal ideations     PHQ Screening   3 most recent PHQ Screens 4/21/2022   Little interest or pleasure in doing things Not at all   Feeling down, depressed, irritable, or hopeless Not at all   Total Score PHQ 2 0       History  Past Medical History:   Diagnosis Date  Arthritis     Chronic pain     Depression     Diabetes ([facility] [address]     Hyperlipidemia     Hypertension     Hypothyroid     Medial meniscus tear     Left knee    Menopause     Psychiatric disorder     Tobacco abuse, in remission        Past Surgical History:   Procedure Laterality Date    HAND/FINGER SURGERY UNLISTED Left 1989    reconstructive surgery    HX ORTHOPAEDIC      Hand reconstructive surgery    HX TONSIL AND ADENOIDECTOMY      HX TUBAL LIGATION  2004    HX WISDOM TEETH EXTRACTION         Social History     Socioeconomic History    Marital status:      Spouse name: Not on file    Number of children: Not on file    Years of education: Not on file    Highest education level: Not on file   Occupational History    Not on file   Tobacco Use    Smoking status: Former Smoker     Packs/day: 1.00     Years: 35.00     Pack years: 35.00     Types: Cigarettes     Quit date: 11/7/2011     Years since quitting: 10.4    Smokeless tobacco: Never Used    Tobacco comment: quit 2010   Vaping Use    Vaping Use: Never used   Substance and Sexual Activity    Alcohol use: No     Alcohol/week: 0.0 standard drinks     Comment: quit 10 years ago    Drug use: No    Sexual activity: Yes     Partners: Male   Other Topics Concern     Service No    Blood Transfusions No    Caffeine Concern No    Occupational Exposure No    Hobby Hazards Not Asked    Sleep Concern Yes     Comment: takes sleeping pills    Stress Concern Not Asked    Weight Concern Yes    Special Diet Not Asked    Back Care Not Asked    Exercise Yes    Bike Helmet Not Asked    Seat Belt Yes    Self-Exams Yes   Social History Narrative    Not on file     Social Determinants of Health     Financial Resource Strain:     Difficulty of Paying Living Expenses: Not on file   Food Insecurity:     Worried About Running Out of Food in the Last Year: Not on file    Ibis of Food in the Last Year: Not on LENI [place] Needs:     Lack of Transportation (Medical): Not on file    Lack of Transportation (Non-Medical): Not on file   Physical Activity:     Days of Exercise per Week: Not on file    Minutes of Exercise per Session: Not on file   Stress:     Feeling of Stress : Not on file   Social Connections:     Frequency of Communication with Friends and Family: Not on file    Frequency of Social Gatherings with Friends and Family: Not on file    Attends Nondenominational Services: Not on file    Active Member of [address] [place] [address] or Organizations: Not on file    Attends Club or Organization Meetings: Not on file    Marital Status: Not on file   Intimate Partner Violence:     Fear of Current or Ex-Partner: Not on file    Emotionally Abused: Not on file    Physically Abused: Not on file    Sexually Abused: Not on file   Housing Stability:     Unable to Pay for Housing in the Last Year: Not on file    Number of [place] in the Last Year: Not on file    Unstable Housing in the Last Year: Not on file       Allergies   Allergen Reactions    Lipitor [Atorvastatin] Other (comments)     myalgias    Lisinopril Cough    Aspirin Other (comments)     Nose bleeds    Zocor [Simvastatin] Other (comments)     Causes pain in the left leg       Current Outpatient Medications   Medication Sig Dispense Refill    acetaminophen (TYLENOL) 500 mg tablet take 2 tablets by mouth every 6 hours for 10 days      sertraline (ZOLOFT) 50 mg tablet Take 50 mg by mouth daily.  sertraline (ZOLOFT) 25 mg tablet Take 25 mg by mouth daily.  diclofenac (VOLTAREN) 1 % gel Apply 2 g to affected area four (4) times daily.  insulin glargine (LANTUS,BASAGLAR) 100 unit/mL (3 mL) inpn 65 Units by SubCUTAneous route nightly. 15 Pen 5    lidocaine (LIDODERM) 5 % 1 Patch by TransDERmal route every twenty-four (24) hours. Apply patch to the affected area for 12 hours a day and remove for 12 hours a day.  15 Each 0    cyclobenzaprine (FLEXERIL) 10 mg tablet Take 1 Tablet by mouth every eight (8) hours as needed (muscle strain) for up to 10 days. 12 Tablet 0    loxapine (LOXITANE) 10 mg capsule Take 10 mg by mouth two (2) times a day.  oxybutynin chloride XL (DITROPAN XL) 10 mg CR tablet TAKE 1 TABLET BY MOUTH  DAILY 90 Tablet 3    SITagliptin-metFORMIN (Janumet) 50-1,000 mg per tablet TAKE 1 TABLET BY MOUTH  TWICE A  Tablet 3    omega-3 acid ethyl esters (LOVAZA) 1 gram capsule TAKE 2 CAPSULES BY MOUTH  TWO TIMES DAILY WITH MEALS 360 Capsule 3    ibuprofen (MOTRIN) 800 mg tablet Take 1 Tablet by mouth every eight (8) hours as needed for Pain. 45 Tablet 0    Insulin Needles, Disposable, (Droplet Pen Needle) 31 gauge x 3/16\" ndle use 1 PEN NEEDLE to inject MEDICATION subcutaneously four times a day 200 Pen Needle 3    acetaminophen (TylenoL) 325 mg tablet Take 325 mg by mouth every four (4) hours as needed for Pain.  rosuvastatin (CRESTOR) 20 mg tablet TAKE 1 TABLET BY MOUTH  NIGHTLY 90 Tablet 3    levothyroxine (SYNTHROID) 137 mcg tablet TAKE 1 TABLET BY MOUTH  DAILY BEFORE BREAKFAST 90 Tablet 3    insulin lispro (HumaLOG KwikPen Insulin) 100 unit/mL kwikpen 12 Units by SubCUTAneous route Before breakfast, lunch, and dinner. 5 units SQ before snacks. Maximum daily dose: 50 units 15 Pen 5    multivit-min/iron/folic/lutein (CENTRUM SILVER WOMEN PO) Take  by mouth daily.  flash glucose sensor (FreeStyle Jorge 14 Day Sensor) kit Use 4 times daily 6 Kit 3    benztropine (COGENTIN) 1 mg tablet       exenatide microspheres (Bydureon BCise) 2 mg/0.85 mL atIn 2 mg by SubCUTAneous route every seven (7) days. 4 Each 2    OLANZapine (ZyPREXA) 15 mg tablet take 1 tablet by mouth at bedtime      cyanocobalamin (VITAMIN B-12) 500 mcg tablet Take 500 mcg by mouth daily.  2 tablets daily (Patient not taking: Reported on 4/21/2022)       Patient Care Team:  Patient Care Team:  Saadia Hayward MD as PCP - General (Family Medicine)  Elia Sosa Phong Swanson MD as PCP - [facility]  Torrie Griffiths MD (Cardiology)  Jesi Rodrigues MD (Ophthalmology)  LISA Croft as Physician Assistant (Psychiatry)  Xavier Chapin MD (Obstetrics & Gynecology)     4/17/2022 Lumbar Spine  FINDINGS:     No acute displaced fracture or traumatic subluxation. If symptoms persist or worsen, cross-sectional imaging is available as needed. At least mild multilevel degenerative change including facet arthropathy and probable lower lumbar spine and probable lower lumbar spine neural foramina narrowing. Disc space loss L5-S1. Visualized sacroiliac joints are grossly intact. IMPRESSION     Degenerative change without acute displaced fracture. Physical Exam  Patient appears well, she is pleasant, alert, oriented x 3, in no distress. ENT normal.  Neck supple. No adenopathy or thyromegaly. RAYNA. Lungs are clear, good air entry, no wheezes, rhonchi or rales. Cardiovascular, S1 and S2 normal, no murmurs, regular rate and rhythm. No LAD. Chest wall negative for tenderness  Abdomen is soft without tenderness  /Anorectal, deferred. Muscleskeletal, no swelling  Extremities show no edema  Neurological is normal without focal findings. Skin: no concerning lesions. Psych: normal affect. Mood good. Oriented x 3. Judgement and insight intact. Vitals:    04/21/22 0841   BP: 117/75   Pulse: 97   Resp: 16   Temp: 97.3 °F (36.3 °C)   SpO2: 99%   Weight: 190 lb (86.2 kg)   Height: 5' 6\" (1.676 m)   LMP: 06/05/2014       Assessment and Plan    Diagnoses and all orders for this visit:    Strain of lumbar region, subsequent encounter  -     lidocaine (LIDODERM) 5 %; 1 Patch by TransDERmal route every twenty-four (24) hours. Apply patch to the affected area for 12 hours a day and remove for 12 hours a day., Normal, Disp-15 Each, R-0  -     cyclobenzaprine (FLEXERIL) 10 mg tablet;  Take 1 Tablet by mouth every eight (8) hours as needed (muscle strain) for up to 10 days. , Normal, Disp-12 Tablet, R-0    Type 2 diabetes mellitus without complication, unspecified whether long term insulin use ([facility] [address]    Type 2 diabetes mellitus with microalbuminuria, with long-term current use of insulin (HCC)  -     insulin glargine (LANTUS,BASAGLAR) 100 unit/mL (3 mL) inpn; 65 Units by SubCUTAneous route nightly., Normal, Disp-15 Pen, R-5    Hospital discharge follow-up         *Plan of care reviewed with patient. Patient in agreement with plan and expresses understanding. All questions answered and patient encouraged to call or RTO if further questions or concerns. 50% of 30 minutes spent on counseling and managing her care. Follow-up and Dispositions    · Return if symptoms worsen or fail to improve, for 3 mos follow up with your PCP, she has appt 6/21/2022.        MERCEDES BarryC

## 2022-04-21 NOTE — PATIENT INSTRUCTIONS
- May use [Ibuprofen AND Tylenol] together OR [Naproxen AND Tylenol] together.   - DO NOT use Ibuprofen AND Naproxen together.   - Ibuprofen dosage: May take 600mg ibuprofen every 6-8 hrs as needed for pain.   - Naproxen dosage: May take 500 mg Naproxen every 12 hours as needed for pain. - Tylenol dosage: May use 500 mg Tylenol every 4 hrs as needed for breakthrough pain. Can take up to 1000 mg of tylenol at one time but cannot exceed 4000 mg in one day. -If prescribed muscle relaxer, do not take them prior to driving or operating heavy machinery (causes drowsiness)    -If using patches, apply them for 12 hours on, THEN 12 hours off. DO NOT APPLY HEAT DIRECTLY ATOP PATCHES.

## 2022-05-18 ENCOUNTER — VIRTUAL VISIT (OUTPATIENT)
Dept: FAMILY MEDICINE CLINIC | Age: 57
End: 2022-05-18
Payer: MEDICARE

## 2022-05-18 DIAGNOSIS — F33.9 RECURRENT DEPRESSION (HCC): ICD-10-CM

## 2022-05-18 DIAGNOSIS — F31.74 BIPOLAR 1 DISORDER, MANIC, FULL REMISSION (HCC): ICD-10-CM

## 2022-05-18 DIAGNOSIS — S39.012D STRAIN OF LUMBAR REGION, SUBSEQUENT ENCOUNTER: ICD-10-CM

## 2022-05-18 DIAGNOSIS — E11.9 TYPE 2 DIABETES MELLITUS WITHOUT COMPLICATION, UNSPECIFIED WHETHER LONG TERM INSULIN USE (HCC): Primary | ICD-10-CM

## 2022-05-18 PROCEDURE — 2022F DILAT RTA XM EVC RTNOPTHY: CPT | Performed by: NURSE PRACTITIONER

## 2022-05-18 PROCEDURE — 3051F HG A1C>EQUAL 7.0%<8.0%: CPT | Performed by: NURSE PRACTITIONER

## 2022-05-18 PROCEDURE — G8417 CALC BMI ABV UP PARAM F/U: HCPCS | Performed by: NURSE PRACTITIONER

## 2022-05-18 PROCEDURE — 99442 PR PHYS/QHP TELEPHONE EVALUATION 11-20 MIN: CPT | Performed by: NURSE PRACTITIONER

## 2022-05-18 PROCEDURE — G9899 SCRN MAM PERF RSLTS DOC: HCPCS | Performed by: NURSE PRACTITIONER

## 2022-05-18 PROCEDURE — G9717 DOC PT DX DEP/BP F/U NT REQ: HCPCS | Performed by: NURSE PRACTITIONER

## 2022-05-18 PROCEDURE — G8427 DOCREV CUR MEDS BY ELIG CLIN: HCPCS | Performed by: NURSE PRACTITIONER

## 2022-05-18 PROCEDURE — 3017F COLORECTAL CA SCREEN DOC REV: CPT | Performed by: NURSE PRACTITIONER

## 2022-05-18 RX ORDER — LIDOCAINE 50 MG/G
1 PATCH TOPICAL EVERY 24 HOURS
Qty: 15 EACH | Refills: 3 | Status: SHIPPED | OUTPATIENT
Start: 2022-05-18 | End: 2022-06-21 | Stop reason: SDUPTHER

## 2022-05-18 NOTE — PROGRESS NOTES
OFFICE NOTE    Vikas Marroquin is a 59 y.o. female presenting today for office visit. Vikas Marroquin, who was evaluated through a synchronous (real-time) audio only encounter, and/or her healthcare decision maker, is aware that it is a billable service, which includes applicable co-pays, with coverage as determined by her insurance carrier. She provided verbal consent to proceed and patient identification was verified. This visit was conducted pursuant to the emergency declaration under the [address] [place] [address], [address] [place] [address] and the [employer] Act. A caregiver was present when appropriate. Ability to conduct physical exam was limited. The patient was located at home in a state where the provider was licensed to provide care. --Britton Huber NP on 5/18/2022 at 5:07 PM    Patient Active Problem List   Diagnosis Code    Hypercholesteremia E78.00    Type 2 diabetes mellitus with microalbuminuria, with long-term current use of insulin ([facility] [address] E11.29, R80.9, Z79.4    Tear of meniscus of left knee S83.207A    Recurrent depression (HCC) F33.9    Left low back pain M54.50    History of alcohol abuse F10.11    Lumbar degenerative disc disease M51.36    Hypothyroidism due to acquired atrophy of thyroid E03.4    Vitamin B12 deficiency E53.8    Bipolar 1 disorder, manic, full remission ([facility] [address] F31.74    PTSD (post-traumatic stress disorder) F43.10    Type 2 diabetes mellitus with diabetic neuropathy ([facility] [address] E11.40     5/18/2022  4:45 AM    Chief Complaint   Patient presents with    Diabetes       HPI:   Audio visit only. Patient reports she has not been taking her Lantus because her blood sugars have been running 120-140. She says she is usually around 120 in the morning before she eats. She says she has been taking Humalog 4 to 6 units once or twice daily.   She says at times her blood sugars gotten down to 50 and she had to eat \"a bunch of chocolate and glucose tablets. \" She says when that happened she stopped taking the Lantus 2 weeks ago. She is taking her Janumet daily. She is lost 17 pounds. She says she is totally gotten serious about her diet and she logs everything. Patient I discussed discontinuing her Lantus and Humalog. She will continue with her daily Janumet. She will continue to log her blood sugars. We will see each other in June. Psychiatrist regularly for bipolar and depression. Patient's psychiatrist manages her mental health medications. Patient reports appetite is good, no urinary issues, sleeps well and regular bowel movements. Patient denies fever, chills, chest pain, shortness of breath, abdomen pain or dark tarry stool. Previous visit 4/21/2022, patient says she went to [facility] for a left lumber strain last Sunday. She was given Flexeril, diclofenac gel, Tyenlol and lidocaine. She was ordered PT and neurology. Reviewed notes and imaging. She says she fell 3 times last year and thinks this what started her back trouble. Her mattress of \"kind of new. \" She had an xray too and normal. She has declined physical therapy. She says she has had PT before got her back. She asked for Tylenol 3 and I declined.      She has type 2 diabetes and needs her lantus 65 units nightly. ROS:    · General: negative for - chills, fever, weight changes or malaise  · HEENT: no sore throat, nasal congestion, vision problems or ear problems  · Respiratory: no cough, shortness of breath, or wheezing  · Cardiovascular: no chest pain, palpitations, or dyspnea on exertion  · Gastrointestinal: no abdominal pain, N/V, change in bowel habits, or black or bloody stools  · Musculoskeletal: no back pain, joint pain, joint stiffness, muscle pain or muscle weakness  · Neurological: no numbness, tingling, headache or dizziness  · Endo:  No polyuria or polydipsia.    · : no hematuria, dysuria, frequency, hesitancy, or nocturia.     · Skin: No itching or rash, no open skin, no unusual lesions   · Psychological: negative for - anxiety, depression, sleep disturbances, suicidal or homicidal ideations     PHQ Screening   3 most recent PHQ Screens 5/18/2022   Little interest or pleasure in doing things Not at all   Feeling down, depressed, irritable, or hopeless Not at all   Total Score PHQ 2 0       History  Past Medical History:   Diagnosis Date    Arthritis     Chronic pain     Depression     Diabetes ([facility] [address]     Hyperlipidemia     Hypertension     Hypothyroid     Medial meniscus tear     Left knee    Menopause     Psychiatric disorder     Tobacco abuse, in remission        Past Surgical History:   Procedure Laterality Date    HAND/FINGER SURGERY UNLISTED Left 1989    reconstructive surgery    HX ORTHOPAEDIC      Hand reconstructive surgery    HX TONSIL AND ADENOIDECTOMY      HX TUBAL LIGATION  2004    HX WISDOM TEETH EXTRACTION         Social History     Socioeconomic History    Marital status:      Spouse name: Not on file    Number of children: Not on file    Years of education: Not on file    Highest education level: Not on file   Occupational History    Not on file   Tobacco Use    Smoking status: Former Smoker     Packs/day: 1.00     Years: 35.00     Pack years: 35.00     Types: Cigarettes     Quit date: 11/7/2011     Years since quitting: 10.5    Smokeless tobacco: Never Used    Tobacco comment: quit 2010   Vaping Use    Vaping Use: Never used   Substance and Sexual Activity    Alcohol use: No     Alcohol/week: 0.0 standard drinks     Comment: quit 10 years ago    Drug use: No    Sexual activity: Yes     Partners: Male   Other Topics Concern     Service No    Blood Transfusions No    Caffeine Concern No    Occupational Exposure No    Hobby Hazards Not Asked    Sleep Concern Yes     Comment: takes sleeping pills    Stress Concern Not Asked    Weight Concern Yes    Special Diet Not Asked    Back Care Not Asked    Exercise Yes    Bike Helmet Not Asked    Seat Belt Yes    Self-Exams Yes   Social History Narrative    Not on file     Social Determinants of Health     Financial Resource Strain:     Difficulty of Paying Living Expenses: Not on file   Food Insecurity:     Worried About Running Out of Food in the Last Year: Not on file    Ibis of Food in the Last Year: Not on file   Transportation Needs:     Lack of Transportation (Medical): Not on file    Lack of Transportation (Non-Medical): Not on file   Physical Activity:     Days of Exercise per Week: Not on file    Minutes of Exercise per Session: Not on file   Stress:     Feeling of Stress : Not on file   Social Connections:     Frequency of Communication with Friends and Family: Not on file    Frequency of Social Gatherings with Friends and Family: Not on file    Attends Baptism Services: Not on file    Active Member of [address] [place] [address] or Organizations: Not on file    Attends Club or Organization Meetings: Not on file    Marital Status: Not on file   Intimate Partner Violence:     Fear of Current or Ex-Partner: Not on file    Emotionally Abused: Not on file    Physically Abused: Not on file    Sexually Abused: Not on file   Housing Stability:     Unable to Pay for Housing in the Last Year: Not on file    Number of [place] in the Last Year: Not on file    Unstable Housing in the Last Year: Not on file       Allergies   Allergen Reactions    Lipitor [Atorvastatin] Other (comments)     myalgias    Lisinopril Cough    Aspirin Other (comments)     Nose bleeds    Zocor [Simvastatin] Other (comments)     Causes pain in the left leg       Current Outpatient Medications   Medication Sig Dispense Refill    lidocaine (LIDODERM) 5 % 1 Patch by TransDERmal route every twenty-four (24) hours. Apply patch to the affected area for 12 hours a day and remove for 12 hours a day.  15 Each 3    acetaminophen (TYLENOL) 500 mg tablet take 2 tablets by mouth every 6 hours for 10 days      sertraline (ZOLOFT) 50 mg tablet Take 50 mg by mouth daily.  sertraline (ZOLOFT) 25 mg tablet Take 25 mg by mouth daily.  diclofenac (VOLTAREN) 1 % gel Apply 2 g to affected area four (4) times daily.  loxapine (LOXITANE) 10 mg capsule Take 10 mg by mouth two (2) times a day.  oxybutynin chloride XL (DITROPAN XL) 10 mg CR tablet TAKE 1 TABLET BY MOUTH  DAILY 90 Tablet 3    SITagliptin-metFORMIN (Janumet) 50-1,000 mg per tablet TAKE 1 TABLET BY MOUTH  TWICE A  Tablet 3    omega-3 acid ethyl esters (LOVAZA) 1 gram capsule TAKE 2 CAPSULES BY MOUTH  TWO TIMES DAILY WITH MEALS 360 Capsule 3    ibuprofen (MOTRIN) 800 mg tablet Take 1 Tablet by mouth every eight (8) hours as needed for Pain. 45 Tablet 0    Insulin Needles, Disposable, (Droplet Pen Needle) 31 gauge x 3/16\" ndle use 1 PEN NEEDLE to inject MEDICATION subcutaneously four times a day 200 Pen Needle 3    acetaminophen (TylenoL) 325 mg tablet Take 325 mg by mouth every four (4) hours as needed for Pain.  rosuvastatin (CRESTOR) 20 mg tablet TAKE 1 TABLET BY MOUTH  NIGHTLY 90 Tablet 3    levothyroxine (SYNTHROID) 137 mcg tablet TAKE 1 TABLET BY MOUTH  DAILY BEFORE BREAKFAST 90 Tablet 3    multivit-min/iron/folic/lutein (CENTRUM SILVER WOMEN PO) Take  by mouth daily.  flash glucose sensor (FreeStyle Jorge 14 Day Sensor) kit Use 4 times daily 6 Kit 3    benztropine (COGENTIN) 1 mg tablet       cyanocobalamin (VITAMIN B-12) 500 mcg tablet Take 500 mcg by mouth daily.  2 tablets daily (Patient not taking: Reported on 4/21/2022)       Patient Care Team:  Patient Care Team:  Sejal Hensley MD as PCP - General (Family Medicine)  Sejal Hensley MD as PCP - [facility] Provider  Jacqueline Norwood MD (Cardiovascular Disease Physician)  Jayashree Pang MD (Ophthalmology)  Toney Ratliff as Physician Assistant (Psychiatry)  Tomi Michael Huntley MD (Obstetrics & Gynecology)    Physical exam  audio only  Patient appears well, is pleasant, alert, oriented x 3, in no distress. Lungs, sound to have normal respiratory effort     Psych: normal affect. Mood good. Oriented x 3. Judgement and insight intact. There were no vitals filed for this visit. Assessment and Plan    Diagnoses and all orders for this visit:    Type 2 diabetes mellitus without complication, unspecified whether long term insulin use ([facility] [address]  -     HEMOGLOBIN A1C WITH EAG; Future    Recurrent depression (HCC)    Bipolar 1 disorder, manic, full remission ([facility] [address]    Strain of lumbar region, subsequent encounter  -     lidocaine (LIDODERM) 5 %; 1 Patch by TransDERmal route every twenty-four (24) hours. Apply patch to the affected area for 12 hours a day and remove for 12 hours a day., Normal, Disp-15 Each, R-3    *Plan of care reviewed with patient. Patient in agreement with plan and expresses understanding. All questions answered and patient encouraged to call or RTO if further questions or concerns. 50% of 20 minutes spent on counseling and managing her care.     [id number] hours    Lucie Runner, PENG-C

## 2022-06-21 ENCOUNTER — OFFICE VISIT (OUTPATIENT)
Dept: FAMILY MEDICINE CLINIC | Age: 57
End: 2022-06-21
Payer: MEDICARE

## 2022-06-21 ENCOUNTER — HOSPITAL ENCOUNTER (OUTPATIENT)
Dept: LAB | Age: 57
Discharge: HOME | End: 2022-06-21
Payer: MEDICARE

## 2022-06-21 VITALS
HEIGHT: 66 IN | RESPIRATION RATE: 16 BRPM | SYSTOLIC BLOOD PRESSURE: 134 MMHG | DIASTOLIC BLOOD PRESSURE: 79 MMHG | WEIGHT: 185 LBS | TEMPERATURE: 97.2 F | BODY MASS INDEX: 29.73 KG/M2 | HEART RATE: 85 BPM | OXYGEN SATURATION: 98 %

## 2022-06-21 DIAGNOSIS — Z79.4 TYPE 2 DIABETES MELLITUS WITH MICROALBUMINURIA, WITH LONG-TERM CURRENT USE OF INSULIN (HCC): Primary | ICD-10-CM

## 2022-06-21 DIAGNOSIS — F31.74 BIPOLAR 1 DISORDER, MANIC, FULL REMISSION (HCC): ICD-10-CM

## 2022-06-21 DIAGNOSIS — S39.012D STRAIN OF LUMBAR REGION, SUBSEQUENT ENCOUNTER: ICD-10-CM

## 2022-06-21 DIAGNOSIS — E11.9 TYPE 2 DIABETES MELLITUS WITHOUT COMPLICATION, UNSPECIFIED WHETHER LONG TERM INSULIN USE (HCC): ICD-10-CM

## 2022-06-21 DIAGNOSIS — E03.4 HYPOTHYROIDISM DUE TO ACQUIRED ATROPHY OF THYROID: ICD-10-CM

## 2022-06-21 DIAGNOSIS — E11.29 TYPE 2 DIABETES MELLITUS WITH MICROALBUMINURIA, WITH LONG-TERM CURRENT USE OF INSULIN (HCC): Primary | ICD-10-CM

## 2022-06-21 DIAGNOSIS — R80.9 TYPE 2 DIABETES MELLITUS WITH MICROALBUMINURIA, WITH LONG-TERM CURRENT USE OF INSULIN (HCC): Primary | ICD-10-CM

## 2022-06-21 LAB
EST. AVERAGE GLUCOSE BLD GHB EST-MCNC: 160 MG/DL
HBA1C MFR BLD: 7.2 % (ref 4.2–5.6)

## 2022-06-21 PROCEDURE — 83036 HEMOGLOBIN GLYCOSYLATED A1C: CPT

## 2022-06-21 PROCEDURE — G8428 CUR MEDS NOT DOCUMENT: HCPCS | Performed by: STUDENT IN AN ORGANIZED HEALTH CARE EDUCATION/TRAINING PROGRAM

## 2022-06-21 PROCEDURE — G9899 SCRN MAM PERF RSLTS DOC: HCPCS | Performed by: STUDENT IN AN ORGANIZED HEALTH CARE EDUCATION/TRAINING PROGRAM

## 2022-06-21 PROCEDURE — 2022F DILAT RTA XM EVC RTNOPTHY: CPT | Performed by: STUDENT IN AN ORGANIZED HEALTH CARE EDUCATION/TRAINING PROGRAM

## 2022-06-21 PROCEDURE — G8417 CALC BMI ABV UP PARAM F/U: HCPCS | Performed by: STUDENT IN AN ORGANIZED HEALTH CARE EDUCATION/TRAINING PROGRAM

## 2022-06-21 PROCEDURE — 36415 COLL VENOUS BLD VENIPUNCTURE: CPT

## 2022-06-21 PROCEDURE — 3017F COLORECTAL CA SCREEN DOC REV: CPT | Performed by: STUDENT IN AN ORGANIZED HEALTH CARE EDUCATION/TRAINING PROGRAM

## 2022-06-21 PROCEDURE — 3051F HG A1C>EQUAL 7.0%<8.0%: CPT | Performed by: STUDENT IN AN ORGANIZED HEALTH CARE EDUCATION/TRAINING PROGRAM

## 2022-06-21 PROCEDURE — 99214 OFFICE O/P EST MOD 30 MIN: CPT | Performed by: STUDENT IN AN ORGANIZED HEALTH CARE EDUCATION/TRAINING PROGRAM

## 2022-06-21 PROCEDURE — G9717 DOC PT DX DEP/BP F/U NT REQ: HCPCS | Performed by: STUDENT IN AN ORGANIZED HEALTH CARE EDUCATION/TRAINING PROGRAM

## 2022-06-21 RX ORDER — INSULIN GLARGINE 100 [IU]/ML
INJECTION, SOLUTION SUBCUTANEOUS
Qty: 12 PEN | Refills: 2 | Status: SHIPPED | OUTPATIENT
Start: 2022-06-21 | End: 2022-08-22 | Stop reason: SDUPTHER

## 2022-06-21 RX ORDER — LANOLIN ALCOHOL/MO/W.PET/CERES
CREAM (GRAM) TOPICAL
COMMUNITY

## 2022-06-21 RX ORDER — INSULIN LISPRO 100 [IU]/ML
INJECTION, SOLUTION INTRAVENOUS; SUBCUTANEOUS
COMMUNITY
End: 2022-08-05

## 2022-06-21 RX ORDER — INSULIN GLARGINE 100 [IU]/ML
INJECTION, SOLUTION SUBCUTANEOUS
COMMUNITY
Start: 2022-06-16 | End: 2022-06-21 | Stop reason: SDUPTHER

## 2022-06-21 RX ORDER — LIDOCAINE 50 MG/G
1 PATCH TOPICAL EVERY 24 HOURS
Qty: 15 EACH | Refills: 3 | Status: SHIPPED | OUTPATIENT
Start: 2022-06-21 | End: 2022-08-05

## 2022-06-21 NOTE — PROGRESS NOTES
Frances Armstrong is a 59 y.o. presents today for   Chief Complaint   Patient presents with    Follow-up    Diabetes     bs 113 fasting     Bipolar     Is someone accompanying this pt? No     Is the patient using any DME equipment during OV? No    Visit Vitals  BP (!) 143/82 (BP 1 Location: Left upper arm, BP Patient Position: Sitting, BP Cuff Size: Adult)   Pulse 85   Temp 97.2 °F (36.2 °C) (Temporal)   Resp 16   Ht 5' 6\" (1.676 m)   Wt 185 lb (83.9 kg)   SpO2 98%   BMI 29.86 kg/m²       Depression Screening:   3 most recent PHQ Screens 6/21/2022   Little interest or pleasure in doing things Not at all   Feeling down, depressed, irritable, or hopeless Several days   Total Score PHQ 2 1       Health Maintenance: reviewed and discussed and ordered per Provider. Health Maintenance Due   Topic Date Due    Low dose CT lung screening  Never done    Foot Exam Q1  01/31/2021    Pneumococcal 0-64 years (2 - PCV) 02/11/2022         Coordination of Care:   1. \"Have you been to the ER, urgent care clinic since your last visit? Hospitalized since your last visit? \" No    2. \"Have you seen or consulted any other health care providers outside of the [address] [place] [address] since your last visit? \" No     3. For patients aged 39-70: Has the patient had a colonoscopy / FIT/ Cologuard? Yes - no Care Gap present    If the patient is female:    4. For patients aged 41-77: Has the patient had a mammogram within the past 2 years? Yes - no Care Gap present    5. For patients aged 21-65: Has the patient had a pap smear? Yes - no Care Gap present     Advanced Directive:  1. Do you have an Advanced Directive? No     2. Would you like information on Advanced Directives?  No    Maddison Goldsmith CMA

## 2022-06-21 NOTE — PROGRESS NOTES
Belgica Suazo is a 59 y.o.  female and presents with    Chief Complaint   Patient presents with    Follow-up    Diabetes     bs 113 fasting     Bipolar           Subjective:    Diabetes  Taking medications as prescribed: YES  Currently on: Lantus 60 units PM, Humalog sliding scale QAD. Janumet BID  Checking blood sugars at home at home: YES, freestyle tavon  Symptoms: none  Diabetic diet: YES  Exercise: YES  No    Back pain - lidocaine patch and tylenol seem to help used PRN. This happened after lifting 2 case of water from the floor about 2 months ago. For Bipolar mood disorder taking Loxapine and zoloft. Doing well. Exercise helping. Hypothyroidism. Denies side effects. Complaint with medicine. Takes medicine as directed.    Patient Active Problem List   Diagnosis Code    Hypercholesteremia E78.00    Type 2 diabetes mellitus with microalbuminuria, with long-term current use of insulin ([facility] E11.29, R80.9, Z79.4    Tear of meniscus of left knee S83.207A    Recurrent depression (HCC) F33.9    Left low back pain M54.50    History of alcohol abuse F10.11    Lumbar degenerative disc disease M51.36    Hypothyroidism due to acquired atrophy of thyroid E03.4    Vitamin B12 deficiency E53.8    Bipolar 1 disorder, manic, full remission (HCC) F31.74    PTSD (post-traumatic stress disorder) F43.10    Type 2 diabetes mellitus with diabetic neuropathy (HCC) E11.40      Past Medical History:   Diagnosis Date    Arthritis     Chronic pain     Depression     Diabetes ([facility]     Hyperlipidemia     Hypertension     Hypothyroid     Medial meniscus tear     Left knee    Menopause     Psychiatric disorder     Tobacco abuse, in remission       Past Surgical History:   Procedure Laterality Date    HAND/FINGER SURGERY UNLISTED Left 1989    reconstructive surgery    HX ORTHOPAEDIC      Hand reconstructive surgery    HX TONSIL AND ADENOIDECTOMY      HX TUBAL LIGATION  2004    HX WISDOM TEETH EXTRACTION        Family History   Problem Relation Age of Onset   Leslie Edwards Alcohol abuse Mother    Triny Jeronimo Mother    Jovanny Orris Mother 61        breast cancer    Diabetes Mother     Hypertension Mother     Psychiatric Disorder Mother     Stroke Mother     Breast Cancer Mother 61    Alcohol abuse Father     Heart Disease Father    Leslie Edwards Diabetes Father     Hypertension Father     Lung Disease Father     Colon Cancer Father 72    Alcohol abuse Paternal Grandmother     Ovarian Cancer Sister 32    Alcohol abuse Sister     Diabetes Sister     Psychiatric Disorder Sister     Alcohol abuse Brother     Psychiatric Disorder Son     Psychiatric Disorder Other         nephew     Social History     Socioeconomic History    Marital status:      Spouse name: Not on file    Number of children: Not on file    Years of education: Not on file    Highest education level: Not on file   Occupational History    Not on file   Tobacco Use    Smoking status: Former Smoker     Packs/day: 1.00     Years: 35.00     Pack years: 35.00     Types: Cigarettes     Quit date: 11/7/2011     Years since quitting: 10.6    Smokeless tobacco: Never Used    Tobacco comment: quit 2010   Vaping Use    Vaping Use: Never used   Substance and Sexual Activity    Alcohol use: No     Alcohol/week: 0.0 standard drinks     Comment: quit 10 years ago    Drug use: No    Sexual activity: Yes     Partners: Male   Other Topics Concern     Service No    Blood Transfusions No    Caffeine Concern No    Occupational Exposure No    Hobby Hazards Not Asked    Sleep Concern Yes     Comment: takes sleeping pills    Stress Concern Not Asked    Weight Concern Yes    Special Diet Not Asked    Back Care Not Asked    Exercise Yes    Bike Helmet Not Asked    Seat Belt Yes    Self-Exams Yes   Social History Narrative    Not on file     Social Determinants of Health     Financial Resource Strain:     Difficulty of Paying Living Expenses: Not on file   Food Insecurity:     Worried About Running Out of Food in the Last Year: Not on file    Ibis of Food in the Last Year: Not on file   Transportation Needs:     Lack of Transportation (Medical): Not on file    Lack of Transportation (Non-Medical): Not on file   Physical Activity:     Days of Exercise per Week: Not on file    Minutes of Exercise per Session: Not on file   Stress:     Feeling of Stress : Not on file   Social Connections:     Frequency of Communication with Friends and Family: Not on file    Frequency of Social Gatherings with Friends and Family: Not on file    Attends [employer] Services: Not on file    Active Member of [address] [place] [address] [employer] or Organizations: Not on file    Attends Club or Organization Meetings: Not on file    Marital Status: Not on file   Intimate Partner Violence:     Fear of Current or Ex-Partner: Not on file    Emotionally Abused: Not on file    Physically Abused: Not on file    Sexually Abused: Not on file   Housing Stability:     Unable to Pay for Housing in the Last Year: Not on file    Number of [place] in the Last Year: Not on file    Unstable Housing in the Last Year: Not on file        Current Outpatient Medications   Medication Sig Dispense Refill    Lantus Solostar U-100 Insulin 100 unit/mL (3 mL) inpn inject 60 units subcutaneously nightly      insulin lispro (HumaLOG U-100 Insulin) 100 unit/mL injection by SubCUTAneous route. 6-8 units per sliding scale      ferrous sulfate (Iron) 325 mg (65 mg iron) tablet Take  by mouth Daily (before breakfast).  lidocaine (LIDODERM) 5 % 1 Patch by TransDERmal route every twenty-four (24) hours. Apply patch to the affected area for 12 hours a day and remove for 12 hours a day. 15 Each 3    acetaminophen (TYLENOL) 500 mg tablet take 2 tablets by mouth every 6 hours for 10 days      sertraline (ZOLOFT) 50 mg tablet Take 50 mg by mouth daily.       loxapine (LOXITANE) 10 mg capsule Take 10 mg by mouth two (2) times a day.  oxybutynin chloride XL (DITROPAN XL) 10 mg CR tablet TAKE 1 TABLET BY MOUTH  DAILY 90 Tablet 3    SITagliptin-metFORMIN (Janumet) 50-1,000 mg per tablet TAKE 1 TABLET BY MOUTH  TWICE A  Tablet 3    omega-3 acid ethyl esters (LOVAZA) 1 gram capsule TAKE 2 CAPSULES BY MOUTH  TWO TIMES DAILY WITH MEALS 360 Capsule 3    ibuprofen (MOTRIN) 800 mg tablet Take 1 Tablet by mouth every eight (8) hours as needed for Pain. 45 Tablet 0    Insulin Needles, Disposable, (Droplet Pen Needle) 31 gauge x 3/16\" ndle use 1 PEN NEEDLE to inject MEDICATION subcutaneously four times a day 200 Pen Needle 3    rosuvastatin (CRESTOR) 20 mg tablet TAKE 1 TABLET BY MOUTH  NIGHTLY 90 Tablet 3    levothyroxine (SYNTHROID) 137 mcg tablet TAKE 1 TABLET BY MOUTH  DAILY BEFORE BREAKFAST 90 Tablet 3    multivit-min/iron/folic/lutein (CENTRUM SILVER WOMEN PO) Take  by mouth daily.  flash glucose sensor (CiplexStyle Jorge 14 Day Sensor) kit Use 4 times daily 6 Kit 3    benztropine (COGENTIN) 1 mg tablet       cyanocobalamin (VITAMIN B-12) 500 mcg tablet Take 500 mcg by mouth daily. 2 tablets daily      sertraline (ZOLOFT) 25 mg tablet Take 25 mg by mouth daily. (Patient not taking: Reported on 6/21/2022)      diclofenac (VOLTAREN) 1 % gel Apply 2 g to affected area four (4) times daily. (Patient not taking: Reported on 6/21/2022)      acetaminophen (TylenoL) 325 mg tablet Take 325 mg by mouth every four (4) hours as needed for Pain. (Patient not taking: Reported on 6/21/2022)          ROS   Review of Systems   Constitutional: Negative for chills, fever and malaise/fatigue. HENT: Negative for congestion, ear discharge and ear pain. Eyes: Negative for blurred vision, pain and discharge. Respiratory: Negative for cough and shortness of breath. Cardiovascular: Negative for chest pain and palpitations. Gastrointestinal: Negative for abdominal pain, nausea and vomiting.    Genitourinary: Negative for dysuria, frequency and urgency. Skin: Negative for itching and rash. Neurological: Negative for dizziness, seizures, loss of consciousness and headaches. Psychiatric/Behavioral: Negative for substance abuse. Objective:  Vitals:    06/21/22 0855 06/21/22 0859   BP: (!) 143/82 134/79   Pulse: 85    Resp: 16    Temp: 97.2 °F (36.2 °C)    TempSrc: Temporal    SpO2: 98%    Weight: 185 lb (83.9 kg)    Height: 5' 6\" (1.676 m)    PainSc:   0 - No pain    LMP: 06/05/2014       Physical Exam  Vitals reviewed. Constitutional:       Appearance: Normal appearance. She is obese. Eyes:      General: No scleral icterus. Right eye: No discharge. Left eye: No discharge. Cardiovascular:      Rate and Rhythm: Normal rate and regular rhythm. Pulses: Normal pulses. Pulmonary:      Effort: Pulmonary effort is normal. No respiratory distress. Breath sounds: Normal breath sounds. Musculoskeletal:      Cervical back: Normal range of motion and neck supple. Neurological:      Mental Status: She is alert and oriented to person, place, and time. Cranial Nerves: No cranial nerve deficit. Psychiatric:         Mood and Affect: Mood normal.         Behavior: Behavior normal.         Thought Content: Thought content normal.         Judgment: Judgment normal.           LABS     TESTS      Assessment/Plan:    1. Strain of lumbar region, subsequent encounter  - lidocaine (LIDODERM) 5 %; 1 Patch by TransDERmal route every twenty-four (24) hours. Apply patch to the affected area for 12 hours a day and remove for 12 hours a day. Dispense: 15 Each; Refill: 3    2. Type 2 diabetes mellitus with microalbuminuria, with long-term current use of insulin (HCC)  - Lantus Solostar U-100 Insulin 100 unit/mL (3 mL) inpn; inject 60 units subcutaneously nightly  Dispense: 12 Pen; Refill: 2  - AMB POC HEMOGLOBIN A1C  - HEMOGLOBIN A1C WITH EAG; Future  - LIPID PANEL;  Future  - METABOLIC PANEL, COMPREHENSIVE; Future    3. Hypothyroidism due to acquired atrophy of thyroid  - TSH 3RD GENERATION; Future    4. Bipolar 1 disorder, manic, full remission ([facility]  F/u w/ psychiatry      Lab review: orders written for new lab studies as appropriate; see orders      I have discussed the diagnosis with the patient and the intended plan as seen in the above orders. The patient has received an after-visit summary and questions were answered concerning future plans. I have discussed medication side effects and warnings with the patient as well. I have reviewed the plan of care with the patient, accepted their input and they are in agreement with the treatment goals.          Jim Lewis MD

## 2022-06-28 ENCOUNTER — TELEPHONE (OUTPATIENT)
Dept: FAMILY MEDICINE CLINIC | Age: 57
End: 2022-06-28

## 2022-06-28 NOTE — TELEPHONE ENCOUNTER
----- Message from Lucia Russell sent at 6/28/2022 11:41 AM EDT -----  Subject: Referral Request    QUESTIONS   Reason for referral request? Patient needs a referral to a neurologist for   her lower back pain (lumbar sprain). Please fax to [facility],   [phone]. Has the physician seen you for this condition before? No   Preferred Specialist (if applicable)? Do you already have an appointment scheduled? No  Additional Information for Provider? Please call patient to   discuss/confirm. ---------------------------------------------------------------------------  --------------  Kaye MORALES  What is the best way for the office to contact you? OK to leave message on   voicemail  Preferred Call Back Phone Number? [phone]  ---------------------------------------------------------------------------  --------------  SCRIPT ANSWERS  Relationship to Patient?  Self

## 2022-07-01 DIAGNOSIS — M51.36 LUMBAR DEGENERATIVE DISC DISEASE: ICD-10-CM

## 2022-07-01 DIAGNOSIS — S39.012D STRAIN OF LUMBAR REGION, SUBSEQUENT ENCOUNTER: Primary | ICD-10-CM

## 2022-07-05 NOTE — TELEPHONE ENCOUNTER
Patient is requesting med refill. Requested Prescriptions     Pending Prescriptions Disp Refills    acetaminophen (TYLENOL) 500 mg tablet       Thank you!

## 2022-07-05 NOTE — TELEPHONE ENCOUNTER
Diana Avila   Requested Prescriptions     Pending Prescriptions Disp Refills    acetaminophen (TYLENOL) 500 mg tablet

## 2022-07-06 RX ORDER — ACETAMINOPHEN 500 MG
TABLET ORAL
Qty: 90 TABLET | Refills: 0 | Status: SHIPPED | OUTPATIENT
Start: 2022-07-06 | End: 2022-09-29 | Stop reason: SDUPTHER

## 2022-07-14 DIAGNOSIS — M51.36 LUMBAR DEGENERATIVE DISC DISEASE: ICD-10-CM

## 2022-07-14 DIAGNOSIS — S39.012D STRAIN OF LUMBAR REGION, SUBSEQUENT ENCOUNTER: Primary | ICD-10-CM

## 2022-08-01 NOTE — PROGRESS NOTES
A1c 7.2%, good job. Continue to take your medications for diabetes and watch your intake of carbohydrates and sugary drinks. Follow-up with your PCP.

## 2022-08-05 ENCOUNTER — HOSPITAL ENCOUNTER (OUTPATIENT)
Dept: LAB | Age: 57
Discharge: HOME | End: 2022-08-05
Payer: MEDICARE

## 2022-08-05 ENCOUNTER — OFFICE VISIT (OUTPATIENT)
Dept: FAMILY MEDICINE CLINIC | Age: 57
End: 2022-08-05
Payer: MEDICARE

## 2022-08-05 VITALS
HEART RATE: 85 BPM | BODY MASS INDEX: 29.12 KG/M2 | WEIGHT: 181.2 LBS | DIASTOLIC BLOOD PRESSURE: 79 MMHG | TEMPERATURE: 97.6 F | OXYGEN SATURATION: 99 % | HEIGHT: 66 IN | SYSTOLIC BLOOD PRESSURE: 125 MMHG | RESPIRATION RATE: 16 BRPM

## 2022-08-05 DIAGNOSIS — E11.29 TYPE 2 DIABETES MELLITUS WITH MICROALBUMINURIA, WITH LONG-TERM CURRENT USE OF INSULIN (HCC): ICD-10-CM

## 2022-08-05 DIAGNOSIS — Z09 HOSPITAL DISCHARGE FOLLOW-UP: ICD-10-CM

## 2022-08-05 DIAGNOSIS — R82.90 URINE ABNORMALITY: ICD-10-CM

## 2022-08-05 DIAGNOSIS — F31.74 BIPOLAR 1 DISORDER, MANIC, FULL REMISSION (HCC): ICD-10-CM

## 2022-08-05 DIAGNOSIS — Z00.00 MEDICARE ANNUAL WELLNESS VISIT, SUBSEQUENT: Primary | ICD-10-CM

## 2022-08-05 DIAGNOSIS — Z79.4 TYPE 2 DIABETES MELLITUS WITH MICROALBUMINURIA, WITH LONG-TERM CURRENT USE OF INSULIN (HCC): ICD-10-CM

## 2022-08-05 DIAGNOSIS — R80.9 TYPE 2 DIABETES MELLITUS WITH MICROALBUMINURIA, WITH LONG-TERM CURRENT USE OF INSULIN (HCC): ICD-10-CM

## 2022-08-05 LAB
BILIRUB UR QL STRIP: NEGATIVE
GLUCOSE UR-MCNC: NEGATIVE MG/DL
KETONES P FAST UR STRIP-MCNC: NEGATIVE MG/DL
PH UR STRIP: 7 [PH] (ref 4.6–8)
PROT UR QL STRIP: NEGATIVE
SP GR UR STRIP: 1.01 (ref 1–1.03)
UA UROBILINOGEN AMB POC: NORMAL (ref 0.2–1)
URINALYSIS CLARITY POC: CLEAR
URINALYSIS COLOR POC: YELLOW
URINE BLOOD POC: NEGATIVE
URINE LEUKOCYTES POC: NORMAL
URINE NITRITES POC: NEGATIVE

## 2022-08-05 PROCEDURE — G9899 SCRN MAM PERF RSLTS DOC: HCPCS | Performed by: STUDENT IN AN ORGANIZED HEALTH CARE EDUCATION/TRAINING PROGRAM

## 2022-08-05 PROCEDURE — 99214 OFFICE O/P EST MOD 30 MIN: CPT | Performed by: STUDENT IN AN ORGANIZED HEALTH CARE EDUCATION/TRAINING PROGRAM

## 2022-08-05 PROCEDURE — 1111F DSCHRG MED/CURRENT MED MERGE: CPT | Performed by: STUDENT IN AN ORGANIZED HEALTH CARE EDUCATION/TRAINING PROGRAM

## 2022-08-05 PROCEDURE — 87186 SC STD MICRODIL/AGAR DIL: CPT

## 2022-08-05 PROCEDURE — 3017F COLORECTAL CA SCREEN DOC REV: CPT | Performed by: STUDENT IN AN ORGANIZED HEALTH CARE EDUCATION/TRAINING PROGRAM

## 2022-08-05 PROCEDURE — 81003 URINALYSIS AUTO W/O SCOPE: CPT | Performed by: STUDENT IN AN ORGANIZED HEALTH CARE EDUCATION/TRAINING PROGRAM

## 2022-08-05 PROCEDURE — 87086 URINE CULTURE/COLONY COUNT: CPT

## 2022-08-05 PROCEDURE — 2022F DILAT RTA XM EVC RTNOPTHY: CPT | Performed by: STUDENT IN AN ORGANIZED HEALTH CARE EDUCATION/TRAINING PROGRAM

## 2022-08-05 PROCEDURE — G9717 DOC PT DX DEP/BP F/U NT REQ: HCPCS | Performed by: STUDENT IN AN ORGANIZED HEALTH CARE EDUCATION/TRAINING PROGRAM

## 2022-08-05 PROCEDURE — 3051F HG A1C>EQUAL 7.0%<8.0%: CPT | Performed by: STUDENT IN AN ORGANIZED HEALTH CARE EDUCATION/TRAINING PROGRAM

## 2022-08-05 PROCEDURE — 87077 CULTURE AEROBIC IDENTIFY: CPT

## 2022-08-05 PROCEDURE — G8417 CALC BMI ABV UP PARAM F/U: HCPCS | Performed by: STUDENT IN AN ORGANIZED HEALTH CARE EDUCATION/TRAINING PROGRAM

## 2022-08-05 PROCEDURE — G8427 DOCREV CUR MEDS BY ELIG CLIN: HCPCS | Performed by: STUDENT IN AN ORGANIZED HEALTH CARE EDUCATION/TRAINING PROGRAM

## 2022-08-05 PROCEDURE — G0439 PPPS, SUBSEQ VISIT: HCPCS | Performed by: STUDENT IN AN ORGANIZED HEALTH CARE EDUCATION/TRAINING PROGRAM

## 2022-08-05 RX ORDER — VENLAFAXINE HYDROCHLORIDE 75 MG/1
CAPSULE, EXTENDED RELEASE ORAL
COMMUNITY
Start: 2022-07-25

## 2022-08-05 RX ORDER — MIRTAZAPINE 7.5 MG/1
TABLET, FILM COATED ORAL
COMMUNITY
Start: 2022-07-25

## 2022-08-05 RX ORDER — TRIFLUOPERAZINE HYDROCHLORIDE 5 MG/1
TABLET, FILM COATED ORAL
COMMUNITY
Start: 2022-07-26

## 2022-08-05 NOTE — PATIENT INSTRUCTIONS
Medicare Wellness Visit, Female     The best way to live healthy is to have a lifestyle where you eat a well-balanced diet, exercise regularly, limit alcohol use, and quit all forms of tobacco/nicotine, if applicable. Regular preventive services are another way to keep healthy. Preventive services (vaccines, screening tests, monitoring & exams) can help personalize your care plan, which helps you manage your own care. Screening tests can find health problems at the earliest stages, when they are easiest to treat. [facility] follows the current, evidence-based guidelines published by the [facility] ([facility]) when recommending preventive services for our patients. Because we follow these guidelines, sometimes recommendations change over time as research supports it. (For example, mammograms used to be recommended annually. Even though Medicare will still pay for an annual mammogram, the newer guidelines recommend a mammogram every two years for women of average risk). Of course, you and your doctor may decide to screen more often for some diseases, based on your risk and your co-morbidities (chronic disease you are already diagnosed with). Preventive services for you include:  - Medicare offers their members a free annual wellness visit, which is time for you and your primary care provider to discuss and plan for your preventive service needs. Take advantage of this benefit every year!  -All adults over the age of 72 should receive the recommended pneumonia vaccines. Current USPSTF guidelines recommend a series of two vaccines for the best pneumonia protection.   -All adults should have a flu vaccine yearly and a tetanus vaccine every 10 years.   -All adults age 48 and older should receive the shingles vaccines (series of two vaccines).       -All adults age 38-68 who are overweight should have a diabetes screening test once every three years.   -All adults born between [birth year] and 1967 should be screened once for Hepatitis C.  -Other screening tests and preventive services for persons with diabetes include: an eye exam to screen for diabetic retinopathy, a kidney function test, a foot exam, and stricter control over your cholesterol.   -Cardiovascular screening for adults with routine risk involves an electrocardiogram (ECG) at intervals determined by your doctor.   -Colorectal cancer screenings should be done for adults age 54-65 with no increased risk factors for colorectal cancer. There are a number of acceptable methods of screening for this type of cancer. Each test has its own benefits and drawbacks. Discuss with your doctor what is most appropriate for you during your annual wellness visit. The different tests include: colonoscopy (considered the best screening method), a fecal occult blood test, a fecal DNA test, and sigmoidoscopy.    -A bone mass density test is recommended when a woman turns 65 to screen for osteoporosis. This test is only recommended one time, as a screening. Some providers will use this same test as a disease monitoring tool if you already have osteoporosis. -Breast cancer screenings are recommended every other year for women of normal risk, age 54-69.  -Cervical cancer screenings for women over age 72 are only recommended with certain risk factors.      Here is a list of your current Health Maintenance items (your personalized list of preventive services) with a due date:  Health Maintenance Due   Topic Date Due    Smoker or Former Smoker - [address] 75  Never done    Diabetic Foot Care  01/31/2021    COVID-19 Vaccine (4 - Booster for Pfizer series) 02/11/2022    Pneumococcal Vaccine (2 - PCV) 02/11/2022    Mammogram  08/20/2022

## 2022-08-05 NOTE — PROGRESS NOTES
Vera Griffiths is a 59 y.o.  female and presents with    Chief Complaint   Patient presents with    Hospital Follow Up           Subjective:    Pt was admitted on 07/19 to [facility]. Had been with a manic episode for about 1 month. Diabetes  Taking medications as prescribed: YES  Currently on: Lantus 20 units. Checking blood sugars at home at home: YES. Fasting sugars have been in 140s. TIT 50%, above target 50%  Symptoms: none  Diabetic diet: YES  Exercise: YES    Has been seeing dark urine, which concerns her that this was how her previous UTI started.      Patient Active Problem List   Diagnosis Code    Hypercholesteremia E78.00    Type 2 diabetes mellitus with microalbuminuria, with long-term current use of insulin ([facility]) E11.29, R80.9, Z79.4    Tear of meniscus of left knee S83.207A    Recurrent depression ([facility] F33.9    Left low back pain M54.50    History of alcohol abuse F10.11    Lumbar degenerative disc disease M51.36    Hypothyroidism due to acquired atrophy of thyroid E03.4    Vitamin B12 deficiency E53.8    Bipolar 1 disorder, manic, full remission ([facility]) F31.74    PTSD (post-traumatic stress disorder) F43.10    Type 2 diabetes mellitus with diabetic neuropathy ([facility]) E11.40      Past Medical History:   Diagnosis Date    Arthritis     Chronic pain     Depression     Diabetes ([facility] [facility]     Hyperlipidemia     Hypertension     Hypothyroid     Medial meniscus tear     Left knee    Menopause     Psychiatric disorder     Tobacco abuse, in remission       Past Surgical History:   Procedure Laterality Date    HAND/FINGER SURGERY UNLISTED Left 1989    reconstructive surgery    HX ORTHOPAEDIC      Hand reconstructive surgery    HX TONSIL AND ADENOIDECTOMY      HX TUBAL LIGATION  2004    HX WISDOM TEETH EXTRACTION        Family History   Problem Relation Age of Onset    Alcohol abuse Mother     OSTEOARTHRITIS Mother     Cancer Mother 61        breast cancer    Diabetes Mother Hypertension Mother     Psychiatric Disorder Mother     Stroke Mother     Breast Cancer Mother 61    Alcohol abuse Father     Heart Disease Father     Diabetes Father     Hypertension Father     Lung Disease Father     Colon Cancer Father 72    Alcohol abuse Paternal Grandmother     Ovarian Cancer Sister 32    Alcohol abuse Sister     Diabetes Sister     Psychiatric Disorder Sister     Alcohol abuse Brother     Psychiatric Disorder Son     Psychiatric Disorder Other         nephew     Social History     Socioeconomic History    Marital status:      Spouse name: Not on file    Number of children: Not on file    Years of education: Not on file    Highest education level: Not on file   Occupational History    Not on file   Tobacco Use    Smoking status: Former     Packs/day: 1.00     Years: 35.00     Pack years: 35.00     Types: Cigarettes     Quit date: 11/7/2011     Years since quitting: 10.7    Smokeless tobacco: Never    Tobacco comments:     quit 2010   Vaping Use    Vaping Use: Never used   Substance and Sexual Activity    Alcohol use: No     Alcohol/week: 0.0 standard drinks     Comment: quit 10 years ago    Drug use: No    Sexual activity: Yes     Partners: Male   Other Topics Concern     Service No    Blood Transfusions No    Caffeine Concern No    Occupational Exposure No    Hobby Hazards Not Asked    Sleep Concern Yes     Comment: takes sleeping pills    Stress Concern Not Asked    Weight Concern Yes    Special Diet Not Asked    Back Care Not Asked    Exercise Yes    Bike Helmet Not Asked    Seat Belt Yes    Self-Exams Yes   Social History Narrative    Not on file     Social Determinants of Health     Financial Resource Strain: Not on file   Food Insecurity: Not on file   Transportation Needs: Not on file   Physical Activity: Not on file   Stress: Not on file   Social Connections: Not on file   Intimate Partner Violence: Not on file   Housing Stability: Not on file        Current Outpatient Medications   Medication Sig Dispense Refill    mirtazapine (REMERON) 7.5 mg tablet take 1 tablet by mouth at bedtime for insomnia      venlafaxine-SR (EFFEXOR-XR) 75 mg capsule take 1 capsule by mouth once daily for depression      trifluoperazine (STELAZINE) 5 mg tablet take 2 tablets by mouth twice a day for PSYCHOSIS      acetaminophen (TYLENOL) 500 mg tablet take 2 tablets by mouth every 6 hours for 10 days 90 Tablet 0    ferrous sulfate 325 mg (65 mg iron) tablet Take  by mouth Daily (before breakfast). Lantus Solostar U-100 Insulin 100 unit/mL (3 mL) inpn inject 60 units subcutaneously nightly (Patient taking differently: inject 20 units subcutaneously nightly) 12 Pen 2    oxybutynin chloride XL (DITROPAN XL) 10 mg CR tablet TAKE 1 TABLET BY MOUTH  DAILY 90 Tablet 3    SITagliptin-metFORMIN (Janumet) 50-1,000 mg per tablet TAKE 1 TABLET BY MOUTH  TWICE A  Tablet 3    omega-3 acid ethyl esters (LOVAZA) 1 gram capsule TAKE 2 CAPSULES BY MOUTH  TWO TIMES DAILY WITH MEALS 360 Capsule 3    Insulin Needles, Disposable, (Droplet Pen Needle) 31 gauge x 3/16\" ndle use 1 PEN NEEDLE to inject MEDICATION subcutaneously four times a day 200 Pen Needle 3    rosuvastatin (CRESTOR) 20 mg tablet TAKE 1 TABLET BY MOUTH  NIGHTLY 90 Tablet 3    levothyroxine (SYNTHROID) 137 mcg tablet TAKE 1 TABLET BY MOUTH  DAILY BEFORE BREAKFAST 90 Tablet 3    multivit-min/iron/folic/lutein (CENTRUM SILVER WOMEN PO) Take  by mouth daily. flash glucose sensor (FreeStyle Jorge 14 Day Sensor) kit Use 4 times daily 6 Kit 3    benztropine (COGENTIN) 1 mg tablet       cyanocobalamin (VITAMIN B12) 500 mcg tablet Take 500 mcg by mouth daily. 2 tablets daily      insulin lispro (HUMALOG) 100 unit/mL injection by SubCUTAneous route. 6-8 units per sliding scale (Patient not taking: Reported on 8/5/2022)      lidocaine (LIDODERM) 5 % 1 Patch by TransDERmal route every twenty-four (24) hours.  Apply patch to the affected area for 12 hours a day and remove for 12 hours a day. (Patient not taking: Reported on 8/5/2022) 15 Each 3    sertraline (ZOLOFT) 50 mg tablet Take 50 mg by mouth daily. (Patient not taking: Reported on 8/5/2022)      sertraline (ZOLOFT) 25 mg tablet Take 25 mg by mouth daily. (Patient not taking: No sig reported)      diclofenac (VOLTAREN) 1 % gel Apply 2 g to affected area four (4) times daily. (Patient not taking: No sig reported)      loxapine (LOXITANE) 10 mg capsule Take 10 mg by mouth two (2) times a day. (Patient not taking: Reported on 8/5/2022)      ibuprofen (MOTRIN) 800 mg tablet Take 1 Tablet by mouth every eight (8) hours as needed for Pain. (Patient not taking: Reported on 8/5/2022) 45 Tablet 0    acetaminophen (TYLENOL) 325 mg tablet Take 325 mg by mouth every four (4) hours as needed for Pain. (Patient not taking: Reported on 8/5/2022)          ROS   Review of Systems   Constitutional:  Negative for chills, fever and malaise/fatigue. HENT:  Negative for congestion, ear discharge and ear pain. Eyes:  Negative for blurred vision, pain and discharge. Respiratory:  Negative for cough and shortness of breath. Cardiovascular:  Negative for chest pain and palpitations. Gastrointestinal:  Negative for abdominal pain, nausea and vomiting. Genitourinary:  Negative for dysuria, frequency and urgency. Skin:  Negative for itching and rash. Neurological:  Negative for dizziness, seizures, loss of consciousness and headaches. Psychiatric/Behavioral:  Negative for substance abuse. Objective:  Vitals:    08/05/22 1414   BP: 125/79   Pulse: 85   Resp: 16   Temp: 97.6 °F (36.4 °C)   TempSrc: Temporal   SpO2: 99%   Weight: 181 lb 3.2 oz (82.2 kg)   Height: 5' 6\" (1.676 m)   PainSc:   0 - No pain   LMP: 06/05/2014       Physical Exam  Vitals reviewed. Constitutional:       Appearance: Normal appearance. She is obese. Eyes:      General: No scleral icterus. Right eye: No discharge.          Left eye: No discharge. Cardiovascular:      Rate and Rhythm: Normal rate and regular rhythm. Pulses: Normal pulses. Pulmonary:      Effort: Pulmonary effort is normal. No respiratory distress. Breath sounds: Normal breath sounds. Musculoskeletal:      Cervical back: Normal range of motion and neck supple. Neurological:      Mental Status: She is alert and oriented to person, place, and time. Cranial Nerves: No cranial nerve deficit. Psychiatric:         Mood and Affect: Mood normal.         Behavior: Behavior normal.         Thought Content: Thought content normal.         Judgment: Judgment normal.         LABS     TESTS      Assessment/Plan:    1. Medicare annual wellness visit, subsequent  See below    2. Hospital discharge follow-up  - VA DISCHARGE MEDS RECONCILED W/ CURRENT OUTPATIENT MED LIST    3. Type 2 diabetes mellitus with microalbuminuria, with long-term current use of insulin (HCC)  Lantus 23 units. Continue exercise and healthy eating    4. Urine abnormality  - AMB POC URINALYSIS DIP STICK AUTO W/O MICRO  - CULTURE, URINE; Future    5. Bipolar 1 disorder, manic, full remission ([facility] [facility]        Lab review: orders written for new lab studies as appropriate; see orders      I have discussed the diagnosis with the patient and the intended plan as seen in the above orders. The patient has received an after-visit summary and questions were answered concerning future plans. I have discussed medication side effects and warnings with the patient as well. I have reviewed the plan of care with the patient, accepted their input and they are in agreement with the treatment goals.          Andrea Ocampo MD     -------------------------------------------------------------------------------------------------------------------------------------------------------    This is the Subsequent Medicare Annual Wellness Exam, performed 12 months or more after the Initial AWV or the last Subsequent ALEXA    I have reviewed the patient's medical history in detail and updated the computerized patient record. Assessment/Plan   Education and counseling provided:  Are appropriate based on today's review and evaluation    1. Medicare annual wellness visit, subsequent     Depression Risk Factor Screening     3 most recent PHQ Screens 8/5/2022   Little interest or pleasure in doing things Not at all   Feeling down, depressed, irritable, or hopeless Several days   Total Score PHQ 2 1       Alcohol & Drug Abuse Risk Screen    Do you average more than 1 drink per night or more than 7 drinks a week:  No    On any one occasion in the past three months have you have had more than 3 drinks containing alcohol:  No          Functional Ability and Level of Safety    Hearing: Hearing is good. Activities of Daily Living: The home contains: grab bars  Patient does total self care      Ambulation: with no difficulty     Fall Risk:  Fall Risk Assessment, last 12 mths 8/4/2021   Able to walk? Yes   Fall in past 12 months? 1   Do you feel unsteady? 0   Are you worried about falling 0   Is TUG test greater than 12 seconds? 0   Is the gait abnormal? 0   Number of falls in past 12 months 1   Fall with injury?  1      Abuse Screen:  Patient is not abused       Cognitive Screening    Has your family/caregiver stated any concerns about your memory: no       Health Maintenance Due     Health Maintenance Due   Topic Date Due    Low dose CT lung screening  Never done    Foot Exam Q1  01/31/2021    COVID-19 Vaccine (4 - Booster for Pfizer series) 02/11/2022    Pneumococcal 0-64 years (2 - PCV) 02/11/2022    Breast Cancer Screen Mammogram  08/20/2022       Patient Care Team   Patient Care Team:  Arron De La Fuente MD as PCP - General (Family Medicine)  Arron De La Fuente MD as PCP - [facility] Empaneled Provider  Aurea Willis MD (Cardiovascular Disease Physician)  Barak Boone MD (Ophthalmology)  Samir Wayne, [address] as Physician Assistant (Psychiatry)  Beverley Rojas MD (Obstetrics & Gynecology)    History     Patient Active Problem List   Diagnosis Code    Hypercholesteremia E78.00    Type 2 diabetes mellitus with microalbuminuria, with long-term current use of insulin ([facility] E11.29, R80.9, Z79.4    Tear of meniscus of left knee S83.207A    Recurrent depression ([address] F33.9    Left low back pain M54.50    History of alcohol abuse F10.11    Lumbar degenerative disc disease M51.36    Hypothyroidism due to acquired atrophy of thyroid E03.4    Vitamin B12 deficiency E53.8    Bipolar 1 disorder, manic, full remission ([address] F31.74    PTSD (post-traumatic stress disorder) F43.10    Type 2 diabetes mellitus with diabetic neuropathy ([facility] [address] E11.40     Past Medical History:   Diagnosis Date    Arthritis     Chronic pain     Depression     Diabetes ([address]     Hyperlipidemia     Hypertension     Hypothyroid     Medial meniscus tear     Left knee    Menopause     Psychiatric disorder     Tobacco abuse, in remission       Past Surgical History:   Procedure Laterality Date    HAND/FINGER SURGERY UNLISTED Left 1989    reconstructive surgery    HX ORTHOPAEDIC      Hand reconstructive surgery    HX TONSIL AND ADENOIDECTOMY      HX TUBAL LIGATION  2004    HX WISDOM TEETH EXTRACTION       Current Outpatient Medications   Medication Sig Dispense Refill    mirtazapine (REMERON) 7.5 mg tablet take 1 tablet by mouth at bedtime for insomnia      venlafaxine-SR (EFFEXOR-XR) 75 mg capsule take 1 capsule by mouth once daily for depression      trifluoperazine (STELAZINE) 5 mg tablet take 2 tablets by mouth twice a day for PSYCHOSIS      acetaminophen (TYLENOL) 500 mg tablet take 2 tablets by mouth every 6 hours for 10 days 90 Tablet 0    ferrous sulfate 325 mg (65 mg iron) tablet Take  by mouth Daily (before breakfast).       Lantus Solostar U-100 Insulin 100 unit/mL (3 mL) inpn inject 60 units subcutaneously nightly (Patient taking differently: inject 20 units subcutaneously nightly) 12 Pen 2    oxybutynin chloride XL (DITROPAN XL) 10 mg CR tablet TAKE 1 TABLET BY MOUTH  DAILY 90 Tablet 3    SITagliptin-metFORMIN (Janumet) 50-1,000 mg per tablet TAKE 1 TABLET BY MOUTH  TWICE A  Tablet 3    omega-3 acid ethyl esters (LOVAZA) 1 gram capsule TAKE 2 CAPSULES BY MOUTH  TWO TIMES DAILY WITH MEALS 360 Capsule 3    Insulin Needles, Disposable, (Droplet Pen Needle) 31 gauge x 3/16\" ndle use 1 PEN NEEDLE to inject MEDICATION subcutaneously four times a day 200 Pen Needle 3    rosuvastatin (CRESTOR) 20 mg tablet TAKE 1 TABLET BY MOUTH  NIGHTLY 90 Tablet 3    levothyroxine (SYNTHROID) 137 mcg tablet TAKE 1 TABLET BY MOUTH  DAILY BEFORE BREAKFAST 90 Tablet 3    multivit-min/iron/folic/lutein (CENTRUM SILVER WOMEN PO) Take  by mouth daily. flash glucose sensor (FreeStyle Jorge 14 Day Sensor) kit Use 4 times daily 6 Kit 3    benztropine (COGENTIN) 1 mg tablet       cyanocobalamin (VITAMIN B12) 500 mcg tablet Take 500 mcg by mouth daily. 2 tablets daily      insulin lispro (HUMALOG) 100 unit/mL injection by SubCUTAneous route. 6-8 units per sliding scale (Patient not taking: Reported on 8/5/2022)      lidocaine (LIDODERM) 5 % 1 Patch by TransDERmal route every twenty-four (24) hours. Apply patch to the affected area for 12 hours a day and remove for 12 hours a day. (Patient not taking: Reported on 8/5/2022) 15 Each 3    sertraline (ZOLOFT) 50 mg tablet Take 50 mg by mouth daily. (Patient not taking: Reported on 8/5/2022)      sertraline (ZOLOFT) 25 mg tablet Take 25 mg by mouth daily. (Patient not taking: No sig reported)      diclofenac (VOLTAREN) 1 % gel Apply 2 g to affected area four (4) times daily. (Patient not taking: No sig reported)      loxapine (LOXITANE) 10 mg capsule Take 10 mg by mouth two (2) times a day. (Patient not taking: Reported on 8/5/2022)      ibuprofen (MOTRIN) 800 mg tablet Take 1 Tablet by mouth every eight (8) hours as needed for Pain. (Patient not taking: Reported on 8/5/2022) 45 Tablet 0    acetaminophen (TYLENOL) 325 mg tablet Take 325 mg by mouth every four (4) hours as needed for Pain.  (Patient not taking: Reported on 8/5/2022)       Allergies   Allergen Reactions    Lipitor [Atorvastatin] Other (comments)     myalgias    Lisinopril Cough    Aspirin Other (comments)     Nose bleeds    Zocor [Simvastatin] Other (comments)     Causes pain in the left leg       Family History   Problem Relation Age of Onset    Alcohol abuse Mother     OSTEOARTHRITIS Mother     Cancer Mother 61        breast cancer    Diabetes Mother     Hypertension Mother     Psychiatric Disorder Mother     Stroke Mother     Breast Cancer Mother 61    Alcohol abuse Father     Heart Disease Father     Diabetes Father     Hypertension Father     Lung Disease Father     Colon Cancer Father 72    Alcohol abuse Paternal Grandmother     Ovarian Cancer Sister 32    Alcohol abuse Sister     Diabetes Sister     Psychiatric Disorder Sister     Alcohol abuse Brother     Psychiatric Disorder Son     Psychiatric Disorder Other         nephew     Social History     Tobacco Use    Smoking status: Former     Packs/day: 1.00     Years: 35.00     Pack years: 35.00     Types: Cigarettes     Quit date: 11/7/2011     Years since quitting: 10.7    Smokeless tobacco: Never    Tobacco comments:     quit 2010   Substance Use Topics    Alcohol use: No     Alcohol/week: 0.0 standard drinks     Comment: quit 10 years ago         Kathryn Larios MD

## 2022-08-05 NOTE — PROGRESS NOTES
Vera Griffiths is a 59 y.o. presents today for   Chief Complaint   Patient presents with    Hospital Follow Up     Is someone accompanying this pt? No    Is the patient using any DME equipment during OV? No    Visit Vitals  /79 (BP 1 Location: Left upper arm, BP Patient Position: Sitting, BP Cuff Size: Adult)   Pulse 85   Temp 97.6 °F (36.4 °C) (Temporal)   Resp 16   Ht 5' 6\" (1.676 m)   Wt 181 lb 3.2 oz (82.2 kg)   SpO2 99%   BMI 29.25 kg/m²       Depression Screening:   3 most recent PHQ Screens 8/5/2022   Little interest or pleasure in doing things Not at all   Feeling down, depressed, irritable, or hopeless Several days   Total Score PHQ 2 1       Health Maintenance: reviewed and discussed and ordered per Provider. Health Maintenance Due   Topic Date Due    Low dose CT lung screening  Never done    Foot Exam Q1  01/31/2021    COVID-19 Vaccine (4 - Booster for Pfizer series) 02/11/2022    Pneumococcal 0-64 years (2 - PCV) 02/11/2022    Medicare Yearly Exam  08/05/2022    Breast Cancer Screen Mammogram  08/20/2022         Coordination of Care:   1. \"Have you been to the ER, urgent care clinic since your last visit? Hospitalized since your last visit? \" Yes [place] [facility] for back pain     2. \"Have you seen or consulted any other health care providers outside of the [address] [place] [address] since your last visit? \" Yes psychiatry      3. For patients aged 39-70: Has the patient had a colonoscopy / FIT/ Cologuard? Yes - no Care Gap present    If the patient is female:    4. For patients aged 41-77: Has the patient had a mammogram within the past 2 years? Yes - no Care Gap present    5. For patients aged 21-65: Has the patient had a pap smear? Yes - no Care Gap present     Advanced Directive:  1. Do you have an Advanced Directive? No     2. Would you like information on Advanced Directives?  No    Alyssa Glasgow CMA

## 2022-08-08 LAB
BACTERIA SPEC CULT: ABNORMAL
CC UR VC: ABNORMAL
SERVICE CMNT-IMP: ABNORMAL

## 2022-08-22 DIAGNOSIS — N39.0 URINARY TRACT INFECTION WITHOUT HEMATURIA, SITE UNSPECIFIED: Primary | ICD-10-CM

## 2022-08-22 DIAGNOSIS — R80.9 TYPE 2 DIABETES MELLITUS WITH MICROALBUMINURIA, WITH LONG-TERM CURRENT USE OF INSULIN (HCC): ICD-10-CM

## 2022-08-22 DIAGNOSIS — Z79.4 TYPE 2 DIABETES MELLITUS WITH MICROALBUMINURIA, WITH LONG-TERM CURRENT USE OF INSULIN (HCC): ICD-10-CM

## 2022-08-22 DIAGNOSIS — E11.29 TYPE 2 DIABETES MELLITUS WITH MICROALBUMINURIA, WITH LONG-TERM CURRENT USE OF INSULIN (HCC): ICD-10-CM

## 2022-08-22 RX ORDER — SULFAMETHOXAZOLE AND TRIMETHOPRIM 800; 160 MG/1; MG/1
1 TABLET ORAL 2 TIMES DAILY
Qty: 6 TABLET | Refills: 0 | Status: SHIPPED | OUTPATIENT
Start: 2022-08-22 | End: 2022-08-25

## 2022-08-22 RX ORDER — INSULIN GLARGINE 100 [IU]/ML
23 INJECTION, SOLUTION SUBCUTANEOUS
Qty: 12 PEN | Refills: 2 | Status: SHIPPED | OUTPATIENT
Start: 2022-08-22 | End: 2022-09-14 | Stop reason: SDUPTHER

## 2022-08-26 ENCOUNTER — HOSPITAL ENCOUNTER (OUTPATIENT)
Dept: WOMENS IMAGING | Age: 57
Discharge: HOME | End: 2022-08-26
Payer: MEDICARE

## 2022-08-26 DIAGNOSIS — Z12.31 VISIT FOR SCREENING MAMMOGRAM: ICD-10-CM

## 2022-08-26 PROCEDURE — 77063 BREAST TOMOSYNTHESIS BI: CPT

## 2022-09-12 ENCOUNTER — HOSPITAL ENCOUNTER (OUTPATIENT)
Dept: LAB | Age: 57
Discharge: HOME | End: 2022-09-12
Payer: MEDICARE

## 2022-09-12 DIAGNOSIS — R80.9 TYPE 2 DIABETES MELLITUS WITH MICROALBUMINURIA, WITH LONG-TERM CURRENT USE OF INSULIN (HCC): ICD-10-CM

## 2022-09-12 DIAGNOSIS — Z79.4 TYPE 2 DIABETES MELLITUS WITH MICROALBUMINURIA, WITH LONG-TERM CURRENT USE OF INSULIN (HCC): ICD-10-CM

## 2022-09-12 DIAGNOSIS — E03.4 HYPOTHYROIDISM DUE TO ACQUIRED ATROPHY OF THYROID: ICD-10-CM

## 2022-09-12 DIAGNOSIS — E11.29 TYPE 2 DIABETES MELLITUS WITH MICROALBUMINURIA, WITH LONG-TERM CURRENT USE OF INSULIN (HCC): ICD-10-CM

## 2022-09-12 LAB
ALBUMIN SERPL-MCNC: 4.2 G/DL (ref 3.4–5)
ALBUMIN/GLOB SERPL: 1.1 {RATIO} (ref 0.8–1.7)
ALP SERPL-CCNC: 75 U/L (ref 45–117)
ALT SERPL-CCNC: 40 U/L (ref 13–56)
ANION GAP SERPL CALC-SCNC: 5 MMOL/L (ref 3–18)
AST SERPL-CCNC: 18 U/L (ref 10–38)
BILIRUB SERPL-MCNC: 0.5 MG/DL (ref 0.2–1)
BUN SERPL-MCNC: 8 MG/DL (ref 7–18)
BUN/CREAT SERPL: 11 (ref 12–20)
CALCIUM SERPL-MCNC: 10 MG/DL (ref 8.5–10.1)
CHLORIDE SERPL-SCNC: 100 MMOL/L (ref 100–111)
CHOLEST SERPL-MCNC: 140 MG/DL
CO2 SERPL-SCNC: 29 MMOL/L (ref 21–32)
CREAT SERPL-MCNC: 0.73 MG/DL (ref 0.6–1.3)
EST. AVERAGE GLUCOSE BLD GHB EST-MCNC: 163 MG/DL
GLOBULIN SER CALC-MCNC: 3.8 G/DL (ref 2–4)
GLUCOSE SERPL-MCNC: 148 MG/DL (ref 74–99)
HBA1C MFR BLD: 7.3 % (ref 4.2–5.6)
HDLC SERPL-MCNC: 49 MG/DL (ref 40–60)
HDLC SERPL: 2.9 {RATIO} (ref 0–5)
LDLC SERPL CALC-MCNC: 57.2 MG/DL (ref 0–100)
LIPID PROFILE,FLP: ABNORMAL
POTASSIUM SERPL-SCNC: 5.3 MMOL/L (ref 3.5–5.5)
PROT SERPL-MCNC: 8 G/DL (ref 6.4–8.2)
SODIUM SERPL-SCNC: 134 MMOL/L (ref 136–145)
TRIGL SERPL-MCNC: 169 MG/DL (ref ?–150)
TSH SERPL DL<=0.05 MIU/L-ACNC: 2.9 UIU/ML (ref 0.36–3.74)
VLDLC SERPL CALC-MCNC: 33.8 MG/DL

## 2022-09-12 PROCEDURE — 36415 COLL VENOUS BLD VENIPUNCTURE: CPT

## 2022-09-12 PROCEDURE — 84443 ASSAY THYROID STIM HORMONE: CPT

## 2022-09-12 PROCEDURE — 83036 HEMOGLOBIN GLYCOSYLATED A1C: CPT

## 2022-09-12 PROCEDURE — 80061 LIPID PANEL: CPT

## 2022-09-12 PROCEDURE — 80053 COMPREHEN METABOLIC PANEL: CPT

## 2022-09-14 ENCOUNTER — OFFICE VISIT (OUTPATIENT)
Dept: FAMILY MEDICINE CLINIC | Age: 57
End: 2022-09-14
Payer: MEDICARE

## 2022-09-14 VITALS
HEIGHT: 66 IN | HEART RATE: 78 BPM | DIASTOLIC BLOOD PRESSURE: 79 MMHG | BODY MASS INDEX: 28.67 KG/M2 | SYSTOLIC BLOOD PRESSURE: 113 MMHG | RESPIRATION RATE: 16 BRPM | OXYGEN SATURATION: 99 % | TEMPERATURE: 97.7 F | WEIGHT: 178.4 LBS

## 2022-09-14 DIAGNOSIS — S39.012D STRAIN OF LUMBAR REGION, SUBSEQUENT ENCOUNTER: Primary | ICD-10-CM

## 2022-09-14 DIAGNOSIS — R80.9 TYPE 2 DIABETES MELLITUS WITH MICROALBUMINURIA, WITH LONG-TERM CURRENT USE OF INSULIN (HCC): ICD-10-CM

## 2022-09-14 DIAGNOSIS — Z79.4 TYPE 2 DIABETES MELLITUS WITH MICROALBUMINURIA, WITH LONG-TERM CURRENT USE OF INSULIN (HCC): ICD-10-CM

## 2022-09-14 DIAGNOSIS — E11.29 TYPE 2 DIABETES MELLITUS WITH MICROALBUMINURIA, WITH LONG-TERM CURRENT USE OF INSULIN (HCC): ICD-10-CM

## 2022-09-14 DIAGNOSIS — Z23 ENCOUNTER FOR IMMUNIZATION: ICD-10-CM

## 2022-09-14 DIAGNOSIS — F31.74 BIPOLAR 1 DISORDER, MANIC, FULL REMISSION (HCC): ICD-10-CM

## 2022-09-14 PROCEDURE — 99214 OFFICE O/P EST MOD 30 MIN: CPT | Performed by: STUDENT IN AN ORGANIZED HEALTH CARE EDUCATION/TRAINING PROGRAM

## 2022-09-14 PROCEDURE — G9899 SCRN MAM PERF RSLTS DOC: HCPCS | Performed by: STUDENT IN AN ORGANIZED HEALTH CARE EDUCATION/TRAINING PROGRAM

## 2022-09-14 PROCEDURE — 90686 IIV4 VACC NO PRSV 0.5 ML IM: CPT | Performed by: STUDENT IN AN ORGANIZED HEALTH CARE EDUCATION/TRAINING PROGRAM

## 2022-09-14 PROCEDURE — G9717 DOC PT DX DEP/BP F/U NT REQ: HCPCS | Performed by: STUDENT IN AN ORGANIZED HEALTH CARE EDUCATION/TRAINING PROGRAM

## 2022-09-14 PROCEDURE — 3051F HG A1C>EQUAL 7.0%<8.0%: CPT | Performed by: STUDENT IN AN ORGANIZED HEALTH CARE EDUCATION/TRAINING PROGRAM

## 2022-09-14 PROCEDURE — 2022F DILAT RTA XM EVC RTNOPTHY: CPT | Performed by: STUDENT IN AN ORGANIZED HEALTH CARE EDUCATION/TRAINING PROGRAM

## 2022-09-14 PROCEDURE — G0008 ADMIN INFLUENZA VIRUS VAC: HCPCS | Performed by: STUDENT IN AN ORGANIZED HEALTH CARE EDUCATION/TRAINING PROGRAM

## 2022-09-14 PROCEDURE — 3017F COLORECTAL CA SCREEN DOC REV: CPT | Performed by: STUDENT IN AN ORGANIZED HEALTH CARE EDUCATION/TRAINING PROGRAM

## 2022-09-14 PROCEDURE — G8417 CALC BMI ABV UP PARAM F/U: HCPCS | Performed by: STUDENT IN AN ORGANIZED HEALTH CARE EDUCATION/TRAINING PROGRAM

## 2022-09-14 PROCEDURE — G8428 CUR MEDS NOT DOCUMENT: HCPCS | Performed by: STUDENT IN AN ORGANIZED HEALTH CARE EDUCATION/TRAINING PROGRAM

## 2022-09-14 RX ORDER — INSULIN GLARGINE 100 [IU]/ML
23 INJECTION, SOLUTION SUBCUTANEOUS
Qty: 12 PEN | Refills: 4 | Status: SHIPPED | OUTPATIENT
Start: 2022-09-14

## 2022-09-14 RX ORDER — PEN NEEDLE, DIABETIC 31 GX3/16"
NEEDLE, DISPOSABLE MISCELLANEOUS
Qty: 200 PEN NEEDLE | Refills: 3 | Status: SHIPPED | OUTPATIENT
Start: 2022-09-14

## 2022-09-14 RX ORDER — ACETAMINOPHEN AND CODEINE PHOSPHATE 300; 60 MG/1; MG/1
TABLET ORAL
COMMUNITY
Start: 2022-08-30 | End: 2022-10-20 | Stop reason: SDUPTHER

## 2022-09-14 RX ORDER — SITAGLIPTIN AND METFORMIN HYDROCHLORIDE 50; 1000 MG/1; MG/1
TABLET, FILM COATED ORAL
Qty: 180 TABLET | Refills: 3 | Status: SHIPPED | OUTPATIENT
Start: 2022-09-14

## 2022-09-14 NOTE — PROGRESS NOTES
Derick Valdez is a 59 y.o. presents today for   Chief Complaint   Patient presents with    Diabetes     Bs 180 fasting      Hypertension     Is someone accompanying this pt? No    Is the patient using any DME equipment during OV? No    Visit Vitals  /79 (BP 1 Location: Left upper arm, BP Patient Position: Sitting, BP Cuff Size: Adult)   Pulse 78   Temp 97.7 °F (36.5 °C) (Temporal)   Resp 16   Ht 5' 6\" (1.676 m)   Wt 178 lb 6.4 oz (80.9 kg)   SpO2 99%   BMI 28.79 kg/m²       Depression Screening:   3 most recent PHQ Screens 9/14/2022   Little interest or pleasure in doing things Not at all   Feeling down, depressed, irritable, or hopeless Several days   Total Score PHQ 2 1       Health Maintenance: reviewed and discussed and ordered per Provider. Health Maintenance Due   Topic Date Due    Low dose CT lung screening  Never done    Foot Exam Q1  01/31/2021    COVID-19 Vaccine (4 - Booster for Pfizer series) 02/11/2022    Pneumococcal 0-64 years (2 - PCV) 02/11/2022    Flu Vaccine (1) 09/01/2022         Coordination of Care:   1. \"Have you been to the ER, urgent care clinic since your last visit? Hospitalized since your last visit? \" Yes urgent care for back pain last month     2. \"Have you seen or consulted any other health care providers outside of the [address] [place] [address] since your last visit? \" No     3. For patients aged 39-70: Has the patient had a colonoscopy / FIT/ Cologuard? No    If the patient is female:    4. For patients aged 41-77: Has the patient had a mammogram within the past 2 years? Yes - no Care Gap present    5. For patients aged 21-65: Has the patient had a pap smear? Yes - no Care Gap present     After obtaining consent, and per orders of Dr. Charity Castleman, injection of Flu vaccine given by Christiana Whittington. Patient instructed to remain in clinic for 10 minutes afterwards, and to report any adverse reaction to me immediately.     [address] [place] [address]

## 2022-09-14 NOTE — PROGRESS NOTES
Derick Valdez is a 59 y.o.  female and presents with    Chief Complaint   Patient presents with    Diabetes     Bs 180 fasting      Hypertension           Subjective:    Lumbar back pain - tender to palpation towards the L about 3 inches from the spine.- went to the urgent care - [facility]. Was given Tylenol #4 which she has noted it has helped with the pain. She only takes it when the pain is too severe. Otherwise takes regular Tylenol. Neurologist appt 11/17/22    Sees psychiatrist in 2 days. She stopped taking the Effexor yesterday w/o talking to psychiatrist d/t tremors, diarrhea, and other side effects. Stopped taking Remeron after talking to psychiatrist d/t gaining weight. Diabetes  Taking medications as prescribed: YES  Currently on: Lantus 23 units. Janumet, has insulin scale for Humalog   Checking blood sugars at home at home: YES.     Symptoms: none  Diabetic diet: YES  Exercise: YES         Patient Active Problem List   Diagnosis Code    Hypercholesteremia E78.00    Type 2 diabetes mellitus with microalbuminuria, with long-term current use of insulin ([facility]) E11.29, R80.9, Z79.4    Tear of meniscus of left knee S83.207A    Recurrent depression ([facility] F33.9    Left low back pain M54.50    History of alcohol abuse F10.11    Lumbar degenerative disc disease M51.36    Hypothyroidism due to acquired atrophy of thyroid E03.4    Vitamin B12 deficiency E53.8    Bipolar 1 disorder, manic, full remission ([facility]) F31.74    PTSD (post-traumatic stress disorder) F43.10    Type 2 diabetes mellitus with diabetic neuropathy ([facility]) E11.40      Past Medical History:   Diagnosis Date    Arthritis     Chronic pain     Depression     Diabetes ([facility]     Hyperlipidemia     Hypertension     Hypothyroid     Medial meniscus tear     Left knee    Menopause     Psychiatric disorder     Tobacco abuse, in remission       Past Surgical History:   Procedure Laterality Date    HAND/FINGER SURGERY UNLISTED Left 1989 reconstructive surgery    HX ORTHOPAEDIC      Hand reconstructive surgery    HX TONSIL AND ADENOIDECTOMY      HX TUBAL LIGATION  2004    HX WISDOM TEETH EXTRACTION        Family History   Problem Relation Age of Onset    Alcohol abuse Mother     OSTEOARTHRITIS Mother     Cancer Mother 61        breast cancer    Diabetes Mother     Hypertension Mother     Psychiatric Disorder Mother     Stroke Mother     Breast Cancer Mother 61    Alcohol abuse Father     Heart Disease Father     Diabetes Father     Hypertension Father     Lung Disease Father     Colon Cancer Father 72    Alcohol abuse Paternal Grandmother     Ovarian Cancer Sister 32    Alcohol abuse Sister     Diabetes Sister     Psychiatric Disorder Sister     Alcohol abuse Brother     Psychiatric Disorder Son     Psychiatric Disorder Other         nephew     Social History     Socioeconomic History    Marital status:      Spouse name: Not on file    Number of children: Not on file    Years of education: Not on file    Highest education level: Not on file   Occupational History    Not on file   Tobacco Use    Smoking status: Former     Packs/day: 1.00     Years: 35.00     Pack years: 35.00     Types: Cigarettes     Quit date: 11/7/2011     Years since quitting: 10.8    Smokeless tobacco: Never    Tobacco comments:     quit 2010   Vaping Use    Vaping Use: Never used   Substance and Sexual Activity    Alcohol use: No     Alcohol/week: 0.0 standard drinks     Comment: quit 10 years ago    Drug use: No    Sexual activity: Yes     Partners: Male   Other Topics Concern     Service No    Blood Transfusions No    Caffeine Concern No    Occupational Exposure No    Hobby Hazards Not Asked    Sleep Concern Yes     Comment: takes sleeping pills    Stress Concern Not Asked    Weight Concern Yes    Special Diet Not Asked    Back Care Not Asked    Exercise Yes    Bike Helmet Not Asked    Seat Belt Yes    Self-Exams Yes   Social History Narrative    Not on file Social Determinants of Health     Financial Resource Strain: Not on file   Food Insecurity: Not on file   Transportation Needs: Not on file   Physical Activity: Not on file   Stress: Not on file   Social Connections: Not on file   Intimate Partner Violence: Not on file   Housing Stability: Not on file        Current Outpatient Medications   Medication Sig Dispense Refill    acetaminophen-codeine (TYLENOL #4) 300-60 mg tab take 1 tablet by mouth four times a day for 5 days if needed for pain      Lantus Solostar U-100 Insulin 100 unit/mL (3 mL) inpn 23 Units by SubCUTAneous route nightly. 12 Pen 2    venlafaxine-SR (EFFEXOR-XR) 75 mg capsule take 1 capsule by mouth once daily for depression      trifluoperazine (STELAZINE) 5 mg tablet 5mg in the morning and 10 mg at night      acetaminophen (TYLENOL) 500 mg tablet take 2 tablets by mouth every 6 hours for 10 days 90 Tablet 0    ferrous sulfate 325 mg (65 mg iron) tablet Take  by mouth Daily (before breakfast). oxybutynin chloride XL (DITROPAN XL) 10 mg CR tablet TAKE 1 TABLET BY MOUTH  DAILY 90 Tablet 3    SITagliptin-metFORMIN (Janumet) 50-1,000 mg per tablet TAKE 1 TABLET BY MOUTH  TWICE A  Tablet 3    omega-3 acid ethyl esters (LOVAZA) 1 gram capsule TAKE 2 CAPSULES BY MOUTH  TWO TIMES DAILY WITH MEALS 360 Capsule 3    Insulin Needles, Disposable, (Droplet Pen Needle) 31 gauge x 3/16\" ndle use 1 PEN NEEDLE to inject MEDICATION subcutaneously four times a day 200 Pen Needle 3    rosuvastatin (CRESTOR) 20 mg tablet TAKE 1 TABLET BY MOUTH  NIGHTLY 90 Tablet 3    levothyroxine (SYNTHROID) 137 mcg tablet TAKE 1 TABLET BY MOUTH  DAILY BEFORE BREAKFAST 90 Tablet 3    multivit-min/iron/folic/lutein (CENTRUM SILVER WOMEN PO) Take  by mouth daily.       flash glucose sensor (FreeStyle Jorge 14 Day Sensor) kit Use 4 times daily 6 Kit 3    benztropine (COGENTIN) 1 mg tablet       mirtazapine (REMERON) 7.5 mg tablet take 1 tablet by mouth at bedtime for insomnia (Patient not taking: Reported on 9/14/2022)      ibuprofen (MOTRIN) 800 mg tablet Take 1 Tablet by mouth every eight (8) hours as needed for Pain. (Patient not taking: No sig reported) 45 Tablet 0    acetaminophen (TYLENOL) 325 mg tablet Take 325 mg by mouth every four (4) hours as needed for Pain. (Patient not taking: No sig reported)      cyanocobalamin (VITAMIN B12) 500 mcg tablet Take 500 mcg by mouth daily. 2 tablets daily (Patient not taking: Reported on 9/14/2022)          ROS   Review of Systems   Constitutional:  Negative for chills, fever and malaise/fatigue. HENT:  Negative for congestion, ear discharge and ear pain. Eyes:  Negative for blurred vision, pain and discharge. Respiratory:  Negative for cough and shortness of breath. Cardiovascular:  Negative for chest pain and palpitations. Gastrointestinal:  Negative for abdominal pain, nausea and vomiting. Genitourinary:  Negative for dysuria, frequency and urgency. Skin:  Negative for itching and rash. Neurological:  Negative for dizziness, seizures, loss of consciousness and headaches. Psychiatric/Behavioral:  Negative for substance abuse. Objective:  Vitals:    09/14/22 1020   BP: 113/79   Pulse: 78   Resp: 16   Temp: 97.7 °F (36.5 °C)   TempSrc: Temporal   SpO2: 99%   Weight: 178 lb 6.4 oz (80.9 kg)   Height: 5' 6\" (1.676 m)   PainSc:   7   PainLoc: Back   LMP: 06/05/2014       Physical Exam  Vitals reviewed. Constitutional:       Appearance: Normal appearance. She is obese. Eyes:      General: No scleral icterus. Right eye: No discharge. Left eye: No discharge. Cardiovascular:      Rate and Rhythm: Normal rate and regular rhythm. Pulmonary:      Effort: Pulmonary effort is normal. No respiratory distress. Breath sounds: Normal breath sounds. Musculoskeletal:      Cervical back: Normal range of motion and neck supple.    Neurological:      Mental Status: She is alert and oriented to person, place, and time. Cranial Nerves: No cranial nerve deficit. Psychiatric:         Mood and Affect: Mood normal.         Behavior: Behavior normal.         Thought Content: Thought content normal.         Judgment: Judgment normal.         LABS     TESTS      Assessment/Plan:     1. Type 2 diabetes mellitus with microalbuminuria, with long-term current use of insulin ([facility])  stable  - Insulin Needles, Disposable, (Droplet Pen Needle) 31 gauge x 3/16\" ndle; use 1 PEN NEEDLE to inject MEDICATION subcutaneously four times a day  Dispense: 200 Pen Needle; Refill: 3  - SITagliptin-metFORMIN (Janumet) 50-1,000 mg per tablet; TAKE 1 TABLET BY MOUTH  TWICE A DAY  Dispense: 180 Tablet; Refill: 3  - Lantus Solostar U-100 Insulin 100 unit/mL (3 mL) inpn; 23 Units by SubCUTAneous route nightly. Dispense: 12 Pen; Refill: 4    2. Strain of lumbar region, subsequent encounter  - REFERRAL TO PHYSICAL THERAPY    3. Bipolar 1 disorder, manic, full remission ([facility]  F/up w/ psychiatrist. Shayan Looney pt not to d/c effexor until she discuss this w/ psych. She states she will take medication when she gets home and discussed w/ psych about this during appt in 2 days    4. Encounter for immunization  - INFLUENZA, FLUARIX, FLULAVAL, FLUZONE (AGE 6 MO+), AFLURIA(AGE 3Y+) IM, PF, 0.5 ML          Lab review: labs are reviewed, up to date and normal      I have discussed the diagnosis with the patient and the intended plan as seen in the above orders. The patient has received an after-visit summary and questions were answered concerning future plans. I have discussed medication side effects and warnings with the patient as well. I have reviewed the plan of care with the patient, accepted their input and they are in agreement with the treatment goals.          Gino Valera MD

## 2022-09-28 ENCOUNTER — HOSPITAL ENCOUNTER (OUTPATIENT)
Dept: PHYSICAL THERAPY | Age: 57
Discharge: HOME | End: 2022-09-28
Payer: MEDICARE

## 2022-09-28 PROCEDURE — 97530 THERAPEUTIC ACTIVITIES: CPT

## 2022-09-28 PROCEDURE — 97161 PT EVAL LOW COMPLEX 20 MIN: CPT

## 2022-09-28 NOTE — PROGRESS NOTES
[address] [facility] AT [address]  Phone: [phone] Fax: [phone] / [address] [address] PHYSICAL THERAPY SERVICES  Patient Name: Ovi Dawn : 1965   Medical   Diagnosis: Other low back pain [M54.59] Treatment Diagnosis: Low back pain   Onset Date: Chronic, referral 22     Referral Source: China Llanos,* Start of Care [facility]): 2022   Prior Hospitalization: See medical history Provider #: [id number]   Prior Level of Function: Functionally independent, lives alone in apartment, walking without AD   Comorbidities: DM II, thyroid issues, depression/bipolar disorder, incontinence, arthritis, hx of left meniscus tear   Medications: Verified on Patient Summary List   The Plan of Care and following information is based on the information from the initial evaluation.     ========================================================================    Assessment / key information:  Pt is a pleasant 59 y.o. female who presents with c/o low back pain. The patient reports a chronic history of low back pain, left >right, that sends shooting pain into the back with transitional movements. Signs/symptoms at eval consistent with mechanical low back pain with likely flexion bias. Functional deficits include: impaired LE strength, decreased LE mobility, impaired ease with transition, increased risk for falls. Rehab potential is good due to desire to attain PLOF.  Pt would benefit from skilled PT to address above deficits to improve Pt's function and ability to return to PLOF with decreased pain.    ========================================================================    Eval Complexity: History: HIGH Complexity :3+ comorbidities / personal factors will impact the outcome/ POC Exam:LOW Complexity : 1-2 Standardized tests and measures addressing body structure, function, activity limitation and / or participation in recreation  Presentation: LOW Complexity : Stable, uncomplicated  Clinical Decision Making:MEDIUM Complexity : FOTO score of 26-74Overall Complexity:LOW   Problem List: pain affecting function, decrease ROM, decrease strength, impaired gait/ balance, decrease ADL/ functional abilitiies, decrease activity tolerance, decrease flexibility/ joint mobility, and decrease transfer abilities   Treatment Plan may include any combination of the following: Therapeutic exercise, Therapeutic activities, Neuromuscular re-education, Physical agent/modality, Gait/balance training, Manual therapy, Aquatic therapy, Patient education, Self Care training, Functional mobility training, Home safety training, and Stair training  Patient / Family readiness to learn indicated by: asking questions    Persons(s) to be included in education: patient (P)  Barriers to Learning/Limitations: None  Measures taken:    Patient Goal (s): \"To rejoin gym on treadmill and also to row, eliminate this pain\"   Patient self reported health status: fair  Rehabilitation Potential: good    GOALS-  Short Term Goals: To be accomplished in 1 week  - Goal: Pt to be compliant with initial HEP to improve ability to independently manage symptoms. Status at last note/certification: Established and reviewed with Pt  Long Term Goals: To be accomplished in 10 treatments  - Goal: Pt to report no pain reaching to floor to grab box to improve ease with household chores and cleaning her home. Status at last note/certification: 92% lumbar mobility, increased pain with bending forward  - Goal: Pt to report < 4/10 pain at worst in low back to increase ease with ADLs. Status at last note/certification: 28/06 pain at worst  - Goal: Pt will perform 5x sit<>stand test in 12 seconds or less without UE assist to improve ease of transfers and demo improved functional LE strength.   Status at last note/certification: 27 seconds without UE assist  - Goal: Pt to demonstrate at leat 4+/5 B hip extension MMT to increase ease of transfers/stair negotiation. Status at last note/certification: 3+/5 B  - Goal: Pt to report FOTO score of at least 48 pts to demonstrate improved function and quality of life. Status at last note/certification: FOTO 36 pts       Frequency / Duration:   -Patient would benefit from skilled PT 2 times per week for 24-36 sessions as needed in this certification period. Goals will be assigned and reassessed every 10 visits/ 30 days per Medicare guidelines     Patient / Caregiver education and instruction: self care, activity modification, and exercises    Therapist Signature: Sierra Hassan Date: 6/22/5541   Certification Period: 9/28/22-12/26/22 Time: 1:20 PM   ========================================================================  I certify that the above Physical Therapy Services are being furnished while the patient is under my care. I agree with the treatment plan and certify that this therapy is necessary. Physician Signature:        Date:       Time: ___                                            Akanksha Hugo,*. Please sign and return to [facility] [place] or you may fax the signed copy to [phone]. Thank you.

## 2022-09-28 NOTE — PROGRESS NOTES
PT DAILY TREATMENT NOTE     Patient Name: Nile Saha  Date:2022  : 1965  [x]  Patient  Verified  Payor: [facility] MEDICARE / Plan: [facility] COMMUNITY PLAN MCR / Product Type: Managed Care Medicare /    In time:12:20  Out time:12:47  Total Treatment Time (min): 27  Visit #: 1 of 10    Medicare/BCBS Only   Total Timed Codes (min):  10 1:1 Treatment Time:  27       Treatment Area: Other low back pain [M54.59]    SUBJECTIVE  Pain Level (0-10 scale): 5  Any medication changes, allergies to medications, adverse drug reactions, diagnosis change, or new procedure performed?: [x] No    [] Yes (see summary sheet for update)  Subjective functional status/changes:   [] No changes reported    CC: low back pain  History/Mechanism of Injury: chronic, insidious worsening over the past few years  Current Symptoms/Complaints: low back pain- intense pain when trying to lay down, stabbing pain at night  Can't walk in the middle of night, feels paralyzed  Pain-  Current: 5/10     Worst: 10/10   Best: 5/10  Aggravated By: getting up/down from chair, lifting heavier weights, lifting groceries, prolonged sitting/laying  Alleviated By: salonpas patch, turmeric tea, anti-inflammatories  Previous Treatment/Compliance: previous PT over one decade ago  Mobility Devices: no AD at this time  PMHx/Surgical Hx: DM II, thyroid issues, depression/bipolar disorder, incontinence, arthritis, hx of left meniscus tear  Work Hx:   Living Situation: apartment, lives alone, grandkids coming to visit at times,   Household Modifications:    Hobbies:   PLOF: functionally independent,   Limitations to PLOF: difficulty with transfers, prolonged standing  Pt Goals: get back into gym program    OBJECTIVE    17 min [x]Eval                  []Re-Eval     10 min Therapeutic Activity:  []  See flow sheet : Patient education on therapy assessment, prognosis, expectations for therapy sessions, patient goals, mechanical low back pain causes and treatment, and HEP. Rationale: to improve the patients ability to adhere to HEP and therapy sessions for increased compliance when working toward therapy goals.              With   [] TE   [x] TA   [] neuro   [] other: Patient Education: [x] Review HEP    [] Progressed/Changed HEP based on:   [] positioning   [] body mechanics   [] transfers   [] heat/ice application    [] other:      Other Objective/Functional Measures:     Observation: flat back in standing, standing in increased lumbar flexion, guarded movements (especially with transfers)  Palpation: TTP at B lumbar paraspinals/parasacral musculature, left > right    Lumbar AROM:                                           AROM (% of full)                 Right Left Effect   Flexion 75% Pain decreases with increased reps   Extension 25-50% Neutral in regards to pain   Side Bend 50 50    Rotation 50 50                                               -  Strength:   Right (/5) Left (/5)   Hip     Flexion 4 4-             Abduction 4 4-             Adduction               Extension 3+ 3+             ER               IR     Knee   Extension 5 5              Flexion 4 4   Ankle   Dorsiflexion 5 5     Directional Preference Testing: prefers flexion for repeated movements    Gait: slow, guarded (unable to fully assess today due to time constraints at initial evaluation)    Functional Squat: wide base of support, increased anterior knee translation  5xSTS= 27\" without UE assist    Stair Negotiation: (unable to fully assess today due to time constraints at initial evaluation)    Balance: Romberg EO/EC 30\"  (unable to fully assess today due to time constraints at initial evaluation)    Reflexes/Sensation: denies sensation changes  Clonus absent at B ankles    Special Tests:      Right Left   Slump       SLR - -   -   Right Left   Hamstring 90/90 +   +   Ely + +   Agapito Giovannill       -   Right Left   EILEEN +   +   FADDIR     Hip Scour + +     Pain Level (0-10 scale) post treatment: 5    ASSESSMENT/Changes in Function: See POC    Patient will continue to benefit from skilled PT services to modify and progress therapeutic interventions, address functional mobility deficits, address ROM deficits, address strength deficits, analyze and address soft tissue restrictions, analyze and cue movement patterns, analyze and modify body mechanics/ergonomics, assess and modify postural abnormalities, address imbalance/dizziness and instruct in home and community integration to attain remaining goals.      [x]  See Plan of Care  []  See progress note/recertification  []  See Discharge Summary         Progress towards goals / Updated goals:  See POC    PLAN  [x]  Upgrade activities as tolerated     []  Continue plan of care  [x]  Update interventions per flow sheet       []  Discharge due to:_  []  Other:_      René Ireland 9/28/2022  12:22 PM    Future Appointments   Date Time Provider Alka Cheema   11/17/2022  1:20 PM Tori Hancock MD [facility] BS AMB   1/16/2023 10:20 AM Radha Fraga MD [facility] BS AMB   9/1/2023  9:30 AM [address] [place] [address] [facility] [address] [place] [address]

## 2022-09-29 NOTE — TELEPHONE ENCOUNTER
This patient contacted the office for the following prescriptions to be refilled:    Medication requested :   Requested Prescriptions     Pending Prescriptions Disp Refills    acetaminophen (TYLENOL) 500 mg tablet 90 Tablet 0     Sig: take 2 tablets by mouth every 6 hours for 10 days      PCP: Radha Fraga MD  LOV: 9/14/2022  NOV [facility]: 1/16/2023  FUTURE APPT:   Future Appointments   Date Time Provider Alka Cheema   10/4/2022  4:15 PM Marli Hurst, PT [facility]   10/6/2022  4:15 PM Kell Botello, PT [facility]   11/17/2022  1:20 PM Tori Hancock MD [facility] BS AMB   1/16/2023 10:20 AM Radha Fraga MD [facility] BS AMB   9/1/2023  9:30 AM [address] [facility] [address] [facility] [address]     Thank you.

## 2022-10-01 RX ORDER — ACETAMINOPHEN 500 MG
TABLET ORAL
Qty: 90 TABLET | Refills: 0 | Status: SHIPPED | OUTPATIENT
Start: 2022-10-01

## 2022-10-04 ENCOUNTER — HOSPITAL ENCOUNTER (OUTPATIENT)
Dept: PHYSICAL THERAPY | Age: 57
Discharge: HOME | End: 2022-10-04
Payer: MEDICARE

## 2022-10-04 PROCEDURE — 97140 MANUAL THERAPY 1/> REGIONS: CPT

## 2022-10-04 PROCEDURE — 97112 NEUROMUSCULAR REEDUCATION: CPT

## 2022-10-04 NOTE — PROGRESS NOTES
PT DAILY TREATMENT NOTE     Patient Name: Dieter Green  Date:10/4/2022  : 1965  [x]  Patient  Verified  Payor: [facility] MEDICARE / Plan:  [employer] MCR / Product Type: Managed Care Medicare /    In time: 4405  Out time:1715  Total Treatment Time (min): 54  Visit #: 2 of 10    Medicare/BCBS Time Tracking (below)   Total Timed Codes (min):  44 1:1 Treatment Time:  44       Treatment Area: Other low back pain [M54.59]    SUBJECTIVE  Pain Level (0-10 scale): 6/10  Any medication changes, allergies to medications, adverse drug reactions, diagnosis change, or new procedure performed?: [x] No    [] Yes (see summary sheet for update)  Subjective functional status/changes:   [] No changes reported  \"My back is really bad, I was doing my squats\"    OBJECTIVE    Modality rationale: decrease pain and increase tissue extensibility to improve the patients ability to perform ADLs and rest comfortably following therapy.    Min Type Additional Details     [] Estim:  []Unatt       []IFC  []Premod                        []Other: []w/ice   []w/heat  Position:   Location:      [] Estim: []Att    []TENS instruct  []NMES                    []Other: []w/US   []w/ice   []w/heat  Position:  Location:      []  Traction: [] Cervical       []Lumbar                       [] Prone          []Supine                       []Intermittent   []Continuous Lbs:  [] before manual  [] after manual      []  Ultrasound: []Continuous   [] Pulsed                           []1MHz   []3MHz W/cm2:  Location:      []  Iontophoresis with dexamethasone        Location: [] Take home patch  [] In clinic     10 []  Ice     [x]  heat  []  Ice massage  []  Laser  []  Anodyne Position: supine with wedge under BLE  Location: back      []  Laser with stim  []  Other: Position:  Location:      []  Vasopneumatic Device    []  Right     []  Left  Pre-treatment girth:  Post-treatment girth:  Measured at (location): Pressure:       [] lo [] med [] hi  Temperature: [] lo [] med [] hi    [x] Skin assessment post-treatment:  [x]intact []redness- no adverse reaction    []redness - adverse reaction    X min Therapeutic Exercise:  [x] See flow sheet :   Rationale: increase ROM and increase strength to improve LE strength/mobility to improve ease of ADLs, household chores, and gait. 11 min Therapeutic Activity:  [x]  See flow sheet :   Rationale: increase strength, improve coordination, improve balance, and increase proprioception to improve the patient's ability to perform transfers and ADLs. 25 min Neuromuscular Re-education:  [x]  See flow sheet :   Rationale: increase strength, improve coordination, improve balance and increase proprioception to improve the patients ability to perform functional tasks with improved abdominal brace utilization. 8 min Manual Therapy: In prone: STM and use of massage gun with pt consent at B l/s paraspinals and glutes                                                                    Throughout Patient Education: [x] Review HEP    Difference between pain and discomfort, importance of stopping prior to incr in pain levels    Refrain from weighted squats with extension that cause incr in LBP, adjust to air squats without weight or OH press           Other Objective/Functional Measures:   Initiated POC per FS       Pain Level (0-10 scale) post treatment: 3    ASSESSMENT/Changes in Function:   Pt seen for 1st treatment since IE. Pt inconsistently able to perform TA draw with cues for exhalation, however expresses interest in trying. Stacked interventions for LE movement with TA draw well tolerated, pt c/o pain in R l/s with seated L hip flexion, noted muscular tenderness at R L5 addressed with MT. Pt I in good squat technique, able to achieve hip hinge and no c/o incr LBP. Session concluded with MHP, pt notes decr in pain to 3/10.     Patient will continue to benefit from skilled PT services to modify and progress therapeutic interventions, address functional mobility deficits, address ROM deficits, address strength deficits, analyze and address soft tissue restrictions, analyze and cue movement patterns, analyze and modify body mechanics/ergonomics, assess and modify postural abnormalities, address imbalance/dizziness, and instruct in home and community integration to attain remaining goals. []  See Plan of Care  []  See progress note/recertification  []  See Discharge Summary         PN due 10/28    Progress towards goals / Updated goals:  Short Term Goals: To be accomplished in 1 week  - Goal: Pt to be compliant with initial HEP to improve ability to independently manage symptoms. Status at last note/certification: Established and reviewed with Pt  Current 10/4/22 pt reports 1x/daily compliance  Long Term Goals: To be accomplished in 10 treatments  - Goal: Pt to report no pain reaching to floor to grab box to improve ease with household chores and cleaning her home. Status at last note/certification: 84% lumbar mobility, increased pain with bending forward  - Goal: Pt to report < 4/10 pain at worst in low back to increase ease with ADLs. Status at last note/certification: 97/92 pain at worst  - Goal: Pt will perform 5x sit<>stand test in 12 seconds or less without UE assist to improve ease of transfers and demo improved functional LE strength. Status at last note/certification: 27 seconds without UE assist  - Goal: Pt to demonstrate at leat 4+/5 B hip extension MMT to increase ease of transfers/stair negotiation. Status at last note/certification: 3+/5 B  - Goal: Pt to report FOTO score of at least 48 pts to demonstrate improved function and quality of life.   Status at last note/certification: FOTO 36 pts           PLAN  [x]  Upgrade activities as tolerated     [x]  Continue plan of care  []  Update interventions per flow sheet       []  Discharge due to:_  [x]  Other:_  scheduling more visits, 2x/week for 3 more kim Chase, PT 10/4/2022  8:34 AM    Future Appointments   Date Time Provider Department Center   10/4/2022  4:15 PM Ej Graff, PT [facility]   10/6/2022  4:15 PM Maris Workman, PT [facility]   11/17/2022  1:20 PM Clarissa France MD [facility] BS AMB   1/16/2023 10:20 AM Reji Robert MD [facility] BS AMB   9/1/2023  9:30 AM [facility] SUNG STEREO BX RM [address]

## 2022-10-06 ENCOUNTER — HOSPITAL ENCOUNTER (OUTPATIENT)
Dept: PHYSICAL THERAPY | Age: 57
Discharge: HOME | End: 2022-10-06
Payer: MEDICARE

## 2022-10-06 PROCEDURE — 97530 THERAPEUTIC ACTIVITIES: CPT

## 2022-10-06 PROCEDURE — 97014 ELECTRIC STIMULATION THERAPY: CPT

## 2022-10-06 PROCEDURE — 97140 MANUAL THERAPY 1/> REGIONS: CPT

## 2022-10-06 PROCEDURE — 97112 NEUROMUSCULAR REEDUCATION: CPT

## 2022-10-06 NOTE — PROGRESS NOTES
PT DAILY TREATMENT NOTE 8-14    Patient Name: Darryl Alonso  Date:10/6/2022  : 1965  [x]  Patient  Verified  Payor: [facility] MEDICARE / Plan: [facility] COMMUNITY PLAN MCR / Product Type: Managed Care Medicare /    In time: 4:15 Out time: 5:10  Total Treatment Time (min): 55   Visit #: 3 of 10    Medicare/BCBS Time Tracking (below)   Total Timed Codes (min):  45 1:1 Treatment Time:  4:22 to 5:00 (38)        Treatment Area: Other low back pain [M54.59]    SUBJECTIVE  Pain Level (0-10 scale): 9  Any medication changes, allergies to medications, adverse drug reactions, diagnosis change, or new procedure performed?: [x] No    [] Yes (see summary sheet for update)  Subjective functional status/changes:   [] No changes reported  \"It's sore like a 9/10. But it doesn't hurt. I do my exercises every other day. \"  Pt notes she felt great after first therapy session. \"I didn't have to use medication, I just put on a lidocaine patch\"  \"I did 3 hours of walking today  and I think I overdid it but I love walking\"   OBJECTIVE    Modality rationale: decrease pain and increase tissue extensibility to improve the patients ability to perform ADLs and rest comfortably following therapy. Min Type Additional Details     10 []  Ice     [x]  heat with estim, IFC to lumbar spine   []  Ice massage  []  Laser  []  Anodyne Position: supine with wedge under BLE  Location: back    [x] Skin assessment post-treatment:  [x]intact []redness- no adverse reaction    []redness - adverse reaction    10 (NC) min Therapeutic Exercise:  [x] See flow sheet :  -added incline board stretch  -added hip flexor stretch at stairs    Rationale: increase ROM and increase strength to improve LE strength/mobility to improve ease of ADLs, household chores, and gait.      10 min Therapeutic Activity:  [x]  See flow sheet :   Rationale: increase strength, improve coordination, improve balance, and increase proprioception to improve the patient's ability to perform transfers and ADLs. 17 min Neuromuscular Re-education:  [x]  See flow sheet :  -added palloff press with double GTB  Added rows and extensions   -clams in SL with green tband    Rationale: increase strength, improve coordination, improve balance and increase proprioception to improve the patients ability to perform functional tasks with improved abdominal brace utilization. 8 min Manual Therapy: In prone: STM and use of massage gun with pt consent at B l/s paraspinals and glutes                                                                    Throughout Patient Education: [x] Review HEP               Other Objective/Functional Measures:     Pain Level (0-10 scale) post treatment: 0    ASSESSMENT/Changes in Function:   Pt with good tolerance to today's increase in treatment and to new TE and NMred. Required low setting on the massage gun to avoid increased c/o mm pain. Patient will continue to benefit from skilled PT services to modify and progress therapeutic interventions, address functional mobility deficits, address ROM deficits, address strength deficits, analyze and address soft tissue restrictions, analyze and cue movement patterns, analyze and modify body mechanics/ergonomics, assess and modify postural abnormalities, address imbalance/dizziness, and instruct in home and community integration to attain remaining goals. []  See Plan of Care  []  See progress note/recertification  []  See Discharge Summary         PN due 10/28    Progress towards goals / Updated goals:  Short Term Goals: To be accomplished in 1 week  - Goal: Pt to be compliant with initial HEP to improve ability to independently manage symptoms. Status at last note/certification: Established and reviewed with Pt  Current 10/4/22 pt reports 1x/daily compliance  Long Term Goals:  To be accomplished in 10 treatments  - Goal: Pt to report no pain reaching to floor to grab box to improve ease with household chores and cleaning her home. Status at last note/certification: 91% lumbar mobility, increased pain with bending forward  - Goal: Pt to report < 4/10 pain at worst in low back to increase ease with ADLs. Status at last note/certification: 64/57 pain at worst   Current: no change, max pain 10/10, but pt describes some pain as more 'soreness' (10/6/22)  - Goal: Pt will perform 5x sit<>stand test in 12 seconds or less without UE assist to improve ease of transfers and demo improved functional LE strength. Status at last note/certification: 27 seconds without UE assist  - Goal: Pt to demonstrate at leat 4+/5 B hip extension MMT to increase ease of transfers/stair negotiation. Status at last note/certification: 3+/5 B  Current: progressed TE with good response  (10/6/22)  - Goal: Pt to report FOTO score of at least 48 pts to demonstrate improved function and quality of life.   Status at last note/certification: FOTO 36 pts     PLAN  [x]  Upgrade activities as tolerated     [x]  Continue plan of care  []  Update interventions per flow sheet       []  Discharge due to:_  []  Other:    Baldo Kawasaki, PT 10/6/2022  8:34 AM    Future Appointments   Date Time Provider Alka Cheema   10/6/2022  4:15 PM Jay Garcia, PT [facility]   10/10/2022  4:15 PM Nicolette Vargas PTA [facility]   10/12/2022  4:15 PM Nicolette Vargas PTA [facility]   11/17/2022  1:20 PM Gorge Robbins MD [facility] BS AMB   1/16/2023 10:20 AM Bri Mccormick MD [facility] BS AMB   9/1/2023  9:30 AM [facility]ERE[address] BX RM [address] [facility]

## 2022-10-10 ENCOUNTER — HOSPITAL ENCOUNTER (OUTPATIENT)
Dept: PHYSICAL THERAPY | Age: 57
Discharge: HOME | End: 2022-10-10
Payer: MEDICARE

## 2022-10-10 PROCEDURE — 97014 ELECTRIC STIMULATION THERAPY: CPT

## 2022-10-10 PROCEDURE — 97140 MANUAL THERAPY 1/> REGIONS: CPT

## 2022-10-10 PROCEDURE — 97112 NEUROMUSCULAR REEDUCATION: CPT

## 2022-10-10 PROCEDURE — 97530 THERAPEUTIC ACTIVITIES: CPT

## 2022-10-10 NOTE — PROGRESS NOTES
PT DAILY TREATMENT NOTE 8-14    Patient Name: Kevan Troy  Date:10/10/2022  : 1965  [x]  Patient  Verified  Payor: [facility] MEDICARE / Plan: [facility] COMMUNITY PLAN MCR / Product Type: Managed Care Medicare /    In time: 4:16 pm                  Out time: 5:11 pm  Total Treatment Time (min): 55  Visit #: 4 of 10    Medicare/BCBS Time Tracking (below)   Total Timed Codes (min):  45 1:1 Treatment Time:  40       Treatment Area: Other low back pain [M54.59]    SUBJECTIVE  Pain Level (0-10 scale): 0  Any medication changes, allergies to medications, adverse drug reactions, diagnosis change, or new procedure performed?: [x] No    [] Yes (see summary sheet for update)  Subjective functional status/changes:   [] No changes reported  Patient reports a significant reduction in symptoms since last treatment. Notes lying on her stomach was the key to reducing her pain. OBJECTIVE    Modality rationale: decrease pain and increase tissue extensibility to improve the patients ability to perform ADLs and rest comfortably following therapy. Min Type Additional Details    10 []  Ice     [x]  Heat with estim, IFC to lumbar spine Position: prone (patient preference)  Location: back   [x] Skin assessment post-treatment:  [x]intact []redness- no adverse reaction    []redness - adverse reaction    9 min Therapeutic Exercise:  [x] See flow sheet:    Rationale: increase ROM and increase strength to improve LE strength/mobility to improve ease of ADLs, household chores, and gait. 13 min Therapeutic Activity:  [x]  See flow sheet:   Rationale: increase strength, improve coordination, improve balance, and increase proprioception to improve the patient's ability to perform transfers and ADLs.      15 min Neuromuscular Re-education:  [x]  See flow sheet:    Rationale: increase strength, improve coordination, improve balance and increase proprioception to improve the patients ability to perform functional tasks with improved abdominal brace utilization. 8 min Manual Therapy: In prone: STM and use of massage gun with pt consent at B l/s paraspinals and glutes                                                                    Throughout Patient Education: [x] Review HEP               Other Objective/Functional Measures:   5 rep, STS: 19 seconds    Pain Level (0-10 scale) post treatment: 0    ASSESSMENT/Changes in Function:   Patient progressing towards LTG #3, reporting increased right knee pain despite modifications to reduce patellar shearing. May benefit from soleus stretching to improve ease with transfers. Patient will continue to benefit from skilled PT services to modify and progress therapeutic interventions, address functional mobility deficits, address ROM deficits, address strength deficits, analyze and address soft tissue restrictions, analyze and cue movement patterns, analyze and modify body mechanics/ergonomics, assess and modify postural abnormalities, address imbalance/dizziness, and instruct in home and community integration to attain remaining goals. []  See Plan of Care  []  See progress note/recertification  []  See Discharge Summary         PN due 10/28    Progress towards goals / Updated goals:  Short Term Goals: To be accomplished in 1 week  - Goal: Pt to be compliant with initial HEP to improve ability to independently manage symptoms. Status at last note/certification: Established and reviewed with Pt  Current 10/4/22 pt reports 1x/daily compliance  Long Term Goals: To be accomplished in 10 treatments  - Goal: Pt to report no pain reaching to floor to grab box to improve ease with household chores and cleaning her home. Status at last note/certification: 35% lumbar mobility, increased pain with bending forward  - Goal: Pt to report < 4/10 pain at worst in low back to increase ease with ADLs.   Status at last note/certification: 17/27 pain at worst   Current: no change, max pain 10/10, but pt describes some pain as more 'soreness' (10/6/22)  - Goal: Pt will perform 5x sit<>stand test in 12 seconds or less without UE assist to improve ease of transfers and demo improved functional LE strength. Status at last note/certification: 27 seconds without UE assist  Current: goal progressing; 19 seconds without UE assist (10/10/22)  - Goal: Pt to demonstrate at leat 4+/5 B hip extension MMT to increase ease of transfers/stair negotiation. Status at last note/certification: 3+/5 B  Current: progressed TE with good response  (10/6/22)  - Goal: Pt to report FOTO score of at least 48 pts to demonstrate improved function and quality of life.   Status at last note/certification: FOTO 36 pts     PLAN  [x]  Upgrade activities as tolerated     [x]  Continue plan of care  []  Update interventions per flow sheet       []  Discharge due to:_  [x]  Other: initiate soleus stretch SHANNA Valladares PTA 10/10/2022    Future Appointments   Date Time Provider Alka Cheema   10/10/2022  4:15 PM [facility]   10/12/2022  4:15 PM Milagro Ward PTA [facility]   11/17/2022  1:20 PM Avery Loredo MD [facility] BS AMB   1/16/2023 10:20 AM Abmer Cruz MD [facility] BS AMB   9/1/2023  9:30 AM [address]G [address]EREO BX RM [address] [facility]

## 2022-10-12 ENCOUNTER — HOSPITAL ENCOUNTER (OUTPATIENT)
Dept: PHYSICAL THERAPY | Age: 57
Discharge: HOME | End: 2022-10-12
Payer: MEDICARE

## 2022-10-12 PROCEDURE — 97014 ELECTRIC STIMULATION THERAPY: CPT

## 2022-10-12 PROCEDURE — 97112 NEUROMUSCULAR REEDUCATION: CPT

## 2022-10-12 PROCEDURE — 97140 MANUAL THERAPY 1/> REGIONS: CPT

## 2022-10-12 PROCEDURE — 97530 THERAPEUTIC ACTIVITIES: CPT

## 2022-10-12 NOTE — PROGRESS NOTES
PT DAILY TREATMENT NOTE 8-14    Patient Name: Ovi Dawn  Date:10/12/2022  : 1965  [x]  Patient  Verified  Payor: [facility] MEDICARE / Plan: [facility] COMMUNITY PLAN MCR / Product Type: Managed Care Medicare /    In time: 4:18 pm                   Out time: 5:18pm  Total Treatment Time (min): 60  Visit #: 5 of 10    Medicare/[facility] Time Tracking (below)   Total Timed Codes (min):  50 1:1 Treatment Time:  42       Treatment Area: Other low back pain [M54.59]    SUBJECTIVE  Pain Level (0-10 scale): 0  Any medication changes, allergies to medications, adverse drug reactions, diagnosis change, or new procedure performed?: [x] No    [] Yes (see summary sheet for update)  Subjective functional status/changes:   [] No changes reported  Patient notes no pain for the past three nights and is thinking about returning to the [facility] to walk on the treadmill and use the rowing machine. OBJECTIVE    Modality rationale: decrease pain and increase tissue extensibility to improve the patients ability to perform ADLs and rest comfortably following therapy. Min Type Additional Details   8 (TC) []  Ice     [x]  Heat with estim, IFC to lumbar spine Position: prone (patient preference)  Location: low back   [x] Skin assessment post-treatment:  [x]intact []redness- no adverse reaction    []redness - adverse reaction    14 min Therapeutic Exercise:  [x] See flow sheet: added SLR, S/L hip ABD   Rationale: increase ROM and increase strength to improve LE strength/mobility to improve ease of ADLs, household chores, and gait. 18 min Therapeutic Activity:  [x]  See flow sheet: added wall minisquats, step ups   Rationale: increase strength, improve coordination, improve balance, and increase proprioception to improve the patient's ability to perform transfers and ADLs.      12 min Neuromuscular Re-education:  [x]  See flow sheet: added half-standing 5# KB overhead lifts   Rationale: increase strength, improve coordination, improve balance and increase proprioception to improve the patients ability to perform functional tasks with improved abdominal brace utilization. 8 min Manual Therapy: in prone: STM and use of massage gun with pt consent at B l/s paraspinals and glutes                                                                    throughout tx Patient Education: [x] Review HEP             Other Objective/Functional Measures:       Pain Level (0-10 scale) post treatment: 0    ASSESSMENT/Changes in Function:   Patient with non-antalgic gait today, reporting significant reduction in pain levels recently. Quick onset of fatigue with gluteus medius PREs. Patient req min cueing to reduce anterior tibial excursion passed toes during squatting attempts. Patient will continue to benefit from skilled PT services to modify and progress therapeutic interventions, address functional mobility deficits, address ROM deficits, address strength deficits, analyze and address soft tissue restrictions, analyze and cue movement patterns, analyze and modify body mechanics/ergonomics, assess and modify postural abnormalities, address imbalance/dizziness, and instruct in home and community integration to attain remaining goals. []  See Plan of Care  []  See progress note/recertification  []  See Discharge Summary         PN due 10/28    Progress towards goals / Updated goals:  Short Term Goals: To be accomplished in 1 week  - Goal: Pt to be compliant with initial HEP to improve ability to independently manage symptoms. Status at last note/certification: Established and reviewed with Pt  Current 10/4/22 pt reports 1x/daily compliance  Long Term Goals: To be accomplished in 10 treatments  - Goal: Pt to report no pain reaching to floor to grab box to improve ease with household chores and cleaning her home.   Status at last note/certification: 36% lumbar mobility, increased pain with bending forward  - Goal: Pt to report < 4/10 pain at worst in low back to increase ease with ADLs. Status at last note/certification: 25/38 pain at worst   Current: no change, max pain 10/10, but pt describes some pain as more 'soreness' (10/6/22)  - Goal: Pt will perform 5x sit<>stand test in 12 seconds or less without UE assist to improve ease of transfers and demo improved functional LE strength. Status at last note/certification: 27 seconds without UE assist  Current: goal progressing; 19 seconds without UE assist (10/10/22)  - Goal: Pt to demonstrate at leat 4+/5 B hip extension MMT to increase ease of transfers/stair negotiation. Status at last note/certification: 3+/5 B  Current: progressed TE with good response  (10/6/22)  - Goal: Pt to report FOTO score of at least 48 pts to demonstrate improved function and quality of life.   Status at last note/certification: FOTO 36 pts     PLAN  [x]  Upgrade activities as tolerated     [x]  Continue plan of care  []  Update interventions per flow sheet       []  Discharge due to:_  [x]  Other: consider adding incline soleus stretch    Calvin Prabhakar, PTA 10/12/2022    Future Appointments   Date Time Provider Alka Cheema   10/12/2022  4:15 PM [facility]   10/19/2022  4:15 PM Ej Graff, PT [facility]   10/25/2022  4:15 PM Elray Heal [facility]   10/27/2022  4:15 PM Ej Graff, PT [facility]   11/17/2022  1:20 PM Clarissa France MD [facility] BS AMB   1/16/2023 10:20 AM Reji Robert MD [facility] BS AMB   9/1/2023  9:30 AM [facility] STERE[facility] BX RM [facility]

## 2022-10-19 ENCOUNTER — HOSPITAL ENCOUNTER (OUTPATIENT)
Dept: PHYSICAL THERAPY | Age: 57
Discharge: HOME | End: 2022-10-19
Payer: MEDICARE

## 2022-10-19 PROCEDURE — 97112 NEUROMUSCULAR REEDUCATION: CPT

## 2022-10-19 PROCEDURE — 97014 ELECTRIC STIMULATION THERAPY: CPT

## 2022-10-19 PROCEDURE — 97530 THERAPEUTIC ACTIVITIES: CPT

## 2022-10-19 PROCEDURE — 97110 THERAPEUTIC EXERCISES: CPT

## 2022-10-19 NOTE — PROGRESS NOTES
PT DAILY TREATMENT NOTE 8-    Patient Name: Blayne Falcon  Date:10/19/2022  : 1965  [x]  Patient  Verified  Payor: [facility] MEDICARE / Plan: [facility] COMMUNITY PLAN MCR / Product Type: Managed Care Medicare /    In time: 3949                   Out time: 501  Total Treatment Time (min):50  Visit #:6 of 10    Medicare/BCBS Time Tracking (below)   Total Timed Codes (min):  40 1:1 Treatment Time:  40       Treatment Area: Other low back pain [M54.59]    SUBJECTIVE  Pain Level (0-10 scale): 4  Any medication changes, allergies to medications, adverse drug reactions, diagnosis change, or new procedure performed?: [x] No    [] Yes (see summary sheet for update)  Subjective functional status/changes:   [] No changes reported  \"I had a hard night the last 2 nights, I had to take tylenol last night and use the heating pad and patches, I do my exercises every other day. \"    OBJECTIVE    Modality rationale: decrease pain and increase tissue extensibility to improve the patients ability to perform ADLs and rest comfortably following therapy. Min Type Additional Details   10 []  Ice     [x]  Heat with estim, IFC to lumbar spine Position: prone (patient preference)  Location: low back   [x] Skin assessment post-treatment:  [x]intact []redness- no adverse reaction    []redness - adverse reaction    10 min Therapeutic Exercise:  [x] See flow sheet:    Rationale: increase ROM and increase strength to improve LE strength/mobility to improve ease of ADLs, household chores, and gait. 20 min Therapeutic Activity:  [x]  See flow sheet:   Added:  Monster, Lateral, Retro walks with RTB 61' x2  Cosmos carry with 10# KB 60' x2  Crate lifts, 12.5#, 1 set straight up/down, 1 set with trunk rot at top   Rationale: increase strength, improve coordination, improve balance, and increase proprioception to improve the patient's ability to perform transfers and ADLs.      10 min Neuromuscular Re-education:  [x]  See flow sheet:   Added seated march on dynadisc   Rationale: increase strength, improve coordination, improve balance and increase proprioception to improve the patients ability to perform functional tasks with improved abdominal brace utilization. NT min Manual Therapy: in prone: STM and use of massage gun with pt consent at B l/s paraspinals and glutes                                                                    throughout tx Patient Education: [x] Review HEP      Stretch before bed and after waking up to reduce am stiffness       Other Objective/Functional Measures:     Pain Level (0-10 scale) post treatment: 0    ASSESSMENT/Changes in Function:   Pt showing great improvement, able to tolerate increasingly difficult WB interventions with good TA engagement. Crate lifts introduced with good technique after initial demo and good carry over throughout. Pt noted soreness in lumbar with lateral steps that alleviated after initial reps. Session concluded with e-stim, noted Lidocaine patch still on L l/s and glutes (removed by pt), patches placed to avoid remaining red area. Patient will continue to benefit from skilled PT services to modify and progress therapeutic interventions, address functional mobility deficits, address ROM deficits, address strength deficits, analyze and address soft tissue restrictions, analyze and cue movement patterns, analyze and modify body mechanics/ergonomics, assess and modify postural abnormalities, address imbalance/dizziness, and instruct in home and community integration to attain remaining goals. []  See Plan of Care  []  See progress note/recertification  []  See Discharge Summary         PN due 10/28    Progress towards goals / Updated goals:  Short Term Goals: To be accomplished in 1 week  - Goal: Pt to be compliant with initial HEP to improve ability to independently manage symptoms.   Status at last note/certification: Established and reviewed with Pt  Current 10/4/22 pt reports 1x/daily compliance  Long Term Goals: To be accomplished in 10 treatments  - Goal: Pt to report no pain reaching to floor to grab box to improve ease with household chores and cleaning her home. Status at last note/certification: 75% lumbar mobility, increased pain with bending forward  Current 10/19/22 progressing with crate lifts, 12.5#  - Goal: Pt to report < 4/10 pain at worst in low back to increase ease with ADLs. Status at last note/certification: 03/34 pain at worst   Current: no change, max pain 10/10, but pt describes some pain as more 'soreness' (10/6/22)  - Goal: Pt will perform 5x sit<>stand test in 12 seconds or less without UE assist to improve ease of transfers and demo improved functional LE strength. Status at last note/certification: 27 seconds without UE assist  Current: goal progressing; 19 seconds without UE assist (10/10/22)  - Goal: Pt to demonstrate at leat 4+/5 B hip extension MMT to increase ease of transfers/stair negotiation. Status at last note/certification: 3+/5 B  Current: progressed TE with good response  (10/6/22)  - Goal: Pt to report FOTO score of at least 48 pts to demonstrate improved function and quality of life.   Status at last note/certification: FOTO 36 pts     PLAN  [x]  Upgrade activities as tolerated     [x]  Continue plan of care  []  Update interventions per flow sheet       []  Discharge due to:_  []  Other:     Nita Grey PT 10/19/2022    Future Appointments   Date Time Provider Alka Cheema   10/19/2022  4:15 PM Reina Dyer, PT [facility]   10/25/2022  4:15 PM Johnny Louis [facility]   10/27/2022  4:15 PM Reina Dyer, PT [facility]   11/17/2022  1:20 PM Dora Ugarte MD [facility] BS AMB   1/16/2023 10:20 AM Andrea Centeno MD [facility] BS AMB   9/1/2023  9:30 AM [address] [place] [address] [facility] [address] [place] [address]

## 2022-10-20 DIAGNOSIS — S39.012D STRAIN OF LUMBAR REGION, SUBSEQUENT ENCOUNTER: Primary | ICD-10-CM

## 2022-10-20 RX ORDER — ACETAMINOPHEN AND CODEINE PHOSPHATE 300; 60 MG/1; MG/1
1 TABLET ORAL
Qty: 15 TABLET | Refills: 0 | Status: SHIPPED | OUTPATIENT
Start: 2022-10-20 | End: 2022-10-24

## 2022-10-20 NOTE — TELEPHONE ENCOUNTER
This patient contacted the office for the following prescriptions to be refilled:    Medication requested :   Requested Prescriptions     Pending Prescriptions Disp Refills    acetaminophen-codeine (TYLENOL #4) 300-60 mg tab        *Pt would like to know if she will have to  the Rx or will Dr. Tony Girard send it to her pharmacy? Please call pt to advise. PCP: Zach Fitzpatrick MD  LOV: 9/14/2022  NOV DMA: 1/16/2023  FUTURE APPT:   Future Appointments   Date Time Provider Alka Cheema   10/25/2022  4:15 PM Justo Prose [facility]   10/27/2022  4:15 PM Elizabeth Wright PT [facility]   11/17/2022  1:20 PM Lucila Sommer MD [facility] BS AMB   1/16/2023 10:20 AM Zach Fitzpatrick MD [facility] AMB   9/1/2023  9:30 AM [address] [place] [address] [facility] [address] [place] [address]     Thank you.

## 2022-10-25 ENCOUNTER — APPOINTMENT (OUTPATIENT)
Dept: PHYSICAL THERAPY | Age: 57
End: 2022-10-25
Payer: MEDICARE

## 2022-10-27 ENCOUNTER — HOSPITAL ENCOUNTER (OUTPATIENT)
Dept: PHYSICAL THERAPY | Age: 57
Discharge: HOME | End: 2022-10-27
Payer: MEDICARE

## 2022-10-27 PROCEDURE — 97014 ELECTRIC STIMULATION THERAPY: CPT

## 2022-10-27 PROCEDURE — 97112 NEUROMUSCULAR REEDUCATION: CPT

## 2022-10-27 PROCEDURE — 97110 THERAPEUTIC EXERCISES: CPT

## 2022-10-27 PROCEDURE — 97530 THERAPEUTIC ACTIVITIES: CPT

## 2022-10-27 NOTE — PROGRESS NOTES
[facility] [address] [place] [address]  Phone: [phone] Fax: [phone]  PROGRESS NOTE  Patient Name: Samm Terrazas : 1965   Treatment/Medical Diagnosis: Other low back pain [M54.59]   Referral Source: Cristal Milner,*     Date of Initial Visit: 22 Attended Visits: 7 Missed Visits: 1     SUMMARY OF TREATMENT   Pt is a pleasant 59 y.o. female who presents with c/o low back pain. The patient reports a chronic history of low back pain, left >right, that sends shooting pain into the back with transitional movements. Treatment has consisted of the following: Therapeutic exercise, Therapeutic activities, Neuromuscular re-education, Physical agent/modality, Gait/balance training, Manual therapy, Patient education, Functional mobility training, and Self Care training. CURRENT STATUS  Pt has attended 7 treatments since initial evaluation on 22. She has made good progress toward goals with the exception of flare-up in LBP to 10/10 last week when lifting 2 yo grandson, requiring a visit to the ER and incr in prescription pain meds to manage. She displays improved functional and bed mobility and LE/ hip strength. Deficits remaining at this time include standing balance making her a falls risk and continued pain limiting activity.  Further assessment as follows:    Subjective \"70%\" Improvement  Pain: Best: 3/10 Worst: 10/10 \"stabbing pain, I can't walk, I can't move\" Av/10  Improvements: Balance, Generally healthier, Ready for the gym  Deficits: Lifting, LBP    Observation: Sway back in standing  Palpation: TTP at L lumbar paraspinals/parasacral musculature     Lumbar AROM:                                           AROM (% of full)                  Right Left Effect   Flexion To mid shin Decr lordotic removal, HS stretch   Extension 25%    Side Bend 50 50 Pain on L with L SB   Rotation 50 50 Strength:    Right (/5) Left (/5)   Hip     Flexion 4 4             Abduction NT NT             Adduction                 Extension 3+ 3+             ER  5 4              IR  4 4-    Knee   Extension 5 5              Flexion 5 5   Ankle   Dorsiflexion 5 5      Directional Preference Testing: prefers flexion for repeated movements     Gait: Independent, WNL     Functional Squat: wide base of support, increased anterior knee translation, B toe out  5xSTS= 22\" with good mechanics     Stair Negotiation: Reciprocal, B UE A on handrails, decr speed with descent     Balance: SLS on floor, EO: L: 7 sec, R: 8 sec     Special Tests:     Right Left   Slump   - +    SLR - +       Right Left   Hamstring 90/90 -   +   Ely + +   Douglas -                                 Right Left   EILEEN +   +       Progress towards goals / Updated goals:  Short Term Goals: To be accomplished in 1 week  - Goal: Pt to be compliant with initial HEP to improve ability to independently manage symptoms. Status at last note/certification: Established and reviewed with Pt  Current 10/4/22 goal met pt reports 1x/daily compliance  Long Term Goals: To be accomplished in 10 treatments  - Goal: Pt to report no pain reaching to floor to grab box to improve ease with household chores and cleaning her home. Status at last note/certification: 53% lumbar mobility, increased pain with bending forward  Current 10/27/22 goal met, pt able to lift 12.5# crate x10 with good technique and no pain  - Goal: Pt to report < 4/10 pain at worst in low back to increase ease with ADLs. Status at last note/certification: 20/13 pain at worst   Current: 10/27/22 goal not met, pt notes recent pain of 10/10 at worst when lifting grandson last week  - Goal: Pt will perform 5x sit<>stand test in 12 seconds or less without UE assist to improve ease of transfers and demo improved functional LE strength.   Status at last note/certification: 27 seconds without UE assist  Current: 10/27/22 goal progressing, 22 sec with good mechanics and technique, mild UE A at plinth  - Goal: Pt to demonstrate at leat 4+/5 B hip extension MMT to increase ease of transfers/stair negotiation. Status at last note/certification: 3+/5 B  Current: 10/27/22 goal not met, 3+/5 B  - Goal: Pt to report FOTO score of at least 48 pts to demonstrate improved function and quality of life. Status at last note/certification: FOTO 36 pts   Current: 10/27/22 goal met 61    New Goals to be achieved in __4__  weeks:  Cont with above un-met goals    RECOMMENDATIONS  Recommend cont skilled PT at 1x/weekly for 4 weeks to address remaining deficits, achieve stated goals, and progress to PLOF. If you have any questions/comments please contact us directly at [phone]   Thank you for allowing us to assist in the care of your patient. Therapist Signature: Татьяна Shah PT Date: 10/27/2022   Reporting Period  9/28/22 - 10/27/22 Time: 5:00 PM   NOTE TO PHYSICIAN:  PLEASE COMPLETE THE ORDERS BELOW AND FAX TO   [facility]: [phone]. If you are unable to process this request in 24 hours please contact our office: [phone].  ___ I have read the above report and request that my patient continue as recommended.   ___ I have read the above report and request that my patient continue therapy with the following changes/special instructions:_________________________________________________________   ___ I have read the above report and request that my patient be discharged from therapy. Physician Signature:        Date:       Time:                                                        Demetris Cortes,*.

## 2022-10-27 NOTE — PROGRESS NOTES
PT DAILY TREATMENT NOTE 8-    Patient Name: James Urias  Date:10/27/2022  : 1965  [x]  Patient  Verified  Payor: [facility] MEDICARE / Plan: [facility] COMMUNITY PLAN MCR / Product Type: Managed Care Medicare /    In time:                  Out time: 456  Total Treatment Time (min):56  Visit #:7 of 10    Medicare/BCBS Time Tracking (below)   Total Timed Codes (min):  46 1:1 Treatment Time:  46       Treatment Area: Other low back pain [M54.59]    SUBJECTIVE  Pain Level (0-10 scale): 4-5/10  Any medication changes, allergies to medications, adverse drug reactions, diagnosis change, or new procedure performed?: [x] No    [] Yes (see summary sheet for update)  Subjective functional status/changes:   [] No changes reported  \"I had to go back to the ER Thursday night, they said it was chronic pain and gave me flexeril, have been taking Codeine when its real bad. \"  \"I see the neurologist on . I need to stay in therapy another month if my insurance will cover. \"    OBJECTIVE    Modality rationale: decrease pain and increase tissue extensibility to improve the patients ability to perform ADLs and rest comfortably following therapy. Min Type Additional Details   10 []  Ice     [x]  Heat with estim, IFC to lumbar spine Position: prone (patient preference)  Location: low back   [x] Skin assessment post-treatment:  [x]intact []redness- no adverse reaction    []redness - adverse reaction    15 min Therapeutic Exercise:  [x] See flow sheet:   Reassessment   Rationale: increase ROM and increase strength to improve LE strength/mobility to improve ease of ADLs, household chores, and gait. 21 min Therapeutic Activity:  [x]  See flow sheet:   5xSTS   Rationale: increase strength, improve coordination, improve balance, and increase proprioception to improve the patient's ability to perform transfers and ADLs.      10 min Neuromuscular Re-education:  [x]  See flow sheet:   SLS on floor, EO   Rationale: increase strength, improve coordination, improve balance and increase proprioception to improve the patients ability to perform functional tasks with improved abdominal brace utilization.         NT min Manual Therapy: in prone: STM and use of massage gun with pt consent at B l/s paraspinals and glutes          Through out min Patient Education: [x] Review HEP   Reassessment findings  Pt progress made toward goals  Pt goals for cont PT, intent of therapy  Review of FOTO    Importance of slow bed mobility, spinal flexion, and TA draws for LBP relief and to prevent exacerbation         Subjective \"70%\" Improvement  Pain: Best: 3/10 Worst: 10/10 \"stabbing pain, I can't walk, I can't move\" Av/10  Improvements: Balance, Generally healthier, Ready for the gym  Deficits: Lifting, LBP    Observation: Sway back in standing  Palpation: TTP at L lumbar paraspinals/parasacral musculature     Lumbar AROM:                                           AROM (% of full)                  Right Left Effect   Flexion To mid shin Decr lordotic removal, HS stretch   Extension 25%    Side Bend 50 50 Pain on L with L SB   Rotation 50 50                                                  Strength:    Right (/5) Left (/5)   Hip     Flexion 4 4             Abduction NT NT             Adduction                 Extension 3+ 3+             ER  5 4              IR  4 4-    Knee   Extension 5 5              Flexion 5 5   Ankle   Dorsiflexion 5 5      Directional Preference Testing: prefers flexion for repeated movements     Gait: Independent, WNL     Functional Squat: wide base of support, increased anterior knee translation, B toe out  5xSTS= 22\" with good mechanics     Stair Negotiation: Reciprocal, B UE A on handrails, decr speed with descent     Balance: SLS on floor, EO: L: 7 sec, R: 8 sec     Special Tests:     Right Left   Slump   - +    SLR - +       Right Left   Hamstring 90/90 -   +   Ely + +   Douglas -                                 Right Left EILEEN +   +       Pain Level (0-10 scale) post treatment: 0    ASSESSMENT/Changes in Function:   Please see PN    Patient will continue to benefit from skilled PT services to modify and progress therapeutic interventions, address functional mobility deficits, address ROM deficits, address strength deficits, analyze and address soft tissue restrictions, analyze and cue movement patterns, analyze and modify body mechanics/ergonomics, assess and modify postural abnormalities, address imbalance/dizziness, and instruct in home and community integration to attain remaining goals. []  See Plan of Care  [x]  See progress note/recertification  []  See Discharge Summary         PN due 10/28    Progress towards goals / Updated goals:  Short Term Goals: To be accomplished in 1 week  - Goal: Pt to be compliant with initial HEP to improve ability to independently manage symptoms. Status at last note/certification: Established and reviewed with Pt  Current 10/4/22 goal met pt reports 1x/daily compliance  Long Term Goals: To be accomplished in 10 treatments  - Goal: Pt to report no pain reaching to floor to grab box to improve ease with household chores and cleaning her home. Status at last note/certification: 96% lumbar mobility, increased pain with bending forward  Current 10/27/22 goal met, pt able to lift 12.5# crate x10 with good technique and no pain  - Goal: Pt to report < 4/10 pain at worst in low back to increase ease with ADLs. Status at last note/certification: 34/22 pain at worst   Current: 10/27/22 goal not met, pt notes recent pain of 10/10 at worst when lifting grandson last week  - Goal: Pt will perform 5x sit<>stand test in 12 seconds or less without UE assist to improve ease of transfers and demo improved functional LE strength.   Status at last note/certification: 27 seconds without UE assist  Current: 10/27/22 goal progressing, 22 sec with good mechanics and technique, mild UE A at plinth  - Goal: Pt to demonstrate at leat 4+/5 B hip extension MMT to increase ease of transfers/stair negotiation. Status at last note/certification: 3+/5 B  Current: 10/27/22 goal not met, 3+/5 B  - Goal: Pt to report FOTO score of at least 48 pts to demonstrate improved function and quality of life.   Status at last note/certification: FOTO 36 pts   Current: 10/27/22 goal met 61      PLAN  [x]  Upgrade activities as tolerated     [x]  Continue plan of care  []  Update interventions per flow sheet       []  Discharge due to:_  [x]  Other: Cont POC 1x/week for 4 weeks    Nona Becker PT 10/27/2022    Future Appointments   Date Time Provider Alka Cheema   10/31/2022  4:15 PM Tonja Leung PT [facility]   11/2/2022  4:15 PM Cassius Ruby [facility]   11/17/2022  1:20 PM Karol Norman MD [facility] BS AMB   1/16/2023 10:20 AM Garrison Marquez MD [facility] AMB   9/1/2023  9:30 AM [facility] [address]

## 2022-10-31 ENCOUNTER — APPOINTMENT (OUTPATIENT)
Dept: PHYSICAL THERAPY | Age: 57
End: 2022-10-31
Payer: MEDICARE

## 2022-11-02 ENCOUNTER — APPOINTMENT (OUTPATIENT)
Dept: PHYSICAL THERAPY | Age: 57
End: 2022-11-02
Payer: MEDICARE

## 2022-11-07 ENCOUNTER — HOSPITAL ENCOUNTER (OUTPATIENT)
Dept: PHYSICAL THERAPY | Age: 57
Discharge: HOME | End: 2022-11-07
Payer: MEDICARE

## 2022-11-07 PROCEDURE — 97140 MANUAL THERAPY 1/> REGIONS: CPT

## 2022-11-07 PROCEDURE — 97530 THERAPEUTIC ACTIVITIES: CPT

## 2022-11-07 PROCEDURE — 97112 NEUROMUSCULAR REEDUCATION: CPT

## 2022-11-07 PROCEDURE — 97014 ELECTRIC STIMULATION THERAPY: CPT

## 2022-11-07 NOTE — PROGRESS NOTES
PT DAILY TREATMENT NOTE 8-    Patient Name: Xochitl Triana  Date:2022  : 1965  [x]  Patient  Verified  Payor: [facility] MEDICARE / Plan: [facility] COMMUNITY PLAN MCR / Product Type: Managed Care Medicare /    In time: 4:17 pm                  Out time: 5:15 pm  Total Treatment Time (min): 58  Visit #: 1  of 10    Medicare/BCBS Time Tracking (below)   Total Timed Codes (min):  48 1:1 Treatment Time:  43       Treatment Area: Other low back pain [M54.59]    SUBJECTIVE  Pain Level (0-10 scale): 3  Any medication changes, allergies to medications, adverse drug reactions, diagnosis change, or new procedure performed?: [x] No    [] Yes (see summary sheet for update)  Subjective functional status/changes:   [] No changes reported  Patient states having relatively no pain until lifting her grandson three weeks ago, causing flareup of symptoms. Notes she is on her last tablet of codeine and is saving it for a bad day. OBJECTIVE    Modality rationale: decrease pain and increase tissue extensibility to improve the patients ability to perform ADLs and rest comfortably following therapy. Min Type Additional Details   10 []  Ice     [x]  Heat with estim, IFC to lumbar spine Position: prone (patient preference)  Location: low back   [x] Skin assessment post-treatment:  [x]intact []redness- no adverse reaction    []redness - adverse reaction    9 min Therapeutic Exercise:  [x] See flow sheet:    Rationale: increase ROM and increase strength to improve LE strength/mobility to improve ease of ADLs, household chores, and gait. 11 min Therapeutic Activity:  [x]  See flow sheet: progressed lifting resistance with 20# KB on chair to mimic lifting 1year old grandson   Rationale: increase strength, improve coordination, improve balance, and increase proprioception to improve the patient's ability to perform transfers and ADLs.      20 min Neuromuscular Re-education:  [x]  See flow sheet: added open books   Rationale: increase strength, improve coordination, improve balance and increase proprioception to improve the patients ability to perform functional tasks with improved abdominal brace utilization. 8 min Manual Therapy: in prone: STM and use of massage gun with pt consent at B l/s paraspinals and glutes, (L) quadriceps stretch          Through out min Patient Education: [x] Review HEP; added open books     Trunk rotation AROM: upper trunk right rotation 50%, upper trunk left rotation 80%    Pain Level (0-10 scale) post treatment: 0    ASSESSMENT/Changes in Function:   Patient demonstrates limited/absent trunk rotation with gait. Good response to open books to improve spinal mobility and spinal rotation for gait. Patient able to demo 20# KB lift from chair height without pain (notes grandson is approx 33 lbs). Patient will continue to benefit from skilled PT services to modify and progress therapeutic interventions, address functional mobility deficits, address ROM deficits, address strength deficits, analyze and address soft tissue restrictions, analyze and cue movement patterns, analyze and modify body mechanics/ergonomics, assess and modify postural abnormalities, address imbalance/dizziness, and instruct in home and community integration to attain remaining goals. []  See Plan of Care  [x]  See progress note/recertification  []  See Discharge Summary         PN due 10/28    Progress towards goals / Updated goals:  Long Term Goals: To be accomplished in 10 treatments  - Goal: Pt to report < 4/10 pain at worst in low back to increase ease with ADLs. Status at last note/certification: 40/84 at worst when lifting grandson last week  - Goal: Pt will perform 5x sit<>stand test in 12 seconds or less without UE assist to improve ease of transfers and demo improved functional LE strength.   Status at last note/certification: 22 sec with good mechanics and technique, mild UE A at plinth  - Goal: Pt to demonstrate at leat 4+/5 B hip extension MMT to increase ease of transfers/stair negotiation.   Status at last note/certification: 3+/5 B      PLAN  [x]  Upgrade activities as tolerated     [x]  Continue plan of care  []  Update interventions per flow sheet       []  Discharge due to:_  []  Other:_    Enedina Verde PTA 11/7/2022    Future Appointments   Date Time Provider Alka Anette   11/7/2022  [facility]   11/14/2022  4:15 PM Abbie Barrios PTA [facility]   11/17/2022  1:20 PM Cole Owusu MD [facility] BS AMB   1/16/2023 10:20 AM Tara Navas MD [facility] BS AMB   9/1/2023  9:30 AM [address] SUNG STEREO BX RM [address] [facility] [address] [facility] [address]

## 2022-11-14 ENCOUNTER — HOSPITAL ENCOUNTER (OUTPATIENT)
Dept: PHYSICAL THERAPY | Age: 57
Discharge: HOME | End: 2022-11-14
Payer: MEDICARE

## 2022-11-14 DIAGNOSIS — Z79.4 TYPE 2 DIABETES MELLITUS WITH DIABETIC NEUROPATHY, WITH LONG-TERM CURRENT USE OF INSULIN (HCC): ICD-10-CM

## 2022-11-14 DIAGNOSIS — E11.40 TYPE 2 DIABETES MELLITUS WITH DIABETIC NEUROPATHY, WITH LONG-TERM CURRENT USE OF INSULIN (HCC): ICD-10-CM

## 2022-11-14 PROCEDURE — 97530 THERAPEUTIC ACTIVITIES: CPT

## 2022-11-14 PROCEDURE — 97112 NEUROMUSCULAR REEDUCATION: CPT

## 2022-11-14 PROCEDURE — 97014 ELECTRIC STIMULATION THERAPY: CPT

## 2022-11-14 PROCEDURE — 97140 MANUAL THERAPY 1/> REGIONS: CPT

## 2022-11-14 NOTE — TELEPHONE ENCOUNTER
This patient contacted the office for the following prescriptions to be refilled:    Medication requested :   Requested Prescriptions     Pending Prescriptions Disp Refills    flash glucose sensor (FreeStyle Jorge 14 Day Sensor) kit 6 Kit 3     Sig: Use 4 times daily      PCP: Marianela Kamara MD  LOV: 9/14/2022  NOV DMA: 1/16/2023  FUTURE APPT:   Future Appointments   Date Time Provider Alka Anette   11/14/2022  4:15 PM Bhavna Becerril PTA [facility]   11/17/2022  1:20 PM Unruly Mckeon MD [facility] BS AMB   1/16/2023 10:20 AM Marianela Kamara MD [facility] BS AMB   9/1/2023  9:30 AM [facility] SUNG COTTER BX RM 1 [place] [id number] [facility]         Thank you.

## 2022-11-14 NOTE — PROGRESS NOTES
PT DAILY TREATMENT NOTE 8-    Patient Name: Kevan Troy  Date:2022  : 1965  [x]  Patient  Verified  Payor: [facility] MEDICARE / Plan: [facility] COMMUNITY PLAN MCR / Product Type: Managed Care Medicare /    In time: 4:18 pm                  Out time: 5:15 pm  Total Treatment Time (min): 57  Visit #: 2 of 10    Medicare/BCBS Time Tracking (below)   Total Timed Codes (min):  48 1:1 Treatment Time:  40       Treatment Area: Other low back pain [M54.59]    SUBJECTIVE  Pain Level (0-10 scale): 0  Any medication changes, allergies to medications, adverse drug reactions, diagnosis change, or new procedure performed?: [x] No    [] Yes (see summary sheet for update)  Subjective functional status/changes:   [] No changes reported  Patient notes less pain since last session. OBJECTIVE    Modality rationale: decrease pain and increase tissue extensibility to improve the patients ability to perform ADLs and rest comfortably following therapy. Min Type Additional Details   9 (TC) []  Ice     [x]  Heat with estim, IFC to lumbar spine Position: prone (patient preference)  Location: low back   [x] Skin assessment post-treatment:  [x]intact []redness- no adverse reaction    []redness - adverse reaction    8 min Therapeutic Exercise:  [x] See flow sheet:    Rationale: increase ROM and increase strength to improve LE strength/mobility to improve ease of ADLs, household chores, and gait. 14 min Therapeutic Activity:  [x]  See flow sheet: progressed lifting resistance with 20# KB on chair above chest height   Rationale: increase strength, improve coordination, improve balance, and increase proprioception to improve the patient's ability to perform transfers and ADLs.      18 min Neuromuscular Re-education:  [x]  See flow sheet: added [place]i getup (first phase of punch with ipsilateral oblique activation)   Rationale: increase strength, improve coordination, improve balance and increase proprioception to improve the patients ability to perform functional tasks with improved abdominal brace utilization. 8 min Manual Therapy: in prone: STM and use of massage gun with pt consent at B l/s paraspinals and glutes, (L) quadriceps stretch          Through out min Patient Education: [x] Review HEP       Pain Level (0-10 scale) post treatment: 0    ASSESSMENT/Changes in Function:   Patient with significant improvement in spinal rotation, demonstrating approx 80% AROM bilaterally, likely contributing to reduced pain levels today. Good use of keeping load (20# kettlebell) close to chest with weighted lifts above shoulder height to improve form and reduce pain. Patient will continue to benefit from skilled PT services to modify and progress therapeutic interventions, address functional mobility deficits, address ROM deficits, address strength deficits, analyze and address soft tissue restrictions, analyze and cue movement patterns, analyze and modify body mechanics/ergonomics, assess and modify postural abnormalities, address imbalance/dizziness, and instruct in home and community integration to attain remaining goals. []  See Plan of Care  [x]  See progress note/recertification  []  See Discharge Summary         PN due 10/28    Progress towards goals / Updated goals:  Long Term Goals: To be accomplished in 10 treatments  - Goal: Pt to report < 4/10 pain at worst in low back to increase ease with ADLs. Status at last note/certification: 03/67 at worst when lifting grandson last week  - Goal: Pt will perform 5x sit<>stand test in 12 seconds or less without UE assist to improve ease of transfers and demo improved functional LE strength. Status at last note/certification: 22 sec with good mechanics and technique, mild UE A at int  - Goal: Pt to demonstrate at leat 4+/5 B hip extension MMT to increase ease of transfers/stair negotiation.   Status at last note/certification: 3+/5 B      PLAN  [x]  Upgrade activities as tolerated [x]  Continue plan of care  []  Update interventions per flow sheet       []  Discharge due to:_  []  Other:_    Puneet Christiansen, PTA 11/14/2022    Future Appointments   Date Time Provider Alka Cheema   11/17/2022  1:20 PM Unruly Mckeon MD [facility] BS AMB   1/16/2023 10:20 AM Marianela Kamara MD [facility] BS AMB   9/1/2023  9:30 AM [facility] SUNG STEREO BX RM [address]

## 2022-11-15 RX ORDER — FLASH GLUCOSE SENSOR
KIT MISCELLANEOUS
Qty: 6 KIT | Refills: 3 | Status: SHIPPED | OUTPATIENT
Start: 2022-11-15

## 2022-11-17 ENCOUNTER — OFFICE VISIT (OUTPATIENT)
Dept: NEUROLOGY | Age: 57
End: 2022-11-17
Payer: MEDICARE

## 2022-11-17 VITALS
HEIGHT: 64 IN | BODY MASS INDEX: 31.69 KG/M2 | DIASTOLIC BLOOD PRESSURE: 82 MMHG | HEART RATE: 83 BPM | WEIGHT: 185.6 LBS | RESPIRATION RATE: 18 BRPM | OXYGEN SATURATION: 97 % | SYSTOLIC BLOOD PRESSURE: 126 MMHG

## 2022-11-17 DIAGNOSIS — G89.29 CHRONIC LEFT-SIDED LOW BACK PAIN, UNSPECIFIED WHETHER SCIATICA PRESENT: Primary | ICD-10-CM

## 2022-11-17 DIAGNOSIS — M54.50 CHRONIC LEFT-SIDED LOW BACK PAIN, UNSPECIFIED WHETHER SCIATICA PRESENT: Primary | ICD-10-CM

## 2022-11-17 DIAGNOSIS — M54.50 CHRONIC LEFT-SIDED LOW BACK PAIN, UNSPECIFIED WHETHER SCIATICA PRESENT: ICD-10-CM

## 2022-11-17 DIAGNOSIS — G89.29 CHRONIC LEFT-SIDED LOW BACK PAIN, UNSPECIFIED WHETHER SCIATICA PRESENT: ICD-10-CM

## 2022-11-17 PROCEDURE — G9899 SCRN MAM PERF RSLTS DOC: HCPCS | Performed by: STUDENT IN AN ORGANIZED HEALTH CARE EDUCATION/TRAINING PROGRAM

## 2022-11-17 PROCEDURE — G8427 DOCREV CUR MEDS BY ELIG CLIN: HCPCS | Performed by: STUDENT IN AN ORGANIZED HEALTH CARE EDUCATION/TRAINING PROGRAM

## 2022-11-17 PROCEDURE — 3017F COLORECTAL CA SCREEN DOC REV: CPT | Performed by: STUDENT IN AN ORGANIZED HEALTH CARE EDUCATION/TRAINING PROGRAM

## 2022-11-17 PROCEDURE — 99204 OFFICE O/P NEW MOD 45 MIN: CPT | Performed by: STUDENT IN AN ORGANIZED HEALTH CARE EDUCATION/TRAINING PROGRAM

## 2022-11-17 PROCEDURE — G8417 CALC BMI ABV UP PARAM F/U: HCPCS | Performed by: STUDENT IN AN ORGANIZED HEALTH CARE EDUCATION/TRAINING PROGRAM

## 2022-11-17 PROCEDURE — G9717 DOC PT DX DEP/BP F/U NT REQ: HCPCS | Performed by: STUDENT IN AN ORGANIZED HEALTH CARE EDUCATION/TRAINING PROGRAM

## 2022-11-17 RX ORDER — CYCLOBENZAPRINE HCL 10 MG
10 TABLET ORAL
Qty: 30 TABLET | Refills: 2 | Status: SHIPPED | OUTPATIENT
Start: 2022-11-17

## 2022-11-17 RX ORDER — GABAPENTIN 100 MG/1
100 CAPSULE ORAL 3 TIMES DAILY
Qty: 90 CAPSULE | Refills: 1 | Status: SHIPPED | OUTPATIENT
Start: 2022-11-17 | End: 2022-12-17

## 2022-11-17 NOTE — PROGRESS NOTES
James Urias is a 59 y.o. female . presents for New Patient (Kvng Goyal MD) and Back Pain (\"Lumbar degenerative disc disease\")   . A 59years old female patient with medical history of arthritis, depression/bipolar, hyperlipidemia, hypertension, hypothyroidism referred here for evaluation of lower back pain. She said, it started in March 2022. Claims it started after lifting a case of water. Pain is mostly when she is laying down. Mostly on the left side. Did not radiate to the lower extremities. When she stands up from her bed or chair, will have bilateral knee pain. She feels a little better when she is walking. She currently walks 1 to 2 hours a day. Might wake her up from sleep. Takes Tylenol-codeine which helps for the pain. Also has cyclobenzaprine. She has attended physical therapy for 2 months. Denied having any numbness or weakness over her lower extremities. She has urinary incontinence for years. No bowel incontinence. Lumbosacral x-ray from April showed degenerative changes. Review of Systems   Constitutional:  Positive for weight loss. Negative for chills and fever. HENT:  Negative for hearing loss and tinnitus. Eyes:  Positive for blurred vision (has glasses). Negative for double vision. Respiratory:  Negative for cough and shortness of breath. Cardiovascular:  Negative for chest pain and leg swelling. Gastrointestinal:  Negative for nausea and vomiting. Genitourinary:  Negative for dysuria, frequency and urgency. Musculoskeletal:  Positive for back pain and joint pain. Negative for neck pain. Skin:  Positive for itching (lower back). Negative for rash. Neurological:  Positive for tremors. Negative for dizziness, tingling, sensory change, speech change, focal weakness, seizures and headaches. Psychiatric/Behavioral:  Positive for depression. Negative for hallucinations and suicidal ideas. The patient has insomnia.       Past Medical History: Diagnosis Date    Arthritis     Chronic pain     Depression     Diabetes ([facility] [address]     Hyperlipidemia     Hypertension     Hypothyroid     Medial meniscus tear     Left knee    Menopause     Psychiatric disorder     Tobacco abuse, in remission        Past Surgical History:   Procedure Laterality Date    HAND/FINGER SURGERY UNLISTED Left     reconstructive surgery    HX ORTHOPAEDIC      Hand reconstructive surgery    HX TONSIL AND ADENOIDECTOMY      HX TUBAL LIGATION      HX WISDOM TEETH EXTRACTION          Family History   Problem Relation Age of Onset   Mercedes Vera Alcohol abuse Mother    Rossi Boot Mother    Rivas Busing Mother 61        breast cancer    Diabetes Mother     Hypertension Mother     Psychiatric Disorder Mother    Mercedes Vera Stroke Mother     Breast Cancer Mother 61    Alcohol abuse Father     Heart Disease Father    Mercedes Vera Diabetes Father     Hypertension Father     Lung Disease Father     Colon Cancer Father 72    Alcohol abuse Paternal Grandmother     Ovarian Cancer Sister 32    Alcohol abuse Sister     Diabetes Sister     Psychiatric Disorder Sister     Alcohol abuse Brother     Psychiatric Disorder Son     Psychiatric Disorder Other         nephew        Social History     Socioeconomic History    Marital status:      Spouse name: Not on file    Number of children: Not on file    Years of education: Not on file    Highest education level: Not on file   Occupational History    Not on file   Tobacco Use    Smoking status: Former     Packs/day: 1.00     Years: 35.00     Pack years: 35.00     Types: Cigarettes     Quit date: 2011     Years since quittin.0    Smokeless tobacco: Never    Tobacco comments:     quit    Vaping Use    Vaping Use: Never used   Substance and Sexual Activity    Alcohol use: No     Alcohol/week: 0.0 standard drinks     Comment: quit 10 years ago    Drug use: No    Sexual activity: Yes     Partners: Male   Other Topics Concern   Service No    Blood Transfusions No    Caffeine Concern No    Occupational Exposure No    Hobby Hazards Not Asked    Sleep Concern Yes     Comment: takes sleeping pills    Stress Concern Not Asked    Weight Concern Yes    Special Diet Not Asked    Back Care Not Asked    Exercise Yes    Bike Helmet Not Asked    Seat Belt Yes    Self-Exams Yes   Social History Narrative    Not on file     Social Determinants of Health     Financial Resource Strain: Not on file   Food Insecurity: Not on file   Transportation Needs: Not on file   Physical Activity: Not on file   Stress: Not on file   Social Connections: Not on file   Intimate Partner Violence: Not on file   Housing Stability: Not on file        Allergies   Allergen Reactions    Lipitor [Atorvastatin] Other (comments)     myalgias    Lisinopril Cough    Aspirin Other (comments)     Nose bleeds    Zocor [Simvastatin] Other (comments)     Causes pain in the left leg         Current Outpatient Medications   Medication Sig Dispense Refill    gabapentin (NEURONTIN) 100 mg capsule Take 1 Capsule by mouth three (3) times daily for 30 days. Max Daily Amount: 300 mg. 90 Capsule 1    cyclobenzaprine (FLEXERIL) 10 mg tablet Take 1 Tablet by mouth three (3) times daily as needed for Muscle Spasm(s). 30 Tablet 2    flash glucose sensor (FreeStyle Jorge 14 Day Sensor) kit Use 4 times daily 6 Kit 3    acetaminophen (TYLENOL) 500 mg tablet take 2 tablets by mouth every 6 hours for 10 days 90 Tablet 0    Insulin Needles, Disposable, (Droplet Pen Needle) 31 gauge x 3/16\" ndle use 1 PEN NEEDLE to inject MEDICATION subcutaneously four times a day 200 Pen Needle 3    SITagliptin-metFORMIN (Janumet) 50-1,000 mg per tablet TAKE 1 TABLET BY MOUTH  TWICE A  Tablet 3    Lantus Solostar U-100 Insulin 100 unit/mL (3 mL) inpn 23 Units by SubCUTAneous route nightly.  12 Pen 4    mirtazapine (REMERON) 7.5 mg tablet       trifluoperazine (STELAZINE) 5 mg tablet 5mg in the morning and 10 mg at night      ferrous sulfate 325 mg (65 mg iron) tablet Take  by mouth Daily (before breakfast).  oxybutynin chloride XL (DITROPAN XL) 10 mg CR tablet TAKE 1 TABLET BY MOUTH  DAILY 90 Tablet 3    omega-3 acid ethyl esters (LOVAZA) 1 gram capsule TAKE 2 CAPSULES BY MOUTH  TWO TIMES DAILY WITH MEALS 360 Capsule 3    acetaminophen (TYLENOL) 325 mg tablet Take 325 mg by mouth every four (4) hours as needed for Pain.  rosuvastatin (CRESTOR) 20 mg tablet TAKE 1 TABLET BY MOUTH  NIGHTLY 90 Tablet 3    levothyroxine (SYNTHROID) 137 mcg tablet TAKE 1 TABLET BY MOUTH  DAILY BEFORE BREAKFAST 90 Tablet 3    multivit-min/iron/folic/lutein (CENTRUM SILVER WOMEN PO) Take  by mouth daily.  benztropine (COGENTIN) 1 mg tablet       cyanocobalamin (VITAMIN B12) 500 mcg tablet Take 500 mcg by mouth daily. 2 tablets daily           Physical Exam  Constitutional:       Appearance: Normal appearance. HENT:      Head: Normocephalic and atraumatic. Mouth/Throat:      Mouth: Mucous membranes are moist.      Pharynx: Oropharynx is clear. No oropharyngeal exudate. Eyes:      Extraocular Movements: Extraocular movements intact. Pupils: Pupils are equal, round, and reactive to light. Pulmonary:      Effort: Pulmonary effort is normal. No respiratory distress. Musculoskeletal:         General: Tenderness (over the lower back) present. Cervical back: Normal range of motion and neck supple. Right lower leg: No edema. Left lower leg: No edema. Comments: Negative straight leg raising. Neurological:      Mental Status: She is alert. Comments: Mental status: Awake, alert, oriented , follows simple and complex commands, no neglect, no extinction.   Speech and languge: fluent, coherent,  and comprehension intact  CN: VFF, EOMI, PERRLA, face sensation intact , no facial asymmetry noted, palate elevation symmetric bilat, SS+SCM 5/5 bilat, tongue midline  Motor: no pronator drift, tone normal throughout, strength 5/5 throughout  Sensory: intact to light touch throughout  Coordination: FNF, HS accurate w/o dysmetria  DTR: 2+ throughout, toes downgoing BL  Gait: Normal.           Hospital Outpatient Visit on 09/12/2022   Component Date Value Ref Range Status    Hemoglobin A1c 09/12/2022 7.3 (A)  4.2 - 5.6 % Final    Comment: (NOTE)  HbA1C Interpretive Ranges  <5.7              Normal  5.7 - 6.4         Consider Prediabetes  >6.5              Consider Diabetes      Est. average glucose 09/12/2022 163  mg/dL Final    Comment: (NOTE)  The eAG should be interpreted with patient characteristics in mind   since ethnicity, interindividual differences, red cell lifespan,   variation in rates of glycation, etc. may affect the validity of the   calculation.  LIPID PROFILE 09/12/2022        Final    Cholesterol, total 09/12/2022 140  <200 MG/DL Final    Triglyceride 09/12/2022 169 (A)  <150 MG/DL Final    Comment: The drugs N-acetylcysteine (NAC) and  Metamiszole have been found to cause falsely  low results in this chemical assay. Please  be sure to submit blood samples obtained  BEFORE administration of either of these  drugs to assure correct results.       HDL Cholesterol 09/12/2022 49  40 - 60 MG/DL Final    LDL, calculated 09/12/2022 57.2  0 - 100 MG/DL Final    VLDL, calculated 09/12/2022 33.8  MG/DL Final    CHOL/HDL Ratio 09/12/2022 2.9  0 - 5.0   Final    Sodium 09/12/2022 134 (A)  136 - 145 mmol/L Final    Potassium 09/12/2022 5.3  3.5 - 5.5 mmol/L Final    Chloride 09/12/2022 100  100 - 111 mmol/L Final    CO2 09/12/2022 29  21 - 32 mmol/L Final    Anion gap 09/12/2022 5  3.0 - 18 mmol/L Final    Glucose 09/12/2022 148 (A)  74 - 99 mg/dL Final    BUN 09/12/2022 8  7.0 - 18 MG/DL Final    Creatinine 09/12/2022 0.73  0.6 - 1.3 MG/DL Final    BUN/Creatinine ratio 09/12/2022 11 (A)  12 - 20   Final    GFR est AA 09/12/2022 >60  >60 ml/min/1.73m2 Final    GFR est non-AA 09/12/2022 >60  >60 ml/min/1.73m2 Final    Comment: (NOTE)  Estimated GFR is calculated using the Modification of Diet in Renal   Disease (MDRD) Study equation, reported for both  Americans   (GFRAA) and non- Americans (GFRNA), and normalized to 1.73m2   body surface area. The physician must decide which value applies to   the patient. The MDRD study equation should only be used in   individuals age 25 or older. It has not been validated for the   following: pregnant women, patients with serious comorbid conditions,   or on certain medications, or persons with extremes of body size,   muscle mass, or nutritional status.  Calcium 09/12/2022 10.0  8.5 - 10.1 MG/DL Final    Bilirubin, total 09/12/2022 0.5  0.2 - 1.0 MG/DL Final    ALT (SGPT) 09/12/2022 40  13 - 56 U/L Final    AST (SGOT) 09/12/2022 18  10 - 38 U/L Final    Alk. phosphatase 09/12/2022 75  45 - 117 U/L Final    Protein, total 09/12/2022 8.0  6.4 - 8.2 g/dL Final    Albumin 09/12/2022 4.2  3.4 - 5.0 g/dL Final    Globulin 09/12/2022 3.8  2.0 - 4.0 g/dL Final    A-G Ratio 09/12/2022 1.1  0.8 - 1.7   Final    TSH 09/12/2022 2.90  0.36 - 3.74 uIU/mL Final             ICD-10-CM ICD-9-CM    1. Chronic left-sided low back pain, unspecified whether sciatica present  M54.50 724.2 MRI LUMB SPINE WO CONT    G89.29 338.29 gabapentin (NEURONTIN) 100 mg capsule      cyclobenzaprine (FLEXERIL) 10 mg tablet        Lower back pain started after lifting something heavy. No obvious radiation currently. Gets worse at nighttime and might wake her up from sleep. Feeling better with movement. Mostly on the left side. No obvious radicular symptoms. But since pain persists for about 8 months,no significant improvement with physical therapy, and waking her up from sleep, will get MRI of the lumbosacral spine. We will start her on gabapentin 100 mg p.o. 3 times daily.   She will continue with the Flexeril 10 mg as needed. We will see her again in 3 months time.

## 2022-12-28 DIAGNOSIS — E78.00 HYPERCHOLESTEREMIA: ICD-10-CM

## 2022-12-28 DIAGNOSIS — E03.4 HYPOTHYROIDISM DUE TO ACQUIRED ATROPHY OF THYROID: ICD-10-CM

## 2022-12-28 NOTE — TELEPHONE ENCOUNTER
This patient contacted the office for the following prescriptions to be refilled:    Medication requested :   Requested Prescriptions     Pending Prescriptions Disp Refills    rosuvastatin (CRESTOR) 20 mg tablet 90 Tablet 3     Sig: TAKE 1 TABLET BY MOUTH  NIGHTLY    levothyroxine (SYNTHROID) 137 mcg tablet 90 Tablet 3     Sig: TAKE 1 TABLET BY MOUTH  DAILY BEFORE BREAKFAST      PCP: Jaime Abraham MD  LOV: 9/14/2022  NOV DMA: 1/16/2023  FUTURE APPT:   Future Appointments   Date Time Provider Alka Anette   1/16/2023 10:20 AM Jaime Abraham MD [facility] BS AMB   2/2/2023  9:00 AM [address] MOB MRI 1 [facility] [address]   2/27/2023 11:40 AM Gin Osuna MD [facility] BS AMB   9/1/2023  9:30 AM [facility] [facility] [address]         Thank you.

## 2022-12-28 NOTE — PROGRESS NOTES
[facility] AT [address]  Phone: [phone] Fax: [phone] FOR PHYSICAL THERAPY          Patient Name: Bria Caller : 1965   Treatment/Medical Diagnosis: Other low back pain [M54.59]   Onset Date: Chronic    Referral Source: Ajit Vann [facility]): 22   Prior Hospitalization: See Medical History Provider #: [id number]   Prior Level of Function: Functionally independent, lives alone in apartment, walking without AD   Comorbidities: DM II, thyroid issues, depression/bipolar disorder, incontinence, arthritis, hx of left meniscus tear   Medications: Verified on Patient Summary List   Visits from [facility]: 9 Missed Visits: 1     SUMMARY OF TREATMENT  Pt is a pleasant 59 y.o. female who presents with c/o low back pain. The patient reports a chronic history of low back pain, left >right, that sends shooting pain into the back with transitional movements. Treatment has consisted of the following: Therapeutic exercise, Therapeutic activities, Neuromuscular re-education, Physical agent/modality, Gait/balance training, Manual therapy, Patient education, Functional mobility training, and Self Care training. CURRENT STATUS  Pt has attended 9 treatments since initial evaluation on 22 where she demonstrated good progress toward goals. She was nearing official d/c however failed to schedule/attend remaining visits and is thus being discharged at this time. Please see below for goals updated at last treatment session. Please send consult should further therapy be warranted. Progress towards goals / Updated goals:  Long Term Goals: To be accomplished in 10 treatments  - Goal: Pt to report < 4/10 pain at worst in low back to increase ease with ADLs.   Status at last note/certification: 93/38 at worst when lifting grandson last week  - Goal: Pt will perform 5x sit<>stand test in 12 seconds or less without UE assist to improve ease of transfers and demo improved functional LE strength. Status at last note/certification: 22 sec with good mechanics and technique, mild UE A at plint  - Goal: Pt to demonstrate at leat 4+/5 B hip extension MMT to increase ease of transfers/stair negotiation. Status at last note/certification: 3+/5 B    RECOMMENDATIONS  Discontinue therapy due to lack of attendance. If you have any questions/comments please contact us directly at [phone]   Thank you for allowing us to assist in the care of your patient.   Therapist Signature: Alysha Tidwell PT Date: 12/28/22   Reporting Period: 9/28/22 - 12/28/22 Time: 3:14 PM

## 2022-12-30 RX ORDER — ACETAMINOPHEN 500 MG
TABLET ORAL
Qty: 90 TABLET | Refills: 0 | Status: SHIPPED | OUTPATIENT
Start: 2022-12-30

## 2022-12-30 RX ORDER — LEVOTHYROXINE SODIUM 137 UG/1
TABLET ORAL
Qty: 90 TABLET | Refills: 3 | Status: SHIPPED | OUTPATIENT
Start: 2022-12-30

## 2022-12-30 RX ORDER — ROSUVASTATIN CALCIUM 20 MG/1
TABLET, COATED ORAL
Qty: 90 TABLET | Refills: 3 | Status: SHIPPED | OUTPATIENT
Start: 2022-12-30

## 2023-01-13 DIAGNOSIS — G89.29 CHRONIC LEFT-SIDED LOW BACK PAIN, UNSPECIFIED WHETHER SCIATICA PRESENT: Primary | ICD-10-CM

## 2023-01-13 DIAGNOSIS — M54.50 CHRONIC LEFT-SIDED LOW BACK PAIN, UNSPECIFIED WHETHER SCIATICA PRESENT: Primary | ICD-10-CM

## 2023-01-13 RX ORDER — GABAPENTIN 100 MG/1
100 CAPSULE ORAL 3 TIMES DAILY
Qty: 90 CAPSULE | Refills: 0 | Status: SHIPPED | OUTPATIENT
Start: 2023-01-13

## 2023-01-13 RX ORDER — GABAPENTIN 100 MG/1
CAPSULE ORAL 3 TIMES DAILY
COMMUNITY
End: 2023-01-13 | Stop reason: SDUPTHER

## 2023-01-16 ENCOUNTER — OFFICE VISIT (OUTPATIENT)
Dept: FAMILY MEDICINE CLINIC | Age: 58
End: 2023-01-16
Payer: MEDICARE

## 2023-01-16 VITALS
BODY MASS INDEX: 30.9 KG/M2 | OXYGEN SATURATION: 100 % | HEIGHT: 64 IN | DIASTOLIC BLOOD PRESSURE: 87 MMHG | SYSTOLIC BLOOD PRESSURE: 133 MMHG | TEMPERATURE: 97.7 F | RESPIRATION RATE: 17 BRPM | HEART RATE: 87 BPM | WEIGHT: 181 LBS

## 2023-01-16 DIAGNOSIS — N39.3 STRESS INCONTINENCE OF URINE: ICD-10-CM

## 2023-01-16 DIAGNOSIS — Z79.4 TYPE 2 DIABETES MELLITUS WITH MICROALBUMINURIA, WITH LONG-TERM CURRENT USE OF INSULIN (HCC): Primary | ICD-10-CM

## 2023-01-16 DIAGNOSIS — Z12.11 SCREEN FOR COLON CANCER: ICD-10-CM

## 2023-01-16 DIAGNOSIS — E03.4 HYPOTHYROIDISM DUE TO ACQUIRED ATROPHY OF THYROID: ICD-10-CM

## 2023-01-16 DIAGNOSIS — R80.9 TYPE 2 DIABETES MELLITUS WITH MICROALBUMINURIA, WITH LONG-TERM CURRENT USE OF INSULIN (HCC): Primary | ICD-10-CM

## 2023-01-16 DIAGNOSIS — E11.29 TYPE 2 DIABETES MELLITUS WITH MICROALBUMINURIA, WITH LONG-TERM CURRENT USE OF INSULIN (HCC): Primary | ICD-10-CM

## 2023-01-16 LAB — HBA1C MFR BLD HPLC: 8.1 %

## 2023-01-16 PROCEDURE — 99214 OFFICE O/P EST MOD 30 MIN: CPT | Performed by: STUDENT IN AN ORGANIZED HEALTH CARE EDUCATION/TRAINING PROGRAM

## 2023-01-16 PROCEDURE — 83036 HEMOGLOBIN GLYCOSYLATED A1C: CPT | Performed by: STUDENT IN AN ORGANIZED HEALTH CARE EDUCATION/TRAINING PROGRAM

## 2023-01-16 PROCEDURE — G9899 SCRN MAM PERF RSLTS DOC: HCPCS | Performed by: STUDENT IN AN ORGANIZED HEALTH CARE EDUCATION/TRAINING PROGRAM

## 2023-01-16 PROCEDURE — 3046F HEMOGLOBIN A1C LEVEL >9.0%: CPT | Performed by: STUDENT IN AN ORGANIZED HEALTH CARE EDUCATION/TRAINING PROGRAM

## 2023-01-16 PROCEDURE — 3017F COLORECTAL CA SCREEN DOC REV: CPT | Performed by: STUDENT IN AN ORGANIZED HEALTH CARE EDUCATION/TRAINING PROGRAM

## 2023-01-16 PROCEDURE — G9717 DOC PT DX DEP/BP F/U NT REQ: HCPCS | Performed by: STUDENT IN AN ORGANIZED HEALTH CARE EDUCATION/TRAINING PROGRAM

## 2023-01-16 PROCEDURE — 2022F DILAT RTA XM EVC RTNOPTHY: CPT | Performed by: STUDENT IN AN ORGANIZED HEALTH CARE EDUCATION/TRAINING PROGRAM

## 2023-01-16 PROCEDURE — G8428 CUR MEDS NOT DOCUMENT: HCPCS | Performed by: STUDENT IN AN ORGANIZED HEALTH CARE EDUCATION/TRAINING PROGRAM

## 2023-01-16 PROCEDURE — G8417 CALC BMI ABV UP PARAM F/U: HCPCS | Performed by: STUDENT IN AN ORGANIZED HEALTH CARE EDUCATION/TRAINING PROGRAM

## 2023-01-16 RX ORDER — OXYBUTYNIN CHLORIDE 10 MG/1
TABLET, EXTENDED RELEASE ORAL
Qty: 90 TABLET | Refills: 3 | Status: SHIPPED | OUTPATIENT
Start: 2023-01-16

## 2023-01-16 NOTE — PROGRESS NOTES
Merline Doyne is a 59 y.o.  female and presents with    Chief Complaint   Patient presents with    Follow-up    Diabetes    Hyperthyroidism           Subjective:    Diabetes  Taking medications as prescribed: YES  Currently on: Lantus 23 units, Janumet  Checking blood sugars at home at home: YES - TIT 3%, and above 97% for the last 30 days  Symptoms: none  Diabetic diet: NO - has been using splenda, and lately has been craving sugars  Exercise: YES     Patien is being complaint with her thyroid pill. Denies sx of hyper or hypothyroidism.  Denies side         Patient Active Problem List   Diagnosis Code    Hypercholesteremia E78.00    Type 2 diabetes mellitus with microalbuminuria, with long-term current use of insulin ([facility]) E11.29, R80.9, Z79.4    Tear of meniscus of left knee S83.207A    Recurrent depression ([facility] [address] F33.9    Left low back pain M54.50    History of alcohol abuse F10.11    Lumbar degenerative disc disease M51.36    Hypothyroidism due to acquired atrophy of thyroid E03.4    Vitamin B12 deficiency E53.8    Bipolar 1 disorder, manic, full remission ([facility]) F31.74    PTSD (post-traumatic stress disorder) F43.10    Type 2 diabetes mellitus with diabetic neuropathy ([facility]) E11.40      Past Medical History:   Diagnosis Date    Arthritis     Chronic pain     Depression     Diabetes ([facility] [address]     Hyperlipidemia     Hypertension     Hypothyroid     Medial meniscus tear     Left knee    Menopause     Psychiatric disorder     Tobacco abuse, in remission       Past Surgical History:   Procedure Laterality Date    HX ORTHOPAEDIC      Hand reconstructive surgery    HX TONSIL AND ADENOIDECTOMY      HX TUBAL LIGATION  2004    HX WISDOM TEETH EXTRACTION      UT UNLISTED PROCEDURE HANDS/FINGERS Left 1989    reconstructive surgery      Family History   Problem Relation Age of Onset    Alcohol abuse Mother     OSTEOARTHRITIS Mother     Cancer Mother 61        breast cancer    Diabetes Mother     Hypertension Mother Psychiatric Disorder Mother     Stroke Mother     Breast Cancer Mother 61    Alcohol abuse Father     Heart Disease Father     Diabetes Father     Hypertension Father     Lung Disease Father     Colon Cancer Father 72    Alcohol abuse Paternal Grandmother     Ovarian Cancer Sister 32    Alcohol abuse Sister     Diabetes Sister     Psychiatric Disorder Sister     Alcohol abuse Brother     Psychiatric Disorder Son     Psychiatric Disorder Other         nephew     Social History     Socioeconomic History    Marital status:      Spouse name: Not on file    Number of children: Not on file    Years of education: Not on file    Highest education level: Not on file   Occupational History    Not on file   Tobacco Use    Smoking status: Former     Packs/day: 1.00     Years: 35.00     Pack years: 35.00     Types: Cigarettes     Quit date: 2011     Years since quittin.2    Smokeless tobacco: Never    Tobacco comments:     quit    Vaping Use    Vaping Use: Never used   Substance and Sexual Activity    Alcohol use: No     Alcohol/week: 0.0 standard drinks     Comment: quit 10 years ago    Drug use: No    Sexual activity: Yes     Partners: Male   Other Topics Concern     Service No    Blood Transfusions No    Caffeine Concern No    Occupational Exposure No    Hobby Hazards Not Asked    Sleep Concern Yes     Comment: takes sleeping pills    Stress Concern Not Asked    Weight Concern Yes    Special Diet Not Asked    Back Care Not Asked    Exercise Yes    Bike Helmet Not Asked    Seat Belt Yes    Self-Exams Yes   Social History Narrative    Not on file     Social Determinants of Health     Financial Resource Strain: Not on file   Food Insecurity: Not on file   Transportation Needs: Not on file   Physical Activity: Not on file   Stress: Not on file   Social Connections: Not on file   Intimate Partner Violence: Not on file   Housing Stability: Not on file        Current Outpatient Medications   Medication Sig Dispense Refill    gabapentin (NEURONTIN) 100 mg capsule Take 1 Capsule by mouth three (3) times daily. Max Daily Amount: 300 mg. 90 Capsule 0    acetaminophen (TYLENOL) 500 mg tablet take 2 tablets by mouth every 6 hours for 10 days 90 Tablet 0    rosuvastatin (CRESTOR) 20 mg tablet TAKE 1 TABLET BY MOUTH  NIGHTLY 90 Tablet 3    levothyroxine (SYNTHROID) 137 mcg tablet TAKE 1 TABLET BY MOUTH  DAILY BEFORE BREAKFAST 90 Tablet 3    cyclobenzaprine (FLEXERIL) 10 mg tablet Take 1 Tablet by mouth three (3) times daily as needed for Muscle Spasm(s). 30 Tablet 2    flash glucose sensor (FreeStyle Jorge 14 Day Sensor) kit Use 4 times daily 6 Kit 3    Insulin Needles, Disposable, (Droplet Pen Needle) 31 gauge x 3/16\" ndle use 1 PEN NEEDLE to inject MEDICATION subcutaneously four times a day 200 Pen Needle 3    SITagliptin-metFORMIN (Janumet) 50-1,000 mg per tablet TAKE 1 TABLET BY MOUTH  TWICE A  Tablet 3    Lantus Solostar U-100 Insulin 100 unit/mL (3 mL) inpn 23 Units by SubCUTAneous route nightly. 12 Pen 4    mirtazapine (REMERON) 7.5 mg tablet       trifluoperazine (STELAZINE) 5 mg tablet 5mg in the morning and 10 mg at night      ferrous sulfate 325 mg (65 mg iron) tablet Take  by mouth Daily (before breakfast). oxybutynin chloride XL (DITROPAN XL) 10 mg CR tablet TAKE 1 TABLET BY MOUTH  DAILY 90 Tablet 3    omega-3 acid ethyl esters (LOVAZA) 1 gram capsule TAKE 2 CAPSULES BY MOUTH  TWO TIMES DAILY WITH MEALS 360 Capsule 3    acetaminophen (TYLENOL) 325 mg tablet Take 325 mg by mouth every four (4) hours as needed for Pain.      multivit-min/iron/folic/lutein (CENTRUM SILVER WOMEN PO) Take  by mouth daily. cyanocobalamin (VITAMIN B12) 500 mcg tablet Take 500 mcg by mouth daily. 2 tablets daily      benztropine (COGENTIN) 1 mg tablet 2 mg two (2) times a day. ROS   Review of Systems   Constitutional:  Negative for chills, fever and malaise/fatigue.    HENT:  Negative for congestion, ear discharge and ear pain. Eyes:  Negative for blurred vision, pain and discharge. Respiratory:  Negative for cough and shortness of breath. Cardiovascular:  Negative for chest pain and palpitations. Gastrointestinal:  Negative for abdominal pain, nausea and vomiting. Genitourinary:  Negative for dysuria, frequency and urgency. Skin:  Negative for itching and rash. Neurological:  Negative for dizziness, seizures, loss of consciousness and headaches. Psychiatric/Behavioral:  Negative for substance abuse. Objective:  Vitals:    01/16/23 1010   BP: 133/87   Pulse: 87   Resp: 17   Temp: 97.7 °F (36.5 °C)   TempSrc: Temporal   SpO2: 100%   Weight: 181 lb (82.1 kg)   Height: 5' 4\" (1.626 m)   LMP: 06/05/2014       Physical Exam  Vitals reviewed. Constitutional:       Appearance: Normal appearance. Eyes:      General: No scleral icterus. Right eye: No discharge. Left eye: No discharge. Cardiovascular:      Rate and Rhythm: Normal rate and regular rhythm. Pulmonary:      Effort: Pulmonary effort is normal. No respiratory distress. Breath sounds: Normal breath sounds. Musculoskeletal:      Cervical back: Normal range of motion and neck supple. Neurological:      Mental Status: She is alert and oriented to person, place, and time. Cranial Nerves: No cranial nerve deficit. Psychiatric:         Mood and Affect: Mood normal.         Behavior: Behavior normal.         Thought Content: Thought content normal.         Judgment: Judgment normal.         LABS     TESTS      Assessment/Plan:    1. Type 2 diabetes mellitus with microalbuminuria, with long-term current use of insulin ([facility])  Discussed w/ patient how she is to increase to 27 units. Then if she has fasting blood sugars over 120 three days in a row she is to go up 2 units in the Lantus. - dapagliflozin (Farxiga) 5 mg tab tablet; Take 1 Tablet by mouth daily. Dispense: 90 Tablet;  Refill: 4  - HEMOGLOBIN A1C WITH EAG; Future  - METABOLIC PANEL, COMPREHENSIVE; Future  - MICROALBUMIN, UR, RAND W/ MICROALB/CREAT RATIO; Future    2. Screen for colon cancer  - COLOGUARD TEST (FECAL DNA COLORECTAL CANCER SCREENING); Future    3. Hypothyroidism due to acquired atrophy of thyroid  - TSH 3RD GENERATION; Future  - T4, FREE; Future    4. Stress incontinence of urine  - oxybutynin chloride XL (DITROPAN XL) 10 mg CR tablet; TAKE 1 TABLET BY MOUTH  DAILY  Dispense: 90 Tablet; Refill: 3      Lab review: orders written for new lab studies as appropriate; see orders      I have discussed the diagnosis with the patient and the intended plan as seen in the above orders. The patient has received an after-visit summary and questions were answered concerning future plans. I have discussed medication side effects and warnings with the patient as well. I have reviewed the plan of care with the patient, accepted their input and they are in agreement with the treatment goals.          Dava Mcburney, MD

## 2023-02-02 ENCOUNTER — HOSPITAL ENCOUNTER (OUTPATIENT)
Dept: MRI IMAGING | Age: 58
Discharge: HOME | End: 2023-02-02
Attending: STUDENT IN AN ORGANIZED HEALTH CARE EDUCATION/TRAINING PROGRAM
Payer: MEDICARE

## 2023-02-02 PROCEDURE — 72148 MRI LUMBAR SPINE W/O DYE: CPT

## 2023-02-03 ENCOUNTER — TELEPHONE (OUTPATIENT)
Dept: FAMILY MEDICINE CLINIC | Age: 58
End: 2023-02-03

## 2023-02-03 DIAGNOSIS — B37.9 YEAST INFECTION: Primary | ICD-10-CM

## 2023-02-03 RX ORDER — FLUCONAZOLE 150 MG/1
150 TABLET ORAL DAILY
Qty: 1 TABLET | Refills: 0 | Status: SHIPPED | OUTPATIENT
Start: 2023-02-03 | End: 2023-02-04

## 2023-02-03 NOTE — TELEPHONE ENCOUNTER
Called patient told medication sent to pharmacy and told to drink lots of water. Patient states understanding.

## 2023-02-03 NOTE — TELEPHONE ENCOUNTER
----- Message from Kristin Dean sent at 2/3/2023 11:42 AM EST -----  Subject: Message to Provider    QUESTIONS  Information for Provider? PATIENT IS CALLING BECAUSE SHE STARTED FARXIGA   AND DR DOUGLAS STATES THAT SHE MIGHT GET A YEAST INFECTION FROM IT AND SHE   DID. SHE WOULD LIKE TO KNOW IF THE DOCTOR COULD CALL SOMETHING TO THE   PHARMACY FOR THE YEAST INFECTION. HER PHARMACY IS [facility]   [address] PLEASE CALL PATIENT TO ADVISE.   ---------------------------------------------------------------------------  --------------  Eliseo Enrique [facility]  [phone]; OK to leave message on voicemail  ---------------------------------------------------------------------------  --------------  SCRIPT ANSWERS  Relationship to Patient?  Self

## 2023-02-07 ENCOUNTER — TELEPHONE (OUTPATIENT)
Dept: NEUROLOGY | Age: 58
End: 2023-02-07

## 2023-02-10 ENCOUNTER — TELEPHONE (OUTPATIENT)
Dept: FAMILY MEDICINE CLINIC | Age: 58
End: 2023-02-10

## 2023-02-10 NOTE — TELEPHONE ENCOUNTER
----- Message from Aditya Anisha sent at 2/10/2023 10:29 AM EST -----  Subject: Results Request    QUESTIONS  Results: MRI lumbar spine; Ordered by: Maninder Tristan   Date Performed: 2023-02-02  ---------------------------------------------------------------------------  --------------  Lowell SAWYER    [phone]; OK to leave message on voicemail  ---------------------------------------------------------------------------  --------------

## 2023-02-27 ENCOUNTER — OFFICE VISIT (OUTPATIENT)
Age: 58
End: 2023-02-27
Payer: MEDICAID

## 2023-02-27 VITALS
BODY MASS INDEX: 31.34 KG/M2 | TEMPERATURE: 98.6 F | OXYGEN SATURATION: 97 % | RESPIRATION RATE: 18 BRPM | SYSTOLIC BLOOD PRESSURE: 132 MMHG | HEIGHT: 64 IN | WEIGHT: 183.6 LBS | DIASTOLIC BLOOD PRESSURE: 86 MMHG | HEART RATE: 81 BPM

## 2023-02-27 DIAGNOSIS — G89.29 CHRONIC BILATERAL LOW BACK PAIN, UNSPECIFIED WHETHER SCIATICA PRESENT: Primary | ICD-10-CM

## 2023-02-27 DIAGNOSIS — M54.50 CHRONIC BILATERAL LOW BACK PAIN, UNSPECIFIED WHETHER SCIATICA PRESENT: Primary | ICD-10-CM

## 2023-02-27 PROCEDURE — 99214 OFFICE O/P EST MOD 30 MIN: CPT | Performed by: STUDENT IN AN ORGANIZED HEALTH CARE EDUCATION/TRAINING PROGRAM

## 2023-02-27 RX ORDER — GABAPENTIN 100 MG/1
100 CAPSULE ORAL 3 TIMES DAILY
Qty: 90 CAPSULE | Refills: 4 | Status: SHIPPED | OUTPATIENT
Start: 2023-02-27 | End: 2023-03-29

## 2023-02-27 RX ORDER — SERTRALINE HYDROCHLORIDE 25 MG/1
TABLET, FILM COATED ORAL
COMMUNITY
Start: 2023-02-06

## 2023-02-27 RX ORDER — GABAPENTIN 100 MG/1
CAPSULE ORAL
COMMUNITY
Start: 2022-12-16 | End: 2023-02-27 | Stop reason: SDUPTHER

## 2023-02-27 RX ORDER — CYCLOBENZAPRINE HCL 10 MG
10 TABLET ORAL 3 TIMES DAILY PRN
Qty: 21 TABLET | Refills: 4 | Status: SHIPPED | OUTPATIENT
Start: 2023-02-27 | End: 2023-03-09

## 2023-02-27 NOTE — PROGRESS NOTES
A 59years old female patient here for follow-up of lower back pain and her MRI result. Last seen in the clinic in November 2022. An MRI of the lumbosacral spine was done on February 2. It showed new grade 1 anterolisthesis of L4 over L5 for 1 set facet arthrosis; moderate to severe left foraminal stenosis. At L5-S1, there is a small residual right paracentral disc protrusion with slight contact of the traversing right S1; no significant mass effect. She is currently on gabapentin and Flexeril. Will help with the pain. About 2 days ago, she started using a lumbar brace and pain is much better. No problem walking. Pain does not mostly radiate to the lower extremities; only had an episode about 3 days ago where she noticed radiation of the pain to the left lower extremity which lasted only for few minutes. No weakness of her lower extremities. From initial encounter:  A 59years old female patient with medical history of arthritis, depression/bipolar, hyperlipidemia, hypertension, hypothyroidism referred here for evaluation of lower back pain. She said, it started in March 2022. Claims it started after lifting a case of water. Pain is mostly when she is laying down. Mostly on the left side. Did not radiate to the lower extremities. When she stands up from her bed or chair, will have bilateral knee pain. She feels a little better when she is walking. She currently walks 1 to 2 hours a day. Might wake her up from sleep. Takes Tylenol-codeine which helps for the pain. Also has cyclobenzaprine. She has attended physical therapy for 2 months. Denied having any numbness or weakness over her lower extremities. She has urinary incontinence for years. No bowel incontinence. Lumbosacral x-ray from April showed degenerative changes. Review of Systems   Constitutional:   Negative for chills and fever. HENT:  Negative for hearing loss and tinnitus. Eyes:  No changes in vision. . Negative for double vision. Respiratory:  Negative for cough and shortness of breath. Cardiovascular:  Negative for chest pain and leg swelling. Gastrointestinal:  Negative for nausea and vomiting. Genitourinary:  Negative for dysuria, frequency and urgency. Musculoskeletal:  Positive for back pain and joint pain. Negative for neck pain. Skin:  Negative for rash or itching. Neurological: Negative for dizziness, tingling, sensory change, speech change, focal weakness, seizures and headaches. Psychiatric/Behavioral:  Positive for depression; but better now. . Negative for hallucinations and suicidal ideas. The patient has insomnia.       Past Medical History:   Diagnosis Date    Arthritis     Chronic pain     Depression     Diabetes ([facility] [address]     Hyperlipidemia     Hypertension     Hypothyroid     Medial meniscus tear     Left knee    Menopause     Psychiatric disorder     Tobacco abuse, in remission        Past Surgical History:   Procedure Laterality Date    HAND/FINGER SURGERY UNLISTED Left 1989    reconstructive surgery    HX ORTHOPAEDIC      Hand reconstructive surgery    HX TONSIL AND ADENOIDECTOMY      HX TUBAL LIGATION  2004    HX WISDOM TEETH EXTRACTION          Family History   Problem Relation Age of Onset    Alcohol abuse Mother     OSTEOARTHRITIS Mother     Cancer Mother 61        breast cancer    Diabetes Mother     Hypertension Mother     Psychiatric Disorder Mother     Stroke Mother     Breast Cancer Mother 61      Alcohol abuse Father     Heart Disease Father     Diabetes Father     Hypertension Father     Lung Disease Father     Colon Cancer Father 72    Alcohol abuse Paternal Grandmother     Ovarian Cancer Sister 32    Alcohol abuse Sister     Diabetes Sister     Psychiatric Disorder Sister     Alcohol abuse Brother     Psychiatric Disorder Son     Psychiatric Disorder Other         nephew        Social History     Socioeconomic History    Marital status:      Spouse name: Not on file Number of children: Not on file    Years of education: Not on file    Highest education level: Not on file   Occupational History    Not on file   Tobacco Use    Smoking status: Former     Packs/day: 1.00     Years: 35.00     Pack years: 35.00     Types: Cigarettes     Quit date: 2011     Years since quittin.0    Smokeless tobacco: Never    Tobacco comments:     quit    Vaping Use    Vaping Use: Never used   Substance and Sexual Activity    Alcohol use: No     Alcohol/week: 0.0 standard drinks     Comment: quit 10 years ago    Drug use: No    Sexual activity: Yes     Partners: Male   Other Topics Concern     Service No    Blood Transfusions No    Caffeine Concern No    Occupational Exposure No    Hobby Hazards Not Asked    Sleep Concern Yes     Comment: takes sleeping pills    Stress Concern Not Asked    Weight Concern Yes    Special Diet Not Asked    Back Care Not Asked    Exercise Yes    Bike Helmet Not Asked    Seat Belt Yes    Self-Exams Yes   Social History Narrative    Not on file     Social Determinants of Health     Financial Resource Strain: Not on file   Food Insecurity: Not on file   Transportation Needs: Not on file   Physical Activity: Not on file   Stress: Not on file   Social Connections: Not on file   Intimate Partner Violence: Not on file   Housing Stability: Not on file        Allergies   Allergen Reactions    Lipitor [Atorvastatin] Other (comments)     myalgias    Lisinopril Cough    Aspirin Other (comments)     Nose bleeds    Zocor [Simvastatin] Other (comments)     Causes pain in the left leg         Current Outpatient Medications   Medication Sig Dispense Refill    gabapentin (NEURONTIN) 100 mg capsule Take 1 Capsule by mouth three (3) times daily for 30 days. Max Daily Amount: 300 mg. 90 Capsule 1    cyclobenzaprine (FLEXERIL) 10 mg tablet Take 1 Tablet by mouth three (3) times daily as needed for Muscle Spasm(s).  30 Tablet 2    flash glucose sensor (FreeStyle Anu Rod 14 Day Sensor) kit Use 4 times daily 6 Kit 3    acetaminophen (TYLENOL) 500 mg tablet take 2 tablets by mouth every 6 hours for 10 days 90 Tablet 0    Insulin Needles, Disposable, (Droplet Pen Needle) 31 gauge x 3/16\" ndle use 1 PEN NEEDLE to inject MEDICATION subcutaneously four times a day 200 Pen Needle 3    SITagliptin-metFORMIN (Janumet) 50-1,000 mg per tablet TAKE 1 TABLET BY MOUTH  TWICE A  Tablet 3    Lantus Solostar U-100 Insulin 100 unit/mL (3 mL) inpn 23 Units by SubCUTAneous route nightly. 12 Pen 4    mirtazapine (REMERON) 7.5 mg tablet       trifluoperazine (STELAZINE) 5 mg tablet 5mg in the morning and 10 mg at night      ferrous sulfate 325 mg (65 mg iron) tablet Take  by mouth Daily (before breakfast). oxybutynin chloride XL (DITROPAN XL) 10 mg CR tablet TAKE 1 TABLET BY MOUTH  DAILY 90 Tablet 3    omega-3 acid ethyl esters (LOVAZA) 1 gram capsule TAKE 2 CAPSULES BY MOUTH  TWO TIMES DAILY WITH MEALS 360 Capsule 3    acetaminophen (TYLENOL) 325 mg tablet Take 325 mg by mouth every four (4) hours as needed for Pain. rosuvastatin (CRESTOR) 20 mg tablet TAKE 1 TABLET BY MOUTH  NIGHTLY 90 Tablet 3    levothyroxine (SYNTHROID) 137 mcg tablet TAKE 1 TABLET BY MOUTH  DAILY BEFORE BREAKFAST 90 Tablet 3    multivit-min/iron/folic/lutein (CENTRUM SILVER WOMEN PO) Take  by mouth daily. benztropine (COGENTIN) 1 mg tablet       cyanocobalamin (VITAMIN B12) 500 mcg tablet Take 500 mcg by mouth daily. 2 tablets daily           Physical Exam  Constitutional:       Appearance: Normal appearance. HENT:      Head: Normocephalic and atraumatic. Mouth/Throat:      Mouth: Mucous membranes are moist.      Pharynx: Oropharynx is clear. No oropharyngeal exudate. Eyes:      Extraocular Movements: Extraocular movements intact. Pupils: Pupils are equal, round, and reactive to light. Pulmonary:      Effort: Pulmonary effort is normal. No respiratory distress.    Musculoskeletal: Cervical back: Normal range of motion and neck supple. Right lower leg: No edema. Left lower leg: No edema. Neurological:   Mental status: Awake, alert, oriented , follows simple and complex commands, no neglect, no extinction. Speech and languge: fluent, coherent,  and comprehension intact  CN: VFF, EOMI, PERRLA, face sensation intact , no facial asymmetry noted, palate elevation symmetric bilat, SS+SCM 5/5 bilat, tongue midline  Motor: no pronator drift, tone normal throughout, strength 5/5 throughout  Sensory: intact to light touch and pinprick throughout  Coordination: FNF  accurate w/o dysmetria  DTR: 2+ throughout  Gait: Normal.           Hospital Outpatient Visit on 09/12/2022   Component Date Value Ref Range Status    Hemoglobin A1c 09/12/2022 7.3 (A)  4.2 - 5.6 % Final    Comment: (NOTE)  HbA1C Interpretive Ranges  <5.7              Normal  5.7 - 6.4         Consider Prediabetes  >6.5              Consider Diabetes      Est. average glucose 09/12/2022 163  mg/dL Final    Comment: (NOTE)  The eAG should be interpreted with patient characteristics in mind   since ethnicity, interindividual differences, red cell lifespan,   variation in rates of glycation, etc. may affect the validity of the   calculation. LIPID PROFILE 09/12/2022        Final    Cholesterol, total 09/12/2022 140  <200 MG/DL Final    Triglyceride 09/12/2022 169 (A)  <150 MG/DL Final    Comment: The drugs N-acetylcysteine (NAC) and  Metamiszole have been found to cause falsely  low results in this chemical assay. Please  be sure to submit blood samples obtained  BEFORE administration of either of these  drugs to assure correct results.       HDL Cholesterol 09/12/2022 49  40 - 60 MG/DL Final    LDL, calculated 09/12/2022 57.2  0 - 100 MG/DL Final    VLDL, calculated 09/12/2022 33.8  MG/DL Final    CHOL/HDL Ratio 09/12/2022 2.9  0 - 5.0   Final    Sodium 09/12/2022 134 (A)  136 - 145 mmol/L Final    Potassium 09/12/2022 5.3  3.5 - 5.5 mmol/L Final    Chloride 09/12/2022 100  100 - 111 mmol/L Final    CO2 09/12/2022 29  21 - 32 mmol/L Final    Anion gap 09/12/2022   5  3.0 - 18 mmol/L Final    Glucose 09/12/2022 148 (A)  74 - 99 mg/dL Final    BUN 09/12/2022 8  7.0 - 18 MG/DL Final    Creatinine 09/12/2022 0.73  0.6 - 1.3 MG/DL Final    BUN/Creatinine ratio 09/12/2022 11 (A)  12 - 20   Final    GFR est AA 09/12/2022 >60  >60 ml/min/1.73m2 Final    GFR est non-AA 09/12/2022 >60  >60 ml/min/1.73m2 Final    Comment: (NOTE)  Estimated GFR is calculated using the Modification of Diet in Renal   Disease (MDRD) Study equation, reported for both  Americans   (GFRAA) and non- Americans (GFRNA), and normalized to 1.73m2   body surface area. The physician must decide which value applies to   the patient. The MDRD study equation should only be used in   individuals age 25 or older. It has not been validated for the   following: pregnant women, patients with serious comorbid conditions,   or on certain medications, or persons with extremes of body size,   muscle mass, or nutritional status. Calcium 09/12/2022 10.0  8.5 - 10.1 MG/DL Final    Bilirubin, total 09/12/2022 0.5  0.2 - 1.0 MG/DL Final    ALT (SGPT) 09/12/2022 40  13 - 56 U/L Final    AST (SGOT) 09/12/2022 18  10 - 38 U/L Final    Alk. phosphatase 09/12/2022 75  45 - 117 U/L Final    Protein, total 09/12/2022 8.0  6.4 - 8.2 g/dL Final    Albumin 09/12/2022 4.2  3.4 - 5.0 g/dL Final    Globulin 09/12/2022 3.8  2.0 - 4.0 g/dL Final    A-G Ratio 09/12/2022 1.1  0.8 - 1.7   Final    TSH 09/12/2022 2.90  0.36 - 3.74 uIU/mL Final     MRI LUMB SPINE WO CONT: 2/2/2023 10:00 AM       CLINICAL INFORMATION   Has lower back pain for the past 8 months; no improvement with physical therapy. COMPARISON   Lumbar spine MRI 20 March 2015       TECHNIQUE   Multiplanar MR images of the lumbar spine obtained including T1 and T2-weighted   sequences.        FINDINGS    For discussion purposes, the first axial T2-weighted image defines the mid T12   vertebral body. Vertebral body height and alignment: Grade 1 anterolisthesis of L4 with respect   L5 on the basis of advanced facet arthrosis. No evidence of acute fracture. Bone marrow signal: Heterogeneous, nonspecific. Conus/filum: Normal in appearance and terminates at an appropriate level. Intervertebral disc height: Disproportionate intervertebral disc space narrowing   at L5-S1. Paraspinal tissues: The paraspinous soft tissues are within normal limits. Specific segmental anatomy is as follows:       T12-L1: Central canal and neural foramina appear patent. L1-2: Mild broad-based disc bulge. Central canal and neural foramina appear   patent. L2-3: Mild broad-based disc bulge. Central canal and neural foramina appear   patent. L3-4: Mild broad-based disc bulge. Mild/moderate facet arthrosis. No significant   central canal narrowing. Mild/moderate foraminal narrowing. L4-5: Grade 1 anterolisthesis of L4 with respect L5 on the basis of advanced   facet arthrosis, left greater than right. Broad-based disc bulge with uncovering   of the posterior disc. Moderate/severe left foraminal narrowing. Mild/moderate   right foraminal narrowing. L5-S1: Broad-based disc bulge with small superimposed right   paracentral/subarticular protrusion type disc herniation. Slight asymmetric   contact of the right S1 nerve root without significant mass effect. Impression       New Grade 1 anterolisthesis of L4 with respect L5 on the basis of advanced facet   arthrosis. Moderate/severe left foraminal narrowing. At L5-S1, small residual right paracentral/subarticular protrusion disc   herniation with slight contact of the traversing right S1 nerve root and no   significant mass effect. Overall improved when compared with 2015 lumbar spine   MRI.        1. Chronic bilateral low back pain, unspecified whether sciatica present  - gabapentin (NEURONTIN) 100 MG capsule; Take 1 capsule by mouth 3 times daily for 30 days. Intended supply: 30 days Max Daily Amount: 300 mg  Dispense: 90 capsule; Refill: 4  - cyclobenzaprine (FLEXERIL) 10 MG tablet; Take 1 tablet by mouth 3 times daily as needed for Muscle spasms  Dispense: 21 tablet; Refill: 4     The lower pain is better with low-dose gabapentin and Flexeril. We will continue the same dose: Gabapentin 100 mg p.o. 3 times daily and Flexeril 10 mg p.o. 3 times daily as needed. Reviewed her MRI which had shown moderate to severe left L4-5 foraminal stenosis; L5-S1 right paracentral disc protrusion with no significant mass effect on the nerve root. Patient currently uses a lumbar brace which she claims is helping. I have advised her to remain active. We will see her again in 4 months time.

## 2023-03-07 RX ORDER — OXYBUTYNIN CHLORIDE 10 MG/1
TABLET, EXTENDED RELEASE ORAL
Qty: 90 TABLET | Refills: 3 | Status: SHIPPED | OUTPATIENT
Start: 2023-03-07

## 2023-03-20 RX ORDER — PSEUDOEPHED/ACETAMINOPH/DIPHEN 30MG-500MG
TABLET ORAL
Qty: 90 TABLET | Refills: 2 | Status: SHIPPED | OUTPATIENT
Start: 2023-03-20

## 2023-03-20 NOTE — TELEPHONE ENCOUNTER
This patient contacted the office for the following prescriptions to be refilled:    Medication requested :   Requested Prescriptions     Pending Prescriptions Disp Refills    ACETAMINOPHEN EXTRA STRENGTH 500 MG tablet [Pharmacy Med Name: ACETAMINOPHEN 500 MG TABLET] 90 tablet      Sig: take 2 tablets by mouth every 6 hours for 10 days      PCP: MD DORIS Dunn DMA: 4/10/2023  FUTURE APPT:   Future Appointments   Date Time Provider Francesca Son   4/10/2023  9:00 AM DMA, LAB DMA [facility]   4/17/2023  9:40 AM Doug España MD [facility]   6/27/2023  9:40 AM Wolf Montgomery MD [facility]   9/1/2023  9:30 AM [address] RADHA STEREO BX RM 1 [facility] [address] [facility]         Thank you.

## 2023-03-29 ENCOUNTER — TELEPHONE (OUTPATIENT)
Facility: CLINIC | Age: 58
End: 2023-03-29

## 2023-03-30 DIAGNOSIS — E11.29 TYPE 2 DIABETES MELLITUS WITH OTHER DIABETIC KIDNEY COMPLICATION (HCC): Primary | ICD-10-CM

## 2023-03-30 RX ORDER — INSULIN GLARGINE 100 [IU]/ML
60 INJECTION, SOLUTION SUBCUTANEOUS NIGHTLY
Qty: 18 ADJUSTABLE DOSE PRE-FILLED PEN SYRINGE | Refills: 1 | Status: SHIPPED | OUTPATIENT
Start: 2023-03-30

## 2023-04-17 ENCOUNTER — OFFICE VISIT (OUTPATIENT)
Facility: CLINIC | Age: 58
End: 2023-04-17
Payer: MEDICAID

## 2023-04-17 VITALS
BODY MASS INDEX: 30.42 KG/M2 | DIASTOLIC BLOOD PRESSURE: 67 MMHG | SYSTOLIC BLOOD PRESSURE: 102 MMHG | TEMPERATURE: 98.4 F | RESPIRATION RATE: 16 BRPM | HEIGHT: 64 IN | HEART RATE: 91 BPM | WEIGHT: 178.2 LBS | OXYGEN SATURATION: 97 %

## 2023-04-17 DIAGNOSIS — E11.29 TYPE 2 DIABETES MELLITUS WITH OTHER DIABETIC KIDNEY COMPLICATION (HCC): ICD-10-CM

## 2023-04-17 DIAGNOSIS — Z11.59 NEED FOR HEPATITIS C SCREENING TEST: ICD-10-CM

## 2023-04-17 DIAGNOSIS — Z11.4 SCREENING FOR HIV WITHOUT PRESENCE OF RISK FACTORS: Primary | ICD-10-CM

## 2023-04-17 PROCEDURE — 99214 OFFICE O/P EST MOD 30 MIN: CPT | Performed by: STUDENT IN AN ORGANIZED HEALTH CARE EDUCATION/TRAINING PROGRAM

## 2023-04-17 PROCEDURE — 3051F HG A1C>EQUAL 7.0%<8.0%: CPT | Performed by: STUDENT IN AN ORGANIZED HEALTH CARE EDUCATION/TRAINING PROGRAM

## 2023-04-17 SDOH — ECONOMIC STABILITY: FOOD INSECURITY: WITHIN THE PAST 12 MONTHS, THE FOOD YOU BOUGHT JUST DIDN'T LAST AND YOU DIDN'T HAVE MONEY TO GET MORE.: PATIENT DECLINED

## 2023-04-17 SDOH — ECONOMIC STABILITY: HOUSING INSECURITY
IN THE LAST 12 MONTHS, WAS THERE A TIME WHEN YOU DID NOT HAVE A STEADY PLACE TO SLEEP OR SLEPT IN A SHELTER (INCLUDING NOW)?: PATIENT REFUSED

## 2023-04-17 SDOH — ECONOMIC STABILITY: INCOME INSECURITY: HOW HARD IS IT FOR YOU TO PAY FOR THE VERY BASICS LIKE FOOD, HOUSING, MEDICAL CARE, AND HEATING?: PATIENT DECLINED

## 2023-04-17 SDOH — ECONOMIC STABILITY: FOOD INSECURITY: WITHIN THE PAST 12 MONTHS, YOU WORRIED THAT YOUR FOOD WOULD RUN OUT BEFORE YOU GOT MONEY TO BUY MORE.: PATIENT DECLINED

## 2023-04-17 ASSESSMENT — ENCOUNTER SYMPTOMS
FACIAL SWELLING: 0
VOMITING: 0
COLOR CHANGE: 0
CONSTIPATION: 0
SHORTNESS OF BREATH: 0
EYE PAIN: 0
EYE ITCHING: 0
BACK PAIN: 0
DIARRHEA: 0
EYE REDNESS: 0
ABDOMINAL PAIN: 0
EYE DISCHARGE: 0

## 2023-04-17 ASSESSMENT — PATIENT HEALTH QUESTIONNAIRE - PHQ9
3. TROUBLE FALLING OR STAYING ASLEEP: 0
7. TROUBLE CONCENTRATING ON THINGS, SUCH AS READING THE NEWSPAPER OR WATCHING TELEVISION: 0
2. FEELING DOWN, DEPRESSED OR HOPELESS: 0
SUM OF ALL RESPONSES TO PHQ QUESTIONS 1-9: 0
SUM OF ALL RESPONSES TO PHQ QUESTIONS 1-9: 0
9. THOUGHTS THAT YOU WOULD BE BETTER OFF DEAD, OR OF HURTING YOURSELF: 0
SUM OF ALL RESPONSES TO PHQ QUESTIONS 1-9: 0
8. MOVING OR SPEAKING SO SLOWLY THAT OTHER PEOPLE COULD HAVE NOTICED. OR THE OPPOSITE, BEING SO FIGETY OR RESTLESS THAT YOU HAVE BEEN MOVING AROUND A LOT MORE THAN USUAL: 0
SUM OF ALL RESPONSES TO PHQ QUESTIONS 1-9: 0
6. FEELING BAD ABOUT YOURSELF - OR THAT YOU ARE A FAILURE OR HAVE LET YOURSELF OR YOUR FAMILY DOWN: 0
10. IF YOU CHECKED OFF ANY PROBLEMS, HOW DIFFICULT HAVE THESE PROBLEMS MADE IT FOR YOU TO DO YOUR WORK, TAKE CARE OF THINGS AT HOME, OR GET ALONG WITH OTHER PEOPLE: 0
4. FEELING TIRED OR HAVING LITTLE ENERGY: 0
5. POOR APPETITE OR OVEREATING: 0
SUM OF ALL RESPONSES TO PHQ9 QUESTIONS 1 & 2: 0
1. LITTLE INTEREST OR PLEASURE IN DOING THINGS: 0

## 2023-04-17 NOTE — PROGRESS NOTES
Sebastian Davis is a 59 y.o. presents today for   Chief Complaint   Patient presents with    Diabetes     Blood sugar 60 # 5 times past 2 weeks      Is someone accompanying this pt? Yes, grandson      Is the patient using any DME equipment during OV? No     Health Maintenance Due   Topic Date Due    HIV screen  Never done    Hepatitis C screen  Never done    Low dose CT lung screening  Never done    Diabetic foot exam  01/31/2021    Diabetic retinal exam  07/27/2022    COVID-19 Vaccine (5 - Booster for Pfizer series) 12/15/2022         Coordination of Care:   1. \"Have you been to the ER, urgent care clinic since your last visit? Hospitalized since your last visit? \" No    2. \"Have you seen or consulted any other health care providers outside of the [address] [place] [address] since your last visit? \" No     3. For patients aged 39-70: Has the patient had a colonoscopy / FIT/ Cologuard? Yes - no Care Gap present      If the patient is female:    4. For patients aged 41-77: Has the patient had a mammogram within the past 2 years? Yes - no Care Gap present      5. For patients aged 21-65: Has the patient had a pap smear?  Yes - no Care Gap present    Jose RUSTaw
pain, constipation, diarrhea and vomiting. Genitourinary:  Negative for difficulty urinating, frequency, hematuria and urgency. Musculoskeletal:  Negative for arthralgias, back pain, myalgias and neck pain. Skin:  Negative for color change. Neurological:  Negative for dizziness, seizures, numbness and headaches. Psychiatric/Behavioral:  Negative for agitation, behavioral problems, decreased concentration, sleep disturbance and suicidal ideas. Objective:  Vitals:    04/17/23 0941   BP: 102/67   Site: Left Upper Arm   Position: Sitting   Cuff Size: Medium Adult   Pulse: 91   Resp: 16   Temp: 98.4 °F (36.9 °C)   TempSrc: Temporal   SpO2: 97%   Weight: 178 lb 3.2 oz (80.8 kg)   Height: 5' 4\" (1.626 m)         Physical Exam  Vitals reviewed. Constitutional:       Appearance: She is obese. HENT:      Head: Normocephalic. Nose: No congestion or rhinorrhea. Eyes:      General: No scleral icterus. Right eye: No discharge. Left eye: No discharge. Cardiovascular:      Rate and Rhythm: Normal rate and regular rhythm. Pulmonary:      Effort: Pulmonary effort is normal.      Breath sounds: Normal breath sounds. Abdominal:      General: Abdomen is flat. Palpations: Abdomen is soft. Musculoskeletal:         General: Normal range of motion. Cervical back: Normal range of motion and neck supple. No rigidity. Skin:     General: Skin is warm and dry. Neurological:      Mental Status: She is alert and oriented to person, place, and time. Gait: Gait normal.   Psychiatric:         Mood and Affect: Mood normal.         Behavior: Behavior normal.         Thought Content: Thought content normal.         Judgment: Judgment normal.         LABS     TESTS      Assessment/Plan:    1. Screening for HIV without presence of risk factors  - HIV 1/2 Ag/Ab, 4TH Generation,W Rflx Confirm; Future    2. Need for hepatitis C screening test  - Hepatitis C Antibody; Future    3.  Type

## 2023-06-07 RX ORDER — CYCLOBENZAPRINE HCL 10 MG
10 TABLET ORAL 3 TIMES DAILY PRN
COMMUNITY
Start: 2023-06-04 | End: 2023-06-07 | Stop reason: SDUPTHER

## 2023-06-07 RX ORDER — CYCLOBENZAPRINE HCL 10 MG
10 TABLET ORAL 3 TIMES DAILY PRN
Qty: 90 TABLET | Refills: 2 | Status: SHIPPED | OUTPATIENT
Start: 2023-06-07

## 2023-06-08 ENCOUNTER — TELEPHONE (OUTPATIENT)
Facility: CLINIC | Age: 58
End: 2023-06-08

## 2023-06-08 DIAGNOSIS — M54.10 RADICULOPATHY, UNSPECIFIED SPINAL REGION: ICD-10-CM

## 2023-06-08 DIAGNOSIS — M51.36 OTHER INTERVERTEBRAL DISC DEGENERATION, LUMBAR REGION: Primary | ICD-10-CM

## 2023-06-08 RX ORDER — LIDOCAINE 50 MG/G
1 PATCH TOPICAL DAILY
Qty: 30 PATCH | Refills: 1 | Status: SHIPPED | OUTPATIENT
Start: 2023-06-08

## 2023-06-08 NOTE — TELEPHONE ENCOUNTER
----- Message from Gianna Grant sent at 6/7/2023  2:32 PM EDT -----  Subject: Message to Provider    QUESTIONS  Information for Provider? patient is looking to r/s her lab appointment   due to changing her drs appointment  ---------------------------------------------------------------------------  --------------  [email or web address]  [phone]; OK to leave message on voicemail  ---------------------------------------------------------------------------  --------------  SCRIPT ANSWERS  Relationship to Patient?  Self

## 2023-06-08 NOTE — TELEPHONE ENCOUNTER
----- Message from Onelia Ambriz sent at 6/7/2023  2:59 PM EDT -----  Subject: Message to Provider    QUESTIONS  Information for Provider? Pt called in and stated is in a lot of pain in   pt back and want to see if pcp can do a prescription for lidocaine patches   for pt back . Pharmacy is [facility]  ---------------------------------------------------------------------------  --------------  [id number]  [phone]; OK to leave message on voicemail  ---------------------------------------------------------------------------  --------------  SCRIPT ANSWERS  Relationship to Patient?  Self

## 2023-07-12 RX ORDER — OMEGA-3-ACID ETHYL ESTERS 1 G/1
CAPSULE, LIQUID FILLED ORAL
Qty: 360 CAPSULE | Refills: 0 | Status: SHIPPED | OUTPATIENT
Start: 2023-07-12

## 2023-07-12 NOTE — TELEPHONE ENCOUNTER
PLEASE FORWARD TO PCP WITH MEDICATIONS ATTACHED TO MESSAGE. This patient contacted the office for the following prescriptions to be refilled:    Medication requested :     DrugName: Omega-3   Dosage- 1g        Pharm[facility]: [facility]    PCP: Miranda Grove MD  LOV: 04/14/2023   NOV DMA: 7/13/2023  FUTURE APPT:   Future Appointments   Date Time Provider [address]   7/13/2023  8:50 AM [facility] BS AMB   8/28/2023 10:00 AM Stefani Wei MD DMA BS AMB   9/1/2023  9:30  [address] RADHA STEREO BX RM 1 [address] [facility]   10/2/2023  1:40 PM Shala Jones MD [facility] BS AMB         Thank you.

## 2023-07-16 DIAGNOSIS — E11.29 TYPE 2 DIABETES MELLITUS WITH OTHER DIABETIC KIDNEY COMPLICATION (HCC): ICD-10-CM

## 2023-07-17 RX ORDER — INSULIN GLARGINE 100 [IU]/ML
INJECTION, SOLUTION SUBCUTANEOUS
Qty: 18 ML | Refills: 1 | Status: SHIPPED | OUTPATIENT
Start: 2023-07-17

## 2023-07-17 NOTE — TELEPHONE ENCOUNTER
This patient contacted the office for the following prescriptions to be refilled:    Medication requested :   Requested Prescriptions     Pending Prescriptions Disp Refills    LANTUS SOLOSTAR 100 UNIT/ML injection pen [Pharmacy Med Name: Lina Ache 100 UNIT/ML] 18 mL      Sig: inject 60 units subcutaneously nightly      PCP: Kali Heaton MD    NOV DMA: 8/28/2023  FUTURE APPT:   Future Appointments   Date Time Provider [address] [place] [address]   8/28/2023 10:00 AM Stefani Mota MD [facility] BS AMB   9/1/2023  9:30  [address] RADHA STEREO BX RM 1 [address] [facility]   10/2/2023  1:40 PM Ganesh Restrepo MD [facility] AMB         Thank you.

## 2023-08-18 ENCOUNTER — HOSPITAL ENCOUNTER (OUTPATIENT)
Facility: HOSPITAL | Age: 58
Setting detail: SPECIMEN
End: 2023-08-18
Payer: MEDICAID

## 2023-08-18 DIAGNOSIS — Z11.4 SCREENING FOR HIV WITHOUT PRESENCE OF RISK FACTORS: ICD-10-CM

## 2023-08-18 DIAGNOSIS — Z11.59 NEED FOR HEPATITIS C SCREENING TEST: ICD-10-CM

## 2023-08-18 DIAGNOSIS — E11.29 TYPE 2 DIABETES MELLITUS WITH OTHER DIABETIC KIDNEY COMPLICATION (HCC): ICD-10-CM

## 2023-08-18 LAB
HBA1C MFR BLD: 7.8 % (ref 4.2–5.6)
TSH SERPL DL<=0.05 MIU/L-ACNC: 2.53 UIU/ML (ref 0.36–3.74)

## 2023-08-18 PROCEDURE — 86803 HEPATITIS C AB TEST: CPT

## 2023-08-18 PROCEDURE — 87389 HIV-1 AG W/HIV-1&-2 AB AG IA: CPT

## 2023-08-18 PROCEDURE — 83036 HEMOGLOBIN GLYCOSYLATED A1C: CPT

## 2023-08-18 PROCEDURE — 36415 COLL VENOUS BLD VENIPUNCTURE: CPT

## 2023-08-18 PROCEDURE — 84443 ASSAY THYROID STIM HORMONE: CPT

## 2023-08-22 LAB
HCV AB SER IA-ACNC: 0.07 INDEX
HCV AB SERPL QL IA: NONREACTIVE
HIV 1+2 AB+HIV1 P24 AG SERPL QL IA: NONREACTIVE
HIV 1/2 RESULT COMMENT: NORMAL

## 2023-08-23 ENCOUNTER — OFFICE VISIT (OUTPATIENT)
Facility: CLINIC | Age: 58
End: 2023-08-23
Payer: MEDICAID

## 2023-08-23 VITALS
SYSTOLIC BLOOD PRESSURE: 99 MMHG | WEIGHT: 184.4 LBS | OXYGEN SATURATION: 97 % | TEMPERATURE: 98.2 F | BODY MASS INDEX: 31.48 KG/M2 | RESPIRATION RATE: 16 BRPM | HEIGHT: 64 IN | DIASTOLIC BLOOD PRESSURE: 66 MMHG | HEART RATE: 92 BPM

## 2023-08-23 DIAGNOSIS — G89.29 CHRONIC BILATERAL LOW BACK PAIN, UNSPECIFIED WHETHER SCIATICA PRESENT: ICD-10-CM

## 2023-08-23 DIAGNOSIS — E11.29 TYPE 2 DIABETES MELLITUS WITH OTHER DIABETIC KIDNEY COMPLICATION (HCC): ICD-10-CM

## 2023-08-23 DIAGNOSIS — M54.50 CHRONIC BILATERAL LOW BACK PAIN, UNSPECIFIED WHETHER SCIATICA PRESENT: ICD-10-CM

## 2023-08-23 DIAGNOSIS — E78.00 PURE HYPERCHOLESTEROLEMIA, UNSPECIFIED: Primary | ICD-10-CM

## 2023-08-23 PROCEDURE — 3051F HG A1C>EQUAL 7.0%<8.0%: CPT | Performed by: STUDENT IN AN ORGANIZED HEALTH CARE EDUCATION/TRAINING PROGRAM

## 2023-08-23 PROCEDURE — 99214 OFFICE O/P EST MOD 30 MIN: CPT | Performed by: STUDENT IN AN ORGANIZED HEALTH CARE EDUCATION/TRAINING PROGRAM

## 2023-08-23 RX ORDER — GABAPENTIN 100 MG/1
CAPSULE ORAL
Qty: 90 CAPSULE | Refills: 4 | Status: SHIPPED | OUTPATIENT
Start: 2023-08-23 | End: 2023-12-23

## 2023-08-23 RX ORDER — SEMAGLUTIDE 0.68 MG/ML
INJECTION, SOLUTION SUBCUTANEOUS
Qty: 9 ML | Refills: 1 | Status: SHIPPED | OUTPATIENT
Start: 2023-08-23

## 2023-08-23 RX ORDER — INSULIN GLARGINE 100 [IU]/ML
INJECTION, SOLUTION SUBCUTANEOUS
Qty: 18 ML | Refills: 1 | Status: SHIPPED | OUTPATIENT
Start: 2023-08-23

## 2023-08-23 RX ORDER — OMEGA-3-ACID ETHYL ESTERS 1 G/1
CAPSULE, LIQUID FILLED ORAL
Qty: 360 CAPSULE | Refills: 0 | Status: SHIPPED | OUTPATIENT
Start: 2023-08-23

## 2023-08-23 ASSESSMENT — PATIENT HEALTH QUESTIONNAIRE - PHQ9
10. IF YOU CHECKED OFF ANY PROBLEMS, HOW DIFFICULT HAVE THESE PROBLEMS MADE IT FOR YOU TO DO YOUR WORK, TAKE CARE OF THINGS AT HOME, OR GET ALONG WITH OTHER PEOPLE: 0
6. FEELING BAD ABOUT YOURSELF - OR THAT YOU ARE A FAILURE OR HAVE LET YOURSELF OR YOUR FAMILY DOWN: 0
4. FEELING TIRED OR HAVING LITTLE ENERGY: 0
2. FEELING DOWN, DEPRESSED OR HOPELESS: 0
SUM OF ALL RESPONSES TO PHQ9 QUESTIONS 1 & 2: 0
SUM OF ALL RESPONSES TO PHQ QUESTIONS 1-9: 0
SUM OF ALL RESPONSES TO PHQ QUESTIONS 1-9: 0
3. TROUBLE FALLING OR STAYING ASLEEP: 0
5. POOR APPETITE OR OVEREATING: 0
1. LITTLE INTEREST OR PLEASURE IN DOING THINGS: 0
9. THOUGHTS THAT YOU WOULD BE BETTER OFF DEAD, OR OF HURTING YOURSELF: 0
7. TROUBLE CONCENTRATING ON THINGS, SUCH AS READING THE NEWSPAPER OR WATCHING TELEVISION: 0
SUM OF ALL RESPONSES TO PHQ QUESTIONS 1-9: 0
SUM OF ALL RESPONSES TO PHQ QUESTIONS 1-9: 0
8. MOVING OR SPEAKING SO SLOWLY THAT OTHER PEOPLE COULD HAVE NOTICED. OR THE OPPOSITE, BEING SO FIGETY OR RESTLESS THAT YOU HAVE BEEN MOVING AROUND A LOT MORE THAN USUAL: 0

## 2023-08-23 NOTE — PROGRESS NOTES
Roger Dave is a 59 y.o. presents today for   Chief Complaint   Patient presents with    Diabetes     Is someone accompanying this pt? No     Is the patient using any DME equipment during OV? No     Health Maintenance Due   Topic Date Due    Low dose CT lung screening &/or counseling  Never done    Diabetic foot exam  01/31/2021    Diabetic retinal exam  07/27/2022    COVID-19 Vaccine (5 - Booster for Pfizer series) 12/15/2022    Flu vaccine (1) 08/01/2023    Annual Wellness Visit (AWV)  08/06/2023    Breast cancer screen  08/26/2023    Lipids  09/12/2023         Coordination of Care:   1. \"Have you been to the ER, urgent care clinic since your last visit? Hospitalized since your last visit? \" No    2. \"Have you seen or consulted any other health care providers outside of the [address] [place] [address] since your last visit? \" No     3. For patients aged 43-73: Has the patient had a colonoscopy / FIT/ Cologuard? Yes - no Care Gap present      If the patient is female:    4. For patients aged 43-66: Has the patient had a mammogram within the past 2 years? No      5. For patients aged 21-65: Has the patient had a pap smear?  Yes - no Care Gap present    Linda Kelly

## 2023-08-23 NOTE — PROGRESS NOTES
Kali Appiah is a 59 y.o.  female and presents with    Chief Complaint   Patient presents with    Diabetes           Subjective:    Diabetes  Taking medications as prescribed: YES  Currently on: Lantus 60 units, janumet  Checking blood sugars at home at home: YES TIT last 30 days 2%, 0% below. 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 Has been feeling stressed. She just had the Sertraline from 50mg to 100mg about 9 weeks. She feels the change has helped.      Patient Active Problem List   Diagnosis    History of alcohol abuse    Hypercholesteremia    Vitamin B12 deficiency    Type 2 diabetes mellitus with diabetic neuropathy (HCC)    Lumbar degenerative disc disease    Type 2 diabetes mellitus with microalbuminuria, with long-term current use of insulin ([facility])    PTSD (post-traumatic stress disorder)    Left low back pain    Recurrent depression ([address])    Tear of meniscus of left knee    Hypothyroidism due to acquired atrophy of thyroid    Bipolar 1 disorder, manic, full remission ([address])      Past Medical History:   Diagnosis Date    Arthritis     Chronic pain     Depression     Diabetes ([address])     Hyperlipidemia     Hypertension     Hypothyroid     Medial meniscus tear     Left knee    Menopause     Psychiatric disorder     Tobacco abuse, in remission       Past Surgical History:   Procedure Laterality Date    HAND/FINGER SURGERY UNLISTED Left 1989    reconstructive surgery    ORTHOPEDIC SURGERY      Hand reconstructive surgery    TONSILLECTOMY AND ADENOIDECTOMY      TUBAL LIGATION  2004    WISDOM TOOTH EXTRACTION        Family History   Problem Relation Age of Onset    Psychiatric Disorder Other         nephew    Alcohol Abuse Mother     Osteoarthritis Mother     Cancer Mother 61        breast cancer    Diabetes Mother     Hypertension Mother     Psychiatric Disorder Mother

## 2023-08-26 ASSESSMENT — ENCOUNTER SYMPTOMS
ABDOMINAL PAIN: 0
EYE REDNESS: 0
EYE DISCHARGE: 0
DIARRHEA: 0
EYE ITCHING: 0
EYE PAIN: 0
BACK PAIN: 0
COLOR CHANGE: 0
CONSTIPATION: 0
VOMITING: 0
FACIAL SWELLING: 0
SHORTNESS OF BREATH: 0

## 2023-09-18 DIAGNOSIS — E11.29 TYPE 2 DIABETES MELLITUS WITH OTHER DIABETIC KIDNEY COMPLICATION (HCC): ICD-10-CM

## 2023-09-18 RX ORDER — INSULIN GLARGINE 100 [IU]/ML
INJECTION, SOLUTION SUBCUTANEOUS
Qty: 18 ML | Refills: 2 | Status: SHIPPED | OUTPATIENT
Start: 2023-09-18

## 2023-09-18 RX ORDER — ROSUVASTATIN CALCIUM 20 MG/1
20 TABLET, COATED ORAL NIGHTLY
Qty: 90 TABLET | Refills: 1 | Status: SHIPPED | OUTPATIENT
Start: 2023-09-18

## 2023-09-18 NOTE — TELEPHONE ENCOUNTER
PLEASE FORWARD TO PCP WITH MEDICATIONS ATTACHED TO MESSAGE. This patient contacted the office for the following prescriptions to be refilled:    Medication requested :     DrugName: LANTUS  Dosage- 100 UNIT(Pt is requesting a 3-mos supply be sent. 2.  DrugName: Rosuvastatin  Dosage- 20 mg    Pharmacy:[facility]/[address] [facility] [place] [facility] [place]    PCP: Alyssa Pepe MD  LOV:  (look in previous encounters if not listed)  NOV DMA: 11/15/2023  FUTURE APPT:   Future Appointments   Date Time Provider [address] [place] [address]   10/2/2023  1:40 PM Miranda Bourgeois MD [facility] BS AMB   11/15/2023  8:30 AM [facility] AMB   11/22/2023  9:40 AM Alyssa Pepe MD [facility] BS AMB         Thank you.

## 2023-10-02 ENCOUNTER — OFFICE VISIT (OUTPATIENT)
Age: 58
End: 2023-10-02
Payer: MEDICARE

## 2023-10-02 VITALS
RESPIRATION RATE: 20 BRPM | DIASTOLIC BLOOD PRESSURE: 88 MMHG | WEIGHT: 185 LBS | TEMPERATURE: 98.5 F | HEART RATE: 84 BPM | OXYGEN SATURATION: 99 % | HEIGHT: 64 IN | BODY MASS INDEX: 31.58 KG/M2 | SYSTOLIC BLOOD PRESSURE: 132 MMHG

## 2023-10-02 DIAGNOSIS — G89.29 CHRONIC BILATERAL LOW BACK PAIN, UNSPECIFIED WHETHER SCIATICA PRESENT: Primary | ICD-10-CM

## 2023-10-02 DIAGNOSIS — M54.50 CHRONIC BILATERAL LOW BACK PAIN, UNSPECIFIED WHETHER SCIATICA PRESENT: Primary | ICD-10-CM

## 2023-10-02 PROCEDURE — 99213 OFFICE O/P EST LOW 20 MIN: CPT | Performed by: STUDENT IN AN ORGANIZED HEALTH CARE EDUCATION/TRAINING PROGRAM

## 2023-10-02 NOTE — PROGRESS NOTES
itching. Neurological: Negative for dizziness, tingling, sensory change, speech change, focal weakness, seizures and headaches.        Past Medical History:   Diagnosis Date    Arthritis     Chronic pain     Depression     Diabetes ([address])     Hyperlipidemia     Hypertension     Hypothyroid     Medial meniscus tear     Left knee    Menopause     Psychiatric disorder     Tobacco abuse, in remission        Past Surgical History:   Procedure Laterality Date    HAND/FINGER SURGERY UNLISTED Left     reconstructive surgery    HX ORTHOPAEDIC      Hand reconstructive surgery    HX TONSIL AND ADENOIDECTOMY      HX TUBAL LIGATION      HX WISDOM TEETH EXTRACTION          Family History   Problem Relation Age of Onset    Alcohol abuse Mother     OSTEOARTHRITIS Mother     Cancer Mother 61        breast cancer    Diabetes Mother     Hypertension Mother     Psychiatric Disorder Mother     Stroke Mother     Breast Cancer Mother 61      Alcohol abuse Father     Heart Disease Father     Diabetes Father     Hypertension Father     Lung Disease Father     Colon Cancer Father 72    Alcohol abuse Paternal Grandmother     Ovarian Cancer Sister 32    Alcohol abuse Sister     Diabetes Sister     Psychiatric Disorder Sister     Alcohol abuse Brother     Psychiatric Disorder Son     Psychiatric Disorder Other         nephew        Social History     Socioeconomic History    Marital status:      Spouse name: Not on file    Number of children: Not on file    Years of education: Not on file    Highest education level: Not on file   Occupational History    Not on file   Tobacco Use    Smoking status: Former     Packs/day: 1.00     Years: 35.00     Pack years: 35.00     Types: Cigarettes     Quit date: 2011     Years since quittin.0    Smokeless tobacco: Never    Tobacco comments:     quit    Vaping Use    Vaping Use: Never used   Substance and Sexual Activity    Alcohol use: No     Alcohol/week: 0.0 standard drinks

## 2023-10-25 ENCOUNTER — HOSPITAL ENCOUNTER (OUTPATIENT)
Dept: WOMENS IMAGING | Facility: HOSPITAL | Age: 58
Discharge: HOME | End: 2023-10-28
Attending: STUDENT IN AN ORGANIZED HEALTH CARE EDUCATION/TRAINING PROGRAM
Payer: MEDICARE

## 2023-10-25 DIAGNOSIS — Z12.31 SCREENING MAMMOGRAM FOR HIGH-RISK PATIENT: ICD-10-CM

## 2023-10-25 PROCEDURE — 77063 BREAST TOMOSYNTHESIS BI: CPT

## 2023-10-26 DIAGNOSIS — E78.1 HYPERTRIGLYCERIDEMIA: Primary | ICD-10-CM

## 2023-10-26 DIAGNOSIS — E11.29 TYPE 2 DIABETES MELLITUS WITH OTHER DIABETIC KIDNEY COMPLICATION (HCC): ICD-10-CM

## 2023-10-26 RX ORDER — ICOSAPENT ETHYL 1000 MG/1
2 CAPSULE ORAL 2 TIMES DAILY
Qty: 60 CAPSULE | Refills: 3 | Status: SHIPPED | OUTPATIENT
Start: 2023-10-26

## 2023-11-15 ENCOUNTER — HOSPITAL ENCOUNTER (OUTPATIENT)
Facility: HOSPITAL | Age: 58
Setting detail: SPECIMEN
Discharge: HOME | End: 2023-11-18
Payer: MEDICARE

## 2023-11-15 DIAGNOSIS — E11.29 TYPE 2 DIABETES MELLITUS WITH OTHER DIABETIC KIDNEY COMPLICATION (HCC): ICD-10-CM

## 2023-11-15 LAB
ANION GAP SERPL CALC-SCNC: 7 MMOL/L (ref 3–18)
BUN SERPL-MCNC: 8 MG/DL (ref 7–18)
BUN/CREAT SERPL: 11 (ref 12–20)
CALCIUM SERPL-MCNC: 9.8 MG/DL (ref 8.5–10.1)
CHLORIDE SERPL-SCNC: 103 MMOL/L (ref 100–111)
CO2 SERPL-SCNC: 27 MMOL/L (ref 21–32)
CREAT SERPL-MCNC: 0.73 MG/DL (ref 0.6–1.3)
CREAT UR-MCNC: 53 MG/DL (ref 30–125)
GLUCOSE SERPL-MCNC: 146 MG/DL (ref 74–99)
HBA1C MFR BLD: 7.4 % (ref 4.2–5.6)
MICROALBUMIN UR-MCNC: 1.79 MG/DL (ref 0–3)
MICROALBUMIN/CREAT UR-RTO: 34 MG/G (ref 0–30)
POTASSIUM SERPL-SCNC: 4.6 MMOL/L (ref 3.5–5.5)
SODIUM SERPL-SCNC: 137 MMOL/L (ref 136–145)

## 2023-11-15 PROCEDURE — 36415 COLL VENOUS BLD VENIPUNCTURE: CPT

## 2023-11-15 PROCEDURE — 80048 BASIC METABOLIC PNL TOTAL CA: CPT

## 2023-11-15 PROCEDURE — 83036 HEMOGLOBIN GLYCOSYLATED A1C: CPT

## 2023-11-15 PROCEDURE — 82570 ASSAY OF URINE CREATININE: CPT

## 2023-11-15 PROCEDURE — 82043 UR ALBUMIN QUANTITATIVE: CPT

## 2023-12-01 RX ORDER — SITAGLIPTIN AND METFORMIN HYDROCHLORIDE 1000; 50 MG/1; MG/1
TABLET, FILM COATED ORAL
Qty: 180 TABLET | Refills: 1 | Status: SHIPPED | OUTPATIENT
Start: 2023-12-01

## 2023-12-11 RX ORDER — PEN NEEDLE, DIABETIC 31 GX3/16"
NEEDLE, DISPOSABLE MISCELLANEOUS
Qty: 200 EACH | Refills: 1 | Status: SHIPPED | OUTPATIENT
Start: 2023-12-11

## 2023-12-26 ENCOUNTER — OFFICE VISIT (OUTPATIENT)
Facility: CLINIC | Age: 58
End: 2023-12-26
Payer: MEDICARE

## 2023-12-26 VITALS
OXYGEN SATURATION: 94 % | RESPIRATION RATE: 17 BRPM | BODY MASS INDEX: 30.56 KG/M2 | DIASTOLIC BLOOD PRESSURE: 68 MMHG | SYSTOLIC BLOOD PRESSURE: 96 MMHG | TEMPERATURE: 96 F | HEIGHT: 64 IN | WEIGHT: 179 LBS | HEART RATE: 91 BPM

## 2023-12-26 DIAGNOSIS — M94.0 COSTOCHONDRITIS: ICD-10-CM

## 2023-12-26 DIAGNOSIS — E11.29 TYPE 2 DIABETES MELLITUS WITH OTHER DIABETIC KIDNEY COMPLICATION (HCC): Primary | ICD-10-CM

## 2023-12-26 PROCEDURE — 99214 OFFICE O/P EST MOD 30 MIN: CPT | Performed by: STUDENT IN AN ORGANIZED HEALTH CARE EDUCATION/TRAINING PROGRAM

## 2023-12-26 PROCEDURE — 3051F HG A1C>EQUAL 7.0%<8.0%: CPT | Performed by: STUDENT IN AN ORGANIZED HEALTH CARE EDUCATION/TRAINING PROGRAM

## 2023-12-26 RX ORDER — MELOXICAM 7.5 MG/1
7.5 TABLET ORAL DAILY
Qty: 30 TABLET | Refills: 0 | Status: SHIPPED | OUTPATIENT
Start: 2023-12-26

## 2023-12-26 NOTE — PROGRESS NOTES
Ivan Grant is a 59y.o. year old female who presents today for   Chief Complaint   Patient presents with    Follow-up       Is someone accompanying this pt? no    Is the patient using any DME equipment during OV? no    Depression Screenin/23/2023     3:28 PM 2023     9:41 AM 2023    10:08 AM 2022    10:16 AM 2022     2:10 PM 2022     8:47 AM 2022     5:02 PM   PHQ-9 Questionaire   Little interest or pleasure in doing things 0 0 0 0 0 0 0   Feeling down, depressed, or hopeless 0 0 0 1 1 1 0   Trouble falling or staying asleep, or sleeping too much 0 0        Feeling tired or having little energy 0 0        Poor appetite or overeating 0 0        Feeling bad about yourself - or that you are a failure or have let yourself or your family down 0 0        Trouble concentrating on things, such as reading the newspaper or watching television 0 0        Moving or speaking so slowly that other people could have noticed. Or the opposite - being so fidgety or restless that you have been moving around a lot more than usual 0 0        Thoughts that you would be better off dead, or of hurting yourself in some way 0 0        PHQ-9 Total Score 0 0 0 1 1 1 0   If you checked off any problems, how difficult have these problems made it for you to do your work, take care of things at home, or get along with other people? 0 0            Abuse Screening:       No data to display                Learning Assessment:  No question data found. Fall Risk:       No data to display                    Coordination of Care:   1. \"Have you been to the ER, urgent care clinic since your last visit? Hospitalized since your last visit? \" yes    2. \"Have you seen or consulted any other health care providers outside of the [address] [place] [address] since your last visit? \" no    3. For patients aged 43-73: Has the patient had a colonoscopy / FIT/ Cologuard? N/a    If the patient is female:    4.  For patients aged
refused        Current Outpatient Medications   Medication Sig Dispense Refill    Insulin Pen Needle (DROPLET PEN NEEDLES) 31G X 5 MM MISC use 1 PEN NEEDLE to inject MEDICATION subcutaneously four times a day 200 each 1    JANUMET  MG per tablet take 1 tablet by mouth twice a day 180 tablet 1    Icosapent Ethyl (VASCEPA) 1 g CAPS capsule Take 2 capsules by mouth 2 times daily 60 capsule 3    insulin glargine (LANTUS SOLOSTAR) 100 UNIT/ML injection pen inject 60 units subcutaneously nightly 18 mL 2    rosuvastatin (CRESTOR) 20 MG tablet Take 1 tablet by mouth nightly 90 tablet 1    gabapentin (NEURONTIN) 100 MG capsule Take 100 mg PO TID 90 capsule 4    omega-3 acid ethyl esters (LOVAZA) 1 g capsule take 2 capsules by mouth twice a day with meals 360 capsule 0    Semaglutide,0.25 or 0.5MG/DOS, (OZEMPIC, 0.25 OR 0.5 MG/DOSE,) 2 MG/3ML SOPN Inject 0.25mg SC once a week for 4 weeks. Then inject 0.5mg SC once a week after that. 9 mL 1    lidocaine (LIDODERM) 5 % Place 1 patch onto the skin daily 12 hours on, 12 hours off.  (Patient not taking: Reported on 10/2/2023) 30 patch 1    cyclobenzaprine (FLEXERIL) 10 MG tablet Take 1 tablet by mouth 3 times daily as needed for Muscle spasms (low back pain) 90 tablet 2    ACETAMINOPHEN EXTRA STRENGTH 500 MG tablet take 2 tablets by mouth every 6 hours for 10 days 90 tablet 2    oxybutynin (DITROPAN-XL) 10 MG extended release tablet take 1 tablet by mouth once daily 90 tablet 3    sertraline (ZOLOFT) 25 MG tablet take 1 tablet by mouth at bedtime      Multiple Vitamins-Minerals (CENTRUM SILVER 50+WOMEN PO) Take by mouth daily      benztropine (COGENTIN) 1 MG tablet ceived the following from [facility]: Outside name: benztropine (COGENTIN) 1 mg tablet      cyanocobalamin 500 MCG tablet Take 1 tablet by mouth daily      ferrous sulfate (IRON 325) 325 (65 Fe) MG tablet Take by mouth every morning (before breakfast)      levothyroxine (SYNTHROID) 137 MCG tablet

## 2024-01-31 ENCOUNTER — TELEPHONE (OUTPATIENT)
Facility: CLINIC | Age: 59
End: 2024-01-31

## 2024-01-31 NOTE — TELEPHONE ENCOUNTER
The patient is requesting a call to speak with the nurse when someone is available. Pt was requesting a medication and expressed that her insurance faxed over a form to approve a medication through a home delivery pharmacy.     I have expressed to the patient that any medication that has not been prescribed by Dr. Ko would not be approved without an appointment. The patient has been scheduled for 2/12 but is still requesting to speak with a nurse.    Thank you!

## 2024-02-08 DIAGNOSIS — E11.29 TYPE 2 DIABETES MELLITUS WITH OTHER DIABETIC KIDNEY COMPLICATION (HCC): ICD-10-CM

## 2024-02-08 DIAGNOSIS — E78.1 HYPERTRIGLYCERIDEMIA: ICD-10-CM

## 2024-02-08 RX ORDER — ICOSAPENT ETHYL 1000 MG/1
2 CAPSULE ORAL 2 TIMES DAILY
Qty: 120 CAPSULE | Refills: 3 | Status: SHIPPED | OUTPATIENT
Start: 2024-02-08

## 2024-02-08 NOTE — TELEPHONE ENCOUNTER
Medication(s) requesting:   Requested Prescriptions     Pending Prescriptions Disp Refills    VASCEPA 1 g CAPS capsule [Pharmacy Med Name: VASCEPA 1 GM CAPSULE] 60 capsule 3     Sig: take 2 capsules by mouth twice a day       Last office visit:  12/26/2023  Next office visit DMA: 2/12/2024

## 2024-02-12 ENCOUNTER — OFFICE VISIT (OUTPATIENT)
Facility: CLINIC | Age: 59
End: 2024-02-12
Payer: MEDICARE

## 2024-02-12 VITALS
SYSTOLIC BLOOD PRESSURE: 106 MMHG | BODY MASS INDEX: 30.9 KG/M2 | RESPIRATION RATE: 16 BRPM | HEIGHT: 64 IN | WEIGHT: 181 LBS | OXYGEN SATURATION: 98 % | HEART RATE: 86 BPM | TEMPERATURE: 97.9 F | DIASTOLIC BLOOD PRESSURE: 69 MMHG

## 2024-02-12 DIAGNOSIS — M51.36 LUMBAR DEGENERATIVE DISC DISEASE: ICD-10-CM

## 2024-02-12 DIAGNOSIS — K59.03 DRUG-INDUCED CONSTIPATION: ICD-10-CM

## 2024-02-12 DIAGNOSIS — E11.29 TYPE 2 DIABETES MELLITUS WITH OTHER DIABETIC KIDNEY COMPLICATION (HCC): Primary | ICD-10-CM

## 2024-02-12 PROCEDURE — 99214 OFFICE O/P EST MOD 30 MIN: CPT | Performed by: STUDENT IN AN ORGANIZED HEALTH CARE EDUCATION/TRAINING PROGRAM

## 2024-02-12 RX ORDER — CYCLOBENZAPRINE HCL 10 MG
10 TABLET ORAL 3 TIMES DAILY PRN
Qty: 90 TABLET | Refills: 2 | Status: SHIPPED | OUTPATIENT
Start: 2024-02-12

## 2024-02-12 RX ORDER — SITAGLIPTIN AND METFORMIN HYDROCHLORIDE 1000; 50 MG/1; MG/1
1 TABLET, FILM COATED ORAL 2 TIMES DAILY
Qty: 180 TABLET | Refills: 1 | Status: SHIPPED | OUTPATIENT
Start: 2024-02-12

## 2024-02-12 NOTE — PROGRESS NOTES
Trice Cristina is a 58 y.o. year old female who presents today for   Chief Complaint   Patient presents with    Diabetes       Is someone accompanying this pt? no    Is the patient using any DME equipment during OV?     Depression Screenin/23/2023     3:28 PM 2023     9:41 AM 2023    10:08 AM 2022    10:16 AM 2022     2:10 PM 2022     8:47 AM 2022     5:02 PM   PHQ-9 Questionaire   Little interest or pleasure in doing things 0 0 0 0 0 0 0   Feeling down, depressed, or hopeless 0 0 0 1 1 1 0   Trouble falling or staying asleep, or sleeping too much 0 0        Feeling tired or having little energy 0 0        Poor appetite or overeating 0 0        Feeling bad about yourself - or that you are a failure or have let yourself or your family down 0 0        Trouble concentrating on things, such as reading the newspaper or watching television 0 0        Moving or speaking so slowly that other people could have noticed. Or the opposite - being so fidgety or restless that you have been moving around a lot more than usual 0 0        Thoughts that you would be better off dead, or of hurting yourself in some way 0 0        PHQ-9 Total Score 0 0 0 1 1 1 0   If you checked off any problems, how difficult have these problems made it for you to do your work, take care of things at home, or get along with other people? 0 0            Abuse Screening:       No data to display                Learning Assessment:  No question data found.    Fall Risk:      2024    11:52 AM   Fall Risk   2 or more falls in past year? no   Fall with injury in past year? no           Coordination of Care:   1. \"Have you been to the ER, urgent care clinic since your last visit?  Hospitalized since your last visit?\" no    2. \"Have you seen or consulted any other health care providers outside of the [facility] since your last visit?\" no    3. For patients aged 45-75: Has the patient had a colonoscopy / 
glargine (LANTUS SOLOSTAR) 100 UNIT/ML injection pen inject 60 units subcutaneously nightly (Patient not taking: Reported on 2/12/2024) 18 mL 2    gabapentin (NEURONTIN) 100 MG capsule Take 100 mg PO TID 90 capsule 4    lidocaine (LIDODERM) 5 % Place 1 patch onto the skin daily 12 hours on, 12 hours off. (Patient not taking: Reported on 10/2/2023) 30 patch 1    Omega-3 Fatty Acids (FISH OIL) 1000 MG capsule TAKE 2 CAPSULES BY MOUTH  TWO TIMES DAILY WITH MEALS (Patient not taking: Reported on 10/2/2023)       No current facility-administered medications for this visit.        ROS   Review of Systems   Constitutional:  Negative for activity change and appetite change.   HENT:  Negative for congestion, drooling, ear discharge, ear pain and facial swelling.    Eyes:  Negative for pain, discharge, redness and itching.   Respiratory:  Negative for shortness of breath.    Cardiovascular:  Negative for leg swelling.   Gastrointestinal:  Positive for constipation. Negative for abdominal pain, diarrhea and vomiting.   Genitourinary:  Negative for difficulty urinating, frequency, hematuria and urgency.   Musculoskeletal:  Negative for arthralgias, back pain, myalgias and neck pain.   Skin:  Negative for color change.   Neurological:  Negative for dizziness, seizures, numbness and headaches.   Psychiatric/Behavioral:  Negative for agitation, behavioral problems, decreased concentration, sleep disturbance and suicidal ideas.              Objective:  Vitals:    02/12/24 1145   BP: 106/69   Pulse: 86   Resp: 16   Temp: 97.9 °F (36.6 °C)   TempSrc: Temporal   SpO2: 98%   Weight: 82.1 kg (181 lb)   Height: 1.626 m (5' 4\")         Physical Exam  Vitals reviewed.   Constitutional:       Appearance: She is obese.   HENT:      Head: Normocephalic.      Nose: No congestion or rhinorrhea.   Eyes:      General: No scleral icterus.        Right eye: No discharge.         Left eye: No discharge.   Cardiovascular:      Rate and Rhythm: Normal

## 2024-02-14 ENCOUNTER — TELEPHONE (OUTPATIENT)
Facility: CLINIC | Age: 59
End: 2024-02-14

## 2024-02-14 DIAGNOSIS — E11.29 TYPE 2 DIABETES MELLITUS WITH OTHER DIABETIC KIDNEY COMPLICATION (HCC): Primary | ICD-10-CM

## 2024-02-14 RX ORDER — FLASH GLUCOSE SENSOR
KIT MISCELLANEOUS
Qty: 6 EACH | Refills: 1 | Status: SHIPPED | OUTPATIENT
Start: 2024-02-14

## 2024-02-14 NOTE — TELEPHONE ENCOUNTER
Patient contacted the office to request a new prescription to be sent to the pharmacy because she requested the incorrect sensors, initally. She is requesting the freestyle cayden 14 day sensors to be sent to the pharmacy. Please advise, thank you.     Call back: [phone]

## 2024-03-04 RX ORDER — LEVOTHYROXINE SODIUM 137 UG/1
137 TABLET ORAL
Qty: 90 TABLET | Refills: 1 | Status: SHIPPED | OUTPATIENT
Start: 2024-03-04

## 2024-03-20 ENCOUNTER — HOSPITAL ENCOUNTER (OUTPATIENT)
Facility: HOSPITAL | Age: 59
Setting detail: SPECIMEN
Discharge: HOME | End: 2024-03-23
Payer: MEDICARE

## 2024-03-20 DIAGNOSIS — E11.29 TYPE 2 DIABETES MELLITUS WITH OTHER DIABETIC KIDNEY COMPLICATION (HCC): ICD-10-CM

## 2024-03-20 LAB
ALBUMIN SERPL-MCNC: 4 G/DL (ref 3.4–5)
ALBUMIN/GLOB SERPL: 1.1 (ref 0.8–1.7)
ALP SERPL-CCNC: 78 U/L (ref 45–117)
ALT SERPL-CCNC: 24 U/L (ref 13–56)
ANION GAP SERPL CALC-SCNC: 6 MMOL/L (ref 3–18)
APPEARANCE UR: ABNORMAL
AST SERPL-CCNC: 13 U/L (ref 10–38)
BACTERIA URNS QL MICRO: ABNORMAL /HPF
BILIRUB SERPL-MCNC: 0.4 MG/DL (ref 0.2–1)
BILIRUB UR QL: NEGATIVE
BUN SERPL-MCNC: 7 MG/DL (ref 7–18)
BUN/CREAT SERPL: 9 (ref 12–20)
CALCIUM SERPL-MCNC: 9.3 MG/DL (ref 8.5–10.1)
CHLORIDE SERPL-SCNC: 104 MMOL/L (ref 100–111)
CHOLEST SERPL-MCNC: 159 MG/DL
CO2 SERPL-SCNC: 27 MMOL/L (ref 21–32)
COLOR UR: YELLOW
CREAT SERPL-MCNC: 0.74 MG/DL (ref 0.6–1.3)
EPITH CASTS URNS QL MICRO: ABNORMAL /LPF (ref 0–5)
ERYTHROCYTE [DISTWIDTH] IN BLOOD BY AUTOMATED COUNT: 13.6 % (ref 11.6–14.5)
EST. AVERAGE GLUCOSE BLD GHB EST-MCNC: 183 MG/DL
GLOBULIN SER CALC-MCNC: 3.6 G/DL (ref 2–4)
GLUCOSE SERPL-MCNC: 182 MG/DL (ref 74–99)
GLUCOSE UR STRIP.AUTO-MCNC: NEGATIVE MG/DL
HBA1C MFR BLD: 8 % (ref 4.2–5.6)
HCT VFR BLD AUTO: 35.9 % (ref 35–45)
HDLC SERPL-MCNC: 40 MG/DL (ref 40–60)
HDLC SERPL: 4 (ref 0–5)
HGB BLD-MCNC: 11.4 G/DL (ref 12–16)
HGB UR QL STRIP: NEGATIVE
KETONES UR QL STRIP.AUTO: NEGATIVE MG/DL
LDLC SERPL CALC-MCNC: 68.2 MG/DL (ref 0–100)
LEUKOCYTE ESTERASE UR QL STRIP.AUTO: ABNORMAL
LIPID PANEL: ABNORMAL
MCH RBC QN AUTO: 27.4 PG (ref 24–34)
MCHC RBC AUTO-ENTMCNC: 31.8 G/DL (ref 31–37)
MCV RBC AUTO: 86.3 FL (ref 78–100)
NITRITE UR QL STRIP.AUTO: NEGATIVE
NRBC # BLD: 0 K/UL (ref 0–0.01)
NRBC BLD-RTO: 0 PER 100 WBC
PH UR STRIP: 6 (ref 5–8)
PLATELET # BLD AUTO: 278 K/UL (ref 135–420)
PMV BLD AUTO: 11.2 FL (ref 9.2–11.8)
POTASSIUM SERPL-SCNC: 4.3 MMOL/L (ref 3.5–5.5)
PROT SERPL-MCNC: 7.6 G/DL (ref 6.4–8.2)
PROT UR STRIP-MCNC: NEGATIVE MG/DL
RBC # BLD AUTO: 4.16 M/UL (ref 4.2–5.3)
RBC #/AREA URNS HPF: ABNORMAL /HPF (ref 0–5)
SODIUM SERPL-SCNC: 137 MMOL/L (ref 136–145)
SP GR UR REFRACTOMETRY: 1.01 (ref 1–1.03)
TRIGL SERPL-MCNC: 254 MG/DL
TSH SERPL DL<=0.05 MIU/L-ACNC: 3.1 UIU/ML (ref 0.36–3.74)
UROBILINOGEN UR QL STRIP.AUTO: 0.2 EU/DL (ref 0.2–1)
VLDLC SERPL CALC-MCNC: 50.8 MG/DL
WBC # BLD AUTO: 7.1 K/UL (ref 4.6–13.2)
WBC URNS QL MICRO: ABNORMAL /HPF (ref 0–4)

## 2024-03-20 PROCEDURE — 36415 COLL VENOUS BLD VENIPUNCTURE: CPT

## 2024-03-20 PROCEDURE — 80053 COMPREHEN METABOLIC PANEL: CPT

## 2024-03-20 PROCEDURE — 84443 ASSAY THYROID STIM HORMONE: CPT

## 2024-03-20 PROCEDURE — 85027 COMPLETE CBC AUTOMATED: CPT

## 2024-03-20 PROCEDURE — 81001 URINALYSIS AUTO W/SCOPE: CPT

## 2024-03-20 PROCEDURE — 83036 HEMOGLOBIN GLYCOSYLATED A1C: CPT

## 2024-03-20 PROCEDURE — 80061 LIPID PANEL: CPT

## 2024-03-22 RX ORDER — SEMAGLUTIDE 0.68 MG/ML
INJECTION, SOLUTION SUBCUTANEOUS
Qty: 9 ML | Refills: 1 | Status: SHIPPED | OUTPATIENT
Start: 2024-03-22

## 2024-03-26 ENCOUNTER — OFFICE VISIT (OUTPATIENT)
Facility: CLINIC | Age: 59
End: 2024-03-26
Payer: MEDICARE

## 2024-03-26 VITALS
TEMPERATURE: 97.8 F | WEIGHT: 180.4 LBS | HEIGHT: 64 IN | OXYGEN SATURATION: 96 % | DIASTOLIC BLOOD PRESSURE: 73 MMHG | BODY MASS INDEX: 30.8 KG/M2 | HEART RATE: 71 BPM | SYSTOLIC BLOOD PRESSURE: 109 MMHG

## 2024-03-26 DIAGNOSIS — Z00.00 MEDICARE ANNUAL WELLNESS VISIT, SUBSEQUENT: Primary | ICD-10-CM

## 2024-03-26 DIAGNOSIS — G89.29 OTHER CHRONIC PAIN: ICD-10-CM

## 2024-03-26 DIAGNOSIS — E11.29 TYPE 2 DIABETES MELLITUS WITH OTHER DIABETIC KIDNEY COMPLICATION (HCC): ICD-10-CM

## 2024-03-26 DIAGNOSIS — N30.00 ACUTE CYSTITIS WITHOUT HEMATURIA: ICD-10-CM

## 2024-03-26 DIAGNOSIS — M51.36 LUMBAR DEGENERATIVE DISC DISEASE: ICD-10-CM

## 2024-03-26 DIAGNOSIS — Z87.891 PERSONAL HISTORY OF TOBACCO USE: ICD-10-CM

## 2024-03-26 PROCEDURE — G0439 PPPS, SUBSEQ VISIT: HCPCS | Performed by: STUDENT IN AN ORGANIZED HEALTH CARE EDUCATION/TRAINING PROGRAM

## 2024-03-26 PROCEDURE — 99214 OFFICE O/P EST MOD 30 MIN: CPT | Performed by: STUDENT IN AN ORGANIZED HEALTH CARE EDUCATION/TRAINING PROGRAM

## 2024-03-26 PROCEDURE — 3052F HG A1C>EQUAL 8.0%<EQUAL 9.0%: CPT | Performed by: STUDENT IN AN ORGANIZED HEALTH CARE EDUCATION/TRAINING PROGRAM

## 2024-03-26 PROCEDURE — G0296 VISIT TO DETERM LDCT ELIG: HCPCS | Performed by: STUDENT IN AN ORGANIZED HEALTH CARE EDUCATION/TRAINING PROGRAM

## 2024-03-26 RX ORDER — INSULIN DEGLUDEC 200 U/ML
56 INJECTION, SOLUTION SUBCUTANEOUS DAILY
Qty: 15 ML | Refills: 3 | Status: SHIPPED | OUTPATIENT
Start: 2024-03-26

## 2024-03-26 RX ORDER — SULFAMETHOXAZOLE AND TRIMETHOPRIM 800; 160 MG/1; MG/1
1 TABLET ORAL 2 TIMES DAILY
Qty: 6 TABLET | Refills: 0 | Status: SHIPPED | OUTPATIENT
Start: 2024-03-26 | End: 2024-03-29

## 2024-03-26 RX ORDER — GABAPENTIN 100 MG/1
100 CAPSULE ORAL 3 TIMES DAILY
Qty: 90 CAPSULE | Refills: 2 | Status: SHIPPED | OUTPATIENT
Start: 2024-03-26 | End: 2024-09-22

## 2024-03-26 ASSESSMENT — PATIENT HEALTH QUESTIONNAIRE - PHQ9
1. LITTLE INTEREST OR PLEASURE IN DOING THINGS: NOT AT ALL
3. TROUBLE FALLING OR STAYING ASLEEP: NOT AT ALL
8. MOVING OR SPEAKING SO SLOWLY THAT OTHER PEOPLE COULD HAVE NOTICED. OR THE OPPOSITE, BEING SO FIGETY OR RESTLESS THAT YOU HAVE BEEN MOVING AROUND A LOT MORE THAN USUAL: NOT AT ALL
9. THOUGHTS THAT YOU WOULD BE BETTER OFF DEAD, OR OF HURTING YOURSELF: NOT AT ALL
10. IF YOU CHECKED OFF ANY PROBLEMS, HOW DIFFICULT HAVE THESE PROBLEMS MADE IT FOR YOU TO DO YOUR WORK, TAKE CARE OF THINGS AT HOME, OR GET ALONG WITH OTHER PEOPLE: SOMEWHAT DIFFICULT
4. FEELING TIRED OR HAVING LITTLE ENERGY: SEVERAL DAYS
SUM OF ALL RESPONSES TO PHQ9 QUESTIONS 1 & 2: 2
6. FEELING BAD ABOUT YOURSELF - OR THAT YOU ARE A FAILURE OR HAVE LET YOURSELF OR YOUR FAMILY DOWN: NOT AT ALL
SUM OF ALL RESPONSES TO PHQ QUESTIONS 1-9: 4
SUM OF ALL RESPONSES TO PHQ QUESTIONS 1-9: 4
5. POOR APPETITE OR OVEREATING: NOT AT ALL
7. TROUBLE CONCENTRATING ON THINGS, SUCH AS READING THE NEWSPAPER OR WATCHING TELEVISION: SEVERAL DAYS
2. FEELING DOWN, DEPRESSED OR HOPELESS: MORE THAN HALF THE DAYS
SUM OF ALL RESPONSES TO PHQ QUESTIONS 1-9: 4
SUM OF ALL RESPONSES TO PHQ QUESTIONS 1-9: 4

## 2024-03-26 ASSESSMENT — ENCOUNTER SYMPTOMS
VOMITING: 0
BACK PAIN: 0
DIARRHEA: 0
SHORTNESS OF BREATH: 0
CONSTIPATION: 0
EYE ITCHING: 0
COLOR CHANGE: 0
ABDOMINAL PAIN: 0
EYE DISCHARGE: 0
EYE PAIN: 0
FACIAL SWELLING: 0
EYE REDNESS: 0

## 2024-03-26 ASSESSMENT — LIFESTYLE VARIABLES
HOW MANY STANDARD DRINKS CONTAINING ALCOHOL DO YOU HAVE ON A TYPICAL DAY: PATIENT DOES NOT DRINK
HOW OFTEN DO YOU HAVE A DRINK CONTAINING ALCOHOL: NEVER

## 2024-03-26 NOTE — PATIENT INSTRUCTIONS
medicines or other ways to help.  Follow-up care is a key part of your treatment and safety. Be sure to make and go to all appointments, and call your doctor if you are having problems. It's also a good idea to know your test results and keep a list of the medicines you take.  Where can you learn more?  Go to https://www.[employer].net/patientEd and enter Q940 to learn more about \"Learning About Lung Cancer Screening.\"  Current as of: October 25, 2023               Content Version: 14.0  © 2006-2024 [employer].   Care instructions adapted under license by [employer]. If you have questions about a medical condition or this instruction, always ask your healthcare professional. [employer] disclaims any warranty or liability for your use of this information.           A Healthy Heart: Care Instructions  Overview     Coronary artery disease, also called heart disease, occurs when a substance called plaque builds up in the vessels that supply oxygen-rich blood to your heart muscle. This can narrow the blood vessels and reduce blood flow. A heart attack happens when blood flow is completely blocked. A high-fat diet, smoking, and other factors increase the risk of heart disease.  Your doctor has found that you have a chance of having heart disease. A heart-healthy lifestyle can help keep your heart healthy and prevent heart disease. This lifestyle includes eating healthy, being active, staying at a weight that's healthy for you, and not smoking or using tobacco. It also includes taking medicines as directed, managing other health conditions, and trying to get a healthy amount of sleep.  Follow-up care is a key part of your treatment and safety. Be sure to make and go to all appointments, and call your doctor if you are having problems. It's also a good idea to know your test results and keep a list of the medicines you take.  How can you care for yourself at home?  Diet    Use less salt when

## 2024-03-26 NOTE — PROGRESS NOTES
Trice Cristina is a 58 y.o.  female and presents with    Chief Complaint   Patient presents with    Follow-up           Subjective:    Diabetes  Taking medications as prescribed: YES  Currently on: Lantus 60 units, janumet, Ozempic 0.5mg  Checking blood sugars at home: Pt states her sugars have been stable - avg 147  Symptoms: none  Diabetic diet: YES  Exercise: NO  Patient states that she was told that there is a shortage on Lantus, and Ozempic.  She is trying to find both of the prescriptions.  Seems that she may have found 1 of 4 Ozempic and that they are from pharmacy that will be given to her soon.    Patient also has been following up with neurology due to neuropathy secondary to back issues.  She was prescribed gabapentin.  Due to insurance issues she is no longer able to see her neurologist.  Request to refill gabapentin at this time since it seems to be helping her.  The plan of treatment for her, per patient, is that if the gabapentin did not work, then will be injections to her back.  Patient feels at this time her gabapentin is working.    Patient Active Problem List   Diagnosis    History of alcohol abuse    Hypercholesteremia    Vitamin B12 deficiency    Type 2 diabetes mellitus with diabetic neuropathy (HCC)    Lumbar degenerative disc disease    Type 2 diabetes mellitus with microalbuminuria, with long-term current use of insulin (HCC)    PTSD (post-traumatic stress disorder)    Left low back pain    Recurrent depression (HCC)    Tear of meniscus of left knee    Hypothyroidism due to acquired atrophy of thyroid    Bipolar 1 disorder, manic, full remission (HCC)      Past Medical History:   Diagnosis Date    Arthritis     Chronic pain     Depression     Diabetes (HCC)     Hyperlipidemia     Hypertension     Hypothyroid     Medial meniscus tear     Left knee    Menopause     Psychiatric disorder     Tobacco abuse, in remission       Past Surgical History:   Procedure Laterality Date    
aged 45-75: Has the patient had a colonoscopy / FIT/ Cologuard? Not due     If the patient is female:    4. For patients aged 40-74: Has the patient had a mammogram within the past 2 years? Not due     5. For patients aged 21-65: Has the patient had a pap smear? Not due     Health Maintenance: reviewed and discussed and ordered per Provider.    Health Maintenance Due   Topic Date Due    Low dose CT lung screening &/or counseling  Never done    Diabetic foot exam  01/31/2021    Pneumococcal 0-64 years Vaccine (2 of 2 - PCV) 02/11/2022    Diabetic retinal exam  07/27/2022    Respiratory Syncytial Virus (RSV) Pregnant or age 60 yrs+ (1 - 1-dose 60+ series) Never done    COVID-19 Vaccine (5 - 2023-24 season) 09/01/2023    Annual Wellness Visit (Medicare Advantage)  Never done        -NATALEE Addison  [facility]  Phone: [phone]  Fax: [phone]

## 2024-03-27 ENCOUNTER — TELEPHONE (OUTPATIENT)
Facility: CLINIC | Age: 59
End: 2024-03-27

## 2024-03-27 NOTE — TELEPHONE ENCOUNTER
Notification received from [employer] stating the Lidocaine patches have been denied due to the diagnosis related to the medication. No alternatives listed.

## 2024-04-16 ENCOUNTER — OFFICE VISIT (OUTPATIENT)
Facility: CLINIC | Age: 59
End: 2024-04-16
Payer: MEDICARE

## 2024-04-16 VITALS
WEIGHT: 181.2 LBS | HEART RATE: 82 BPM | BODY MASS INDEX: 30.93 KG/M2 | RESPIRATION RATE: 10 BRPM | HEIGHT: 64 IN | TEMPERATURE: 97.3 F | OXYGEN SATURATION: 98 % | DIASTOLIC BLOOD PRESSURE: 77 MMHG | SYSTOLIC BLOOD PRESSURE: 117 MMHG

## 2024-04-16 DIAGNOSIS — E11.29 TYPE 2 DIABETES MELLITUS WITH OTHER DIABETIC KIDNEY COMPLICATION (HCC): ICD-10-CM

## 2024-04-16 DIAGNOSIS — G89.29 CHRONIC BILATERAL LOW BACK PAIN, UNSPECIFIED WHETHER SCIATICA PRESENT: ICD-10-CM

## 2024-04-16 DIAGNOSIS — M54.50 CHRONIC BILATERAL LOW BACK PAIN, UNSPECIFIED WHETHER SCIATICA PRESENT: ICD-10-CM

## 2024-04-16 DIAGNOSIS — M51.36 LUMBAR DEGENERATIVE DISC DISEASE: Primary | ICD-10-CM

## 2024-04-16 DIAGNOSIS — M94.0 COSTOCHONDRITIS: ICD-10-CM

## 2024-04-16 DIAGNOSIS — G89.29 OTHER CHRONIC PAIN: ICD-10-CM

## 2024-04-16 PROCEDURE — 99214 OFFICE O/P EST MOD 30 MIN: CPT | Performed by: STUDENT IN AN ORGANIZED HEALTH CARE EDUCATION/TRAINING PROGRAM

## 2024-04-16 PROCEDURE — 3052F HG A1C>EQUAL 8.0%<EQUAL 9.0%: CPT | Performed by: STUDENT IN AN ORGANIZED HEALTH CARE EDUCATION/TRAINING PROGRAM

## 2024-04-16 RX ORDER — ACETAMINOPHEN AND CODEINE PHOSPHATE 300; 30 MG/1; MG/1
1 TABLET ORAL EVERY 6 HOURS PRN
Qty: 12 TABLET | Refills: 0 | Status: SHIPPED | OUTPATIENT
Start: 2024-04-16 | End: 2024-04-19

## 2024-04-16 RX ORDER — MELOXICAM 7.5 MG/1
7.5 TABLET ORAL DAILY
Qty: 30 TABLET | Refills: 0 | Status: SHIPPED | OUTPATIENT
Start: 2024-04-16

## 2024-04-16 ASSESSMENT — ENCOUNTER SYMPTOMS
SHORTNESS OF BREATH: 0
EYE REDNESS: 0
BACK PAIN: 1
EYE DISCHARGE: 0
ABDOMINAL PAIN: 0
COLOR CHANGE: 0
VOMITING: 0
EYE ITCHING: 0
EYE PAIN: 0
CONSTIPATION: 0
FACIAL SWELLING: 0
DIARRHEA: 0

## 2024-04-16 NOTE — PROGRESS NOTES
Trice Cristina is a 59 y.o.  female and presents with    Chief Complaint   Patient presents with    Back Pain           Subjective:    Diabetes  Taking medications as prescribed: YES  Currently on: Tresiba 56 units units, janumet, Ozempic 0.5mg  Checking blood sugars at home: Pt states her sugars have been stable - avg 147  Symptoms: none  Diabetic diet: YES  Exercise: NO    She has been having significant back pain. She used to be seen by Dr. Duran but her insurance is not covering him anymore. She has been going to the gym. She has chronic back pain but she feels like she has aggravated the pain in her back, and also has pain in her knee since she started with the gym. When she has this type of pain she usually goes to the ED but decided today to just have appt with me.   Patient Active Problem List   Diagnosis    History of alcohol abuse    Hypercholesteremia    Vitamin B12 deficiency    Type 2 diabetes mellitus with diabetic neuropathy (HCC)    Lumbar degenerative disc disease    Type 2 diabetes mellitus with microalbuminuria, with long-term current use of insulin (HCC)    PTSD (post-traumatic stress disorder)    Left low back pain    Recurrent depression (HCC)    Tear of meniscus of left knee    Hypothyroidism due to acquired atrophy of thyroid    Bipolar 1 disorder, manic, full remission (HCC)      Past Medical History:   Diagnosis Date    Arthritis     Chronic pain     Depression     Diabetes (HCC)     Hyperlipidemia     Hypertension     Hypothyroid     Medial meniscus tear     Left knee    Menopause     Psychiatric disorder     Tobacco abuse, in remission       Past Surgical History:   Procedure Laterality Date    HAND/FINGER SURGERY UNLISTED Left 1989    reconstructive surgery    ORTHOPEDIC SURGERY      Hand reconstructive surgery    TONSILLECTOMY AND ADENOIDECTOMY      TUBAL LIGATION  2004    WISDOM TOOTH EXTRACTION        Family History   Problem Relation Age of Onset    Psychiatric Disorder

## 2024-04-16 NOTE — PROGRESS NOTES
Trice Cristina is a 59 y.o. year old female who presents today for   Chief Complaint   Patient presents with    Back Pain       Is someone accompanying this pt? No     Is the patient using any DME equipment during OV? No     Depression Screening:       3/26/2024     9:43 AM 8/23/2023     3:28 PM 4/17/2023     9:41 AM 1/16/2023    10:08 AM 9/14/2022    10:16 AM 8/5/2022     2:10 PM 6/21/2022     8:47 AM   PHQ-9 Questionaire   Little interest or pleasure in doing things 0 0 0 0 0 0 0   Feeling down, depressed, or hopeless 2 0 0 0 1 1 1   Trouble falling or staying asleep, or sleeping too much 0 0 0       Feeling tired or having little energy 1 0 0       Poor appetite or overeating 0 0 0       Feeling bad about yourself - or that you are a failure or have let yourself or your family down 0 0 0       Trouble concentrating on things, such as reading the newspaper or watching television 1 0 0       Moving or speaking so slowly that other people could have noticed. Or the opposite - being so fidgety or restless that you have been moving around a lot more than usual 0 0 0       Thoughts that you would be better off dead, or of hurting yourself in some way 0 0 0       PHQ-9 Total Score 4 0 0 0 1 1 1   If you checked off any problems, how difficult have these problems made it for you to do your work, take care of things at home, or get along with other people? 1 0 0           Abuse Screening:       No data to display                Learning Assessment:  No question data found.    Fall Risk:      3/26/2024     9:51 AM 2/12/2024    11:52 AM   Fall Risk   2 or more falls in past year? no no   Fall with injury in past year? no no           Coordination of Care:   1. \"Have you been to the ER, urgent care clinic since your last visit?  Hospitalized since your last visit?\" No     2. \"Have you seen or consulted any other health care providers outside of the [facility] since your last visit?\" Yes     3. For patients aged

## 2024-04-19 ENCOUNTER — TELEPHONE (OUTPATIENT)
Facility: CLINIC | Age: 59
End: 2024-04-19

## 2024-04-19 NOTE — TELEPHONE ENCOUNTER
Fax received from [facility] stating the Lidocaine patches were denied due to the diagnosis associated with the prescription. Patient made aware.

## 2024-04-29 NOTE — TELEPHONE ENCOUNTER
Medication requested :   Requested Prescriptions     Pending Prescriptions Disp Refills    oxyBUTYnin (DITROPAN-XL) 10 MG extended release tablet [Pharmacy Med Name: OXYBUTYNIN CL ER 10 MG TABLET] 90 tablet 3     Sig: take 1 tablet by mouth once daily      PCP: Stefani Barker MD  LOV:           3/26/2024   NOV DMA: 6/26/2024  FUTURE APPT:   Future Appointments   Date Time Provider Department Center   6/11/2024 10:00 AM Gayla Steward MD S BS AMB   6/26/2024  9:20 AM Stefani Barker MD [facility] BS AMB   10/28/2024  8:00 AM [facility]       Thank you.

## 2024-04-30 ENCOUNTER — OFFICE VISIT (OUTPATIENT)
Facility: CLINIC | Age: 59
End: 2024-04-30
Payer: MEDICARE

## 2024-04-30 VITALS
RESPIRATION RATE: 17 BRPM | HEIGHT: 64 IN | DIASTOLIC BLOOD PRESSURE: 79 MMHG | BODY MASS INDEX: 30.35 KG/M2 | HEART RATE: 76 BPM | OXYGEN SATURATION: 98 % | SYSTOLIC BLOOD PRESSURE: 120 MMHG | TEMPERATURE: 97.3 F | WEIGHT: 177.8 LBS

## 2024-04-30 DIAGNOSIS — E11.29 TYPE 2 DIABETES MELLITUS WITH OTHER DIABETIC KIDNEY COMPLICATION (HCC): Primary | ICD-10-CM

## 2024-04-30 DIAGNOSIS — N32.81 OVERACTIVE BLADDER: ICD-10-CM

## 2024-04-30 DIAGNOSIS — R03.0 ELEVATED BLOOD PRESSURE READING: ICD-10-CM

## 2024-04-30 DIAGNOSIS — M51.36 LUMBAR DEGENERATIVE DISC DISEASE: ICD-10-CM

## 2024-04-30 PROCEDURE — 3052F HG A1C>EQUAL 8.0%<EQUAL 9.0%: CPT | Performed by: FAMILY MEDICINE

## 2024-04-30 PROCEDURE — 99214 OFFICE O/P EST MOD 30 MIN: CPT | Performed by: FAMILY MEDICINE

## 2024-04-30 RX ORDER — SEMAGLUTIDE 1.34 MG/ML
1 INJECTION, SOLUTION SUBCUTANEOUS
Qty: 3 ML | Refills: 2 | Status: SHIPPED | OUTPATIENT
Start: 2024-04-30

## 2024-04-30 RX ORDER — OXYBUTYNIN CHLORIDE 10 MG/1
10 TABLET, EXTENDED RELEASE ORAL DAILY
Qty: 90 TABLET | Refills: 3 | Status: SHIPPED | OUTPATIENT
Start: 2024-04-30

## 2024-04-30 RX ORDER — OXYBUTYNIN CHLORIDE 10 MG/1
TABLET, EXTENDED RELEASE ORAL
Qty: 90 TABLET | Refills: 3 | OUTPATIENT
Start: 2024-04-30

## 2024-04-30 SDOH — ECONOMIC STABILITY: FOOD INSECURITY: WITHIN THE PAST 12 MONTHS, YOU WORRIED THAT YOUR FOOD WOULD RUN OUT BEFORE YOU GOT MONEY TO BUY MORE.: NEVER TRUE

## 2024-04-30 SDOH — ECONOMIC STABILITY: HOUSING INSECURITY
IN THE LAST 12 MONTHS, WAS THERE A TIME WHEN YOU DID NOT HAVE A STEADY PLACE TO SLEEP OR SLEPT IN A SHELTER (INCLUDING NOW)?: NO

## 2024-04-30 SDOH — ECONOMIC STABILITY: FOOD INSECURITY: WITHIN THE PAST 12 MONTHS, THE FOOD YOU BOUGHT JUST DIDN'T LAST AND YOU DIDN'T HAVE MONEY TO GET MORE.: NEVER TRUE

## 2024-04-30 SDOH — ECONOMIC STABILITY: INCOME INSECURITY: HOW HARD IS IT FOR YOU TO PAY FOR THE VERY BASICS LIKE FOOD, HOUSING, MEDICAL CARE, AND HEATING?: NOT HARD AT ALL

## 2024-04-30 ASSESSMENT — PATIENT HEALTH QUESTIONNAIRE - PHQ9
8. MOVING OR SPEAKING SO SLOWLY THAT OTHER PEOPLE COULD HAVE NOTICED. OR THE OPPOSITE, BEING SO FIGETY OR RESTLESS THAT YOU HAVE BEEN MOVING AROUND A LOT MORE THAN USUAL: NOT AT ALL
2. FEELING DOWN, DEPRESSED OR HOPELESS: NOT AT ALL
SUM OF ALL RESPONSES TO PHQ9 QUESTIONS 1 & 2: 0
5. POOR APPETITE OR OVEREATING: NOT AT ALL
3. TROUBLE FALLING OR STAYING ASLEEP: NOT AT ALL
SUM OF ALL RESPONSES TO PHQ QUESTIONS 1-9: 0
9. THOUGHTS THAT YOU WOULD BE BETTER OFF DEAD, OR OF HURTING YOURSELF: NOT AT ALL
SUM OF ALL RESPONSES TO PHQ QUESTIONS 1-9: 0
SUM OF ALL RESPONSES TO PHQ QUESTIONS 1-9: 0
7. TROUBLE CONCENTRATING ON THINGS, SUCH AS READING THE NEWSPAPER OR WATCHING TELEVISION: NOT AT ALL
4. FEELING TIRED OR HAVING LITTLE ENERGY: NOT AT ALL
SUM OF ALL RESPONSES TO PHQ QUESTIONS 1-9: 0
10. IF YOU CHECKED OFF ANY PROBLEMS, HOW DIFFICULT HAVE THESE PROBLEMS MADE IT FOR YOU TO DO YOUR WORK, TAKE CARE OF THINGS AT HOME, OR GET ALONG WITH OTHER PEOPLE: NOT DIFFICULT AT ALL
6. FEELING BAD ABOUT YOURSELF - OR THAT YOU ARE A FAILURE OR HAVE LET YOURSELF OR YOUR FAMILY DOWN: NOT AT ALL
1. LITTLE INTEREST OR PLEASURE IN DOING THINGS: NOT AT ALL

## 2024-04-30 NOTE — PROGRESS NOTES
Trice Cristina is a 59 y.o. presents today for   Chief Complaint   Patient presents with    Headache     Is someone accompanying this pt? no    Is the patient using any DME equipment during OV? no  There were no vitals filed for this visit.    Depression Screenin/30/2024     8:50 AM 3/26/2024     9:43 AM 2023     3:28 PM 2023     9:41 AM 2023    10:08 AM 2022    10:16 AM 2022     2:10 PM   PHQ-9 Questionaire   Little interest or pleasure in doing things 0 0 0 0 0 0 0   Feeling down, depressed, or hopeless 0 2 0 0 0 1 1   Trouble falling or staying asleep, or sleeping too much 0 0 0 0      Feeling tired or having little energy 0 1 0 0      Poor appetite or overeating 0 0 0 0      Feeling bad about yourself - or that you are a failure or have let yourself or your family down 0 0 0 0      Trouble concentrating on things, such as reading the newspaper or watching television 0 1 0 0      Moving or speaking so slowly that other people could have noticed. Or the opposite - being so fidgety or restless that you have been moving around a lot more than usual 0 0 0 0      Thoughts that you would be better off dead, or of hurting yourself in some way 0 0 0 0      PHQ-9 Total Score 0 4 0 0 0 1 1   If you checked off any problems, how difficult have these problems made it for you to do your work, take care of things at home, or get along with other people? 0 1 0 0           Abuse Screening:       No data to display                 Learning Assessment Screening:   No question data found.     Fall Risk Screening:       3/26/2024     9:51 AM 2024    11:52 AM   Fall Risk   2 or more falls in past year? no no   Fall with injury in past year? no no           Health Maintenance: reviewed and discussed and ordered per Provider.    Health Maintenance Due   Topic Date Due    Low dose CT lung screening &/or counseling  Never done    Diabetic foot exam  2021    Pneumococcal 0-64 years Vaccine (2 of

## 2024-04-30 NOTE — PROGRESS NOTES
Trice Cristina is a 59 y.o.  female and presents with    Chief Complaint   Patient presents with    Headache    Hypertension    Medication Refill           Subjective:  Ms. Cristina c/o elevated blood pressure at pharmacy (190/90; she was seen in urgent care with BP  160s/90s;  she has mild headache today with improvement over the past 2 days.      She has h/o diabetes and reports compliance with medications; she had elevated blood glucose 190 yesterday and 170 this morning.      She recently completed abx for urinary tract infection; no new back pain; she has urinary frequency and uses oxybutynin.      ROS   Constitutional:  Negative for activity change and appetite change.   HENT:  Negative for congestion, drooling, ear discharge, ear pain and facial swelling.    Eyes:  Negative for pain, discharge, redness and itching.   Respiratory:  Negative for shortness of breath.    Cardiovascular:  Negative for leg swelling.   Gastrointestinal:  Negative for abdominal pain, constipation, diarrhea and vomiting.   Genitourinary:  Negative for difficulty urinating, frequency, hematuria and urgency.   Musculoskeletal:  Positive for back pain. Negative for arthralgias, myalgias and neck pain.   Skin:  Negative for color change.   Neurological:  Negative for dizziness, seizures, numbness and headaches.   Psychiatric/Behavioral:  Negative for agitation, behavioral problems, decreased concentration, sleep disturbance and suicidal ideas.      All other systems reviewed and are negative.      Objective:  Vitals:    04/30/24 0855   BP: 120/79   Pulse: 76   Resp: 17   Temp: 97.3 °F (36.3 °C)   SpO2: 98%         alert, well appearing, and in no distress, oriented to person, place, and time, and obese  Mental status - normal mood, behavior, speech, dress, motor activity, and thought processes  Chest - clear to auscultation, no wheezes, rales or rhonchi, symmetric air entry  Heart - normal rate, regular rhythm, normal S1, S2, no

## 2024-06-05 ENCOUNTER — TELEPHONE (OUTPATIENT)
Facility: CLINIC | Age: 59
End: 2024-06-05

## 2024-06-05 NOTE — TELEPHONE ENCOUNTER
MEDICATION REFILL REQUESTS    acetaminophen-codeine (TYLENOL/CODEINE #3) 300-30 MG per tablet     Pt says she only has 2 left but has an appt with the initial provider who prescribes this Rx.    Pt requested if Dr. Ko could send her an Rx of 10    Please advise.

## 2024-06-10 NOTE — TELEPHONE ENCOUNTER
Called pt. She states she does not need the refill at this time. She has 1 left and will be seeing spine specialist tomorrow

## 2024-06-11 ENCOUNTER — OFFICE VISIT (OUTPATIENT)
Age: 59
End: 2024-06-11
Payer: MEDICARE

## 2024-06-11 VITALS — WEIGHT: 181 LBS | BODY MASS INDEX: 30.9 KG/M2 | HEIGHT: 64 IN

## 2024-06-11 DIAGNOSIS — G89.29 CHRONIC BILATERAL THORACIC BACK PAIN: Primary | ICD-10-CM

## 2024-06-11 DIAGNOSIS — M51.36 LUMBAR DEGENERATIVE DISC DISEASE: ICD-10-CM

## 2024-06-11 DIAGNOSIS — M54.6 CHRONIC BILATERAL THORACIC BACK PAIN: Primary | ICD-10-CM

## 2024-06-11 PROCEDURE — 99204 OFFICE O/P NEW MOD 45 MIN: CPT | Performed by: PHYSICAL MEDICINE & REHABILITATION

## 2024-06-11 RX ORDER — GABAPENTIN 100 MG/1
100 CAPSULE ORAL 3 TIMES DAILY
Qty: 270 CAPSULE | Refills: 0 | Status: SHIPPED | OUTPATIENT
Start: 2024-06-11 | End: 2024-09-09

## 2024-06-11 NOTE — PROGRESS NOTES
Trice Cristina presents today for   Chief Complaint   Patient presents with    Back Pain     Lumbar spine       Is someone accompanying this pt? no    Is the patient using any DME equipment during OV? no    Depression Screening:       No data to display                Learning Assessment:  Failed to redirect to the Timeline version of the [employer] SmartLink.    Abuse Screening:       No data to display                Fall Risk  Failed to redirect to the Timeline version of the [employer] Smart[employer].    OPIOID RISK TOOL  Failed to redirect to the Timeline version of the [employer] SmartLink.    Coordination of Care:  1. Have you been to the ER, urgent care clinic since your last visit? Yes BP  Hospitalized since your last visit? no    2. Have you seen or consulted any other health care providers outside of the [facility] since your last visit? no Include any pap smears or colon screening. no            
regularly  -Medications - renew gabapentin 100mg tid . Counseled regarding side effects and appropriate administration of medications.    -Diagnostics/Imaging - reviewed MRI lumbar spine  -x-ray thoracic spine   -Injections - Consider L4/5 facet injection vs MBB   -Lifestyle - Encourage weight loss and regular exercise    -Education - The patient's diagnosis, prognosis and treatment options were discussed today. All questions were answered.    F/U - in 3 month(s) or sooner if needed.  Consider further imaging thoracic spine if mid back and rib pain persists          Gayla Steward MD  [facility]

## 2024-06-13 RX ORDER — PSEUDOEPHED/ACETAMINOPH/DIPHEN 30MG-500MG
TABLET ORAL
Qty: 90 TABLET | Refills: 2 | Status: SHIPPED | OUTPATIENT
Start: 2024-06-13

## 2024-06-13 NOTE — TELEPHONE ENCOUNTER
Medication Refill Requests      ACETAMINOPHEN EXTRA STRENGTH 500 MG tablet      Pt requested for this medication last week and let dr. oK know her spine specialist could possible fill it for hr however, they were unable to fill this Rx and was told to contact her PCP.    Please advise

## 2024-06-14 ENCOUNTER — TELEPHONE (OUTPATIENT)
Age: 59
End: 2024-06-14

## 2024-06-14 NOTE — TELEPHONE ENCOUNTER
Patient called yesterday and called back today and stated that [facility] told her they'd be sending her xray's over to Dr. Amado yesterday 06/13.     Please contact and advise patient if we have received them.  Patient can be reached at [phone].   2020

## 2024-06-17 ENCOUNTER — TELEPHONE (OUTPATIENT)
Facility: CLINIC | Age: 59
End: 2024-06-17

## 2024-06-17 ENCOUNTER — TELEPHONE (OUTPATIENT)
Age: 59
End: 2024-06-17

## 2024-06-17 DIAGNOSIS — M51.36 LUMBAR DEGENERATIVE DISC DISEASE: Primary | ICD-10-CM

## 2024-06-17 DIAGNOSIS — G89.29 OTHER CHRONIC PAIN: ICD-10-CM

## 2024-06-17 RX ORDER — ACETAMINOPHEN AND CODEINE PHOSPHATE 300; 30 MG/1; MG/1
1 TABLET ORAL EVERY 6 HOURS PRN
Qty: 12 TABLET | Refills: 0 | Status: SHIPPED | OUTPATIENT
Start: 2024-06-17 | End: 2024-06-20

## 2024-06-17 NOTE — TELEPHONE ENCOUNTER
Patient is requesting a refill on the below medication: She does not need the regular Tylenol        acetaminophen-codeine (TYLENOL/CODEINE #3) 300-30 MG per tablet [[id number]       Please advise    Thank you

## 2024-06-17 NOTE — TELEPHONE ENCOUNTER
Patient called and said that she had the xray done on her ribs last week Thursday 6/13/24, at [facility].    Patient would like to know if  has received the results, if so , patient would like a call back with the results.     Patient tel. [phone].

## 2024-06-18 NOTE — TELEPHONE ENCOUNTER
Patient was identified with two Identifiers.  X-ray results were reviewed with patient.  Patient verbalized understanding and had no additional questions at this time.  Office number was provided to pt for any reason she may need she feels to contact us.  Patient was thankful for the call.

## 2024-06-18 NOTE — TELEPHONE ENCOUNTER
Can you call her and let her know the x-ray demonstrates arthritis in her mid/thoracic back. No evidence of any fracture        FINDINGS: There are 12 thoracic type vertebra. The alignment of the thoracic spine is unremarkable. No evidence for acute fracture or dislocation.  Pedicles are present at every level.  Moderate multilevel degenerative disc disease with facet arthropathy     IMPRESSION   1. No acute pathology appreciated in the thoracic spine.   2. Moderate multilevel degenerative disc disease.

## 2024-06-19 DIAGNOSIS — E11.29 TYPE 2 DIABETES MELLITUS WITH OTHER DIABETIC KIDNEY COMPLICATION (HCC): ICD-10-CM

## 2024-06-19 DIAGNOSIS — E78.1 HYPERTRIGLYCERIDEMIA: ICD-10-CM

## 2024-06-19 RX ORDER — SEMAGLUTIDE 1.34 MG/ML
1 INJECTION, SOLUTION SUBCUTANEOUS
Qty: 3 ML | Refills: 2 | Status: SHIPPED | OUTPATIENT
Start: 2024-06-19

## 2024-06-19 RX ORDER — ICOSAPENT ETHYL 1000 MG/1
2 CAPSULE ORAL 2 TIMES DAILY
Qty: 120 CAPSULE | Refills: 3 | Status: SHIPPED | OUTPATIENT
Start: 2024-06-19

## 2024-06-19 NOTE — TELEPHONE ENCOUNTER
Medication Refill Requests      Semaglutide, 1 MG/DOSE, (OZEMPIC, 1 MG/DOSE,) 4 MG/3ML SOPN sc injection

## 2024-06-19 NOTE — TELEPHONE ENCOUNTER
Medication(s) requesting:   Requested Prescriptions     Pending Prescriptions Disp Refills    VASCEPA 1 g CAPS capsule [Pharmacy Med Name: VASCEPA 1GM CAPSULES] 120 capsule 3     Sig: TAKE 2 CAPSULES BY MOUTH TWICE A DAY    Semaglutide, 1 MG/DOSE, (OZEMPIC, 1 MG/DOSE,) 4 MG/3ML SOPN sc injection 3 mL 2     Sig: Inject 1 mg into the skin every 7 days       Last office visit:  04/30/2024  Next office visit DMA: 7/11/2024

## 2024-06-21 ENCOUNTER — TELEPHONE (OUTPATIENT)
Age: 59
End: 2024-06-21

## 2024-06-21 NOTE — TELEPHONE ENCOUNTER
Patient  is requesting to have a back injection as discussed on her last visit, but not sure if the injection would be in office or not. Patient states she is still in a lot of pain, and would like an injection prior to her vacation. Patient is asking for a call back to confirm, and can be reached at [phone].

## 2024-07-11 DIAGNOSIS — E11.29 TYPE 2 DIABETES MELLITUS WITH OTHER DIABETIC KIDNEY COMPLICATION (HCC): ICD-10-CM

## 2024-07-12 RX ORDER — FLASH GLUCOSE SENSOR
KIT MISCELLANEOUS
Qty: 6 EACH | Refills: 1 | Status: SHIPPED | OUTPATIENT
Start: 2024-07-12

## 2024-07-31 NOTE — TELEPHONE ENCOUNTER
Medication(s) requesting:   Requested Prescriptions     Pending Prescriptions Disp Refills    rosuvastatin (CRESTOR) 20 MG tablet [Pharmacy Med Name: ROSUVASTATIN 20MG TABLETS] 90 tablet 1     Sig: TAKE 1 TABLET BY MOUTH NIGHTLY       Last office visit:  04/30/2024  Next office visit DMA: 8/9/2024

## 2024-08-02 ENCOUNTER — TELEPHONE (OUTPATIENT)
Facility: CLINIC | Age: 59
End: 2024-08-02

## 2024-08-02 RX ORDER — ROSUVASTATIN CALCIUM 20 MG/1
20 TABLET, COATED ORAL NIGHTLY
Qty: 90 TABLET | Refills: 1 | Status: SHIPPED | OUTPATIENT
Start: 2024-08-02

## 2024-08-09 ENCOUNTER — HOSPITAL ENCOUNTER (OUTPATIENT)
Facility: HOSPITAL | Age: 59
Setting detail: SPECIMEN
End: 2024-08-09
Payer: MEDICARE

## 2024-08-09 ENCOUNTER — OFFICE VISIT (OUTPATIENT)
Facility: CLINIC | Age: 59
End: 2024-08-09

## 2024-08-09 VITALS
TEMPERATURE: 97.3 F | OXYGEN SATURATION: 98 % | HEIGHT: 64 IN | DIASTOLIC BLOOD PRESSURE: 70 MMHG | RESPIRATION RATE: 14 BRPM | BODY MASS INDEX: 30.8 KG/M2 | HEART RATE: 77 BPM | SYSTOLIC BLOOD PRESSURE: 104 MMHG | WEIGHT: 180.4 LBS

## 2024-08-09 DIAGNOSIS — M51.36 LUMBAR DEGENERATIVE DISC DISEASE: ICD-10-CM

## 2024-08-09 DIAGNOSIS — E03.4 HYPOTHYROIDISM DUE TO ACQUIRED ATROPHY OF THYROID: ICD-10-CM

## 2024-08-09 DIAGNOSIS — T16.1XXA ACUTE FOREIGN BODY OF RIGHT EAR, INITIAL ENCOUNTER: ICD-10-CM

## 2024-08-09 DIAGNOSIS — E11.29 TYPE 2 DIABETES MELLITUS WITH OTHER DIABETIC KIDNEY COMPLICATION (HCC): Primary | ICD-10-CM

## 2024-08-09 LAB
T4 FREE SERPL-MCNC: 1.1 NG/DL (ref 0.7–1.5)
TSH SERPL DL<=0.05 MIU/L-ACNC: 1.68 UIU/ML (ref 0.36–3.74)

## 2024-08-09 PROCEDURE — 36415 COLL VENOUS BLD VENIPUNCTURE: CPT

## 2024-08-09 PROCEDURE — 84443 ASSAY THYROID STIM HORMONE: CPT

## 2024-08-09 PROCEDURE — 84439 ASSAY OF FREE THYROXINE: CPT

## 2024-08-09 RX ORDER — LEVOTHYROXINE SODIUM 137 UG/1
137 TABLET ORAL
Qty: 90 TABLET | Refills: 1 | Status: SHIPPED | OUTPATIENT
Start: 2024-08-09

## 2024-08-09 RX ORDER — SEMAGLUTIDE 2.68 MG/ML
2 INJECTION, SOLUTION SUBCUTANEOUS
Qty: 9 ML | Refills: 1 | Status: SHIPPED | OUTPATIENT
Start: 2024-08-09

## 2024-08-09 ASSESSMENT — ENCOUNTER SYMPTOMS
VOMITING: 0
ABDOMINAL PAIN: 0
EYE DISCHARGE: 0
DIARRHEA: 0
BACK PAIN: 0
CONSTIPATION: 0
FACIAL SWELLING: 0
COLOR CHANGE: 0
EYE ITCHING: 0
SHORTNESS OF BREATH: 0
EYE REDNESS: 0
EYE PAIN: 0

## 2024-08-09 NOTE — PROGRESS NOTES
care providers outside of the [facility] since your last visit?\" Yes    3. For patients aged 45-75: Has the patient had a colonoscopy / FIT/ Cologuard? Not Due     If the patient is female:    4. For patients aged 40-74: Has the patient had a mammogram within the past 2 years? Not Due     5. For patients aged 21-65: Has the patient had a pap smear? Not Due     Health Maintenance: reviewed and discussed and ordered per Provider.    Health Maintenance Due   Topic Date Due    Lung Cancer Screening &/or Counseling  Never done    Diabetic foot exam  01/31/2021    Pneumococcal 0-64 years Vaccine (2 of 2 - PCV) 02/11/2022    Diabetic retinal exam  07/27/2022    Respiratory Syncytial Virus (RSV) Pregnant or age 60 yrs+ (1 - 1-dose 60+ series) Never done    COVID-19 Vaccine (5 - 2023-24 season) 09/01/2023    Flu vaccine (1) 08/01/2024        - Janine Coe CMA  [facility]  Phone: [phone]  Fax: [phone]  
disease  Gabapenting PRN for back pain    3. Hypothyroidism due to acquired atrophy of thyroid  - levothyroxine (SYNTHROID) 137 MCG tablet; Take 1 tablet by mouth every morning (before breakfast)  Dispense: 90 tablet; Refill: 1  - TSH + Free T4 Panel; Future    4. Acute foreign body of right ear, initial encounter  Ceruminosis is noted.  Wax is removed by syringing and manual debridement. Noted q tip cotton, which was removed. Instructions for home care to prevent wax buildup are given.  - 36701 - OR REMOVE IMPACTED EAR WAX    Lab review: orders written for new lab studies as appropriate; see orders    On this date 8/9/2024 I have spent 30 minutes reviewing previous notes, test results and face to face with the patient discussing the diagnosis and importance of compliance with the treatment plan as well as documenting on the day of the visit.    I have discussed the diagnosis with the patient and the intended plan as seen in the above orders.  The patient has received an after-visit summary and questions were answered concerning future plans.  I have discussed medication side effects and warnings with the patient as well. I have reviewed the plan of care with the patient, accepted their input and they are in agreement with the treatment goals.     Stefani Gant MD

## 2024-08-20 DIAGNOSIS — E11.29 TYPE 2 DIABETES MELLITUS WITH OTHER DIABETIC KIDNEY COMPLICATION (HCC): ICD-10-CM

## 2024-08-20 NOTE — TELEPHONE ENCOUNTER
Patient called in requesting a refill for the following:   Insulin Degludec (TRESIBA FLEXTOUCH) 200 UNIT/ ML SOPN     Patient also mention that she needed to continue Janumet cause her blood sugar levels were high.     LOV: 08/09/24   NOV: 10/28/24     Please be advised, thank you.

## 2024-08-20 NOTE — TELEPHONE ENCOUNTER
Medication(s) requesting:   Requested Prescriptions     Pending Prescriptions Disp Refills    Insulin Degludec (TRESIBA FLEXTOUCH) 200 UNIT/ML SOPN 15 mL 3     Sig: Inject 56 Units into the skin daily       Last office visit:  08/09/2024  Next office visit DMA: 11/5/2024

## 2024-08-21 DIAGNOSIS — E11.29 TYPE 2 DIABETES MELLITUS WITH OTHER DIABETIC KIDNEY COMPLICATION (HCC): ICD-10-CM

## 2024-08-22 RX ORDER — INSULIN DEGLUDEC 200 U/ML
56 INJECTION, SOLUTION SUBCUTANEOUS DAILY
Qty: 15 ML | Refills: 3 | OUTPATIENT
Start: 2024-08-22

## 2024-08-22 RX ORDER — INSULIN DEGLUDEC 200 U/ML
56 INJECTION, SOLUTION SUBCUTANEOUS DAILY
Qty: 15 ML | Refills: 3 | Status: SHIPPED | OUTPATIENT
Start: 2024-08-22

## 2024-09-09 ENCOUNTER — OFFICE VISIT (OUTPATIENT)
Facility: CLINIC | Age: 59
End: 2024-09-09

## 2024-09-09 DIAGNOSIS — Z23 FLU VACCINE NEED: Primary | ICD-10-CM

## 2024-09-20 ENCOUNTER — TELEMEDICINE (OUTPATIENT)
Facility: CLINIC | Age: 59
End: 2024-09-20

## 2024-09-20 DIAGNOSIS — E11.29 TYPE 2 DIABETES MELLITUS WITH OTHER DIABETIC KIDNEY COMPLICATION (HCC): Primary | ICD-10-CM

## 2024-09-20 DIAGNOSIS — Z09 HOSPITAL DISCHARGE FOLLOW-UP: ICD-10-CM

## 2024-09-20 RX ORDER — ACARBOSE 25 MG/1
25 TABLET ORAL
Qty: 90 TABLET | Refills: 3 | Status: SHIPPED | OUTPATIENT
Start: 2024-09-20

## 2024-09-20 ASSESSMENT — ENCOUNTER SYMPTOMS
VOMITING: 0
ABDOMINAL PAIN: 0
EYE PAIN: 0
CONSTIPATION: 0
EYE ITCHING: 0
FACIAL SWELLING: 0
COLOR CHANGE: 0
SHORTNESS OF BREATH: 0
BACK PAIN: 0
EYE DISCHARGE: 0
EYE REDNESS: 0

## 2024-09-23 DIAGNOSIS — E11.29 TYPE 2 DIABETES MELLITUS WITH OTHER DIABETIC KIDNEY COMPLICATION (HCC): ICD-10-CM

## 2024-09-23 DIAGNOSIS — E78.1 HYPERTRIGLYCERIDEMIA: ICD-10-CM

## 2024-09-23 RX ORDER — ICOSAPENT ETHYL 1 G/1
2 CAPSULE ORAL 2 TIMES DAILY
Qty: 120 CAPSULE | Refills: 3 | Status: SHIPPED | OUTPATIENT
Start: 2024-09-23

## 2024-09-30 ENCOUNTER — TELEPHONE (OUTPATIENT)
Facility: CLINIC | Age: 59
End: 2024-09-30

## 2024-09-30 NOTE — TELEPHONE ENCOUNTER
Patient states her glucose level has being extremely dana in the 400s and would like to know what she can do she states she will restart janument and  tresiba  but needs to know dosage

## 2024-10-04 ENCOUNTER — TELEMEDICINE (OUTPATIENT)
Facility: CLINIC | Age: 59
End: 2024-10-04

## 2024-10-04 DIAGNOSIS — E11.29 TYPE 2 DIABETES MELLITUS WITH OTHER DIABETIC KIDNEY COMPLICATION (HCC): Primary | ICD-10-CM

## 2024-10-04 ASSESSMENT — ENCOUNTER SYMPTOMS
VOMITING: 0
CONSTIPATION: 0
COLOR CHANGE: 0
SHORTNESS OF BREATH: 0
EYE DISCHARGE: 0
DIARRHEA: 0
EYE REDNESS: 0
EYE PAIN: 0
EYE ITCHING: 0
ABDOMINAL PAIN: 0
FACIAL SWELLING: 0
BACK PAIN: 0

## 2024-10-04 NOTE — PROGRESS NOTES
Trice Cristina is a 59 y.o.  female and presents with    No chief complaint on file.      Trice Cristina, was evaluated through a synchronous (real-time) audio-video encounter. The patient (or guardian if applicable) is aware that this is a billable service, which includes applicable co-pays. This Virtual Visit was conducted with patient's (and/or legal guardian's) consent. Patient identification was verified, and a caregiver was present when appropriate.   The patient was located at Home: [address]  [place] [address]  Provider was located at Facility (Appt Dept): [address], [address]  [place] [address]  Confirm you are appropriately licensed, registered, or certified to deliver care in the state where the patient is located as indicated above. If you are not or unsure, please re-schedule the visit: Yes, I confirm.        --Stefani Gant MD on 10/4/2024 at 3:11 PM    An electronic signature was used to authenticate this note.      Subjective:    She has been noticing her sugars to be bw the 300-400s. She has been checking fasting, prior to meals and 2 hours after meal. She had a smoothie about 2 hours. Her blood sugars is 306.  She has been doing Acarbose, and Tresiba 56 units.    Patient Active Problem List   Diagnosis    History of alcohol abuse    Hypercholesteremia    Vitamin B12 deficiency    Type 2 diabetes mellitus with diabetic neuropathy (HCC)    Lumbar degenerative disc disease    Type 2 diabetes mellitus with microalbuminuria, with long-term current use of insulin (HCC)    PTSD (post-traumatic stress disorder)    Left low back pain    Recurrent depression (HCC)    Tear of meniscus of left knee    Hypothyroidism due to acquired atrophy of thyroid    Bipolar 1 disorder, manic, full remission (HCC)      Past Medical History:   Diagnosis Date    Arthritis     Chronic pain     Depression     Diabetes (HCC)     Hyperlipidemia     Hypertension     Hypothyroid

## 2024-10-23 ENCOUNTER — HOSPITAL ENCOUNTER (OUTPATIENT)
Facility: HOSPITAL | Age: 59
Setting detail: SPECIMEN
Discharge: HOME | End: 2024-10-26
Payer: MEDICARE

## 2024-10-23 ENCOUNTER — OFFICE VISIT (OUTPATIENT)
Facility: CLINIC | Age: 59
End: 2024-10-23
Payer: MEDICARE

## 2024-10-23 VITALS
HEIGHT: 64 IN | HEART RATE: 75 BPM | TEMPERATURE: 97.4 F | OXYGEN SATURATION: 99 % | RESPIRATION RATE: 14 BRPM | BODY MASS INDEX: 31.76 KG/M2 | SYSTOLIC BLOOD PRESSURE: 139 MMHG | WEIGHT: 186 LBS | DIASTOLIC BLOOD PRESSURE: 82 MMHG

## 2024-10-23 DIAGNOSIS — E78.1 HYPERTRIGLYCERIDEMIA: ICD-10-CM

## 2024-10-23 DIAGNOSIS — E11.29 TYPE 2 DIABETES MELLITUS WITH OTHER DIABETIC KIDNEY COMPLICATION (HCC): ICD-10-CM

## 2024-10-23 DIAGNOSIS — N32.81 OVERACTIVE BLADDER: ICD-10-CM

## 2024-10-23 DIAGNOSIS — R30.0 DYSURIA: Primary | ICD-10-CM

## 2024-10-23 DIAGNOSIS — B37.9 YEAST INFECTION: ICD-10-CM

## 2024-10-23 DIAGNOSIS — R30.0 DYSURIA: ICD-10-CM

## 2024-10-23 DIAGNOSIS — E03.4 HYPOTHYROIDISM DUE TO ACQUIRED ATROPHY OF THYROID: ICD-10-CM

## 2024-10-23 LAB
APPEARANCE UR: CLEAR
BACTERIA URNS QL MICRO: ABNORMAL /HPF
BILIRUB UR QL: NEGATIVE
BILIRUBIN, URINE, POC: NEGATIVE
BLOOD URINE, POC: NEGATIVE
COLOR UR: YELLOW
EPITH CASTS URNS QL MICRO: ABNORMAL /LPF (ref 0–5)
GLUCOSE UR STRIP.AUTO-MCNC: NEGATIVE MG/DL
GLUCOSE URINE, POC: NEGATIVE
HGB UR QL STRIP: NEGATIVE
KETONES UR QL STRIP.AUTO: NEGATIVE MG/DL
KETONES, URINE, POC: NEGATIVE
LEUKOCYTE ESTERASE UR QL STRIP.AUTO: ABNORMAL
LEUKOCYTE ESTERASE, URINE, POC: NORMAL
NITRITE UR QL STRIP.AUTO: NEGATIVE
NITRITE, URINE, POC: NEGATIVE
PH UR STRIP: 6.5 (ref 5–8)
PH, URINE, POC: 6.5 (ref 4.6–8)
PROT UR STRIP-MCNC: NEGATIVE MG/DL
PROTEIN,URINE, POC: NEGATIVE
RBC #/AREA URNS HPF: ABNORMAL /HPF (ref 0–5)
SP GR UR REFRACTOMETRY: <1.005 (ref 1–1.04)
SPECIFIC GRAVITY, URINE, POC: 1 (ref 1–1.03)
URINALYSIS CLARITY, POC: CLEAR
URINALYSIS COLOR, POC: YELLOW
UROBILINOGEN UR QL STRIP.AUTO: 0.2 EU/DL (ref 0.2–1)
UROBILINOGEN, POC: NORMAL MG/DL
WBC URNS QL MICRO: ABNORMAL /HPF (ref 0–5)

## 2024-10-23 PROCEDURE — 99214 OFFICE O/P EST MOD 30 MIN: CPT | Performed by: STUDENT IN AN ORGANIZED HEALTH CARE EDUCATION/TRAINING PROGRAM

## 2024-10-23 PROCEDURE — 87186 SC STD MICRODIL/AGAR DIL: CPT

## 2024-10-23 PROCEDURE — 87086 URINE CULTURE/COLONY COUNT: CPT

## 2024-10-23 PROCEDURE — 87088 URINE BACTERIA CULTURE: CPT

## 2024-10-23 PROCEDURE — 3052F HG A1C>EQUAL 8.0%<EQUAL 9.0%: CPT | Performed by: STUDENT IN AN ORGANIZED HEALTH CARE EDUCATION/TRAINING PROGRAM

## 2024-10-23 PROCEDURE — 81001 URINALYSIS AUTO W/SCOPE: CPT

## 2024-10-23 PROCEDURE — 81001 URINALYSIS AUTO W/SCOPE: CPT | Performed by: STUDENT IN AN ORGANIZED HEALTH CARE EDUCATION/TRAINING PROGRAM

## 2024-10-23 RX ORDER — FLASH GLUCOSE SENSOR
KIT MISCELLANEOUS
Qty: 6 EACH | Refills: 1 | Status: SHIPPED | OUTPATIENT
Start: 2024-10-23

## 2024-10-23 RX ORDER — FLUCONAZOLE 150 MG/1
150 TABLET ORAL
Qty: 2 TABLET | Refills: 0 | Status: SHIPPED | OUTPATIENT
Start: 2024-10-23 | End: 2024-10-29

## 2024-10-23 RX ORDER — OXYBUTYNIN CHLORIDE 10 MG/1
10 TABLET, EXTENDED RELEASE ORAL DAILY
Qty: 90 TABLET | Refills: 3 | Status: SHIPPED | OUTPATIENT
Start: 2024-10-23

## 2024-10-23 RX ORDER — ICOSAPENT ETHYL 1 G/1
2 CAPSULE ORAL 2 TIMES DAILY
Qty: 120 CAPSULE | Refills: 3 | Status: SHIPPED | OUTPATIENT
Start: 2024-10-23

## 2024-10-23 RX ORDER — ROSUVASTATIN CALCIUM 20 MG/1
20 TABLET, COATED ORAL NIGHTLY
Qty: 90 TABLET | Refills: 1 | Status: SHIPPED | OUTPATIENT
Start: 2024-10-23

## 2024-10-23 RX ORDER — INSULIN DEGLUDEC 200 U/ML
66 INJECTION, SOLUTION SUBCUTANEOUS DAILY
Qty: 15 ML | Refills: 3 | Status: SHIPPED | OUTPATIENT
Start: 2024-10-23

## 2024-10-23 RX ORDER — INSULIN ASPART 100 [IU]/ML
5 INJECTION, SOLUTION INTRAVENOUS; SUBCUTANEOUS
Qty: 5 ADJUSTABLE DOSE PRE-FILLED PEN SYRINGE | Refills: 3 | Status: SHIPPED | OUTPATIENT
Start: 2024-10-23

## 2024-10-23 RX ORDER — PEN NEEDLE, DIABETIC 31 GX3/16"
1 NEEDLE, DISPOSABLE MISCELLANEOUS 4 TIMES DAILY
Qty: 200 EACH | Refills: 3 | Status: SHIPPED | OUTPATIENT
Start: 2024-10-23

## 2024-10-23 RX ORDER — LEVOTHYROXINE SODIUM 137 UG/1
137 TABLET ORAL
Qty: 90 TABLET | Refills: 1 | Status: SHIPPED | OUTPATIENT
Start: 2024-10-23

## 2024-10-23 ASSESSMENT — ENCOUNTER SYMPTOMS
VOMITING: 0
EYE DISCHARGE: 0
COLOR CHANGE: 0
EYE REDNESS: 0
ABDOMINAL PAIN: 0
BACK PAIN: 0
SHORTNESS OF BREATH: 0
CONSTIPATION: 0
FACIAL SWELLING: 0
DIARRHEA: 0
EYE ITCHING: 0
EYE PAIN: 0

## 2024-10-23 NOTE — PROGRESS NOTES
Trice Cristina is a 59 y.o. year old female who presents today for   Chief Complaint   Patient presents with    Other     Need referral for [facility].     Urinary Tract Infection     Burning and some smell, using the bathroom frequently         \"Have you been to the ER, urgent care clinic since your last visit?  Hospitalized since your last visit?\"   NO     “Have you seen or consulted any other health care providers outside our system since your last visit?”   YES - When: approximately 1 months ago.  Where and Why: ORTHOPEDICS.       “Have you had a diabetic eye exam?”    YES - Where: MARCH 2024 Nurse/FAMILIA to request most recent records if not in the chart     Date of last diabetic eye exam: 7/27/2021           - FAMILIA Benítez  [facility]  Phone: [phone]  Fax: [phone]

## 2024-10-23 NOTE — PROGRESS NOTES
Trice Cristina is a 59 y.o.  female and presents with    Chief Complaint   Patient presents with    Other     Need referral for [facility].     Urinary Tract Infection     Burning and some smell, using the bathroom frequently            Subjective:    Diabetes  Taking medications as prescribed: YES  Currently on: Tresiba 62 units units, acarbose  Checking blood sugars at home: freestyle cayden - TIT 2% and above 98%  Symptoms: none  Diabetic diet: YES  Exercise: YES - walking and  doing yoga    She has been having some pain in the vaginal area. She feels like it could be a diaper rash. She does present w/ dysuria. She states it has been for 3 weeks.     Patient Active Problem List   Diagnosis    History of alcohol abuse    Hypercholesteremia    Vitamin B12 deficiency    Type 2 diabetes mellitus with diabetic neuropathy (HCC)    Lumbar degenerative disc disease    Type 2 diabetes mellitus with microalbuminuria, with long-term current use of insulin ([facility])    PTSD (post-traumatic stress disorder)    Left low back pain    Recurrent depression (HCC)    Tear of meniscus of left knee    Hypothyroidism due to acquired atrophy of thyroid    Bipolar 1 disorder, manic, full remission ([facility])      Past Medical History:   Diagnosis Date    Arthritis     Chronic pain     Depression     Diabetes (HCC)     Hyperlipidemia     Hypertension     Hypothyroid     Medial meniscus tear     Left knee    Menopause     Psychiatric disorder     Tobacco abuse, in remission       Past Surgical History:   Procedure Laterality Date    HAND/FINGER SURGERY UNLISTED Left 1989    reconstructive surgery    ORTHOPEDIC SURGERY      Hand reconstructive surgery    TONSILLECTOMY AND ADENOIDECTOMY      TUBAL LIGATION  2004    WISDOM TOOTH EXTRACTION        Family History   Problem Relation Age of Onset    Psychiatric Disorder Other         nephew    Alcohol Abuse Mother     Osteoarthritis Mother     Cancer Mother 60        breast cancer    Diabetes

## 2024-10-24 ENCOUNTER — TELEPHONE (OUTPATIENT)
Facility: CLINIC | Age: 59
End: 2024-10-24

## 2024-10-24 NOTE — TELEPHONE ENCOUNTER
Pt called stating she received a call from pharmacy re: Rx for high cholesterol medication (Did not know name), but she is allergic to it.    Please provide alternative medication.  Thank you.

## 2024-10-25 DIAGNOSIS — R30.0 DYSURIA: Primary | ICD-10-CM

## 2024-10-25 RX ORDER — SULFAMETHOXAZOLE AND TRIMETHOPRIM 800; 160 MG/1; MG/1
1 TABLET ORAL 2 TIMES DAILY
Qty: 6 TABLET | Refills: 0 | Status: SHIPPED | OUTPATIENT
Start: 2024-10-25 | End: 2024-10-28

## 2024-10-25 NOTE — TELEPHONE ENCOUNTER
Called patient.  Discussed with her that the medication that was sent to her rosuvastatin, she has been taking already for a while.  She has been having issues that is when he comes to taking other statins due to myalgias.  Patient verbalized understanding.

## 2024-10-26 LAB
BACTERIA SPEC CULT: ABNORMAL
CC UR VC: ABNORMAL
SERVICE CMNT-IMP: ABNORMAL

## 2024-11-05 ENCOUNTER — HOSPITAL ENCOUNTER (OUTPATIENT)
Facility: HOSPITAL | Age: 59
Setting detail: SPECIMEN
Discharge: HOME | End: 2024-11-08
Payer: MEDICARE

## 2024-11-05 ENCOUNTER — LAB (OUTPATIENT)
Facility: CLINIC | Age: 59
End: 2024-11-05

## 2024-11-05 DIAGNOSIS — E11.29 TYPE 2 DIABETES MELLITUS WITH OTHER DIABETIC KIDNEY COMPLICATION (HCC): ICD-10-CM

## 2024-11-05 LAB
ANION GAP SERPL CALC-SCNC: 6 MMOL/L (ref 3–18)
BUN SERPL-MCNC: 10 MG/DL (ref 7–18)
BUN/CREAT SERPL: 16 (ref 12–20)
CALCIUM SERPL-MCNC: 10 MG/DL (ref 8.5–10.1)
CHLORIDE SERPL-SCNC: 95 MMOL/L (ref 100–111)
CO2 SERPL-SCNC: 26 MMOL/L (ref 21–32)
CREAT SERPL-MCNC: 0.64 MG/DL (ref 0.6–1.3)
CREAT UR-MCNC: 15 MG/DL (ref 30–125)
GLUCOSE SERPL-MCNC: 174 MG/DL (ref 74–99)
HBA1C MFR BLD: 8.3 % (ref 4.2–5.6)
MICROALBUMIN UR-MCNC: 1.01 MG/DL (ref 0–3)
MICROALBUMIN/CREAT UR-RTO: 67 MG/G (ref 0–30)
POTASSIUM SERPL-SCNC: 4.8 MMOL/L (ref 3.5–5.5)
SODIUM SERPL-SCNC: 127 MMOL/L (ref 136–145)

## 2024-11-05 PROCEDURE — 82570 ASSAY OF URINE CREATININE: CPT

## 2024-11-05 PROCEDURE — 82043 UR ALBUMIN QUANTITATIVE: CPT

## 2024-11-05 PROCEDURE — 80048 BASIC METABOLIC PNL TOTAL CA: CPT

## 2024-11-05 PROCEDURE — 36415 COLL VENOUS BLD VENIPUNCTURE: CPT

## 2024-11-05 PROCEDURE — 83036 HEMOGLOBIN GLYCOSYLATED A1C: CPT

## 2024-11-07 ENCOUNTER — PHARMACY VISIT (OUTPATIENT)
Facility: CLINIC | Age: 59
End: 2024-11-07

## 2024-11-07 VITALS — WEIGHT: 184.2 LBS | BODY MASS INDEX: 31.62 KG/M2

## 2024-11-07 DIAGNOSIS — E11.29 TYPE 2 DIABETES MELLITUS WITH OTHER DIABETIC KIDNEY COMPLICATION (HCC): Primary | ICD-10-CM

## 2024-11-07 RX ORDER — SENNA AND DOCUSATE SODIUM 50; 8.6 MG/1; MG/1
3 TABLET, FILM COATED ORAL DAILY
COMMUNITY

## 2024-11-07 RX ORDER — BLOOD-GLUCOSE SENSOR
EACH MISCELLANEOUS
Qty: 6 EACH | Refills: 3 | OUTPATIENT
Start: 2024-11-07 | End: 2024-11-08 | Stop reason: SDUPTHER

## 2024-11-07 RX ORDER — ACETAMINOPHEN 500 MG
1000 TABLET ORAL PRN
COMMUNITY

## 2024-11-07 RX ORDER — KETOROLAC TROMETHAMINE 30 MG/ML
1 INJECTION, SOLUTION INTRAMUSCULAR; INTRAVENOUS CONTINUOUS
Qty: 1 EACH | Refills: 0 | Status: SHIPPED | OUTPATIENT
Start: 2024-11-07 | End: 2024-11-08 | Stop reason: SDUPTHER

## 2024-11-07 NOTE — PROGRESS NOTES
Pharmacy Progress Note - Diabetes Management Initial       Assessment / Plan:   Diabetes Management:  Per ADA guidelines, Pt's A1c is not at goal of < 7%.  Patient's most recent A1c was  8.3% on 11/5/24. According to patient's LibreView, her average BG is 156 mg/dL and is in range 77% of the time. This last week, she's had postprandial hyperglycemia because she's been eating sweet potato pie. Discussed dietary adjustments, portion control, and limiting sweets/baked goods to prevent temptation. Patient reports she tends to overindulge sometimes in foods since she stopped drinking alcohol. Recommended trying to make smaller portions or single serve items and freeze the remainder so she isn't tempted to overindulge. Also discussed keeping busy when she feels tempted to eat sweets such as walking, cleaning the house, crocheting, drinking water. She is nervous about going low at night so tends to eat snacks when she gets up in the middle of the night to use the bathroom. Will have patient switch dose of Tresiba to the daytime to help ease patient's nerves about nocturnal hyperglycemia. She will not take her dose of Tresiba tonight and take tomorrow morning. Will order Maddi 3 plus sensors and reader to upgrade patient to a system that has alerts. Follow up in 1 month.    Medication therapy changes:   - Ordered Maddi 3 plus sensors and reader   - Switch Tresiba to the morning     Hyperlipidemia:  The 10-year ASCVD risk score (Mo DK, et al., 2019) is: 8.4% based on parameters listed. Current lipid treatment guidelines recommend at least moderate-intensity statin doses for all patients with diabetes to decrease overall ASCVD risk. Patient currently qualifies for intensity statin therapy based on current recommendations and is currently taking rosuvastatin 20 mg daily. No further issues identified at this time.     Medication list updated:   Reviewed patient's medication list to ensure information was up to date. Pt

## 2024-11-08 RX ORDER — BLOOD-GLUCOSE SENSOR
EACH MISCELLANEOUS
Qty: 6 EACH | Refills: 3 | Status: SHIPPED | OUTPATIENT
Start: 2024-11-08

## 2024-11-08 RX ORDER — KETOROLAC TROMETHAMINE 30 MG/ML
1 INJECTION, SOLUTION INTRAMUSCULAR; INTRAVENOUS CONTINUOUS
Qty: 1 EACH | Refills: 0 | Status: SHIPPED | OUTPATIENT
Start: 2024-11-08

## 2024-11-12 ENCOUNTER — TELEPHONE (OUTPATIENT)
Facility: CLINIC | Age: 59
End: 2024-11-12

## 2024-11-12 ENCOUNTER — OFFICE VISIT (OUTPATIENT)
Facility: CLINIC | Age: 59
End: 2024-11-12
Payer: MEDICARE

## 2024-11-12 VITALS
HEIGHT: 64 IN | BODY MASS INDEX: 31.72 KG/M2 | WEIGHT: 185.8 LBS | SYSTOLIC BLOOD PRESSURE: 136 MMHG | RESPIRATION RATE: 14 BRPM | TEMPERATURE: 97.1 F | OXYGEN SATURATION: 98 % | HEART RATE: 66 BPM | DIASTOLIC BLOOD PRESSURE: 83 MMHG

## 2024-11-12 DIAGNOSIS — E11.29 TYPE 2 DIABETES MELLITUS WITH OTHER DIABETIC KIDNEY COMPLICATION (HCC): Primary | ICD-10-CM

## 2024-11-12 PROCEDURE — 3052F HG A1C>EQUAL 8.0%<EQUAL 9.0%: CPT | Performed by: STUDENT IN AN ORGANIZED HEALTH CARE EDUCATION/TRAINING PROGRAM

## 2024-11-12 PROCEDURE — 99214 OFFICE O/P EST MOD 30 MIN: CPT | Performed by: STUDENT IN AN ORGANIZED HEALTH CARE EDUCATION/TRAINING PROGRAM

## 2024-11-12 ASSESSMENT — ENCOUNTER SYMPTOMS
SHORTNESS OF BREATH: 0
EYE PAIN: 0
FACIAL SWELLING: 0
DIARRHEA: 0
EYE DISCHARGE: 0
VOMITING: 0
COLOR CHANGE: 0
BACK PAIN: 0
ABDOMINAL PAIN: 0
CONSTIPATION: 0
EYE REDNESS: 0
EYE ITCHING: 0

## 2024-11-12 NOTE — TELEPHONE ENCOUNTER
Pharmacy Progress Note - Telephone Call    Ms. Trice Cristina 59 y.o. was contacted via an outbound telephone call today regarding Maddi 3 plus sensors.     PA results from [facility] (Key:[id number]) and resulted that question response was not able to be generated, contact number on the back of card [phone]. Spoke with Trudi who states that the sensors go through medical side. Was transferred to medical team who states that paperwork needs faxed to [phone] with the provider's name, NPI, order information, start date, and clinical notes.    Attempted to contact the patient to inquire if she would be agreeable to get CGM supplies from DME pharmacy and verify mailing address to submit order through [employer]. Patient did not answer. Left a VM stating to return call back to [phone], option 2 regarding her sensors.    Thank you,    Jo-Ann Ulrich, PharmD  Clinical Pharmacy Specialist  11/12/24    For Pharmacy Admin Tracking Only    Program: Medical Group  CPA in place:  Yes  Time Spent (min): 20

## 2024-11-12 NOTE — PROGRESS NOTES
Trice Cristina is a 59 y.o. year old female who presents today for   Chief Complaint   Patient presents with    3 Month Follow-Up        \"Have you been to the ER, urgent care clinic since your last visit?  Hospitalized since your last visit?\"   NO     “Have you seen or consulted any other health care providers outside our system since your last visit?”   NO     Have you had a mammogram?”   YES - Where: 11/2024 Nurse/CMA to request most recent records if not in the chart    Date of last Mammogram: 10/25/2023       “Have you had a diabetic eye exam?”    YES - Where: 03/2024 Nurse/CMA to request most recent records if not in the chart     Date of last diabetic eye exam: 7/27/2021           - FAMILIA Benítez  [facility]  Phone: [phone]  Fax: [phone]   Statement Selected

## 2024-11-12 NOTE — PROGRESS NOTES
Trice Cristina is a 59 y.o.  female and presents with    Chief Complaint   Patient presents with    3 Month Follow-Up           Subjective:    Diabetes  Taking medications as prescribed: YES  Currently on: Tresiba 62 units units, acarbose, Novolog 5 units TID  Checking blood sugars at home: freestyle cayden - TIT 30% and above 70% for the last 14 days  Symptoms: none  Diabetic diet: YES  Exercise: YES - walking and  doing yoga    Patient Active Problem List   Diagnosis    History of alcohol abuse    Hypercholesteremia    Vitamin B12 deficiency    Type 2 diabetes mellitus with diabetic neuropathy (HCC)    Lumbar degenerative disc disease    Type 2 diabetes mellitus with microalbuminuria, with long-term current use of insulin (HCC)    PTSD (post-traumatic stress disorder)    Left low back pain    Recurrent depression ([facility])    Tear of meniscus of left knee    Hypothyroidism due to acquired atrophy of thyroid    Bipolar 1 disorder, manic, full remission ([facility])      Past Medical History:   Diagnosis Date    Arthritis     Chronic pain     Depression     Diabetes (HCC)     Hyperlipidemia     Hypertension     Hypothyroid     Medial meniscus tear     Left knee    Menopause     Psychiatric disorder     Tobacco abuse, in remission       Past Surgical History:   Procedure Laterality Date    HAND/FINGER SURGERY UNLISTED Left 1989    reconstructive surgery    ORTHOPEDIC SURGERY      Hand reconstructive surgery    TONSILLECTOMY AND ADENOIDECTOMY      TUBAL LIGATION  2004    WISDOM TOOTH EXTRACTION        Family History   Problem Relation Age of Onset    Psychiatric Disorder Other         nephew    Alcohol Abuse Mother     Osteoarthritis Mother     Cancer Mother 60        breast cancer    Diabetes Mother     Hypertension Mother     Psychiatric Disorder Mother     Stroke Mother     Breast Cancer Mother 63    Alcohol Abuse Father     Heart Disease Father     Diabetes Father     Hypertension Father     Lung Disease Father

## 2024-11-14 NOTE — TELEPHONE ENCOUNTER
*Incoming Call*  For Jo-Ann Ulrich:    Patient confirmed that sensors can be shipped to her home address, [address] [place] [address] [address] [place] [id number]     [facility]ae[facility] Rico [facility]   Ambulatory Pharmacy Clinical   [phone]  Department, toll free: [phone], option 2     For Pharmacy Admin Tracking Only    Program: Medical Group  Gap Closed?: Yes   Time Spent (min): 10

## 2024-11-15 ENCOUNTER — CLINICAL DOCUMENTATION (OUTPATIENT)
Facility: CLINIC | Age: 59
End: 2024-11-15

## 2024-11-15 NOTE — PROGRESS NOTES
Pharmacy Progress Note - Clinical Documentation     Ordered Maddi 3 plus sensors and Maddi 3 reader through DME Pharmacy ([facility]).     Thank you,   Jo-Ann Ulrich, PharmD  Clinical Pharmacist Specialist  11/15/24     For Pharmacy Admin Tracking Only    Program: Medical Group  CPA in place:  Yes  Recommendation Provided To: Other: 2  Intervention Detail: New Rx: 2, reason: Cost/Formulary Change, Needs Additional Therapy  Intervention Accepted By: Patient/Caregiver: 2  Gap Closed?: No   Time Spent (min): 10

## 2024-11-21 DIAGNOSIS — E03.4 HYPOTHYROIDISM DUE TO ACQUIRED ATROPHY OF THYROID: ICD-10-CM

## 2024-11-21 DIAGNOSIS — E11.29 TYPE 2 DIABETES MELLITUS WITH OTHER DIABETIC KIDNEY COMPLICATION (HCC): ICD-10-CM

## 2024-11-21 NOTE — TELEPHONE ENCOUNTER
Patient called in to request the following medication      Insulin Pen Needle (DROPLET PEN NEEDLES) 31G X 5 MM MISC         levothyroxine (SYNTHROID) 137 MCG tablet       LOV:11/12/24    NOV:02/12/2024      Please advise    Thank you

## 2024-11-21 NOTE — TELEPHONE ENCOUNTER
Medication(s) requesting:   Requested Prescriptions     Pending Prescriptions Disp Refills    levothyroxine (SYNTHROID) 137 MCG tablet 90 tablet 1     Sig: Take 1 tablet by mouth every morning (before breakfast)    Insulin Pen Needle (DROPLET PEN NEEDLES) 31G X 5 MM MISC 200 each 3     Sig: Inject 1 each into the skin in the morning, at noon, in the evening, and at bedtime       Last office visit:  11/12/2024  Next office visit DMA: 12/19/2024

## 2024-11-26 RX ORDER — LEVOTHYROXINE SODIUM 137 UG/1
137 TABLET ORAL
Qty: 90 TABLET | Refills: 1 | Status: SHIPPED | OUTPATIENT
Start: 2024-11-26

## 2024-11-26 RX ORDER — PEN NEEDLE, DIABETIC 31 GX3/16"
1 NEEDLE, DISPOSABLE MISCELLANEOUS 4 TIMES DAILY
Qty: 200 EACH | Refills: 3 | Status: SHIPPED | OUTPATIENT
Start: 2024-11-26

## 2024-12-05 DIAGNOSIS — E11.29 TYPE 2 DIABETES MELLITUS WITH OTHER DIABETIC KIDNEY COMPLICATION (HCC): ICD-10-CM

## 2024-12-05 NOTE — TELEPHONE ENCOUNTER
Medication(s) requesting:   Requested Prescriptions     Pending Prescriptions Disp Refills    insulin aspart (NOVOLOG FLEXPEN) 100 UNIT/ML injection pen 5 Adjustable Dose Pre-filled Pen Syringe 3     Sig: Inject 5 Units into the skin 3 times daily (before meals)       Last office visit:  11/12/2024  Next office visit DMA: 1/9/2025

## 2024-12-05 NOTE — TELEPHONE ENCOUNTER
Pt needs refill for below,edication      insulin aspart (NOVOLOG FLEXPEN) 100 UNIT/ML injection pen               Pt called in states that as per [facility] her novolon pen should indicate the following        5 units in the morning  7 at lunch   10 at dinner   As per  [facility] Pharmacist      She needs to have this reflected in script       Please advise

## 2024-12-09 DIAGNOSIS — E78.1 HYPERTRIGLYCERIDEMIA: ICD-10-CM

## 2024-12-09 DIAGNOSIS — E03.4 HYPOTHYROIDISM DUE TO ACQUIRED ATROPHY OF THYROID: ICD-10-CM

## 2024-12-09 DIAGNOSIS — E11.29 TYPE 2 DIABETES MELLITUS WITH OTHER DIABETIC KIDNEY COMPLICATION (HCC): ICD-10-CM

## 2024-12-09 DIAGNOSIS — N32.81 OVERACTIVE BLADDER: ICD-10-CM

## 2024-12-09 NOTE — TELEPHONE ENCOUNTER
Medication(s) requesting:   Requested Prescriptions     Pending Prescriptions Disp Refills    oxyBUTYnin (DITROPAN-XL) 10 MG extended release tablet 90 tablet 3     Sig: Take 1 tablet by mouth daily    Icosapent Ethyl (VASCEPA) 1 g CAPS capsule 120 capsule 3     Sig: Take 2 capsules by mouth 2 times daily    rosuvastatin (CRESTOR) 20 MG tablet 90 tablet 1     Sig: Take 1 tablet by mouth nightly    Insulin Pen Needle (DROPLET PEN NEEDLES) 31G X 5 MM MISC 200 each 3     Sig: Inject 1 each into the skin in the morning, at noon, in the evening, and at bedtime    levothyroxine (SYNTHROID) 137 MCG tablet 90 tablet 1     Sig: Take 1 tablet by mouth every morning (before breakfast)    Insulin Degludec (TRESIBA FLEXTOUCH) 200 UNIT/ML SOPN 15 mL 3     Sig: Inject 66 Units into the skin daily    insulin aspart (NOVOLOG FLEXPEN) 100 UNIT/ML injection pen 5 Adjustable Dose Pre-filled Pen Syringe 3     Sig: Inject 5 Units into the skin 3 times daily (before meals)       Last office visit:  11/12/2024  Next office visit DMA: 1/9/2025

## 2024-12-09 NOTE — TELEPHONE ENCOUNTER
Pharmacy has requested the following new refills,:      LOV 11/12/24    NOV:  2/12/25        New pharmacy:      [facility] - [facility] [address] [phone] -  [phone] [[id number]]       OXYBUTYIN ER TAB    VASCEPA  CAP    ROSUVASTATIN TAB    PENNEEDLE [id number] ULTIGU    LEVOTHRYOXINE TAB    TRESIBA VIAL     NOVOLIN N FLEXPEN          Please advise    Thank you

## 2024-12-10 RX ORDER — ICOSAPENT ETHYL 1 G/1
2 CAPSULE ORAL 2 TIMES DAILY
Qty: 120 CAPSULE | Refills: 3 | Status: SHIPPED | OUTPATIENT
Start: 2024-12-10

## 2024-12-10 RX ORDER — OXYBUTYNIN CHLORIDE 10 MG/1
10 TABLET, EXTENDED RELEASE ORAL DAILY
Qty: 90 TABLET | Refills: 3 | Status: SHIPPED | OUTPATIENT
Start: 2024-12-10

## 2024-12-10 RX ORDER — INSULIN ASPART 100 [IU]/ML
5 INJECTION, SOLUTION INTRAVENOUS; SUBCUTANEOUS
Qty: 5 ADJUSTABLE DOSE PRE-FILLED PEN SYRINGE | Refills: 3 | OUTPATIENT
Start: 2024-12-10

## 2024-12-10 RX ORDER — LEVOTHYROXINE SODIUM 137 UG/1
137 TABLET ORAL
Qty: 90 TABLET | Refills: 1 | Status: SHIPPED | OUTPATIENT
Start: 2024-12-10

## 2024-12-10 RX ORDER — INSULIN DEGLUDEC 200 U/ML
66 INJECTION, SOLUTION SUBCUTANEOUS DAILY
Qty: 15 ML | Refills: 3 | Status: SHIPPED | OUTPATIENT
Start: 2024-12-10

## 2024-12-10 RX ORDER — PEN NEEDLE, DIABETIC 31 GX3/16"
1 NEEDLE, DISPOSABLE MISCELLANEOUS 4 TIMES DAILY
Qty: 200 EACH | Refills: 3 | Status: SHIPPED | OUTPATIENT
Start: 2024-12-10

## 2024-12-10 RX ORDER — INSULIN ASPART 100 [IU]/ML
5 INJECTION, SOLUTION INTRAVENOUS; SUBCUTANEOUS
Qty: 5 ADJUSTABLE DOSE PRE-FILLED PEN SYRINGE | Refills: 3 | Status: SHIPPED | OUTPATIENT
Start: 2024-12-10

## 2024-12-10 RX ORDER — ROSUVASTATIN CALCIUM 20 MG/1
20 TABLET, COATED ORAL NIGHTLY
Qty: 90 TABLET | Refills: 1 | Status: SHIPPED | OUTPATIENT
Start: 2024-12-10

## 2024-12-10 NOTE — TELEPHONE ENCOUNTER
Pt is following up on previous request:      Pt called in states that as per [facility] her novolon pen should indicate the following           5 units in the morning  7 at lunch   10 at dinner   As per  [facility] Pharmacist      Would like to use       [facility] - [address] - P [phone] - F [phone]         Please Advise      Thank you

## 2024-12-16 DIAGNOSIS — E11.29 TYPE 2 DIABETES MELLITUS WITH OTHER DIABETIC KIDNEY COMPLICATION (HCC): ICD-10-CM

## 2024-12-16 RX ORDER — INSULIN ASPART 100 [IU]/ML
INJECTION, SOLUTION INTRAVENOUS; SUBCUTANEOUS
Qty: 5 ADJUSTABLE DOSE PRE-FILLED PEN SYRINGE | Refills: 3 | Status: SHIPPED | OUTPATIENT
Start: 2024-12-16

## 2024-12-16 NOTE — TELEPHONE ENCOUNTER
Mitchell called in requesting medication refill for the following medication:     Insulin Aspart (NOVOLOG FLEXPEN) 100 UNIT/ML injection pen     Please be advised, thank you.

## 2024-12-23 DIAGNOSIS — E03.4 HYPOTHYROIDISM DUE TO ACQUIRED ATROPHY OF THYROID: ICD-10-CM

## 2024-12-23 DIAGNOSIS — E11.29 TYPE 2 DIABETES MELLITUS WITH OTHER DIABETIC KIDNEY COMPLICATION (HCC): ICD-10-CM

## 2024-12-23 RX ORDER — LEVOTHYROXINE SODIUM 137 UG/1
137 TABLET ORAL
Qty: 90 TABLET | Refills: 1 | Status: SHIPPED | OUTPATIENT
Start: 2024-12-23

## 2024-12-23 RX ORDER — INSULIN DEGLUDEC 200 U/ML
66 INJECTION, SOLUTION SUBCUTANEOUS DAILY
Qty: 15 ML | Refills: 3 | Status: SHIPPED | OUTPATIENT
Start: 2024-12-23

## 2024-12-23 RX ORDER — INSULIN ASPART 100 [IU]/ML
INJECTION, SOLUTION INTRAVENOUS; SUBCUTANEOUS
Qty: 5 ADJUSTABLE DOSE PRE-FILLED PEN SYRINGE | Refills: 3 | Status: SHIPPED | OUTPATIENT
Start: 2024-12-23

## 2024-12-23 NOTE — TELEPHONE ENCOUNTER
Medication(s) requesting:   Requested Prescriptions     Pending Prescriptions Disp Refills    levothyroxine (SYNTHROID) 137 MCG tablet 90 tablet 1     Sig: Take 1 tablet by mouth every morning (before breakfast)    Insulin Degludec (TRESIBA FLEXTOUCH) 200 UNIT/ML SOPN 15 mL 3     Sig: Inject 66 Units into the skin daily    insulin aspart (NOVOLOG FLEXPEN) 100 UNIT/ML injection pen 5 Adjustable Dose Pre-filled Pen Syringe 3     Sig: Please inject 5 units in the morning, 7 units at lunch, 10 units at dinner       Last office visit:  11.12.24  Next office visit DMA: 1/9/2025

## 2024-12-27 DIAGNOSIS — E11.29 TYPE 2 DIABETES MELLITUS WITH OTHER DIABETIC KIDNEY COMPLICATION (HCC): ICD-10-CM

## 2024-12-27 NOTE — TELEPHONE ENCOUNTER
----- Message from Xavierjeansharad sent at 12/27/2024  9:13 AM CST -----  Contact: BRIGIDA CENTENO [[id number]]  ..Type:  Patient Requesting Call    Who Called:BRIGIDA CENTENO [[id number]]  Does the patient know what this is regarding?:need authorization   Would the patient rather a call back or a response via [facility]? call  Best Call Back Number: [phone]  Additional Information: Kelsie Deleon recommended cat scan of chest to be done, however [facility] haven't received authorization  yet or no response. They need to know will this be covered with the patient insurance.   Medication(s) requesting:   Requested Prescriptions     Pending Prescriptions Disp Refills    Insulin Pen Needle (DROPLET PEN NEEDLES) 31G X 5 MM MISC 200 each 3     Sig: Inject 1 each into the skin in the morning, at noon, in the evening, and at bedtime       Last office visit:  11/12/24  Next office visit DMA: 1/9/2025

## 2024-12-27 NOTE — TELEPHONE ENCOUNTER
Med Refill Requests    Medication(s) requesting:   Requested Prescriptions        Pending Prescriptions Disp Refills   Insulin Pen Needle (DROPLET PEN NEEDLES) 31G X 5 MM MISC 200 each 3       Last OV: 11/12/24  Next Appt: 02/12/25

## 2024-12-27 NOTE — TELEPHONE ENCOUNTER
Medication(s) requesting:   Requested Prescriptions     Pending Prescriptions Disp Refills    Insulin Pen Needle (DROPLET PEN NEEDLES) 31G X 5 MM MISC 200 each 3     Sig: Inject 1 each into the skin in the morning, at noon, in the evening, and at bedtime       Last office visit:  11/12/24  Next office visit DMA: 1/9/2025

## 2024-12-30 DIAGNOSIS — E11.29 TYPE 2 DIABETES MELLITUS WITH OTHER DIABETIC KIDNEY COMPLICATION (HCC): ICD-10-CM

## 2024-12-30 DIAGNOSIS — N32.81 OVERACTIVE BLADDER: ICD-10-CM

## 2024-12-30 DIAGNOSIS — E78.1 HYPERTRIGLYCERIDEMIA: ICD-10-CM

## 2025-01-06 RX ORDER — ICOSAPENT ETHYL 1 G/1
2 CAPSULE ORAL 2 TIMES DAILY
Qty: 120 CAPSULE | Refills: 3 | Status: SHIPPED | OUTPATIENT
Start: 2025-01-06

## 2025-01-06 RX ORDER — OXYBUTYNIN CHLORIDE 10 MG/1
10 TABLET, EXTENDED RELEASE ORAL DAILY
Qty: 90 TABLET | Refills: 3 | Status: SHIPPED | OUTPATIENT
Start: 2025-01-06

## 2025-01-06 RX ORDER — PEN NEEDLE, DIABETIC 31 GX3/16"
1 NEEDLE, DISPOSABLE MISCELLANEOUS 4 TIMES DAILY
Qty: 200 EACH | Refills: 3 | Status: SHIPPED | OUTPATIENT
Start: 2025-01-06

## 2025-01-06 RX ORDER — ROSUVASTATIN CALCIUM 20 MG/1
20 TABLET, COATED ORAL NIGHTLY
Qty: 90 TABLET | Refills: 1 | Status: SHIPPED | OUTPATIENT
Start: 2025-01-06

## 2025-01-08 ENCOUNTER — TELEPHONE (OUTPATIENT)
Dept: PHARMACY | Facility: CLINIC | Age: 60
End: 2025-01-08

## 2025-01-08 NOTE — TELEPHONE ENCOUNTER
**[facility]**    Attempt made to contact patient at the home number.    Patient cancelled appointment on 1/9/25 at 9:00 am with Jo-Ann Ulrich    Left a message requesting a call back at [phone] to schedule the appointment    *Letter Sent*    Maeve Rico [facility]   Ambulatory Pharmacy Clinical   [phone]  Department, toll free: [phone], option 2       For Pharmacy Admin Tracking Only    Program: Medical Group  Gap Closed?: No   Time Spent (min): 5

## 2025-02-06 DIAGNOSIS — E11.29 TYPE 2 DIABETES MELLITUS WITH OTHER DIABETIC KIDNEY COMPLICATION (HCC): ICD-10-CM

## 2025-02-06 RX ORDER — ROSUVASTATIN CALCIUM 20 MG/1
20 TABLET, COATED ORAL NIGHTLY
Qty: 90 TABLET | Refills: 3 | Status: SHIPPED | OUTPATIENT
Start: 2025-02-06

## 2025-02-06 NOTE — TELEPHONE ENCOUNTER
Medication(s) requesting:   Requested Prescriptions     Pending Prescriptions Disp Refills    rosuvastatin (CRESTOR) 20 MG tablet [Pharmacy Med Name: Rosuvastatin Calcium 20mg Tablet] 30 tablet 11     Sig: Take 1 tablet by mouth every evening       Last office visit:  11/12/2024  Next office visit DMA: 2/12/2025

## 2025-02-19 DIAGNOSIS — E11.29 TYPE 2 DIABETES MELLITUS WITH OTHER DIABETIC KIDNEY COMPLICATION (HCC): ICD-10-CM

## 2025-02-24 NOTE — TELEPHONE ENCOUNTER
Medication(s) requesting:   Requested Prescriptions     Pending Prescriptions Disp Refills    TRESIBA FLEXTOUCH 200 UNIT/ML SOPN [Pharmacy Med Name: Tresiba FlexTouch 200u/mL Pen] 9 mL 11     Sig: Inject 66 units subcutaneously every day       Last office visit:  11/12/2024  Next office visit DMA: Visit date not found

## 2025-02-28 RX ORDER — INSULIN DEGLUDEC 200 U/ML
INJECTION, SOLUTION SUBCUTANEOUS
Qty: 9 ML | Refills: 11 | Status: SHIPPED | OUTPATIENT
Start: 2025-02-28

## 2025-03-08 DIAGNOSIS — E78.1 HYPERTRIGLYCERIDEMIA: ICD-10-CM

## 2025-03-08 DIAGNOSIS — E11.29 TYPE 2 DIABETES MELLITUS WITH OTHER DIABETIC KIDNEY COMPLICATION (HCC): ICD-10-CM

## 2025-03-10 NOTE — TELEPHONE ENCOUNTER
Medication(s) requesting:   Requested Prescriptions     Pending Prescriptions Disp Refills    VASCEPA 1 g CAPS capsule [Pharmacy Med Name: Vascepa 1g Capsule] 120 capsule 11     Sig: Take 2 capsules by mouth twice daily       Last office visit:  11/12/2024  Next office visit DMA: Visit date not found

## 2025-03-13 RX ORDER — ICOSAPENT ETHYL 1000 MG/1
2 CAPSULE ORAL 2 TIMES DAILY
Qty: 120 CAPSULE | Refills: 5 | Status: SHIPPED | OUTPATIENT
Start: 2025-03-13

## 2025-05-11 DIAGNOSIS — E11.29 TYPE 2 DIABETES MELLITUS WITH OTHER DIABETIC KIDNEY COMPLICATION (HCC): ICD-10-CM

## 2025-05-12 NOTE — TELEPHONE ENCOUNTER
Medication(s) requesting:   Requested Prescriptions     Pending Prescriptions Disp Refills    insulin aspart (NOVOLOG FLEXPEN) 100 UNIT/ML injection pen [Pharmacy Med Name: Novolog Flexpen 100unit/ml] 15 mL 11     Sig: Inject 5 units subcutaneously in the morning, 7 units at lunch, & 10 units at dinner       Last office visit:  11/12/2024  Next office visit DMA: Visit date not found

## 2025-05-16 RX ORDER — INSULIN ASPART 100 [IU]/ML
INJECTION, SOLUTION INTRAVENOUS; SUBCUTANEOUS
Qty: 15 ML | Refills: 2 | Status: SHIPPED | OUTPATIENT
Start: 2025-05-16